# Patient Record
Sex: FEMALE | Race: WHITE | NOT HISPANIC OR LATINO | Employment: UNEMPLOYED | URBAN - NONMETROPOLITAN AREA
[De-identification: names, ages, dates, MRNs, and addresses within clinical notes are randomized per-mention and may not be internally consistent; named-entity substitution may affect disease eponyms.]

---

## 2017-01-01 ENCOUNTER — HOSPITAL ENCOUNTER (OUTPATIENT)
Dept: PHYSICAL THERAPY | Facility: HOSPITAL | Age: 37
Setting detail: THERAPIES SERIES
Discharge: HOME | End: 2017-01-20
Attending: NURSE PRACTITIONER | Admitting: NURSE PRACTITIONER

## 2017-01-04 ENCOUNTER — TRANSCRIBE ORDERS (OUTPATIENT)
Dept: ADMINISTRATIVE | Facility: HOSPITAL | Age: 37
End: 2017-01-04

## 2017-01-04 DIAGNOSIS — M54.17 RADICULOPATHY OF LUMBOSACRAL REGION: ICD-10-CM

## 2017-01-04 DIAGNOSIS — M54.50 LOW BACK PAIN POTENTIALLY ASSOCIATED WITH RADICULOPATHY: Primary | ICD-10-CM

## 2017-01-05 ENCOUNTER — HOSPITAL ENCOUNTER (OUTPATIENT)
Dept: MRI IMAGING | Facility: HOSPITAL | Age: 37
Discharge: HOME | End: 2017-01-05
Admitting: NURSE PRACTITIONER

## 2017-01-05 DIAGNOSIS — M54.50 LOW BACK PAIN POTENTIALLY ASSOCIATED WITH RADICULOPATHY: ICD-10-CM

## 2017-01-05 DIAGNOSIS — M54.17 RADICULOPATHY OF LUMBOSACRAL REGION: ICD-10-CM

## 2017-01-05 PROCEDURE — 72148 MRI LUMBAR SPINE W/O DYE: CPT

## 2017-01-18 ENCOUNTER — OFFICE VISIT (OUTPATIENT)
Dept: NEUROSURGERY | Facility: CLINIC | Age: 37
End: 2017-01-18

## 2017-01-18 VITALS — BODY MASS INDEX: 41.99 KG/M2 | HEIGHT: 65 IN | WEIGHT: 252 LBS

## 2017-01-18 DIAGNOSIS — M54.17 LUMBOSACRAL NEURITIS: ICD-10-CM

## 2017-01-18 DIAGNOSIS — E66.01 MORBID OBESITY DUE TO EXCESS CALORIES (HCC): ICD-10-CM

## 2017-01-18 DIAGNOSIS — M51.36 DEGENERATION OF LUMBAR INTERVERTEBRAL DISC: Primary | ICD-10-CM

## 2017-01-18 DIAGNOSIS — F17.210 CIGARETTE SMOKER: ICD-10-CM

## 2017-01-18 PROCEDURE — 99204 OFFICE O/P NEW MOD 45 MIN: CPT | Performed by: NURSE PRACTITIONER

## 2017-01-18 RX ORDER — TRAMADOL HYDROCHLORIDE 50 MG/1
50 TABLET ORAL 2 TIMES DAILY
COMMUNITY
End: 2017-11-06

## 2017-01-18 RX ORDER — OXYCODONE AND ACETAMINOPHEN 10; 325 MG/1; MG/1
1 TABLET ORAL 3 TIMES DAILY
Qty: 90 TABLET | Refills: 0 | Status: SHIPPED | OUTPATIENT
Start: 2017-01-18 | End: 2017-02-17

## 2017-01-18 RX ORDER — PREGABALIN 100 MG/1
100 CAPSULE ORAL 2 TIMES DAILY
COMMUNITY

## 2017-01-18 RX ORDER — TIZANIDINE 4 MG/1
4 TABLET ORAL 2 TIMES DAILY
COMMUNITY
End: 2017-11-06

## 2017-01-18 NOTE — PROGRESS NOTES
Neurosurgery Initial Patient Visit    Patient: Merari Bradshaw  : 1980    Primary Care Provider: LETY Shelby    Requesting Provider:  LETY Shelby      History    Chief Complaint:   Chief Complaint   Patient presents with   • Back Pain     Noticed having back pain and leg numbness while pregnant, to the point of even having falls while pregnant. After delivery, she would get better. Within the past 8 months she has noticed a severe increase in pain, to the point she is not able to stand for longer than 5-10 minutes. Legs alternate with numbness and weakness. Has recently tried PT with no improvement.       History of Present Illness: She is a 37-year-old  female who presents with chief complaint of pain in hips and lower back, pain in legs.  She is referred by LETY Mckeon, and we have been asked to see the patient in consultation for further evaluation.    She has had problems with her low back since around , during pregnancy.  She states that over the years this is gradually become worse.  She does not give history of any specific injury otherwise.  During her pregnancy she would often have episodes where her right leg would become completely numb, resulting in giving way with her and falling.  She has low back pain and pain that she feels in both of the hips.  She then has pain that radiates down the legs more mesial Rudy and posteriorly, reaching down into the foot on the left.  She has intermittent numbness and tingling more with activity, and the symptoms are all worse in the left leg.  As far as weakness she feels that both legs are about the same.    She has had 2 different sessions of physical therapy recently, probably a couple of months in total.  This even included pool therapy to ensure that she was not having an exacerbation of fibromyalgia.  She unfortunately did not get any lasting relief.  She chronically takes medication for her fibromyalgia and more  "recently has been prescribed tramadol for her back and leg pain.  She states that she is at the point that she can barely go and that she cannot even do more simple activities such as housekeeping.  She has been employed as a  and has not been able to work now in a while.  She has had a lumbar MRI here at [facility] for review.    Allergies:   Adhesive tape  She does not tolerate Neurontin, which makes her \"loopy\", and has had GI upset from Lortab in the past.    Past Medical History:    Past Medical History   Diagnosis Date   • Acid reflux    • Basal cell carcinoma of breast    • Fibrocystic breast    • Fibromyalgia        Past Surgical History:   Past Surgical History   Procedure Laterality Date   • Hysterectomy     •  section     • Cholecystectomy     • Hx ovarian cystectomy     • Exploratory laparotomy         Medications:    Current Outpatient Prescriptions on File Prior to Visit   Medication Sig Dispense Refill   • omeprazole (priLOSEC) 20 MG capsule Take 20 mg by mouth Daily.     • VITAMIN D, ERGOCALCIFEROL, PO Take 1 tablet by mouth Daily.     • [DISCONTINUED] pregabalin (LYRICA) 75 MG capsule Take 75 mg by mouth 3 (Three) Times a Day.       No current facility-administered medications on file prior to visit.         Social History:   reports that she has been smoking Cigarettes.  She has been smoking about 0.50 packs per day. She has never used smokeless tobacco. She reports that she does not drink alcohol or use illicit drugs.    Family History:    Family History   Problem Relation Age of Onset   • Coronary artery disease Father    • Hypertension Father    • Diabetes Mother    • Hypertension Mother    • Deep vein thrombosis Paternal Grandmother    • Pulmonary embolism Paternal Grandmother    • Throat cancer Maternal Grandfather        Review of Systems:  Review of Systems   Constitutional: Negative for chills, fever and unexpected weight change.   HENT: Negative for congestion, hearing loss, " rhinorrhea, sore throat and tinnitus.    Eyes: Negative for photophobia and visual disturbance.        Wears glasses   Respiratory: Negative for cough, shortness of breath and wheezing.    Cardiovascular: Negative for chest pain and palpitations.   Gastrointestinal: Negative for constipation, diarrhea, nausea and vomiting.   Endocrine: Positive for cold intolerance and heat intolerance.   Genitourinary: Negative for difficulty urinating.        She describes having some dribbling ever since her hysterectomy.   Musculoskeletal: Positive for back pain. Negative for arthralgias, gait problem and neck pain.        She has fibromyalgia.   Skin: Negative for rash.        She has polycystic breast disease and has had removal of basal cell carcinoma from the left breast.   Allergic/Immunologic: Negative for environmental allergies.   Neurological: Positive for headaches. Negative for seizures and numbness.   Hematological: Does not bruise/bleed easily.   Psychiatric/Behavioral: Negative for confusion. The patient is not nervous/anxious.    All other systems reviewed and are negative.          Neurological Physical Examination    Physical Exam   Constitutional: She is oriented to person, place, and time. She appears well-developed. No distress.   She is over nourished.  She appears to be in quite a bit of pain and discomfort.  She moves about very slowly with antalgic gait.   HENT:   Head: Normocephalic and atraumatic.   Eyes: No scleral icterus.   Neck: Neck supple. No tracheal deviation and normal range of motion present.   Cardiovascular: Normal rate, regular rhythm and normal heart sounds.    No murmur heard.  Pulmonary/Chest: Effort normal and breath sounds normal. No respiratory distress. She has no wheezes.   Musculoskeletal:   She seems very comfortable and moves about and changes positions very slowly.  Her gait is very antalgic.  Lumbar mobility is quite limited.  Straight leg raising is positive bilaterally, more  so on the left and at about 30-45°.  She has generalized lower extremity weakness, more on the left.  She is unable to attempt testing of all muscle groups.  Deep tendon reflexes a good 2+ patellar, 1-2+ at both ankles.   Neurological: She is alert and oriented to person, place, and time. She displays normal reflexes. No cranial nerve deficit.   Optic disks not visualized.   Skin: Skin is warm and dry. No rash noted.   Psychiatric: She has a normal mood and affect. Her speech is normal and behavior is normal. Cognition and memory are normal.   Vitals reviewed.         Medical Decision Making    Management Options:  In the office, I have initially reviewed her MRI done here at [facility].  The primary finding is disc degeneration and bulging at L4 5.  This results in bilateral foraminal narrowing, which is more to the left.  It is not clear that this results in a definite impingement that clearly correlates with her presenting symptoms, complaints, and findings on exam.    I have reviewed this with her and have also asked for Dr. Mondragon to personally review the imaging.  It is not felt that there is a clear surgical finding and at this time surgery is not recommended.  We would proceed with EMG nerve conduction testing of the left leg.  Also, after some discussion the patient is agreeable to referral in to pain management, and requests Dr. Cowart.  In review of her medications, I think it is reasonable to try increasing Lyrica.  She states that she has some 75 mg tablets at home, and she will try adding one of those twice a day to see if that dose can be tolerated.  If so, she can probably be increased to 200 mg twice a day.  She feels that she needs something stronger for pain but is concerned if she would be able to tolerate Norco.  We will give her a prescription for Percocet to try to help get her present pain level under control.  We will see her back in 2-3 months to review the nerve conduction testing and hopefully  at that point she will be establish with pain management.  She understands all this and is agreeable.    Merari was seen today for back pain.    Diagnoses and all orders for this visit:    Degeneration of lumbar intervertebral disc    Lumbosacral neuritis    Morbid obesity due to excess calories    Cigarette smoker        Thank you, LETY Shelby, for this Consultation and the opportunity to participate in the care of this pleasant patient.

## 2017-01-18 NOTE — MR AVS SNAPSHOT
Merari Bradshaw   1/18/2017 2:15 PM   Office Visit    Dept Phone:  [phone]   Encounter #:  [id number]    Provider:  LETY Stewart   Department:  [facility] NEUROSURGERY                Your Full Care Plan              Today's Medication Changes          These changes are accurate as of: 1/18/17  3:17 PM.  If you have any questions, ask your nurse or doctor.               New Medication(s)Ordered:     oxyCODONE-acetaminophen  MG per tablet   Commonly known as:  PERCOCET   Take 1 tablet by mouth 3 (Three) Times a Day for 30 days.   Started by:  LETY Stewart         Medication(s)that have changed:     pregabalin 100 MG capsule   Commonly known as:  LYRICA   Take 100 mg by mouth 2 (Two) Times a Day.   What changed:  Another medication with the same name was removed. Continue taking this medication, and follow the directions you see here.   Changed by:  LETY Stewart            Where to Get Your Medications      You can get these medications from any pharmacy     Bring a paper prescription for each of these medications     oxyCODONE-acetaminophen  MG per tablet                  Your Updated Medication List          This list is accurate as of: 1/18/17  3:17 PM.  Always use your most recent med list.                omeprazole 20 MG capsule   Commonly known as:  priLOSEC       oxyCODONE-acetaminophen  MG per tablet   Commonly known as:  PERCOCET   Take 1 tablet by mouth 3 (Three) Times a Day for 30 days.       pregabalin 100 MG capsule   Commonly known as:  LYRICA       tiZANidine 4 MG tablet   Commonly known as:  ZANAFLEX       traMADol 50 MG tablet   Commonly known as:  ULTRAM       VITAMIN D (ERGOCALCIFEROL) PO               We Performed the Following     Ambulatory Referral to Pain Management       You Were Diagnosed With        Codes Comments    Degeneration of lumbar intervertebral disc    -  Primary ICD-10-CM: M51.36  ICD-9-CM:  722.52     Lumbosacral neuritis     ICD-10-CM: M54.17  ICD-9-CM: 724.4     Morbid obesity due to excess calories     ICD-10-CM: E66.01  ICD-9-CM: 278.01     Cigarette smoker     ICD-10-CM: F17.210  ICD-9-CM: 305.1       Instructions     None    Patient Instructions History      Upcoming Appointments     Visit Type Date Time Department    NEW PATIENT 2017  2:15 PM [facility]OS[facility]    FOLLOW UP 2017 10:30 AM [facility]      MyChart Signup     [facility] [employer] allows you to send messages to your doctor, view your test results, renew your prescriptions, schedule appointments, and more. To sign up, go to [facility] and click on the Sign Up Now link in the New User? box. Enter your [employer] Activation Code exactly as it appears below along with the last four digits of your Social Security Number and your Date of Birth () to complete the sign-up process. If you do not sign up before the expiration date, you must request a new code.    [employer] Activation Code: [email or web address]  Expires: 2017  3:15 PM    If you have questions, you can email [email or web address] or call [phone] to talk to our [employer] staff. Remember, [employer] is NOT to be used for urgent needs. For medical emergencies, dial 911.               Other Info from Your Visit           Your Appointments     2017 10:30 AM CDT   Follow Up with Vicente Mondragon MD   [facility] (--)    [address] [place] [address] [address]  [facility] [address] [phone]           Arrive 15 minutes prior to appointment.              Allergies     Adhesive Tape  Rash      Reason for Visit     Back Pain Noticed having back pain and leg numbness while pregnant, to the point of even having falls while pregnant. After delivery, she would get better. Within the past 8 months she has noticed a severe increase in pain, to the point she is not able to stand for longer than 5-10 minutes.  "Legs alternate with numbness and weakness. Has recently tried PT with no improvement.      Vital Signs     Height Weight Body Mass Index Smoking Status          65\" (165.1 cm) 252 lb (114 kg) 41.93 kg/m2 Current Every Day Smoker        Problems and Diagnoses Noted     Cigarette smoker    Degeneration of lumbar or lumbosacral intervertebral disc    Lumbosacral neuritis    Severe obesity        "

## 2017-01-27 ENCOUNTER — HOSPITAL ENCOUNTER (OUTPATIENT)
Dept: NEUROLOGY | Age: 37
Discharge: HOME | End: 2017-01-27
Payer: MEDICAID

## 2017-01-27 PROCEDURE — 95886 MUSC TEST DONE W/N TEST COMP: CPT

## 2017-01-27 PROCEDURE — 95908 NRV CNDJ TST 3-4 STUDIES: CPT

## 2017-01-27 PROCEDURE — 95886 MUSC TEST DONE W/N TEST COMP: CPT | Performed by: PSYCHIATRY & NEUROLOGY

## 2017-01-27 PROCEDURE — 95908 NRV CNDJ TST 3-4 STUDIES: CPT | Performed by: PSYCHIATRY & NEUROLOGY

## 2017-05-16 ENCOUNTER — OFFICE VISIT (OUTPATIENT)
Dept: PRIMARY CARE CLINIC | Age: 37
End: 2017-05-16
Payer: MEDICAID

## 2017-05-16 VITALS
OXYGEN SATURATION: 98 % | DIASTOLIC BLOOD PRESSURE: 86 MMHG | HEART RATE: 74 BPM | BODY MASS INDEX: 40.02 KG/M2 | SYSTOLIC BLOOD PRESSURE: 128 MMHG | TEMPERATURE: 98.2 F | WEIGHT: 249 LBS | HEIGHT: 66 IN

## 2017-05-16 DIAGNOSIS — Z00.00 ROUTINE PHYSICAL EXAMINATION: Primary | ICD-10-CM

## 2017-05-16 DIAGNOSIS — Z72.0 TOBACCO USE: ICD-10-CM

## 2017-05-16 DIAGNOSIS — F41.1 GAD (GENERALIZED ANXIETY DISORDER): ICD-10-CM

## 2017-05-16 DIAGNOSIS — M79.7 FIBROMYALGIA: ICD-10-CM

## 2017-05-16 DIAGNOSIS — E55.9 VITAMIN D DEFICIENCY: ICD-10-CM

## 2017-05-16 DIAGNOSIS — E66.01 MORBID OBESITY DUE TO EXCESS CALORIES (HCC): ICD-10-CM

## 2017-05-16 PROCEDURE — 99395 PREV VISIT EST AGE 18-39: CPT | Performed by: NURSE PRACTITIONER

## 2017-05-16 RX ORDER — ERGOCALCIFEROL 1.25 MG/1
1 CAPSULE ORAL
COMMUNITY
Start: 2017-02-21 | End: 2018-07-19

## 2017-05-16 RX ORDER — VARENICLINE TARTRATE 25 MG
KIT ORAL
Qty: 1 EACH | Refills: 0 | Status: SHIPPED | OUTPATIENT
Start: 2017-05-16 | End: 2017-05-31 | Stop reason: SDUPTHER

## 2017-05-16 RX ORDER — BUSPIRONE HYDROCHLORIDE 7.5 MG/1
7.5 TABLET ORAL 2 TIMES DAILY
Qty: 60 TABLET | Refills: 5 | Status: SHIPPED | OUTPATIENT
Start: 2017-05-16 | End: 2017-07-26 | Stop reason: SDUPTHER

## 2017-05-16 RX ORDER — TRAMADOL HYDROCHLORIDE 50 MG/1
50 TABLET ORAL EVERY 6 HOURS PRN
COMMUNITY
End: 2017-06-21

## 2017-05-16 RX ORDER — OMEPRAZOLE 20 MG/1
1 CAPSULE, DELAYED RELEASE ORAL DAILY
COMMUNITY
Start: 2017-04-26 | End: 2017-08-29 | Stop reason: SDUPTHER

## 2017-05-16 RX ORDER — VARENICLINE TARTRATE 1 MG/1
1 TABLET, FILM COATED ORAL 2 TIMES DAILY
Qty: 60 TABLET | Refills: 3 | Status: SHIPPED | OUTPATIENT
Start: 2017-05-16 | End: 2017-05-31 | Stop reason: SDUPTHER

## 2017-05-16 RX ORDER — VITAMIN E 268 MG
400 CAPSULE ORAL 2 TIMES DAILY
Status: ON HOLD | COMMUNITY
End: 2017-10-01 | Stop reason: ALTCHOICE

## 2017-05-16 ASSESSMENT — ENCOUNTER SYMPTOMS
TROUBLE SWALLOWING: 0
COUGH: 0
VOMITING: 0
ABDOMINAL PAIN: 0
DIARRHEA: 0
SORE THROAT: 0
CONSTIPATION: 0
RHINORRHEA: 0
NAUSEA: 0
SHORTNESS OF BREATH: 0
SINUS PRESSURE: 0

## 2017-05-17 ENCOUNTER — TELEPHONE (OUTPATIENT)
Dept: PRIMARY CARE CLINIC | Age: 37
End: 2017-05-17

## 2017-05-19 DIAGNOSIS — Z00.00 ROUTINE PHYSICAL EXAMINATION: ICD-10-CM

## 2017-05-19 LAB
ALBUMIN SERPL-MCNC: 4.1 G/DL (ref 3.5–5.2)
ALP BLD-CCNC: 104 U/L (ref 35–104)
ALT SERPL-CCNC: 23 U/L (ref 5–33)
ANION GAP SERPL CALCULATED.3IONS-SCNC: 13 MMOL/L (ref 7–19)
AST SERPL-CCNC: 35 U/L (ref 5–32)
BASOPHILS ABSOLUTE: 0.1 K/UL (ref 0–0.2)
BASOPHILS RELATIVE PERCENT: 0.8 % (ref 0–1)
BILIRUB SERPL-MCNC: 0.3 MG/DL (ref 0.2–1.2)
BUN BLDV-MCNC: 10 MG/DL (ref 6–20)
CALCIUM SERPL-MCNC: 9.4 MG/DL (ref 8.6–10)
CHLORIDE BLD-SCNC: 104 MMOL/L (ref 98–111)
CHOLESTEROL, TOTAL: 200 MG/DL (ref 160–199)
CO2: 23 MMOL/L (ref 22–29)
CREAT SERPL-MCNC: 0.8 MG/DL (ref 0.5–0.9)
EOSINOPHILS ABSOLUTE: 0.1 K/UL (ref 0–0.6)
EOSINOPHILS RELATIVE PERCENT: 1 % (ref 0–5)
GFR NON-AFRICAN AMERICAN: >60
GLUCOSE BLD-MCNC: 90 MG/DL (ref 74–109)
HCT VFR BLD CALC: 46.4 % (ref 37–47)
HDLC SERPL-MCNC: 42 MG/DL (ref 65–121)
HEMOGLOBIN: 14.9 G/DL (ref 12–16)
LDL CHOLESTEROL CALCULATED: 133 MG/DL
LYMPHOCYTES ABSOLUTE: 3.8 K/UL (ref 1.1–4.5)
LYMPHOCYTES RELATIVE PERCENT: 38.9 % (ref 20–40)
MCH RBC QN AUTO: 29.9 PG (ref 27–31)
MCHC RBC AUTO-ENTMCNC: 32.1 G/DL (ref 33–37)
MCV RBC AUTO: 93.2 FL (ref 81–99)
MONOCYTES ABSOLUTE: 0.5 K/UL (ref 0–0.9)
MONOCYTES RELATIVE PERCENT: 5.2 % (ref 0–10)
NEUTROPHILS ABSOLUTE: 5.3 K/UL (ref 1.5–7.5)
NEUTROPHILS RELATIVE PERCENT: 53.9 % (ref 50–65)
PDW BLD-RTO: 13.3 % (ref 11.5–14.5)
PLATELET # BLD: 277 K/UL (ref 130–400)
PMV BLD AUTO: 11.9 FL (ref 7.4–10.4)
POTASSIUM SERPL-SCNC: 4.5 MMOL/L (ref 3.5–5)
RBC # BLD: 4.98 M/UL (ref 4.2–5.4)
SODIUM BLD-SCNC: 140 MMOL/L (ref 136–145)
T4 FREE: 1 NG/ML (ref 0.9–1.7)
TOTAL PROTEIN: 7.4 G/DL (ref 6.6–8.7)
TRIGL SERPL-MCNC: 126 MG/DL (ref 150–199)
TSH SERPL DL<=0.05 MIU/L-ACNC: 2.04 UIU/ML (ref 0.27–4.2)
WBC # BLD: 9.9 K/UL (ref 4.8–10.8)

## 2017-05-23 ENCOUNTER — TELEPHONE (OUTPATIENT)
Dept: PRIMARY CARE CLINIC | Age: 37
End: 2017-05-23

## 2017-05-31 ENCOUNTER — TELEPHONE (OUTPATIENT)
Dept: PRIMARY CARE CLINIC | Age: 37
End: 2017-05-31

## 2017-05-31 DIAGNOSIS — Z72.0 TOBACCO USE: ICD-10-CM

## 2017-05-31 RX ORDER — VARENICLINE TARTRATE 25 MG
KIT ORAL
Qty: 1 EACH | Refills: 0 | Status: SHIPPED | OUTPATIENT
Start: 2017-05-31 | End: 2017-06-21

## 2017-05-31 RX ORDER — VARENICLINE TARTRATE 1 MG/1
1 TABLET, FILM COATED ORAL 2 TIMES DAILY
Qty: 60 TABLET | Refills: 3 | Status: SHIPPED | OUTPATIENT
Start: 2017-05-31 | End: 2017-07-26

## 2017-06-12 ENCOUNTER — OFFICE VISIT (OUTPATIENT)
Dept: NEUROSURGERY | Facility: CLINIC | Age: 37
End: 2017-06-12

## 2017-06-12 VITALS
SYSTOLIC BLOOD PRESSURE: 120 MMHG | HEIGHT: 65 IN | WEIGHT: 256 LBS | DIASTOLIC BLOOD PRESSURE: 78 MMHG | BODY MASS INDEX: 42.65 KG/M2

## 2017-06-12 DIAGNOSIS — E66.01 MORBID OBESITY DUE TO EXCESS CALORIES (HCC): ICD-10-CM

## 2017-06-12 DIAGNOSIS — M51.36 DEGENERATION OF LUMBAR INTERVERTEBRAL DISC: Primary | ICD-10-CM

## 2017-06-12 DIAGNOSIS — F17.210 CIGARETTE SMOKER: ICD-10-CM

## 2017-06-12 DIAGNOSIS — M54.17 LUMBOSACRAL NEURITIS: ICD-10-CM

## 2017-06-12 PROCEDURE — 99213 OFFICE O/P EST LOW 20 MIN: CPT | Performed by: NEUROLOGICAL SURGERY

## 2017-06-12 RX ORDER — VARENICLINE TARTRATE 1 MG/1
TABLET, FILM COATED ORAL
COMMUNITY
Start: 2017-05-31 | End: 2017-10-09

## 2017-06-12 RX ORDER — VITAMIN E 268 MG
CAPSULE ORAL
COMMUNITY
End: 2017-10-09

## 2017-06-12 RX ORDER — BUSPIRONE HYDROCHLORIDE 7.5 MG/1
7.5 TABLET ORAL 2 TIMES DAILY
COMMUNITY
Start: 2017-05-16 | End: 2018-04-30

## 2017-06-12 RX ORDER — PREGABALIN 100 MG/1
CAPSULE ORAL
COMMUNITY
Start: 2017-04-26 | End: 2017-10-09

## 2017-06-12 NOTE — PROGRESS NOTES
Patient ID: Merari Bradshaw is a 37 y.o. female here today for []follow-up for chronic low back pain    HPI:  []She has had chronic pain.  Currently following a pregnancy.  She is in the past had some radicular pain on the left.  She has pain across her lower back and buttocks.  No recent event or injury.  Some numbness towards the left foot.  She has been to pain clinic with Dr. LILY Quarles.  The patient is happy with her.  She has had some injections that have helped.  She no longer describes a radicular pain.  Add an EMG nerve conduction carried out is normal.  I have reviewed previous MRI of lumbar spine.  There is no surgical lesion.  Minimal degenerative disc disease.    The following portions of the patient's history were reviewed and updated as appropriate: allergies, current medications, past family history, past medical history, past social history, past surgical history and problem list.    Review of Systems   Constitutional: Negative.    Eyes: Negative.    Respiratory: Negative.    Cardiovascular: Negative.    Gastrointestinal: Negative.    Endocrine: Negative.    Genitourinary: Negative.    Musculoskeletal: Positive for back pain.   Skin: Negative.    Allergic/Immunologic: Negative.    Neurological: Positive for headaches.   Hematological: Negative.    Psychiatric/Behavioral: Negative.    All other systems reviewed and are negative.      Past Medical History:   Diagnosis Date   • Acid reflux    • Basal cell carcinoma of breast    • Fibrocystic breast    • Fibromyalgia        Family History   Problem Relation Age of Onset   • Coronary artery disease Father    • Hypertension Father    • Diabetes Mother    • Hypertension Mother    • Deep vein thrombosis Paternal Grandmother    • Pulmonary embolism Paternal Grandmother    • Throat cancer Maternal Grandfather        Social History   Substance Use Topics   • Smoking status: Current Every Day Smoker     Packs/day: 0.50     Types: Cigarettes   •  Smokeless tobacco: Never Used   • Alcohol use No       Past Surgical History:   Procedure Laterality Date   •  SECTION     • CHOLECYSTECTOMY     • EXPLORATORY LAPAROTOMY     • HX OVARIAN CYSTECTOMY     • HYSTERECTOMY         Adhesive tape      Physical Exam:  []Ache alert oriented properly.  Gait is normal.  Reasonable forward flexion.  Straight leg raising 90° on either side.  Chest clear heart regular.  Abdomen slightly obese soft nontender.  She has cranial nerves intact.  Good equal strength.  Reflexes 0-1+ equal.  Complains of some numbness left foot.  Cerebellar intact.    Review of Radiographic Studies:  []MRI lumbar spine reviewed    Medical Decision Making:  []She has chronic pain there is no surgical lesion.  She should continue treatment with Dr. Quarles with whom she is very pleased    1. Degeneration of lumbar intervertebral disc    2. Lumbosacral neuritis    3. Cigarette smoker    4. Morbid obesity due to excess calories         Merari was seen today for back pain and leg pain.    Diagnoses and all orders for this visit:    Degeneration of lumbar intervertebral disc    Lumbosacral neuritis    Cigarette smoker    Morbid obesity due to excess calories        Return if symptoms worsen or fail to improve.

## 2017-06-21 ENCOUNTER — OFFICE VISIT (OUTPATIENT)
Dept: PRIMARY CARE CLINIC | Age: 37
End: 2017-06-21
Payer: MEDICAID

## 2017-06-21 VITALS
TEMPERATURE: 97.3 F | HEIGHT: 66 IN | HEART RATE: 71 BPM | DIASTOLIC BLOOD PRESSURE: 78 MMHG | SYSTOLIC BLOOD PRESSURE: 118 MMHG | BODY MASS INDEX: 40.18 KG/M2 | WEIGHT: 250 LBS | OXYGEN SATURATION: 96 %

## 2017-06-21 DIAGNOSIS — R60.9 PERIPHERAL EDEMA: ICD-10-CM

## 2017-06-21 DIAGNOSIS — R13.10 DYSPHAGIA, UNSPECIFIED TYPE: ICD-10-CM

## 2017-06-21 DIAGNOSIS — K21.9 GASTROESOPHAGEAL REFLUX DISEASE, ESOPHAGITIS PRESENCE NOT SPECIFIED: ICD-10-CM

## 2017-06-21 DIAGNOSIS — J01.90 ACUTE SINUSITIS, RECURRENCE NOT SPECIFIED, UNSPECIFIED LOCATION: Primary | ICD-10-CM

## 2017-06-21 PROCEDURE — 99213 OFFICE O/P EST LOW 20 MIN: CPT | Performed by: NURSE PRACTITIONER

## 2017-06-21 RX ORDER — AMOXICILLIN AND CLAVULANATE POTASSIUM 875; 125 MG/1; MG/1
1 TABLET, FILM COATED ORAL 2 TIMES DAILY
Qty: 20 TABLET | Refills: 0 | Status: SHIPPED | OUTPATIENT
Start: 2017-06-21 | End: 2017-07-01

## 2017-06-21 ASSESSMENT — ENCOUNTER SYMPTOMS
ABDOMINAL PAIN: 0
NAUSEA: 0
TROUBLE SWALLOWING: 1
RHINORRHEA: 0
COUGH: 0
CONSTIPATION: 0
SINUS PRESSURE: 0
DIARRHEA: 0
VOMITING: 0
SHORTNESS OF BREATH: 0
SORE THROAT: 0

## 2017-06-30 ENCOUNTER — OFFICE VISIT (OUTPATIENT)
Dept: GASTROENTEROLOGY | Age: 37
End: 2017-06-30
Payer: MEDICAID

## 2017-06-30 VITALS
SYSTOLIC BLOOD PRESSURE: 120 MMHG | DIASTOLIC BLOOD PRESSURE: 82 MMHG | HEIGHT: 66 IN | WEIGHT: 247.8 LBS | HEART RATE: 70 BPM | OXYGEN SATURATION: 98 % | BODY MASS INDEX: 39.82 KG/M2

## 2017-06-30 DIAGNOSIS — R13.10 DYSPHAGIA, UNSPECIFIED TYPE: ICD-10-CM

## 2017-06-30 DIAGNOSIS — Z80.0 FAMILY HISTORY OF ESOPHAGEAL CANCER: ICD-10-CM

## 2017-06-30 DIAGNOSIS — Z80.0 FAMILY HISTORY OF COLON CANCER: ICD-10-CM

## 2017-06-30 DIAGNOSIS — R12 CHRONIC HEARTBURN: Primary | ICD-10-CM

## 2017-06-30 PROCEDURE — 99214 OFFICE O/P EST MOD 30 MIN: CPT | Performed by: NURSE PRACTITIONER

## 2017-06-30 RX ORDER — RANITIDINE 150 MG/1
150 TABLET ORAL NIGHTLY
Qty: 30 TABLET | Refills: 11 | Status: SHIPPED | OUTPATIENT
Start: 2017-06-30 | End: 2017-11-17

## 2017-06-30 ASSESSMENT — ENCOUNTER SYMPTOMS
DIARRHEA: 1
ABDOMINAL PAIN: 0
CHOKING: 0
BACK PAIN: 1
VOMITING: 0
TROUBLE SWALLOWING: 1
ANAL BLEEDING: 0
RECTAL PAIN: 0
COUGH: 0
CONSTIPATION: 1
NAUSEA: 0
PHOTOPHOBIA: 0
WHEEZING: 0
BLOOD IN STOOL: 0
ABDOMINAL DISTENTION: 0
SORE THROAT: 0

## 2017-07-05 ENCOUNTER — HOSPITAL ENCOUNTER (OUTPATIENT)
Age: 37
Setting detail: OUTPATIENT SURGERY
Discharge: HOME | End: 2017-07-05
Attending: INTERNAL MEDICINE | Admitting: INTERNAL MEDICINE
Payer: MEDICAID

## 2017-07-05 ENCOUNTER — ANESTHESIA (OUTPATIENT)
Dept: OPERATING ROOM | Age: 37
End: 2017-07-05
Payer: MEDICAID

## 2017-07-05 ENCOUNTER — HOSPITAL ENCOUNTER (OUTPATIENT)
Age: 37
Setting detail: SPECIMEN
Discharge: HOME | End: 2017-07-05
Payer: MEDICAID

## 2017-07-05 ENCOUNTER — ANESTHESIA EVENT (OUTPATIENT)
Dept: OPERATING ROOM | Age: 37
End: 2017-07-05

## 2017-07-05 VITALS — OXYGEN SATURATION: 92 % | SYSTOLIC BLOOD PRESSURE: 112 MMHG | DIASTOLIC BLOOD PRESSURE: 73 MMHG

## 2017-07-05 VITALS
RESPIRATION RATE: 18 BRPM | HEIGHT: 66 IN | OXYGEN SATURATION: 95 % | SYSTOLIC BLOOD PRESSURE: 123 MMHG | HEART RATE: 70 BPM | TEMPERATURE: 97 F | BODY MASS INDEX: 39.7 KG/M2 | DIASTOLIC BLOOD PRESSURE: 77 MMHG | WEIGHT: 247 LBS

## 2017-07-05 PROBLEM — R13.19 ESOPHAGEAL DYSPHAGIA: Status: ACTIVE | Noted: 2017-06-30

## 2017-07-05 PROCEDURE — G8918 PT W/O PREOP ORDER IV AB PRO: HCPCS

## 2017-07-05 PROCEDURE — 00740 PR ANESTH,UGI ENDOSCOPY: CPT | Performed by: NURSE ANESTHETIST, CERTIFIED REGISTERED

## 2017-07-05 PROCEDURE — 43248 EGD GUIDE WIRE INSERTION: CPT | Performed by: INTERNAL MEDICINE

## 2017-07-05 PROCEDURE — G8907 PT DOC NO EVENTS ON DISCHARG: HCPCS

## 2017-07-05 PROCEDURE — 43239 EGD BIOPSY SINGLE/MULTIPLE: CPT

## 2017-07-05 PROCEDURE — 43248 EGD GUIDE WIRE INSERTION: CPT

## 2017-07-05 PROCEDURE — 87077 CULTURE AEROBIC IDENTIFY: CPT

## 2017-07-05 PROCEDURE — 43239 EGD BIOPSY SINGLE/MULTIPLE: CPT | Performed by: INTERNAL MEDICINE

## 2017-07-05 RX ORDER — PROPOFOL 10 MG/ML
INJECTION, EMULSION INTRAVENOUS PRN
Status: DISCONTINUED | OUTPATIENT
Start: 2017-07-05 | End: 2017-07-05 | Stop reason: SDUPTHER

## 2017-07-05 RX ORDER — SODIUM CHLORIDE, SODIUM LACTATE, POTASSIUM CHLORIDE, CALCIUM CHLORIDE 600; 310; 30; 20 MG/100ML; MG/100ML; MG/100ML; MG/100ML
INJECTION, SOLUTION INTRAVENOUS CONTINUOUS
Status: DISCONTINUED | OUTPATIENT
Start: 2017-07-05 | End: 2017-07-05 | Stop reason: HOSPADM

## 2017-07-05 RX ADMIN — SODIUM CHLORIDE, SODIUM LACTATE, POTASSIUM CHLORIDE, CALCIUM CHLORIDE: 600; 310; 30; 20 INJECTION, SOLUTION INTRAVENOUS at 08:08

## 2017-07-05 RX ADMIN — PROPOFOL 150 MG: 10 INJECTION, EMULSION INTRAVENOUS at 08:57

## 2017-07-06 LAB — CLOTEST: NEGATIVE

## 2017-07-26 ENCOUNTER — HOSPITAL ENCOUNTER (OUTPATIENT)
Dept: GENERAL RADIOLOGY | Age: 37
Discharge: HOME | End: 2017-07-26
Payer: MEDICAID

## 2017-07-26 ENCOUNTER — OFFICE VISIT (OUTPATIENT)
Dept: PRIMARY CARE CLINIC | Age: 37
End: 2017-07-26
Payer: MEDICAID

## 2017-07-26 ENCOUNTER — TELEPHONE (OUTPATIENT)
Dept: PRIMARY CARE CLINIC | Age: 37
End: 2017-07-26

## 2017-07-26 VITALS
TEMPERATURE: 98.4 F | DIASTOLIC BLOOD PRESSURE: 86 MMHG | SYSTOLIC BLOOD PRESSURE: 136 MMHG | HEART RATE: 71 BPM | WEIGHT: 245.5 LBS | HEIGHT: 66 IN | BODY MASS INDEX: 39.46 KG/M2 | OXYGEN SATURATION: 97 %

## 2017-07-26 DIAGNOSIS — S91.332A PUNCTURE WOUND OF LEFT FOOT, INITIAL ENCOUNTER: ICD-10-CM

## 2017-07-26 DIAGNOSIS — F41.1 GAD (GENERALIZED ANXIETY DISORDER): ICD-10-CM

## 2017-07-26 DIAGNOSIS — S91.332A PUNCTURE WOUND OF LEFT FOOT, INITIAL ENCOUNTER: Primary | ICD-10-CM

## 2017-07-26 PROCEDURE — 90715 TDAP VACCINE 7 YRS/> IM: CPT | Performed by: NURSE PRACTITIONER

## 2017-07-26 PROCEDURE — 90471 IMMUNIZATION ADMIN: CPT | Performed by: NURSE PRACTITIONER

## 2017-07-26 PROCEDURE — 73630 X-RAY EXAM OF FOOT: CPT

## 2017-07-26 PROCEDURE — 99213 OFFICE O/P EST LOW 20 MIN: CPT | Performed by: NURSE PRACTITIONER

## 2017-07-26 RX ORDER — BUSPIRONE HYDROCHLORIDE 7.5 MG/1
7.5 TABLET ORAL 3 TIMES DAILY
Qty: 90 TABLET | Refills: 5 | Status: SHIPPED | OUTPATIENT
Start: 2017-07-26 | End: 2018-07-19

## 2017-07-26 ASSESSMENT — ENCOUNTER SYMPTOMS
TROUBLE SWALLOWING: 0
CONSTIPATION: 0
NAUSEA: 0
SORE THROAT: 0
SHORTNESS OF BREATH: 0
RHINORRHEA: 0
DIARRHEA: 0
SINUS PRESSURE: 0
VOMITING: 0
ABDOMINAL PAIN: 0
COUGH: 0

## 2017-08-29 RX ORDER — OMEPRAZOLE 20 MG/1
20 CAPSULE, DELAYED RELEASE ORAL DAILY
Qty: 30 CAPSULE | Refills: 11 | Status: SHIPPED | OUTPATIENT
Start: 2017-08-29 | End: 2018-03-14 | Stop reason: SDUPTHER

## 2017-09-01 ENCOUNTER — OFFICE VISIT (OUTPATIENT)
Dept: PRIMARY CARE CLINIC | Age: 37
End: 2017-09-01
Payer: MEDICAID

## 2017-09-01 VITALS
DIASTOLIC BLOOD PRESSURE: 80 MMHG | OXYGEN SATURATION: 98 % | HEIGHT: 66 IN | WEIGHT: 249 LBS | HEART RATE: 65 BPM | TEMPERATURE: 98.4 F | SYSTOLIC BLOOD PRESSURE: 130 MMHG | BODY MASS INDEX: 40.02 KG/M2

## 2017-09-01 DIAGNOSIS — K21.9 GASTROESOPHAGEAL REFLUX DISEASE, ESOPHAGITIS PRESENCE NOT SPECIFIED: ICD-10-CM

## 2017-09-01 DIAGNOSIS — E78.2 MIXED HYPERLIPIDEMIA: ICD-10-CM

## 2017-09-01 DIAGNOSIS — G56.03 BILATERAL CARPAL TUNNEL SYNDROME: Primary | ICD-10-CM

## 2017-09-01 DIAGNOSIS — Z90.710 S/P TOTAL HYSTERECTOMY: ICD-10-CM

## 2017-09-01 DIAGNOSIS — N95.1 MENOPAUSAL SYMPTOMS: ICD-10-CM

## 2017-09-01 DIAGNOSIS — Z80.3 FAMILY HISTORY OF BREAST CANCER: ICD-10-CM

## 2017-09-01 DIAGNOSIS — R06.83 SNORING: ICD-10-CM

## 2017-09-01 DIAGNOSIS — G47.30 SLEEP APNEA, UNSPECIFIED TYPE: ICD-10-CM

## 2017-09-01 DIAGNOSIS — E66.3 OVERWEIGHT: ICD-10-CM

## 2017-09-01 PROCEDURE — 99214 OFFICE O/P EST MOD 30 MIN: CPT | Performed by: NURSE PRACTITIONER

## 2017-09-01 ASSESSMENT — ENCOUNTER SYMPTOMS
COUGH: 0
RHINORRHEA: 0
DIARRHEA: 0
EYE REDNESS: 0
SORE THROAT: 0
VOMITING: 0
CONSTIPATION: 0
SHORTNESS OF BREATH: 0

## 2017-09-19 ENCOUNTER — HOSPITAL ENCOUNTER (OUTPATIENT)
Dept: NEUROLOGY | Age: 37
Discharge: HOME | End: 2017-09-19
Payer: MEDICAID

## 2017-09-19 PROCEDURE — 95911 NRV CNDJ TEST 9-10 STUDIES: CPT

## 2017-09-19 PROCEDURE — 95886 MUSC TEST DONE W/N TEST COMP: CPT | Performed by: PSYCHIATRY & NEUROLOGY

## 2017-09-19 PROCEDURE — 95886 MUSC TEST DONE W/N TEST COMP: CPT

## 2017-09-19 PROCEDURE — 95911 NRV CNDJ TEST 9-10 STUDIES: CPT | Performed by: PSYCHIATRY & NEUROLOGY

## 2017-09-27 ENCOUNTER — APPOINTMENT (OUTPATIENT)
Dept: CT IMAGING | Age: 37
End: 2017-09-27
Payer: MEDICAID

## 2017-09-27 ENCOUNTER — TELEPHONE (OUTPATIENT)
Dept: PRIMARY CARE CLINIC | Age: 37
End: 2017-09-27

## 2017-09-27 ENCOUNTER — HOSPITAL ENCOUNTER (EMERGENCY)
Age: 37
Discharge: HOME | End: 2017-09-27
Payer: MEDICAID

## 2017-09-27 VITALS
SYSTOLIC BLOOD PRESSURE: 129 MMHG | OXYGEN SATURATION: 96 % | RESPIRATION RATE: 20 BRPM | DIASTOLIC BLOOD PRESSURE: 84 MMHG | WEIGHT: 250 LBS | HEIGHT: 66 IN | TEMPERATURE: 98 F | BODY MASS INDEX: 40.18 KG/M2 | HEART RATE: 74 BPM

## 2017-09-27 DIAGNOSIS — M54.2 CERVICAL SPINE PAIN: ICD-10-CM

## 2017-09-27 DIAGNOSIS — M54.12 CERVICAL RADICULOPATHY: ICD-10-CM

## 2017-09-27 DIAGNOSIS — R20.2 NUMBNESS AND TINGLING OF RIGHT ARM: ICD-10-CM

## 2017-09-27 DIAGNOSIS — R20.0 NUMBNESS AND TINGLING IN BOTH HANDS: ICD-10-CM

## 2017-09-27 DIAGNOSIS — M48.02 NEURAL FORAMINAL STENOSIS OF CERVICAL SPINE: Primary | ICD-10-CM

## 2017-09-27 DIAGNOSIS — R20.2 NUMBNESS AND TINGLING IN BOTH HANDS: ICD-10-CM

## 2017-09-27 DIAGNOSIS — R51.9 ACUTE NONINTRACTABLE HEADACHE, UNSPECIFIED HEADACHE TYPE: Primary | ICD-10-CM

## 2017-09-27 DIAGNOSIS — R20.0 NUMBNESS AND TINGLING OF RIGHT ARM: ICD-10-CM

## 2017-09-27 PROCEDURE — 99283 EMERGENCY DEPT VISIT LOW MDM: CPT | Performed by: NURSE PRACTITIONER

## 2017-09-27 PROCEDURE — 96372 THER/PROPH/DIAG INJ SC/IM: CPT

## 2017-09-27 PROCEDURE — 70450 CT HEAD/BRAIN W/O DYE: CPT

## 2017-09-27 PROCEDURE — 99283 EMERGENCY DEPT VISIT LOW MDM: CPT

## 2017-09-27 PROCEDURE — 72125 CT NECK SPINE W/O DYE: CPT

## 2017-09-27 PROCEDURE — 6360000002 HC RX W HCPCS: Performed by: NURSE PRACTITIONER

## 2017-09-27 RX ORDER — PROMETHAZINE HYDROCHLORIDE 25 MG/ML
25 INJECTION, SOLUTION INTRAMUSCULAR; INTRAVENOUS ONCE
Status: COMPLETED | OUTPATIENT
Start: 2017-09-27 | End: 2017-09-27

## 2017-09-27 RX ORDER — ONDANSETRON 4 MG/1
4 TABLET, ORALLY DISINTEGRATING ORAL ONCE
Status: COMPLETED | OUTPATIENT
Start: 2017-09-27 | End: 2017-09-27

## 2017-09-27 RX ORDER — METHYLPREDNISOLONE 4 MG/1
TABLET ORAL
Qty: 1 KIT | Refills: 0 | Status: ON HOLD | OUTPATIENT
Start: 2017-09-27 | End: 2017-10-01 | Stop reason: ALTCHOICE

## 2017-09-27 RX ADMIN — HYDROMORPHONE HYDROCHLORIDE 1 MG: 1 INJECTION, SOLUTION INTRAMUSCULAR; INTRAVENOUS; SUBCUTANEOUS at 10:07

## 2017-09-27 RX ADMIN — ONDANSETRON 4 MG: 4 TABLET, ORALLY DISINTEGRATING ORAL at 11:43

## 2017-09-27 RX ADMIN — PROMETHAZINE HYDROCHLORIDE 25 MG: 25 INJECTION INTRAMUSCULAR; INTRAVENOUS at 10:07

## 2017-09-27 ASSESSMENT — PAIN DESCRIPTION - PAIN TYPE: TYPE: ACUTE PAIN

## 2017-09-27 ASSESSMENT — PAIN SCALES - GENERAL
PAINLEVEL_OUTOF10: 7
PAINLEVEL_OUTOF10: 7

## 2017-09-27 ASSESSMENT — ENCOUNTER SYMPTOMS: RESPIRATORY NEGATIVE: 1

## 2017-10-01 ENCOUNTER — HOSPITAL ENCOUNTER (OUTPATIENT)
Age: 37
Setting detail: OBSERVATION
Discharge: HOME | End: 2017-10-02
Attending: EMERGENCY MEDICINE | Admitting: INTERNAL MEDICINE
Payer: MEDICAID

## 2017-10-01 ENCOUNTER — APPOINTMENT (OUTPATIENT)
Dept: GENERAL RADIOLOGY | Age: 37
End: 2017-10-01
Payer: MEDICAID

## 2017-10-01 DIAGNOSIS — H11.31 SUBCONJUNCTIVAL HEMORRHAGE, RIGHT: ICD-10-CM

## 2017-10-01 DIAGNOSIS — R07.89 CHEST DISCOMFORT: Primary | ICD-10-CM

## 2017-10-01 LAB
ALBUMIN SERPL-MCNC: 3.8 G/DL (ref 3.5–5.2)
ALP BLD-CCNC: 96 U/L (ref 35–104)
ALT SERPL-CCNC: 30 U/L (ref 5–33)
ANION GAP SERPL CALCULATED.3IONS-SCNC: 9 MMOL/L (ref 7–19)
AST SERPL-CCNC: 24 U/L (ref 5–32)
BASOPHILS ABSOLUTE: 0.1 K/UL (ref 0–0.2)
BASOPHILS RELATIVE PERCENT: 1.1 % (ref 0–1)
BILIRUB SERPL-MCNC: 0.3 MG/DL (ref 0.2–1.2)
BUN BLDV-MCNC: 11 MG/DL (ref 6–20)
CALCIUM SERPL-MCNC: 9.3 MG/DL (ref 8.6–10)
CHLORIDE BLD-SCNC: 106 MMOL/L (ref 98–111)
CO2: 27 MMOL/L (ref 22–29)
CREAT SERPL-MCNC: 0.7 MG/DL (ref 0.5–0.9)
EOSINOPHILS ABSOLUTE: 0.2 K/UL (ref 0–0.6)
EOSINOPHILS RELATIVE PERCENT: 1.9 % (ref 0–5)
GFR NON-AFRICAN AMERICAN: >60
GLUCOSE BLD-MCNC: 101 MG/DL (ref 74–109)
HCT VFR BLD CALC: 44.7 % (ref 37–47)
HEMOGLOBIN: 14.3 G/DL (ref 12–16)
LYMPHOCYTES ABSOLUTE: 3.4 K/UL (ref 1.1–4.5)
LYMPHOCYTES RELATIVE PERCENT: 34.7 % (ref 20–40)
MCH RBC QN AUTO: 28.9 PG (ref 27–31)
MCHC RBC AUTO-ENTMCNC: 32 G/DL (ref 33–37)
MCV RBC AUTO: 90.3 FL (ref 81–99)
MONOCYTES ABSOLUTE: 0.6 K/UL (ref 0–0.9)
MONOCYTES RELATIVE PERCENT: 6.4 % (ref 0–10)
NEUTROPHILS ABSOLUTE: 5.5 K/UL (ref 1.5–7.5)
NEUTROPHILS RELATIVE PERCENT: 55.7 % (ref 50–65)
PDW BLD-RTO: 12.9 % (ref 11.5–14.5)
PERFORMED ON: NORMAL
PLATELET # BLD: 293 K/UL (ref 130–400)
PMV BLD AUTO: 10.1 FL (ref 9.4–12.3)
POC TROPONIN I: 0 NG/ML (ref 0–0.08)
POTASSIUM SERPL-SCNC: 4.2 MMOL/L (ref 3.5–5)
RBC # BLD: 4.95 M/UL (ref 4.2–5.4)
SODIUM BLD-SCNC: 142 MMOL/L (ref 136–145)
TOTAL PROTEIN: 7.3 G/DL (ref 6.6–8.7)
TROPONIN: <0.01 NG/ML (ref 0–0.03)
TROPONIN: <0.01 NG/ML (ref 0–0.03)
WBC # BLD: 9.9 K/UL (ref 4.8–10.8)

## 2017-10-01 PROCEDURE — G0378 HOSPITAL OBSERVATION PER HR: HCPCS

## 2017-10-01 PROCEDURE — 99220 PR INITIAL OBSERVATION CARE/DAY 70 MINUTES: CPT | Performed by: INTERNAL MEDICINE

## 2017-10-01 PROCEDURE — 84484 ASSAY OF TROPONIN QUANT: CPT

## 2017-10-01 PROCEDURE — 36415 COLL VENOUS BLD VENIPUNCTURE: CPT

## 2017-10-01 PROCEDURE — 6370000000 HC RX 637 (ALT 250 FOR IP): Performed by: INTERNAL MEDICINE

## 2017-10-01 PROCEDURE — 96372 THER/PROPH/DIAG INJ SC/IM: CPT

## 2017-10-01 PROCEDURE — 99285 EMERGENCY DEPT VISIT HI MDM: CPT

## 2017-10-01 PROCEDURE — 99284 EMERGENCY DEPT VISIT MOD MDM: CPT | Performed by: EMERGENCY MEDICINE

## 2017-10-01 PROCEDURE — 80053 COMPREHEN METABOLIC PANEL: CPT

## 2017-10-01 PROCEDURE — 6370000000 HC RX 637 (ALT 250 FOR IP): Performed by: EMERGENCY MEDICINE

## 2017-10-01 PROCEDURE — 93005 ELECTROCARDIOGRAM TRACING: CPT

## 2017-10-01 PROCEDURE — 71010 XR CHEST PORTABLE: CPT

## 2017-10-01 PROCEDURE — 2580000003 HC RX 258: Performed by: INTERNAL MEDICINE

## 2017-10-01 PROCEDURE — 6360000002 HC RX W HCPCS: Performed by: INTERNAL MEDICINE

## 2017-10-01 PROCEDURE — 85025 COMPLETE CBC W/AUTO DIFF WBC: CPT

## 2017-10-01 RX ORDER — PANTOPRAZOLE SODIUM 40 MG/1
40 TABLET, DELAYED RELEASE ORAL
Status: DISCONTINUED | OUTPATIENT
Start: 2017-10-02 | End: 2017-10-02 | Stop reason: HOSPADM

## 2017-10-01 RX ORDER — FAMOTIDINE 20 MG/1
20 TABLET, FILM COATED ORAL DAILY
Status: CANCELLED | OUTPATIENT
Start: 2017-10-01

## 2017-10-01 RX ORDER — ASPIRIN 81 MG/1
324 TABLET, CHEWABLE ORAL ONCE
Status: COMPLETED | OUTPATIENT
Start: 2017-10-01 | End: 2017-10-01

## 2017-10-01 RX ORDER — FAMOTIDINE 20 MG/1
20 TABLET, FILM COATED ORAL DAILY
Status: DISCONTINUED | OUTPATIENT
Start: 2017-10-01 | End: 2017-10-02 | Stop reason: HOSPADM

## 2017-10-01 RX ORDER — BUSPIRONE HYDROCHLORIDE 15 MG/1
7.5 TABLET ORAL 3 TIMES DAILY
Status: DISCONTINUED | OUTPATIENT
Start: 2017-10-01 | End: 2017-10-02 | Stop reason: HOSPADM

## 2017-10-01 RX ORDER — ACETAMINOPHEN 325 MG/1
650 TABLET ORAL EVERY 4 HOURS PRN
Status: DISCONTINUED | OUTPATIENT
Start: 2017-10-01 | End: 2017-10-02 | Stop reason: HOSPADM

## 2017-10-01 RX ORDER — VITAMIN E 268 MG
400 CAPSULE ORAL 2 TIMES DAILY
Status: CANCELLED | OUTPATIENT
Start: 2017-10-01

## 2017-10-01 RX ORDER — ASPIRIN 81 MG/1
81 TABLET, CHEWABLE ORAL DAILY
Status: DISCONTINUED | OUTPATIENT
Start: 2017-10-01 | End: 2017-10-02 | Stop reason: HOSPADM

## 2017-10-01 RX ORDER — PREGABALIN 100 MG/1
200 CAPSULE ORAL NIGHTLY
COMMUNITY
End: 2017-12-06 | Stop reason: SDUPTHER

## 2017-10-01 RX ORDER — PREGABALIN 50 MG/1
200 CAPSULE ORAL NIGHTLY
Status: DISCONTINUED | OUTPATIENT
Start: 2017-10-01 | End: 2017-10-02 | Stop reason: HOSPADM

## 2017-10-01 RX ORDER — SODIUM CHLORIDE 0.9 % (FLUSH) 0.9 %
10 SYRINGE (ML) INJECTION PRN
Status: DISCONTINUED | OUTPATIENT
Start: 2017-10-01 | End: 2017-10-02 | Stop reason: HOSPADM

## 2017-10-01 RX ORDER — ONDANSETRON 2 MG/ML
4 INJECTION INTRAMUSCULAR; INTRAVENOUS EVERY 6 HOURS PRN
Status: DISCONTINUED | OUTPATIENT
Start: 2017-10-01 | End: 2017-10-02 | Stop reason: HOSPADM

## 2017-10-01 RX ORDER — PREGABALIN 50 MG/1
100 CAPSULE ORAL DAILY
Status: DISCONTINUED | OUTPATIENT
Start: 2017-10-02 | End: 2017-10-02 | Stop reason: HOSPADM

## 2017-10-01 RX ORDER — SODIUM CHLORIDE 0.9 % (FLUSH) 0.9 %
10 SYRINGE (ML) INJECTION EVERY 12 HOURS SCHEDULED
Status: DISCONTINUED | OUTPATIENT
Start: 2017-10-01 | End: 2017-10-02 | Stop reason: HOSPADM

## 2017-10-01 RX ADMIN — BUSPIRONE HYDROCHLORIDE 7.5 MG: 15 TABLET ORAL at 20:12

## 2017-10-01 RX ADMIN — FAMOTIDINE 20 MG: 20 TABLET ORAL at 20:12

## 2017-10-01 RX ADMIN — ENOXAPARIN SODIUM 40 MG: 40 INJECTION SUBCUTANEOUS at 16:28

## 2017-10-01 RX ADMIN — BUSPIRONE HYDROCHLORIDE 7.5 MG: 15 TABLET ORAL at 16:26

## 2017-10-01 RX ADMIN — Medication 10 ML: at 20:39

## 2017-10-01 RX ADMIN — ASPIRIN 81 MG CHEWABLE TABLET 324 MG: 81 TABLET CHEWABLE at 12:43

## 2017-10-01 RX ADMIN — PREGABALIN 200 MG: 50 CAPSULE ORAL at 20:12

## 2017-10-01 RX ADMIN — MAGNESIUM HYDROXIDE 30 ML: 400 SUSPENSION ORAL at 20:39

## 2017-10-01 ASSESSMENT — ENCOUNTER SYMPTOMS
VOMITING: 0
SHORTNESS OF BREATH: 1
NAUSEA: 1

## 2017-10-01 NOTE — PROGRESS NOTES
Patient states pain in L shoulder \"better now that I sat up. Still there, but not as bad. \" Will continue to monitor patient.   Electronically signed by Geoffrey Borges RN on 10/1/2017 at 5:48 PM

## 2017-10-01 NOTE — ED NOTES
Pt stating to MD that she had chest pains on Friday. Pt currently pain free. Pt did not mention this to triage RN or primary RN during assessment.       Roland Swan RN  10/01/17 2886

## 2017-10-01 NOTE — ED PROVIDER NOTES
Neg Hx     Stomach Cancer Neg Hx           SOCIAL HISTORY       Social History     Social History    Marital status:      Spouse name: N/A    Number of children: N/A    Years of education: N/A     Social History Main Topics    Smoking status: Current Some Day Smoker     Types: Cigarettes    Smokeless tobacco: Former User     Quit date: 6/10/2017      Comment: smokes 1-2 cigarretts per day    Alcohol use No    Drug use: No    Sexual activity: Yes     Partners: Male     Other Topics Concern    None     Social History Narrative       SCREENINGS    [place] Coma Scale  Eye Opening: Spontaneous  Best Verbal Response: Oriented  Best Motor Response: Obeys commands  [place] Coma Scale Score: 15        PHYSICAL EXAM    (up to 7 for level 4, 8 or more for level 5)   ED Triage Vitals   BP Temp Temp src Pulse Resp SpO2 Height Weight   10/01/17 1109 10/01/17 1109 -- 10/01/17 1109 10/01/17 1109 10/01/17 1109 10/01/17 1109 10/01/17 1109   150/98 98.3 °F (36.8 °C)  87 20 97 % 5' 6\" (1.676 m) 254 lb (115.2 kg)       Physical Exam   Constitutional: She is oriented to person, place, and time. She appears well-developed and well-nourished. No distress. HENT:   Mouth/Throat: Oropharynx is clear and moist.   Eyes:   Moderate-sized conjunctival hemorrhage   Neck: Normal range of motion. Neck supple. Cardiovascular: Normal rate, regular rhythm and normal heart sounds. Pulmonary/Chest: Effort normal and breath sounds normal. She exhibits no tenderness. Musculoskeletal: She exhibits no edema. Neurological: She is alert and oriented to person, place, and time. Skin: Skin is warm and dry. She is not diaphoretic. Psychiatric: She has a normal mood and affect. Nursing note and vitals reviewed.       DIAGNOSTIC RESULTS     EKG: All EKG's are interpreted by the Emergency Department Physician who either signs or Co-signs this chart in the absence of a cardiologist.    EKG: sinus, VR 61, short UT, short QT, LAD, no ST elevation or depression or Q-waves. RADIOLOGY:   Non-plain film images such as CT, Ultrasound and MRI are read by the radiologist. Plain radiographic images are visualized and preliminarily interpreted by the emergency physician with the below findings:        Interpretation per the Radiologist below, if available at the time of this note:    XR Chest Portable   Final Result   Impression:   Moderate thyromegaly, without convincing evidence of fluid overload. Signed by Dr Naye Ritchie on 10/1/2017 1:15 PM      NM Myocardial Spect Rest Multi    (Results Pending)         ED BEDSIDE ULTRASOUND:   Performed by ED Physician - none    LABS:  Labs Reviewed   CBC WITH AUTO DIFFERENTIAL - Abnormal; Notable for the following:        Result Value    MCHC 32.0 (*)     Basophils % 1.1 (*)     All other components within normal limits   COMPREHENSIVE METABOLIC PANEL   TROPONIN   TROPONIN   TROPONIN   POCT TROPONIN   POCT VENOUS       All other labs were within normal range or not returned as of this dictation. EMERGENCY DEPARTMENT COURSE and DIFFERENTIAL DIAGNOSIS/MDM:   Vitals:    Vitals:    10/01/17 1341 10/01/17 1401 10/01/17 1421 10/01/17 1457   BP: 130/85 125/85 136/85 120/72   Pulse: 77 61 62 62   Resp:    12   Temp:    97 °F (36.1 °C)   TempSrc:    Temporal   SpO2: 92% 94% 93% 96%   Weight:    248 lb 12.8 oz (112.9 kg)   Height:    5' 6\" (1.676 m)           MDM  Number of Diagnoses or Management Options  Diagnosis management comments: Dr. Kvng Noriega sees in ED - admits. CRITICAL CARE TIME   Total Critical Care time was 0 minutes, excluding separately reportable procedures. There was a high probability of clinically significant/life threatening deterioration in the patient's condition which required my urgent intervention. CONSULTS:  IP CONSULT TO CARDIOLOGY    PROCEDURES:  Unless otherwise noted below, none     Procedures    FINAL IMPRESSION      1. Chest discomfort    2.  Subconjunctival hemorrhage, right

## 2017-10-01 NOTE — IP AVS SNAPSHOT
Commonly known as:  ERGOCALCIFEROL   Take 1 capsule by mouth Twice a Week Fall/winter months only                  As directed                               Allergies as of 10/2/2017        Reactions    Adhesive Tape Rash      Immunizations as of 10/2/2017     Name Date Dose VIS Date Route    Tdap (Boostrix, Adacel) 2017 0.5 mL 2015 Intramuscular      Last Vitals          Most Recent Value    Temp  96.9 °F (36.1 °C)    Pulse  63    Resp  16    BP  124/82         After Visit Summary    This summary was created for you. Thank you for entrusting your care to us. The following information includes details about your hospital/visit stay along with steps you should take to help with your recovery once you leave the hospital.  In this packet, you will find information about the topics listed below:    · Instructions about your medications including a list of your home medications  · A summary of your hospital visit  · Follow-up appointments once you have left the hospital  · Your care plan at home      You may receive a survey regarding the care you received during your stay. Your input is valuable to us. We encourage you to complete and return your survey in the envelope provided. We hope you will choose us in the future for your healthcare needs. Patient Information     Patient Name MONSE Pham 1980      Care Provided at:     Name Address Phone       [address] [facility] [phone]            Your Visit    Here you will find information about your visit, including the reason for your visit. Please take this sheet with you when you visit your doctor or other health care provider in the future. It will help determine the best possible medical care for you at that time. If you have any questions once you leave the hospital, please call the department phone number listed below.         Why you were here Your primary diagnosis was:  Not on File    Your diagnoses also included:  Chest Pressure      Visit Information     Date & Time Provider Department Dept. Phone    10/1/2017 Bernadine Kelly MD [address] [phone]       Follow-up Appointments    Below is a list of your follow-up and future appointments. This may not be a complete list as you may have made appointments directly with providers that we are not aware of or your providers may have made some for you. Please call your providers to confirm appointments. It is important to keep your appointments. Please bring your current insurance card, photo ID, co-pay, and all medication bottles to your appointment. If self-pay, payment is expected at the time of service. Follow-up Information     Follow up with Andreas Boas, APRN. Schedule an appointment as soon as possible for a visit in 1 week. Specialties:  Family Nurse Practitioner, Family Medicine, Emergency Medicine    Contact information:    [address]  [phone]        Future Appointments     11/17/2017 9:00 AM     Appointment with MEHRAN Knowles at [address] [place] [address] ([phone])   Please arrive 15 minutes prior to scheduled appointment time to complete paper work, bring photo ID and insurance cards. Quintin Baker [address] [place] [address]       11/30/2017 9:00 PM     Appointment with [facility] 6 at [address] [place] [address] ([phone])   [address] [place] [address]  [phone]         Preventive Care        Date Due    HIV screening is recommended for all people regardless of risk factors  aged 15-65 years at least once (lifetime) who have never been HIV tested.  1/15/1994    Pneumococcal Vaccine - Pneumovax for adults aged 19-64 years with: chronic heart disease, chronic lung disease, diabetes mellitus, alcoholism, chronic liver disease, or cigarette smoking. (1 of 1 - PPSV23) 1/15/1998    Pap Smear 1/15/2000 For questions regarding your Connect[employer] account call [phone]. If you have a clinical question, please call your doctor's office. View your information online  ? Review your current list of  medications, immunization, and allergies. ? Review your future test results online . ? Review your discharge instructions provided by your caregivers at discharge    Certain functionality such as prescription refills, scheduling appointments or sending messages to your provider are not activated if your provider does not use [employer] in his/her office    For questions regarding your Connect[employer] account call [phone]. If you have a clinical question, please call your doctor's office. The information on all pages of the After Visit Summary has been reviewed with me, the patient and/or responsible adult, by my health care provider(s). I had the opportunity to ask questions regarding this information. I understand I should dispose of my armband safely at home to protect my health information. A complete copy of the After Visit Summary has been given to me, the patient and/or responsible adult. Patient Signature/Responsible Adult:____________________    Clinician Signature:_____________________    Date:_____________________    Time:_____________________        More Information           Chest Pain: Care Instructions  Your Care Instructions  There are many things that can cause chest pain. Some are not serious and will get better on their own in a few days. But some kinds of chest pain need more testing and treatment. Your doctor may have recommended a follow-up visit in the next 8 to 12 hours. If you are not getting better, you may need more tests or treatment. Even though your doctor has released you, you still need to watch for any problems. The doctor carefully checked you, but sometimes problems can develop later.  If you have new symptoms or if your symptoms do not get

## 2017-10-01 NOTE — H&P
vaccine  0.5 mL Intramuscular Once    pneumococcal 13-valent conjugate  0.5 mL Intramuscular Once       Allergies:  Adhesive tape     Social History:       Social History     Social History    Marital status:      Spouse name: N/A    Number of children: N/A    Years of education: N/A     Occupational History    Not on file. Social History Main Topics    Smoking status: Current Some Day Smoker     Types: Cigarettes    Smokeless tobacco: Former User     Quit date: 6/10/2017      Comment: smokes 1-2 cigarretts per day    Alcohol use No    Drug use: No    Sexual activity: Yes     Partners: Male     Other Topics Concern    Not on file     Social History Narrative       Family History:     Family History   Problem Relation Age of Onset    Heart Disease Mother     High Blood Pressure Mother     Diabetes Mother     Heart Disease Father     High Blood Pressure Father     Cancer Maternal Grandfather      esphogeal cancer    Cancer Paternal Grandfather      colon ca    Colon Cancer Paternal Grandfather     Colon Polyps Neg Hx     Liver Cancer Neg Hx     Liver Disease Neg Hx     Rectal Cancer Neg Hx     Stomach Cancer Neg Hx          REVIEW OF SYSTEMS:     Except as noted in the HPI, all other systems are negative        PHYSICAL EXAMINATION:     /72  Pulse 62  Temp 97 °F (36.1 °C) (Temporal)   Resp 12  Ht 5' 6\" (1.676 m)  Wt 248 lb 12.8 oz (112.9 kg)  LMP 01/01/2005  SpO2 96%  BMI 40.16 kg/m2    GENERAL - well developed and well nourished, in no amount of generalized distress  HEENT -  PERRLA, Hearing appears normal, conjunctiva and lids are normal, ears and nose appear normal  NECK - no thyromegaly, no JVD, trachea is in the midline  CARDIOVASCULAR - PMI is in the left mid line clavicular position, Normal S1 and S2 with a grade 1/6 systolic murmur. No S3 or S4    PULMONARY -  no respiratory distress. scattered wheezes and rales.    ABDOMEN  - soft, non tender, no rebound, no hepatomegaly or splenomegaly  MUSCULOSKELETAL  - Prone/Supine, digitals and nails are without clubbing or cyanosis  EXTREMITIES - trace edema  NEUROLOGIC - cranial nerves, II-XII, are normal  SKIN - turgor is normal, no rash  PSYCHIATRIC - normal mood and affect, alert and orientated x 3, judgement and insight appear appropriate      LABORATORY EVALUATION & TESTING:    I have personally reviewed and interpreted the results of the following diagnostic testing      EKG and or Telemetry:  which was personally reviewed me:  Sinus rhythm,   Pulse Readings from Last 1 Encounters:   10/01/17 62    bpm,  without Acute changes    Troponin:  negative myocardial necrosis (  0)    CBC:   Recent Labs      10/01/17   1240   WBC  9.9   HGB  14.3   HCT  44.7   MCV  90.3   PLT  293     BMP:   Recent Labs      10/01/17   1240   NA  142   K  4.2   CL  106   CO2  27   BUN  11   CREATININE  0.7     Cardiac Enzymes:   Recent Labs      10/01/17   1226   TROPONINI  0.00     PT/INR: No results for input(s): PROTIME, INR in the last 72 hours. APTT: No results for input(s): APTT in the last 72 hours.   Liver Profile:  Lab Results   Component Value Date    AST 24 10/01/2017    ALT 30 10/01/2017    BILITOT 0.3 10/01/2017    ALKPHOS 96 10/01/2017     Lab Results   Component Value Date    CHOL 200 05/19/2017    HDL 42 05/19/2017    TRIG 126 05/19/2017     TSH:  Lab Results   Component Value Date    TSH 2.04 05/19/2017     UA:   Lab Results   Component Value Date    COLORU YELLOW 02/27/2015    PHUR 6.0 02/27/2015    WBCUA 80 02/27/2015    RBCUA 2 02/27/2015    BACTERIA 4+ 02/27/2015    LEUKOCYTESUR MODERATE 02/27/2015    UROBILINOGEN 0.2 02/27/2015    BILIRUBINUR NEGATIVE 02/27/2015    GLUCOSEU NEGATIVE 02/27/2015             ALL THE CARDIOLOGY PROBLEMS ARE LISTED ABOVE; HOWEVER, THE FOLLOWING SPECIFIC CARDIAC PROBLEMS WERE ADDRESSED AND TREATED DURING THE HOSPITAL VISIT TODAY: MEDICAL DECISION MAKING             Cardiac Specific Problem / Diagnosis  Discussion and Data Reviewed Diagnostic Procedures Ordered Management Options Selected           1. Non exertional chest pressure  are worsening   Review and summation of old records:    none Yes Continue current medications:     Yes      lexiscan / cardiolyte           2. Cigarette use Initial presentation during this evaluation   Patient has a history of this risk factor, which is managed and is on current therapy. The patient has been advised of the potential contribution this risk factor makes to coronary atherosclerotic disease and multiple other systemic problems and the absolute need to discontinue    I advised the patient that there are multiple educational tools available to assist in the management of this risk factor. No Continue current medications:    Yes           3. GERD Initial presentation during this evaluation On zantac No Continue current medications:       Yes         PLAN:    1. Continue present medications except for changes as noted above  2. Continue to monitor rhythm  3. Further orders per clinical course. 4. lexiscan / cardiolyte, tomorrow            Discussed with patient and nursing.     I greatly appreciate the opportunity and your confidence in allowing me to participate in the care of Lamont Xiao    Electronically signed by Diane Hirsch MD on 10/1/17     [facility] [place]

## 2017-10-02 ENCOUNTER — APPOINTMENT (OUTPATIENT)
Dept: NUCLEAR MEDICINE | Age: 37
End: 2017-10-02
Payer: MEDICAID

## 2017-10-02 VITALS
TEMPERATURE: 96.9 F | WEIGHT: 248.8 LBS | BODY MASS INDEX: 39.98 KG/M2 | OXYGEN SATURATION: 96 % | RESPIRATION RATE: 16 BRPM | HEIGHT: 66 IN | SYSTOLIC BLOOD PRESSURE: 124 MMHG | HEART RATE: 63 BPM | DIASTOLIC BLOOD PRESSURE: 82 MMHG

## 2017-10-02 LAB
ALBUMIN SERPL-MCNC: 3.5 G/DL (ref 3.5–5.2)
ALP BLD-CCNC: 89 U/L (ref 35–104)
ALT SERPL-CCNC: 29 U/L (ref 5–33)
ANION GAP SERPL CALCULATED.3IONS-SCNC: 12 MMOL/L (ref 7–19)
AST SERPL-CCNC: 22 U/L (ref 5–32)
BASOPHILS ABSOLUTE: 0.1 K/UL (ref 0–0.2)
BASOPHILS RELATIVE PERCENT: 0.9 % (ref 0–1)
BILIRUB SERPL-MCNC: 0.3 MG/DL (ref 0.2–1.2)
BUN BLDV-MCNC: 12 MG/DL (ref 6–20)
CALCIUM SERPL-MCNC: 9.1 MG/DL (ref 8.6–10)
CHLORIDE BLD-SCNC: 103 MMOL/L (ref 98–111)
CHOLESTEROL, TOTAL: 179 MG/DL (ref 160–199)
CO2: 27 MMOL/L (ref 22–29)
CREAT SERPL-MCNC: 0.7 MG/DL (ref 0.5–0.9)
EKG P AXIS: 3 DEGREES
EKG P AXIS: 3 DEGREES
EKG P AXIS: 36 DEGREES
EKG P-R INTERVAL: 150 MS
EKG P-R INTERVAL: 156 MS
EKG P-R INTERVAL: 162 MS
EKG Q-T INTERVAL: 390 MS
EKG Q-T INTERVAL: 402 MS
EKG Q-T INTERVAL: 406 MS
EKG QRS DURATION: 76 MS
EKG QRS DURATION: 82 MS
EKG QRS DURATION: 88 MS
EKG QTC CALCULATION (BAZETT): 400 MS
EKG QTC CALCULATION (BAZETT): 406 MS
EKG QTC CALCULATION (BAZETT): 407 MS
EKG T AXIS: 33 DEGREES
EKG T AXIS: 34 DEGREES
EKG T AXIS: 47 DEGREES
EOSINOPHILS ABSOLUTE: 0.2 K/UL (ref 0–0.6)
EOSINOPHILS RELATIVE PERCENT: 2 % (ref 0–5)
GFR NON-AFRICAN AMERICAN: >60
GLUCOSE BLD-MCNC: 125 MG/DL (ref 74–109)
HCT VFR BLD CALC: 41 % (ref 37–47)
HDLC SERPL-MCNC: 43 MG/DL (ref 65–121)
HEMOGLOBIN: 13.1 G/DL (ref 12–16)
LDL CHOLESTEROL CALCULATED: 112 MG/DL
LV EF: 60 %
LVEF MODALITY: NORMAL
LYMPHOCYTES ABSOLUTE: 4.2 K/UL (ref 1.1–4.5)
LYMPHOCYTES RELATIVE PERCENT: 43.1 % (ref 20–40)
MCH RBC QN AUTO: 28.9 PG (ref 27–31)
MCHC RBC AUTO-ENTMCNC: 32 G/DL (ref 33–37)
MCV RBC AUTO: 90.3 FL (ref 81–99)
MONOCYTES ABSOLUTE: 0.5 K/UL (ref 0–0.9)
MONOCYTES RELATIVE PERCENT: 5.3 % (ref 0–10)
NEUTROPHILS ABSOLUTE: 4.7 K/UL (ref 1.5–7.5)
NEUTROPHILS RELATIVE PERCENT: 48.4 % (ref 50–65)
PDW BLD-RTO: 13 % (ref 11.5–14.5)
PLATELET # BLD: 291 K/UL (ref 130–400)
PMV BLD AUTO: 10.9 FL (ref 9.4–12.3)
POTASSIUM SERPL-SCNC: 3.8 MMOL/L (ref 3.5–5)
RBC # BLD: 4.54 M/UL (ref 4.2–5.4)
SODIUM BLD-SCNC: 142 MMOL/L (ref 136–145)
TOTAL PROTEIN: 6.9 G/DL (ref 6.6–8.7)
TRIGL SERPL-MCNC: 122 MG/DL (ref 150–199)
TROPONIN: <0.01 NG/ML (ref 0–0.03)
WBC # BLD: 9.8 K/UL (ref 4.8–10.8)

## 2017-10-02 PROCEDURE — G0009 ADMIN PNEUMOCOCCAL VACCINE: HCPCS | Performed by: INTERNAL MEDICINE

## 2017-10-02 PROCEDURE — 84484 ASSAY OF TROPONIN QUANT: CPT

## 2017-10-02 PROCEDURE — G0378 HOSPITAL OBSERVATION PER HR: HCPCS

## 2017-10-02 PROCEDURE — 6360000002 HC RX W HCPCS: Performed by: INTERNAL MEDICINE

## 2017-10-02 PROCEDURE — 85025 COMPLETE CBC W/AUTO DIFF WBC: CPT

## 2017-10-02 PROCEDURE — G0008 ADMIN INFLUENZA VIRUS VAC: HCPCS | Performed by: INTERNAL MEDICINE

## 2017-10-02 PROCEDURE — 78452 HT MUSCLE IMAGE SPECT MULT: CPT

## 2017-10-02 PROCEDURE — 3430000000 HC RX DIAGNOSTIC RADIOPHARMACEUTICAL: Performed by: INTERNAL MEDICINE

## 2017-10-02 PROCEDURE — 93005 ELECTROCARDIOGRAM TRACING: CPT

## 2017-10-02 PROCEDURE — A9500 TC99M SESTAMIBI: HCPCS | Performed by: INTERNAL MEDICINE

## 2017-10-02 PROCEDURE — 2580000003 HC RX 258: Performed by: INTERNAL MEDICINE

## 2017-10-02 PROCEDURE — 99217 PR OBSERVATION CARE DISCHARGE MANAGEMENT: CPT | Performed by: INTERNAL MEDICINE

## 2017-10-02 PROCEDURE — 80053 COMPREHEN METABOLIC PANEL: CPT

## 2017-10-02 PROCEDURE — 93017 CV STRESS TEST TRACING ONLY: CPT

## 2017-10-02 PROCEDURE — 80061 LIPID PANEL: CPT

## 2017-10-02 PROCEDURE — 6370000000 HC RX 637 (ALT 250 FOR IP): Performed by: INTERNAL MEDICINE

## 2017-10-02 PROCEDURE — 36415 COLL VENOUS BLD VENIPUNCTURE: CPT

## 2017-10-02 PROCEDURE — 90674 CCIIV4 VAC NO PRSV 0.5 ML IM: CPT | Performed by: INTERNAL MEDICINE

## 2017-10-02 PROCEDURE — 90670 PCV13 VACCINE IM: CPT | Performed by: INTERNAL MEDICINE

## 2017-10-02 RX ADMIN — PNEUMOCOCCAL 13-VALENT CONJUGATE VACCINE 0.5 ML: 2.2; 2.2; 2.2; 2.2; 2.2; 4.4; 2.2; 2.2; 2.2; 2.2; 2.2; 2.2; 2.2 INJECTION, SUSPENSION INTRAMUSCULAR at 18:21

## 2017-10-02 RX ADMIN — REGADENOSON 0.4 MG: 0.08 INJECTION, SOLUTION INTRAVENOUS at 07:00

## 2017-10-02 RX ADMIN — TETRAKIS(2-METHOXYISOBUTYLISOCYANIDE)COPPER(I) TETRAFLUOROBORATE 30 MILLICURIE: 1 INJECTION, POWDER, LYOPHILIZED, FOR SOLUTION INTRAVENOUS at 11:20

## 2017-10-02 RX ADMIN — PREGABALIN 100 MG: 50 CAPSULE ORAL at 10:59

## 2017-10-02 RX ADMIN — ASPIRIN 81 MG CHEWABLE TABLET 81 MG: 81 TABLET CHEWABLE at 10:56

## 2017-10-02 RX ADMIN — ACETAMINOPHEN 650 MG: 325 TABLET, FILM COATED ORAL at 11:03

## 2017-10-02 RX ADMIN — Medication 10 ML: at 10:54

## 2017-10-02 RX ADMIN — PANTOPRAZOLE SODIUM 40 MG: 40 TABLET, DELAYED RELEASE ORAL at 10:56

## 2017-10-02 RX ADMIN — A/SINGAPORE/GP1908/2015 IVR-180 (H1N1) (AN A/MICHIGAN/45/2015-LIKE VIRUS), A/SINGAPORE/GP2050/2015 (H3N2) (AN A/HONG KONG/4801/2014 - LIKE VIRUS), B/UTAH/9/2014 (A B/PHUKET/3073/2013-LIKE VIRUS), B/HONG KONG/259/2010 (A B/BRISBANE/60/08-LIKE VIRUS) 0.5 ML: 15; 15; 15; 15 INJECTION, SUSPENSION INTRAMUSCULAR at 18:24

## 2017-10-02 RX ADMIN — BUSPIRONE HYDROCHLORIDE 7.5 MG: 15 TABLET ORAL at 10:58

## 2017-10-02 RX ADMIN — BUSPIRONE HYDROCHLORIDE 7.5 MG: 15 TABLET ORAL at 14:38

## 2017-10-02 RX ADMIN — TETRAKIS(2-METHOXYISOBUTYLISOCYANIDE)COPPER(I) TETRAFLUOROBORATE 10 MILLICURIE: 1 INJECTION, POWDER, LYOPHILIZED, FOR SOLUTION INTRAVENOUS at 11:20

## 2017-10-02 ASSESSMENT — PAIN SCALES - GENERAL
PAINLEVEL_OUTOF10: 7
PAINLEVEL_OUTOF10: 7
PAINLEVEL_OUTOF10: 6

## 2017-10-02 NOTE — PROGRESS NOTES
[facility]  Progress Note                            Date:  10/2/2017  Patient: Lamont Xiao  Admission:  10/1/2017 11:31 AM  Admit DX: Chest pressure [R07.89]  Age:  44 y. o., 1980     LOS: 0 days     Reason for evaluation:   Chest discomfort      SUBJECTIVE:    The patient was seen and examined. Notes and labs reviewed. There were not complications over night. No new complaints. Radhika Valentino in progress. OBJECTIVE:    Telemetry: Sinus  /80  Pulse 65  Temp 96.6 °F (35.9 °C) (Temporal)   Resp 16  Ht 5' 6\" (1.676 m)  Wt 248 lb 12.8 oz (112.9 kg)  LMP 01/01/2005  SpO2 94%  BMI 40.16 kg/m2    Intake/Output Summary (Last 24 hours) at 10/02/17 1038  Last data filed at 10/02/17 0200   Gross per 24 hour   Intake             1370 ml   Output             1525 ml   Net             -155 ml           Labs:   CBC:   Recent Labs      10/01/17   1240  10/02/17   0121   WBC  9.9  9.8   HGB  14.3  13.1   HCT  44.7  41.0   PLT  293  291     BMP: Recent Labs      10/01/17   1240  10/02/17   0121   NA  142  142   K  4.2  3.8   CO2  27  27   BUN  11  12   CREATININE  0.7  0.7   LABGLOM  >60  >60   GLUCOSE  101  125*     BNP: No results for input(s): BNP in the last 72 hours. PT/INR: No results for input(s): PROTIME, INR in the last 72 hours. APTT:No results for input(s): APTT in the last 72 hours.   CARDIAC ENZYMES:  Recent Labs      10/01/17   1239  10/01/17   1955  10/02/17   0121   TROPONINI  <0.01  <0.01  <0.01     FASTING LIPID PANEL:  Lab Results   Component Value Date    HDL 43 10/02/2017    LDLCALC 112 10/02/2017    TRIG 122 10/02/2017     LIVER PROFILE:  Recent Labs      10/01/17   1240  10/02/17   0121   AST  24  22   ALT  30  29   LABALBU  3.8  3.5       NURSE:  Cristina Winston RN      See Dc summary

## 2017-10-03 NOTE — DISCHARGE SUMMARY
[facility] [address] [facility]    Discharge Summary        Patient ID: Elsa Bryant      Patient's PCP: ERIK Do    Admit Date: 10/1/2017     Discharge Date:  10/2/2017    Admitting Physician:  Dr. Ran Bryant     Discharge Physician: Dr. Ran Bryant    Discharge Diagnoses:    1. Non exertional chest pressure    2. Tobacco Abuse  3. GERD          Cardiac Specific Diagnoses:    Specialty Problems     None            The patient was seen and examined on day of discharge and this discharge summary is in conjunction with any daily progress note from day of discharge. History of Present Illness:     Elsa Bryant is a 44 y.o. female who presented to [facility] ED with symptoms / signs / problem or diagnosis of eye pain. Patient developed subconjunctival hemorrhage and presented to the emergency room. Patient admitted that she was having occasional chest discomfort that began three days prior while being evaluated. The chest discomfort was intermittent, varied in intensity, and would last for several hours. Patient stated that it began at rest and was not worse with activity. There were no aggravating or alleviating factors. Patient described it as mild pressure and tightness that was located in the center of her chest and radiated to her back, throat, and left shoulder. Patient denied nausea, vomiting, diaphoresis, dyspnea, presyncope, fever/chills, weakness, syncope, and edema. Hospital Course:     Patient was admitted to PCU and monitored on telemetry. Troponin remained negative. Patient underwent a nuclear Tomasa Sprout on 10/2/2017 with the following findings:  1. Analysis of the stress and rest images reveals no obvious areas of ischemia or infarction. 2. Analysis of the gated images reveals grossly normal left ventricular function with a calculated ejection fraction of 60 %.     Conclusions:  Grossly negative Cardiolyte for the presence of ischemia or infarction with normal wall motion and ejection fraction at rest.    The rest of the patient's hospitalization was uneventful. She was discharged home on medical therapy with outpatient follow up. Consults:     IP CONSULT TO CARDIOLOGY      Significant Diagnostic Studies:    1. NM lexiscan, EKG        Activity: activity as directed per discharge instructions. Diet:  Cardiac diet    Labs: For convenience and continuity at follow-up the following most recent labs are provided:    CBC:    Lab Results   Component Value Date    WBC 9.8 10/02/2017    HGB 13.1 10/02/2017    HCT 41.0 10/02/2017     10/02/2017       Renal:    Lab Results   Component Value Date     10/02/2017    K 3.8 10/02/2017     10/02/2017    CO2 27 10/02/2017    BUN 12 10/02/2017    CREATININE 0.7 10/02/2017    CALCIUM 9.1 10/02/2017         Discharge Medications:     Discharge Medication List as of 10/2/2017  6:01 PM           Details   ! ! pregabalin (LYRICA) 100 MG capsule Take 200 mg by mouth nightlyHistorical Med      omeprazole (PRILOSEC) 20 MG delayed release capsule Take 1 capsule by mouth daily, Disp-30 capsule, R-11Normal      !! LYRICA 100 MG capsule 1 qam 2 qhs, Disp-90 capsule, R-5, DAWPhone In      busPIRone (BUSPAR) 7.5 MG tablet Take 1 tablet by mouth 3 times daily, Disp-90 tablet, R-5Normal      ranitidine (ZANTAC) 150 MG tablet Take 1 tablet by mouth nightly, Disp-30 tablet, R-11Normal      vitamin D (ERGOCALCIFEROL) 74772 UNITS CAPS capsule Take 1 capsule by mouth Twice a Week Fall/winter months onlyHistorical Med       !! - Potential duplicate medications found. Please discuss with provider. Disposition:      1. Home  2. Follow up with cardiology as arranged  3. Follow up with primary care provider as arranged  4.  For patients who underwent percutaneous coronary intervention during this hospitalization, they will be sent home on:  aspirin, an antiplatelet,a beta - blocker, ACE-inhibitor or ARB, and statin if not allergic. Electronically signed by Pito Haider CNP on 10/3/17    ______________________________________________________________________  I have independently interviewed and examined Alisa Sutton. I have discussed key elements of the care plan with the APRN and I agree with the findings and care plan as stated above unless otherwise noted. Admission Complaint / Problem:      Chest discomfort    Physical Examination of the Day of Discharge:     GENERAL - well developed and well nourished    HEENT -  PERRLA, Hearing appears normal, conjunctiva and lids are normal, ears and nose appear normal  NECK - no thyromegaly, no JVD, trachea is in the midline  CARDIOVASCULAR - PMI is in the mid line clavicular position, Normal S1 and S2 with a grade 1/6 systolic murmur. No S3 or S4    PULMONARY - no respiratory distress. no wheezes or rales. Lungs are clear to ausculation   ABDOMEN  - soft, non tender, no rebound, no hepatomegaly or splenomegaly  MUSCULOSKELETAL  - gait and station are normal, digitals and nails are without clubbing or cyanosis  EXTREMITIES - no edema  NEUROLOGIC - cranial nerves, 2-12, are normal  SKIN - turgor is normal, no rash  PSYCHIATRIC - normal mood and affect, alert and orientated x 3, judgement and insight appear appropriate  I examined the patient for these problems:      Specialty Problems     None            Discharge Diagnoses:    1. Non exertional chest pressure     2. Tobacco Abuse  3. GERD      Plan:      2. May go  3. Follow up in the office PRN  4.  Follow up with PCP for non cardiac chest discomort      Electronically signed by Kenneth Preston MD on 10/3/17

## 2017-10-09 ENCOUNTER — OFFICE VISIT (OUTPATIENT)
Dept: NEUROSURGERY | Facility: CLINIC | Age: 37
End: 2017-10-09

## 2017-10-09 VITALS
DIASTOLIC BLOOD PRESSURE: 84 MMHG | HEIGHT: 65 IN | WEIGHT: 250 LBS | BODY MASS INDEX: 41.65 KG/M2 | SYSTOLIC BLOOD PRESSURE: 112 MMHG

## 2017-10-09 DIAGNOSIS — F17.210 CIGARETTE SMOKER: ICD-10-CM

## 2017-10-09 DIAGNOSIS — M50.30 DEGENERATION OF CERVICAL INTERVERTEBRAL DISC: Primary | ICD-10-CM

## 2017-10-09 DIAGNOSIS — M51.36 DEGENERATION OF LUMBAR INTERVERTEBRAL DISC: ICD-10-CM

## 2017-10-09 DIAGNOSIS — M79.7 FIBROMYALGIA: ICD-10-CM

## 2017-10-09 DIAGNOSIS — G44.89 OTHER HEADACHE SYNDROME: ICD-10-CM

## 2017-10-09 DIAGNOSIS — R20.0 FACIAL NUMBNESS: ICD-10-CM

## 2017-10-09 DIAGNOSIS — E66.01 MORBID OBESITY DUE TO EXCESS CALORIES (HCC): ICD-10-CM

## 2017-10-09 PROCEDURE — 99214 OFFICE O/P EST MOD 30 MIN: CPT | Performed by: NURSE PRACTITIONER

## 2017-10-09 RX ORDER — RANITIDINE 150 MG/1
150 TABLET ORAL NIGHTLY
Status: ON HOLD | COMMUNITY
Start: 2017-09-22 | End: 2017-12-14

## 2017-10-09 NOTE — PROGRESS NOTES
"Neurosurgery Follow Up Office Visit      Patient Name:  Merari Bradshaw  Age:  37 y.o.  YOB: 1980  MR#:  [id number]    /84 (BP Location: Right arm, Patient Position: Sitting)  Ht 65\" (165.1 cm)  Wt 250 lb (113 kg)  BMI 41.6 kg/m2    Social History   Substance Use Topics   • Smoking status: Current Every Day Smoker     Packs/day: 0.50     Types: Cigarettes   • Smokeless tobacco: Never Used   • Alcohol use No       Chief Complaint   Patient presents with   • Neck Pain     Chronic neck pain for multiple years, thought her pain was related to Fibromyalgia. Has bilateral arm pain and hand numbness, had EMG testing by Dr. Galvin about 1 month ago who ruled out CT. Right arm worse than left. She is having trouble holding objects. Has new neck imaging today.   • Back Pain     She is established with pain management, and recieving injections. These are not lasting long enough and her symptoms are returning quickly. She is now experiencing numbness in her feet and other lower body parts. No new lumbar imaging.   • Headache     Patient had an episode of a migraine a few weeks ago but with this she had facial and arm numbness. She was evaluated by [facility] where they performed a CT of the head.         History  Chief Complaint:  She returns to the office today with a new problem.  She has been having problems with increased neck pain and symptoms into both arms for about the last 6 months or so.  In particular she has noticed increasing weakness and has been dropping things with both hands.  Her pain seems to mostly radiating to the fourth and fifth fingers.  She has chronic numbness and tingling.  She describes that a few weeks ago she had an episode of severe headache and also numbness and tingling on the face.  She apparently had had an episode of chest pain about a week before that.  She had an extensive cardiac evaluation that was unremarkable and his CT of the head that was normal.  She " also had a CT of the cervical spine questioning abnormality at C6 7 to the right.  This was followed by EMG nerve conduction testing which ruled out carpal tunnel of both upper extremities.  As ultimately recommended that she see neurosurgery or orthospine, and she chose to return here for further evaluation.    She is a former patient last seen earlier in the summer, with primary focus on lower back issues.  She was not found to have any surgical lesion within the lumbar spine and we have never really evaluated her for cervical issues.  She was told years ago by her nurse practitioner that she had fibromyalgia.  She has never seen a specialist for this and I do not think she has ever formally been evaluated by neurology.  She continues area care of Dr. Lorenaz Quarles and Lyrica seems to help her symptoms some.  She also though states that she is having numbness and tingling all over her body including her legs and feet.  More recently she has started having vaginal and genital numbness which concerned her quite a bit.  It is something she has never experienced before.  She has had CT of the head as well as CT of the cervical spine and brings these in on disc for review.  And, she has also had EMG and nerve conduction testing though we do not yet have the formal report.      Physical Examination:  Upon exam, she looks pretty good.  She is awake and alert, pleasant and cooperative, speech is clear and appropriate.  Skin is warm and dry.  Cranial nerves II through XII appear intact without acute neurologic deficit.  I question some nystagmus with left lateral gaze.  There is also a small amount of remaining some conjunctival hemorrhage in the right eye.  She tells me this is much improved.  Rapid alternating movements and finger-nose done well.  Cervical range of motion is limited particularly with extension.  There is some generalized weakness of the upper extremities and largely this seems to be secondary to  pain.  Her weakness does in general seem to be greater on the right.  Deep tendon reflexes diminished bilaterally.  David's negative.  No Tinel's at either wrist or elbow.      Medical Decision Making  Treatment Options:   In the office we have discussed her care at length.  I reviewed her imaging on disc from [facility].  The CT of the head appears to be unremarkable.  There is some disc degeneration and disc space narrowing at C6 7 which may be a bit more rightward.  I cannot determine that there is a true compression or impingement on the CT.  We have agreed that we will request the formal EMG nerve conduction report.  In talking with her I am uncertain of the definite diagnosis of fibromyalgia which he has contributed a lot of her symptoms to 4 years.  I feel that her numbness and tingling needs to be further evaluated, particularly as it is throughout the body.  We will get an MRI of the brain with and without.  With regard to cervical issues she needs a trial of physical therapy and we will order this 3 days a week for 3-4 weeks.  I would also like to see an MRI of the cervical spine without contrast.  We have talked about referral to neurology and I feel that this would also be very helpful.  We will make the referral as I would be concerned about the possibility of multiple sclerosis being a differential diagnosis for her.  Cannot clearly correlate all of her presenting complaints to a cervical issue.  She is very agreeable with our plan of care and we will see her back for MRI review and follow-up from physical therapy.      Assessment and Plan  Merari was seen today for neck pain, back pain and headache.    Diagnoses and all orders for this visit:    Degeneration of cervical intervertebral disc  -     MRI Cervical Spine Without Contrast; Future  -     Ambulatory Referral to Physical Therapy Evaluate and treat (3 days a week x 3-4 weeks)    Degeneration of lumbar intervertebral disc  -     Ambulatory Referral  to Physical Therapy Evaluate and treat (3 days a week x 3-4 weeks)    Other headache syndrome  -     MRI Brain With & Without Contrast; Future  -     Ambulatory Referral to Neurology    Facial numbness  -     MRI Brain With & Without Contrast; Future  -     Ambulatory Referral to Neurology    Fibromyalgia  -     Ambulatory Referral to Neurology    Morbid obesity due to excess calories    Cigarette smoker      *Imaging disc kept in office for future review.*    Return in about 4 weeks (around 11/6/2017) for MRI and physical therapy follow up.      Yolis Morgan, APRN

## 2017-10-18 ENCOUNTER — HOSPITAL ENCOUNTER (OUTPATIENT)
Dept: PHYSICAL THERAPY | Facility: HOSPITAL | Age: 37
Setting detail: THERAPIES SERIES
Discharge: HOME | End: 2017-10-18

## 2017-10-18 DIAGNOSIS — M50.30 DEGENERATION OF CERVICAL INTERVERTEBRAL DISC: Primary | ICD-10-CM

## 2017-10-18 DIAGNOSIS — M51.36 DEGENERATION OF LUMBAR INTERVERTEBRAL DISC: ICD-10-CM

## 2017-10-18 PROCEDURE — 97161 PT EVAL LOW COMPLEX 20 MIN: CPT

## 2017-10-18 NOTE — THERAPY EVALUATION
Outpatient Physical Therapy Ortho Initial Evaluation  [facility]     Patient Name: Merari Bradshaw  : 1980  MRN: [id number]  Today's Date: 10/18/2017      Visit Date: 10/18/2017    Patient Active Problem List   Diagnosis   • Degeneration of lumbar intervertebral disc   • Lumbosacral neuritis   • Morbid obesity due to excess calories   • Cigarette smoker   • Degeneration of cervical intervertebral disc   • Other headache syndrome   • Facial numbness   • Fibromyalgia        Past Medical History:   Diagnosis Date   • Acid reflux    • Basal cell carcinoma of breast    • Fibrocystic breast    • Fibromyalgia         Past Surgical History:   Procedure Laterality Date   •  SECTION     • CHOLECYSTECTOMY     • EXPLORATORY LAPAROTOMY     • HX OVARIAN CYSTECTOMY     • HYSTERECTOMY       Allergies   Allergen Reactions   • Adhesive Tape Rash       Current Outpatient Prescriptions:   •  busPIRone (BUSPAR) 7.5 MG tablet, Take  by mouth., Disp: , Rfl:   •  omeprazole (priLOSEC) 20 MG capsule, Take 20 mg by mouth Daily., Disp: , Rfl:   •  pregabalin (LYRICA) 100 MG capsule, Take 100 mg by mouth 2 (Two) Times a Day. 1 a.m. And 2 p.m., Disp: , Rfl:   •  raNITIdine (ZANTAC) 150 MG tablet, , Disp: , Rfl:   •  tiZANidine (ZANAFLEX) 4 MG tablet, Take 4 mg by mouth 2 (Two) Times a Day., Disp: , Rfl:   •  traMADol (ULTRAM) 50 MG tablet, Take 50 mg by mouth 2 (Two) Times a Day., Disp: , Rfl:   •  VITAMIN D, ERGOCALCIFEROL, PO, Take 1 tablet by mouth Daily., Disp: , Rfl:     Visit Dx:     ICD-10-CM ICD-9-CM   1. Degeneration of cervical intervertebral disc M50.30 722.4   2. Degeneration of lumbar intervertebral disc M51.36 722.52             Patient History       10/18/17 1000          History    Chief Complaint Pain  -NH      Type of Pain Back pain;Neck pain  -NH      Date Current Problem(s) Began --   Chronic, years.  -NH      Brief Description of Current Complaint Patient has been having LBP since  "being pregnant 19 years ago. Patient feels like her legs give out at times and has toes on B feet that are numb. Patient also reports that she is feeling numb in her \"lady parts\" which caused her concern and go to the doctor. MD is aware. Patient denies sensation deficit with wiping and denies bowel incontienance. States she has had problems with bladder incontinence since she was pregnant. Patient reports pain in neck and hands has progressively gotten worse over the last 6 months. States she woke up with R arm numbness and a migrane in which she went to the ED for. Patient reports getting a nerve conduction study that showed diminished output.   -NH      Patient/Caregiver Goals Return to work;Return to prior level of function  -NH      Hand Dominance right-handed  -NH      Occupation/sports/leisure activities Unable to work the last 2 years due to LBP; Patient's goal is to return to work.  -NH      What clinical tests have you had for this problem? Nerve Conduction Test  -NH      Results of Clinical Tests C6-C7 reduced nerve activity  -NH      Pain     Pain Location Back;Neck  -NH      Pain at Present 6  -NH      Pain at Best 4  -NH      Pain at Worst 8  -NH      Pain Description Pins and needles  -NH      What Performance Factors Make the Current Problem(s) WORSE? Standing  -NH      Tolerance Time- Standing 10 minutes with shifting  -NH      Tolerance Time- Sitting 30 minutes with shifting  -NH      Tolerance Time- Walking 10-15 minutes  -NH      Is your sleep disturbed? Yes  -NH      Is medication used to assist with sleep? No  -NH      Total hours of sleep per night 4-5 hours  -NH      What position do you sleep in? Right sidelying   moves a lot/toss and turns  -NH        User Key  (r) = Recorded By, (t) = Taken By, (c) = Cosigned By    Initials Name Provider Type    NH Sirena Rao, PT Physical Therapist                PT Ortho       10/18/17 1000    Subjective Comments    Subjective Comments Patient " reports she is so sensitive her family cannot give her a hub because the pressure causes her extreme pain.  -NH    Precautions and Contraindications    Precautions/Limitations no known precautions/limitations  -NH    Posture/Observations    Posture/Observations Comments Forward head, rounded shld posture.  -NH    ROM (Range of Motion)    General ROM head/neck/trunk range of motion deficits identified  -NH    General ROM Detail B shoulders grossly WFL but painful. Decreased R shoulder IR functional reach to PSIS  -NH    General Head/Neck/Trunk Assessment    ROM neck ROM deficit  -NH    ROM Detail LB Flexion 63 degrees, pain with R SB  -NH    Neck    Flexion AROM Deficit 38 w/ pain, no radiating  -NH    Extension AROM Deficit 19 w/ pins/needles R 5th digit  -NH    Lt Lat Flexion AROM Deficit 20 increase radicular symptoms  -NH    Rt Lateral Flexion AROM Deficit 20 increase radicular symptoms  -NH    Lt Rotation AROM Deficit 45 pain down shld/back  -NH    Rt Rotation AROM Deficit 35 painful  -NH    MMT (Manual Muscle Testing)    General MMT Assessment lower extremity strength deficits identified;upper extremity strength deficits identified  -NH    General MMT Assessment Detail L : 45# R : 32#  -NH    Left Shoulder    Flexion Gross Movement (3+/5) fair plus  -NH    Extension Gross Movement (4-/5) good minus  -NH    ABduction Gross Movement (3+/5) fair plus  -NH    Int Rotation Gross Movement (4/5) good  -NH    Ext Rotation Gross Movement (4/5) good  -NH    Right Shoulder    Flexion Gross Movement (3/5) fair  -NH    Extension Gross Movement (3+/5) fair plus  -NH    ABduction Gross Movement (3/5) fair  -NH    Int Rotation Gross Movement (4-/5) good minus  -NH    Ext Rotation Gross Movement (4-/5) good minus  -NH    Left Hip    Hip Flexion Gross Movement (3+/5) fair plus  -NH    Hip ABduction Gross Movement (3+/5) fair plus  -NH    Hip ADduction Gross Movement (4-/5) good minus  -NH    Right Hip    Hip Flexion  Gross Movement (3/5) fair  -NH    Hip ABduction Gross Movement (3/5) fair  -NH    Hip ADduction Gross Movement (3+/5) fair plus  -NH    Upper Extremity    Upper Ext Manual Muscle Testing left shoulder strength deficit;right shoulder strength deficit;right elbow/forearm strength deficit;left elbow/forearm strength deficit  -NH    Lower Extremity    Lower Ext Manual Muscle Testing left hip strength deficit;right hip strength deficit;left knee strength deficit;right knee strength deficit  -NH    Left Knee    Knee Extension Gross Movement (4+/5) good plus  -NH    Knee Flexion Gross Movement (4+/5) good plus  -NH    Right Knee    Knee Extension Gross Movement (4/5) good  -NH    Knee Flexion Gross Movement (4/5) good  -NH    Flexibility    Flexibility Tested? Lower Extremity  -NH    Lower Extremity Flexibility    Hamstrings Severely limited   L 48 deg proximal length, R 45 deg proximal length  -NH      User Key  (r) = Recorded By, (t) = Taken By, (c) = Cosigned By    Initials Name Provider Type    NH Sirena Rao, PT Physical Therapist                            Therapy Education       10/18/17 1100          Therapy Education    Education Details Review chin tucks, UT/LS stretch, Pec stretch, TA isometric  -NH      Given HEP;Symptoms/condition management;Pain management;Posture/body mechanics  -NH      Program New  -NH      How Provided Demonstration;Verbal  -NH      Provided to Patient  -NH      Level of Understanding Verbalized;Demonstrated  -NH        User Key  (r) = Recorded By, (t) = Taken By, (c) = Cosigned By    Initials Name Provider Type    NH Sirena Rao, PT Physical Therapist                PT OP Goals       10/18/17 1100       PT Short Term Goals    STG Date to Achieve 11/01/17  -NH     STG 1 IND and compliant with HEP  -NH     STG 2 Patient will reduce OSWESTRY score to 50%  -NH     STG 3 Patient will be able to tolerate 45 min treatment session with no greater than 3 point increase in pain on VAS.   -NH     STG 4 Patient will have R shoulder strength to 3+/5 grossly  -NH     STG 5 Patient will have R hip flexion/abd 3+/5  -NH     Long Term Goals    LTG Date to Achieve 11/15/17  -NH     LTG 1 Patient will reduce OSWESTRY score to 40%  -NH     LTG 2 Patient will have 10 degree increase in proximal HS length bilaterally.  -NH     LTG 3 Patient will have 4/5 R shoulder flexion  -NH     LTG 4 Patient will have 4/5 R hip flexion strength  -NH     LTG 5 Patient will have 4/5 R hip abd strength  -NH     LTG 6 Patient will increase R  strength by 10#  -NH     Time Calculation    PT Goal Re-Cert Due Date 11/08/17  -NH       User Key  (r) = Recorded By, (t) = Taken By, (c) = Cosigned By    Initials Name Provider Type    NH Sirena Rao, PT Physical Therapist                PT Assessment/Plan       10/18/17 1100       PT Assessment    Functional Limitations Limitation in home management;Limitations in community activities;Limitations in functional capacity and performance;Performance in leisure activities;Performance in work activities  -NH     Impairments Impaired flexibility;Muscle strength;Pain;Posture;Range of motion;Sensation;Poor body mechanics  -NH     Assessment Comments Patient is a 37 year old female presenting to the clinic with cervical and lumbar pain with radicular symptoms in all extremities. Patient demonstrates reduced strength, joint moiblity and soft tissue tightness. Patient severely tender over cervical region making it difficult to assess joint mobility at time of evaluation. Patient's progress in therapy may be impacted by co-morbidities including overweight and fibromyalgia. Patient would benefit from skilled physical therapy to address deficits outlined in PTE.  -NH     Please refer to paper survey for additional self-reported information Yes  -NH     Rehab Potential Good  -NH     Patient/caregiver participated in establishment of treatment plan and goals Yes  -NH     Patient would benefit  from skilled therapy intervention Yes  -NH     PT Plan    PT Frequency 2x/week;3x/week  -NH     Predicted Duration of Therapy Intervention (days/wks) 3-4 weeks  -NH     Planned CPT's? PT EVAL LOW COMPLEXITY: 52548;PT RE-EVAL: 20440;PT THER PROC EA 15 MIN: 48244;PT THER ACT EA 15 MIN: 91829;PT MANUAL THERAPY EA 15 MIN: 16186;PT NEUROMUSC RE-EDUCATION EA 15 MIN: 56645;PT ELECTRICAL STIM UNATTEND: ;PT ULTRASOUND EA 15 MIN: 57478;PT TRACTION CERVICAL: 43397;PT TRACTION LUMBAR: 70087;PT THER SUPP EA 15 MIN  -NH     Physical Therapy Interventions (Optional Details) balance training;bed mobility training;gait training;home exercise program;manual therapy techniques;modalities;patient/family education;ROM (Range of Motion);strengthening;stretching;stair training  -NH     PT Plan Comments Postural strengthening, core. Restore ROM of neck.  -NH       User Key  (r) = Recorded By, (t) = Taken By, (c) = Cosigned By    Initials Name Provider Type    NH Sirena Rao, PT Physical Therapist                  Exercises       10/18/17 1200 10/18/17 1000       Subjective Comments    Subjective Comments  Patient reports she is so sensitive her family cannot give her a hub because the pressure causes her extreme pain.  -NH     Aquatics    Aquatics performed? No  -NH        User Key  (r) = Recorded By, (t) = Taken By, (c) = Cosigned By    Initials Name Provider Type    NH iSrena Rao, PT Physical Therapist           Manual Rx (last 36 hours)      Manual Treatments       10/18/17 1100          Manual Rx 1    Manual Rx 1 Location Cervical  -NH      Manual Rx 1 Type distraction, light stm  -NH      Manual Rx 1 Duration 5'  -NH        User Key  (r) = Recorded By, (t) = Taken By, (c) = Cosigned By    Initials Name Provider Type    NH Sirena Rao, PT Physical Therapist                            Outcome Measures       10/18/17 1100          Modified Oswestry    Modified Oswestry Score/Comments 62%  -NH      Functional Assessment     Outcome Measure Options Modyasmanyd Mimi  -NH        User Key  (r) = Recorded By, (t) = Taken By, (c) = Cosigned By    Initials Name Provider Type    NH Sirena Rao, PT Physical Therapist            Time Calculation:   Start Time: 1007  Stop Time: 1055  Time Calculation (min): 48 min  Total Timed Code Minutes- PT: 0 minute(s) (Eval only)     Therapy Charges for Today     Code Description Service Date Service Provider Modifiers Qty    59973978111 HC PT EVAL LOW COMPLEXITY 3 10/18/2017 Sirena Rao, PT GP 1          PT G-Codes  Outcome Measure Options: Modifed Owpati Rao, PT  10/18/2017

## 2017-10-23 ENCOUNTER — HOSPITAL ENCOUNTER (OUTPATIENT)
Dept: MRI IMAGING | Facility: HOSPITAL | Age: 37
Discharge: HOME | End: 2017-10-23

## 2017-10-23 ENCOUNTER — HOSPITAL ENCOUNTER (OUTPATIENT)
Dept: MRI IMAGING | Facility: HOSPITAL | Age: 37
Discharge: HOME | End: 2017-10-23
Admitting: NURSE PRACTITIONER

## 2017-10-23 DIAGNOSIS — G44.89 OTHER HEADACHE SYNDROME: ICD-10-CM

## 2017-10-23 DIAGNOSIS — M50.30 DEGENERATION OF CERVICAL INTERVERTEBRAL DISC: ICD-10-CM

## 2017-10-23 DIAGNOSIS — R20.0 FACIAL NUMBNESS: ICD-10-CM

## 2017-10-23 LAB — CREAT BLDA-MCNC: 0.8 MG/DL (ref 0.6–1.3)

## 2017-10-23 PROCEDURE — 0 GADOBENATE DIMEGLUMINE 529 MG/ML SOLUTION: Performed by: NURSE PRACTITIONER

## 2017-10-23 PROCEDURE — 70553 MRI BRAIN STEM W/O & W/DYE: CPT

## 2017-10-23 PROCEDURE — 82565 ASSAY OF CREATININE: CPT

## 2017-10-23 PROCEDURE — A9577 INJ MULTIHANCE: HCPCS | Performed by: NURSE PRACTITIONER

## 2017-10-23 PROCEDURE — 72141 MRI NECK SPINE W/O DYE: CPT

## 2017-10-23 RX ADMIN — GADOBENATE DIMEGLUMINE 20 ML: 529 INJECTION, SOLUTION INTRAVENOUS at 11:15

## 2017-10-25 ENCOUNTER — HOSPITAL ENCOUNTER (OUTPATIENT)
Dept: PHYSICAL THERAPY | Facility: HOSPITAL | Age: 37
Setting detail: THERAPIES SERIES
Discharge: HOME | End: 2017-10-25

## 2017-10-25 DIAGNOSIS — M50.30 DEGENERATION OF CERVICAL INTERVERTEBRAL DISC: Primary | ICD-10-CM

## 2017-10-25 DIAGNOSIS — M51.36 DEGENERATION OF LUMBAR INTERVERTEBRAL DISC: ICD-10-CM

## 2017-10-25 PROCEDURE — 97110 THERAPEUTIC EXERCISES: CPT | Performed by: PHYSICAL THERAPY ASSISTANT

## 2017-10-25 NOTE — THERAPY TREATMENT NOTE
Outpatient Physical Therapy Ortho Treatment Note  [facility]     Patient Name: Merari Bradshaw  : 1980  MRN: [id number]  Today's Date: 10/25/2017      Visit Date: 10/25/2017    Visits /2   Recert Date 17   MD appointment    Pt reported % of improvement  NA     Insurance available:8 visits approved    Visit Dx:    ICD-10-CM ICD-9-CM   1. Degeneration of cervical intervertebral disc M50.30 722.4   2. Degeneration of lumbar intervertebral disc M51.36 722.52       Patient Active Problem List   Diagnosis   • Degeneration of lumbar intervertebral disc   • Lumbosacral neuritis   • Morbid obesity due to excess calories   • Cigarette smoker   • Degeneration of cervical intervertebral disc   • Other headache syndrome   • Facial numbness   • Fibromyalgia        Past Medical History:   Diagnosis Date   • Acid reflux    • Basal cell carcinoma of breast    • Fibrocystic breast    • Fibromyalgia         Past Surgical History:   Procedure Laterality Date   •  SECTION     • CHOLECYSTECTOMY     • EXPLORATORY LAPAROTOMY     • HX OVARIAN CYSTECTOMY     • HYSTERECTOMY               PT Ortho       10/25/17 1100    Precautions and Contraindications    Precautions/Limitations no known precautions/limitations  -    Subjective Pain    Post-Treatment Pain Level 6  -      User Key  (r) = Recorded By, (t) = Taken By, (c) = Cosigned By    Initials Name Provider Type    RENNY Abernathy PTA Physical Therapy Assistant                            PT Assessment/Plan       10/25/17 1100       PT Assessment    Assessment Comments Pt had no necrease in pain, Educated Pt on HEP compliance.  -     PT Plan    PT Frequency 2x/week;3x/week  -     PT Plan Comments manual cervical   -       User Key  (r) = Recorded By, (t) = Taken By, (c) = Cosigned By    Initials Name Provider Type    RENNY Abernathy PTA Physical Therapy Assistant                Modalities       10/25/17 1100          Ice    Patient  denies application of Ice Yes  -        User Key  (r) = Recorded By, (t) = Taken By, (c) = Cosigned By    Initials Name Provider Type    RENNY Abernathy PTA Physical Therapy Assistant                Exercises       10/25/17 1100          Subjective Comments    Subjective Comments Pt reports she slept wrong and is very tender in cervical area.  -      Subjective Pain    Able to rate subjective pain? yes  -      Pre-Treatment Pain Level 6   in neck  -JH      Post-Treatment Pain Level 6  -JH      Exercise 1    Exercise Name 1 PRO ll UE LE  -JH      Time (Minutes) 1 10  -JH      Additional Comments L 1.0  -JH      Exercise 2    Exercise Name 2 UT stretch  -JH      Reps 2 3  -JH      Time (Seconds) 2 30  -JH      Exercise 3    Exercise Name 3 Levator stretch  -JH      Reps 3 3  -JH      Time (Seconds) 3 30  -JH      Exercise 4    Exercise Name 4 LTR  -JH      Reps 4 10  -JH      Time (Seconds) 4 10  -JH      Exercise 5    Exercise Name 5 supine piriformis stretch  -JH      Reps 5 3  -JH      Time (Seconds) 5 30  -JH      Exercise 6    Exercise Name 6 KAE  -JH      Time (Minutes) 6 4  -JH      Exercise 7    Exercise Name 7 scap squeeze  -JH      Reps 7 20  -JH        User Key  (r) = Recorded By, (t) = Taken By, (c) = Cosigned By    Initials Name Provider Type    RENNY Abernathy PTA Physical Therapy Assistant                               PT OP Goals       10/25/17 1100       PT Short Term Goals    STG Date to Achieve 11/01/17  -     STG 1 IND and compliant with HEP  -     STG 2 Patient will reduce OSWESTRY score to 50%  -     STG 3 Patient will be able to tolerate 45 min treatment session with no greater than 3 point increase in pain on VAS.  -     STG 4 Patient will have R shoulder strength to 3+/5 grossly  -     STG 5 Patient will have R hip flexion/abd 3+/5  -JH     Long Term Goals    LTG Date to Achieve 11/15/17  -     LTG 1 Patient will reduce OSWESTRY score to 40%  -     LTG 2 Patient will  have 10 degree increase in proximal HS length bilaterally.  -     LT 3 Patient will have 4/5 R shoulder flexion  -     LT 4 Patient will have 4/5 R hip flexion strength  -     LTG 5 Patient will have 4/5 R hip abd strength  [facility] Patient will increase R  strength by 10#  -     Time Calculation    PT Goal Re-Cert Due Date 11/08/17  -       User Key  (r) = Recorded By, (t) = Taken By, (c) = Cosigned By    Initials Name Provider Type     Naun Abernathy PTA Physical Therapy Assistant                    Time Calculation:   Start Time: 1100  Stop Time: 1141  Time Calculation (min): 41 min    Therapy Charges for Today     Code Description Service Date Service Provider Modifiers Qty    08902543875 HC PT THER PROC EA 15 MIN 10/25/2017 Naun Abernathy PTA GP 3                    Naun Abernathy PTA  10/25/2017

## 2017-10-27 ENCOUNTER — HOSPITAL ENCOUNTER (OUTPATIENT)
Dept: PHYSICAL THERAPY | Facility: HOSPITAL | Age: 37
Setting detail: THERAPIES SERIES
Discharge: HOME | End: 2017-10-27

## 2017-10-27 DIAGNOSIS — M50.30 DEGENERATION OF CERVICAL INTERVERTEBRAL DISC: Primary | ICD-10-CM

## 2017-10-27 DIAGNOSIS — M51.36 DEGENERATION OF LUMBAR INTERVERTEBRAL DISC: ICD-10-CM

## 2017-10-27 PROCEDURE — 97110 THERAPEUTIC EXERCISES: CPT

## 2017-10-27 PROCEDURE — 97140 MANUAL THERAPY 1/> REGIONS: CPT

## 2017-10-27 NOTE — THERAPY WOUND CARE TREATMENT
Outpatient Physical Therapy Ortho Treatment Note  [facility]     Patient Name: Merari Bradshaw  : 1980  MRN: [id number]  Today's Date: 10/27/2017      Visit Date: 10/27/2017     Subjective Improvement:  0%     Attendance: 3/3   Approved: Romaine menard MD follow up: PRN           date: 17           Visit Dx:    ICD-10-CM ICD-9-CM   1. Degeneration of cervical intervertebral disc M50.30 722.4   2. Degeneration of lumbar intervertebral disc M51.36 722.52       Patient Active Problem List   Diagnosis   • Degeneration of lumbar intervertebral disc   • Lumbosacral neuritis   • Morbid obesity due to excess calories   • Cigarette smoker   • Degeneration of cervical intervertebral disc   • Other headache syndrome   • Facial numbness   • Fibromyalgia        Past Medical History:   Diagnosis Date   • Acid reflux    • Basal cell carcinoma of breast    • Fibrocystic breast    • Fibromyalgia         Past Surgical History:   Procedure Laterality Date   •  SECTION     • CHOLECYSTECTOMY     • EXPLORATORY LAPAROTOMY     • HX OVARIAN CYSTECTOMY     • HYSTERECTOMY               PT Ortho       10/27/17 0800    Subjective Comments    Subjective Comments Pt states that she is really hurting today.  -MB    Precautions and Contraindications    Precautions/Limitations no known precautions/limitations  -MB    Subjective Pain    Able to rate subjective pain? yes  -MB    Pre-Treatment Pain Level 7  -MB    Post-Treatment Pain Level 6  -MB      10/25/17 1100    Precautions and Contraindications    Precautions/Limitations no known precautions/limitations  -JH    Subjective Pain    Post-Treatment Pain Level 6  -JH      User Key  (r) = Recorded By, (t) = Taken By, (c) = Cosigned By    Initials Name Provider Type    MECHE Adorno PTA Physical Therapy Assistant    RENNY Abernathy PTA Physical Therapy Assistant                            PT Assessment/Plan       10/27/17 0800       PT  Assessment    Functional Limitations Limitation in home management;Limitations in community activities;Limitations in functional capacity and performance;Performance in leisure activities;Performance in work activities  -MB     Impairments Impaired flexibility;Muscle strength;Pain;Posture;Range of motion;Sensation;Poor body mechanics  -MB     Assessment Comments Pt has slight decrease in pain with treatment today. Pt very TTP B UT with manual. Pt needs cues for trans ab iso.   -MB     Rehab Potential Good  -MB     Patient/caregiver participated in establishment of treatment plan and goals Yes  -MB     Patient would benefit from skilled therapy intervention Yes  -MB     PT Plan    PT Frequency 2x/week;3x/week  -MB     Predicted Duration of Therapy Intervention (days/wks) 3-4 weeks  -MB     PT Plan Comments Seated core next  -MB       User Key  (r) = Recorded By, (t) = Taken By, (c) = Cosigned By    Initials Name Provider Type    MECHE Adorno PTA Physical Therapy Assistant                Modalities       10/27/17 0800          Moist Heat    MH Applied Yes  -MB      Location Cervical/Lumbar  -MB      Rx Minutes 15 mins  -MB      MH S/P Rx Yes  -MB        User Key  (r) = Recorded By, (t) = Taken By, (c) = Cosigned By    Initials Name Provider Type    MECHE Adorno PTA Physical Therapy Assistant                Exercises       10/27/17 0800          Subjective Comments    Subjective Comments Pt states that she is really hurting today.  -MB      Subjective Pain    Able to rate subjective pain? yes  -MB      Pre-Treatment Pain Level 7  -MB      Post-Treatment Pain Level 6  -MB      Aquatics    Aquatics performed? No  -MB      Exercise 1    Exercise Name 1 Doorway S  -MB      Reps 1 3  -MB      Time (Seconds) 1 30  -MB      Exercise 2    Exercise Name 2 UT stretch  -MB      Reps 2 2  -MB      Time (Seconds) 2 30  -MB      Exercise 3    Exercise Name 3 Levator stretch  -MB      Reps 3 2  -MB      Time (Seconds)  3 30  -MB      Exercise 4    Exercise Name 4 Elephant S  -MB      Reps 4 3  -MB      Time (Seconds) 4 30  -MB      Exercise 5    Exercise Name 5 Hamstring S  -MB      Reps 5 2  -MB      Time (Seconds) 5 30  -MB      Exercise 6    Exercise Name 6 Piriformis S seated  -MB      Sets 6 2  -MB      Time (Seconds) 6 30  -MB      Exercise 7    Exercise Name 7 TBand Rows/ext  -MB      Sets 7 2  -MB      Reps 7 10  -MB      Additional Comments Red  -MB      Exercise 8    Exercise Name 8 LTR  -MB      Sets 8 1  -MB      Reps 8 10  -MB      Time (Seconds) 8 5  -MB      Exercise 9    Exercise Name 9 Hooklying clamshell  -MB      Sets 9 2  -MB      Reps 9 10  -MB      Additional Comments Green  -MB      Exercise 10    Exercise Name 10 Bridges + hip add squeeze  -MB      Sets 10 2  -MB      Reps 10 10  -MB        User Key  (r) = Recorded By, (t) = Taken By, (c) = Cosigned By    Initials Name Provider Type    MECHE Adorno PTA Physical Therapy Assistant                        Manual Rx (last 36 hours)      Manual Treatments       10/27/17 0700          Manual Rx 1    Manual Rx 1 Location Cervical  -MB      Manual Rx 1 Type Sub occipital release, distraction, STM  -MB      Manual Rx 1 Duration 8'  -MB        User Key  (r) = Recorded By, (t) = Taken By, (c) = Cosigned By    Initials Name Provider Type    MECHE Adorno PTA Physical Therapy Assistant                PT OP Goals       10/27/17 0800       PT Short Term Goals    STG Date to Achieve 11/01/17  -MB     STG 1 IND and compliant with HEP  -MB     STG 2 Patient will reduce OSWESTRY score to 50%  -MB     STG 3 Patient will be able to tolerate 45 min treatment session with no greater than 3 point increase in pain on VAS.  -MB     STG 4 Patient will have R shoulder strength to 3+/5 grossly  -MB     STG 5 Patient will have R hip flexion/abd 3+/5  -MB     Long Term Goals    LTG Date to Achieve 11/15/17  -MB     LTG 1 Patient will reduce OSWESTRY score to 40%  -MB      LTG 2 Patient will have 10 degree increase in proximal HS length bilaterally.  -MB     LTG 3 Patient will have 4/5 R shoulder flexion  -MB     LTG 4 Patient will have 4/5 R hip flexion strength  -MB     LTG 5 Patient will have 4/5 R hip abd strength  -MB     LTG 6 Patient will increase R  strength by 10#  -MB     Time Calculation    PT Goal Re-Cert Due Date 11/08/17  -MB       User Key  (r) = Recorded By, (t) = Taken By, (c) = Cosigned By    Initials Name Provider Type    MECHE Adorno PTA Physical Therapy Assistant                Therapy Education       10/27/17 0800          Therapy Education    Given HEP;Symptoms/condition management;Pain management;Posture/body mechanics  -MB      Program Reinforced  -MB      How Provided Demonstration;Verbal  -MB      Provided to Patient  -MB      Level of Understanding Verbalized;Demonstrated  -MB        User Key  (r) = Recorded By, (t) = Taken By, (c) = Cosigned By    Initials Name Provider Type    MECHE Adorno PTA Physical Therapy Assistant                Time Calculation:   Start Time: 0758  Stop Time: 0900  Time Calculation (min): 62 min  Total Timed Code Minutes- PT: 47 minute(s)    Therapy Charges for Today     Code Description Service Date Service Provider Modifiers Qty    01209072337 HC PT THER PROC EA 15 MIN 10/27/2017 Declan Adorno PTA GP 2    53293017438 HC PT THER SUPP EA 15 MIN 10/27/2017 Declan Adorno PTA GP 1    20830706892 HC PT MANUAL THERAPY EA 15 MIN 10/27/2017 Declan Adorno PTA GP 1                    Declan Adorno PTA  10/27/2017

## 2017-10-30 ENCOUNTER — APPOINTMENT (OUTPATIENT)
Dept: PHYSICAL THERAPY | Facility: HOSPITAL | Age: 37
End: 2017-10-30

## 2017-10-30 ENCOUNTER — TELEPHONE (OUTPATIENT)
Dept: NEUROSURGERY | Facility: CLINIC | Age: 37
End: 2017-10-30

## 2017-10-30 NOTE — TELEPHONE ENCOUNTER
Patient calls, stating that therapy is making her worse. I have advised her to D/C all future sessions and we will discuss this and her MRI results at her appointment next week. She is agreeable.

## 2017-11-01 ENCOUNTER — APPOINTMENT (OUTPATIENT)
Dept: PHYSICAL THERAPY | Facility: HOSPITAL | Age: 37
End: 2017-11-01

## 2017-11-06 ENCOUNTER — TELEPHONE (OUTPATIENT)
Dept: NEUROSURGERY | Facility: CLINIC | Age: 37
End: 2017-11-06

## 2017-11-06 ENCOUNTER — OFFICE VISIT (OUTPATIENT)
Dept: NEUROSURGERY | Facility: CLINIC | Age: 37
End: 2017-11-06

## 2017-11-06 VITALS
HEIGHT: 65 IN | SYSTOLIC BLOOD PRESSURE: 124 MMHG | WEIGHT: 250 LBS | BODY MASS INDEX: 41.65 KG/M2 | DIASTOLIC BLOOD PRESSURE: 78 MMHG

## 2017-11-06 DIAGNOSIS — F17.210 CIGARETTE SMOKER: ICD-10-CM

## 2017-11-06 DIAGNOSIS — M54.17 LUMBOSACRAL NEURITIS: Primary | ICD-10-CM

## 2017-11-06 DIAGNOSIS — M50.20 DISPLACEMENT OF CERVICAL INTERVERTEBRAL DISC WITHOUT MYELOPATHY: ICD-10-CM

## 2017-11-06 DIAGNOSIS — M54.12 CERVICAL RADICULOPATHY: ICD-10-CM

## 2017-11-06 DIAGNOSIS — E66.01 MORBID OBESITY DUE TO EXCESS CALORIES (HCC): ICD-10-CM

## 2017-11-06 PROCEDURE — 99213 OFFICE O/P EST LOW 20 MIN: CPT | Performed by: NURSE PRACTITIONER

## 2017-11-06 RX ORDER — OXYCODONE HYDROCHLORIDE AND ACETAMINOPHEN 5; 325 MG/1; MG/1
1 TABLET ORAL EVERY 8 HOURS PRN
Qty: 90 TABLET | Refills: 0 | Status: SHIPPED | OUTPATIENT
Start: 2017-11-06 | End: 2017-12-15 | Stop reason: HOSPADM

## 2017-11-06 RX ORDER — ONDANSETRON 4 MG/1
4 TABLET, ORALLY DISINTEGRATING ORAL EVERY 6 HOURS PRN
Status: SHIPPED | OUTPATIENT
Start: 2017-11-06

## 2017-11-06 NOTE — TELEPHONE ENCOUNTER
Yolis Morgan asked that I call Dr Dupree's office to see if they had an EMG and Nerve Conduction test results on this patient.  I left message for them to call back if they had any questions.

## 2017-11-06 NOTE — PROGRESS NOTES
"Neurosurgery Follow Up Office Visit      Patient Name:  Merari Bradshaw  Age:  37 y.o.  YOB: 1980  MR#:  [id number]    /78  Ht 65\" (165.1 cm)  Wt 250 lb (113 kg)  BMI 41.6 kg/m2    Social History   Substance Use Topics   • Smoking status: Current Every Day Smoker     Packs/day: 0.50     Types: Cigarettes   • Smokeless tobacco: Never Used   • Alcohol use No       Chief Complaint   Patient presents with   • Neck Pain     Patient states she has been having sharp pains shooting through her right neck down into right arm.  She is having some numbness in spots of her left arm.   • Back Pain     She was doing Physical therapy but it started making the pain worse so she has stopped PT till furture instructions.   • Headache     Still having chronic headaches every day.         History  Chief Complaint:  She and her  return to the office today for follow-up and MRI results.  She continues complaining of neck pain that radiates across the shoulders and down the shoulder blades.  Her pain is worse in the left arm where it radiates all the way to the hand and fingers.  She also has a lot of numbness and tingling and feels weak.  She has more mild symptoms happening in the right arm.  She has had EMG and nerve conduction testing that we still do not have a formal report of testing for the upper extremities.  She has been referred to neurology as there is question apparently of an actual fibromyalgia and diagnosis.  She is also described other symptoms to me in the past that would make me question that multiple sclerosis could be a differential diagnosis for her, in the setting of chronic pain issues.  She still sees Dr. Lorenza Quarles but this is for low back issues only.  She has not been found in the recent past to have any type of surgical lesion in the lumbar.  She and her family have already been talking and if surgery is an option then she is ready to consider it.  She has been in " physical therapy but this actually made her worse and it was agreed for her remaining sessions to be placed on hold.      Physical Examination:  On exam, she appears a bit uncomfortable.  She is awake and alert, pleasant and cooperative, speech is clear and appropriate.  Skin is warm and dry.  Cervical range of motion is quite limited with all movement.  It elicits a sensation of crepitus.  There is generalized weakness of the upper extremities.  Particularly pronation is weak bilaterally, and this is greater on the left.  It also elicits pain.  There is weakness of left  and finger extensors appear bit weak bilaterally.  Deep tendon reflexes diminished bilaterally.  David's negative.  I question a positive Tinel's at the left wrist and left elbow.      Medical Decision Making  Treatment Options:   In the office I reviewed her cervical MRI.  She has disc degeneration at C5 6 which is mostly central and also resulting in the left foraminal stenosis.  At C6 7, there is disc bulging with foraminal stenosis bilaterally.  We still need to obtain a formal EMG nerve conduction report of the upper extremities.  We have talked about her medications she has difficulty tolerating Percocet due to nausea and cannot tolerate Norco at all.  She has tried breaking of Percocet and half which will be 5 mg and did relatively well.  We will try that and she is requested a prescription for Zofran.  We will find a time for her to return on Dr. Botello schedule for him to personally evaluate her and reviewed imaging.  Again, if surgery is recommended that she is very ready to consider it.      Assessment and Plan  Merari was seen today for neck pain, back pain and headache.    Diagnoses and all orders for this visit:    Lumbosacral neuritis  -     ondansetron ODT (ZOFRAN-ODT) disintegrating tablet 4 mg; Take 1 tablet by mouth Every 6 (Six) Hours As Needed for Nausea or Vomiting.    Displacement of cervical intervertebral disc without  myelopathy    Cervical radiculopathy    Morbid obesity due to excess calories    Cigarette smoker    Other orders  -     SCANNED - IMAGING  -     oxyCODONE-acetaminophen (PERCOCET) 5-325 MG per tablet; Take 1 tablet by mouth Every 8 (Eight) Hours As Needed for Moderate Pain .        No Follow-up on file.      Yolis Morgan, APRN

## 2017-11-17 ENCOUNTER — OFFICE VISIT (OUTPATIENT)
Dept: NEUROLOGY | Age: 37
End: 2017-11-17
Payer: MEDICAID

## 2017-11-17 VITALS
HEIGHT: 66 IN | OXYGEN SATURATION: 99 % | DIASTOLIC BLOOD PRESSURE: 80 MMHG | BODY MASS INDEX: 40.47 KG/M2 | SYSTOLIC BLOOD PRESSURE: 120 MMHG | WEIGHT: 251.8 LBS | HEART RATE: 79 BPM

## 2017-11-17 DIAGNOSIS — R40.0 SOMNOLENCE, DAYTIME: ICD-10-CM

## 2017-11-17 DIAGNOSIS — G47.09 OTHER INSOMNIA: ICD-10-CM

## 2017-11-17 DIAGNOSIS — G25.81 RESTLESS LEG SYNDROME: ICD-10-CM

## 2017-11-17 DIAGNOSIS — R06.81 WITNESSED APNEIC SPELLS: ICD-10-CM

## 2017-11-17 DIAGNOSIS — R06.83 SNORING: ICD-10-CM

## 2017-11-17 DIAGNOSIS — G47.33 OBSTRUCTIVE SLEEP APNEA: Primary | ICD-10-CM

## 2017-11-17 PROCEDURE — 99203 OFFICE O/P NEW LOW 30 MIN: CPT | Performed by: PHYSICIAN ASSISTANT

## 2017-11-17 RX ORDER — ONDANSETRON 4 MG/1
4 TABLET, ORALLY DISINTEGRATING ORAL DAILY
COMMUNITY
Start: 2017-11-06 | End: 2018-07-19

## 2017-11-17 RX ORDER — OXYCODONE HYDROCHLORIDE AND ACETAMINOPHEN 5; 325 MG/1; MG/1
1 TABLET ORAL 4 TIMES DAILY
COMMUNITY
Start: 2017-11-06 | End: 2018-07-19

## 2017-11-17 NOTE — PROGRESS NOTES
[facility] SLEEP    Patient: Kt Anaya  :  1980  Age:  44 y.o. MRN:  [id number]  Today:    17    Provider:  Rachael Turpin PA-C    Chief Complaint:  Chief Complaint   Patient presents with    Sleep Apnea     New patient having sleep study on ./    Chronic Pain     Patient had a referal from outside source needing a proper diagnois        History Source: History obtained from chart review and the patient. PCP: ERKI Mohr     Referring Provider: ERIK Appiah    HISTORY OF PRESENT ILLNESS:   Kt Anaya is a 44y.o. year old female with a history of chronic back pain, fibromyalgia, and GERD who was referred for a pre-diagnostic PSG evaluation. She was referred for a PSG due to her complaints of snoring, witnessed apneas, and excessive daytime somnolence. She is scheduled for a PSG, 2017    Location or symptom:  Snoring  Onset:  Several years  Timing:  q hs  Severity:  The snoring can be heard from another room  Associated: Witnessed apneas and excessive daytime somnolence  Alleviated:  Nothing    Patient complains of snoring, complains of excessive daytime somnolence, complains of restless legs, complains of sleep disturbance, complains of witnessed apneas, complains of gasping, complains of insomnia. She does have difficulty initiating sleep. She does have difficulty maintaining sleep. She sleeps 3-4 hours per night. The sleep is not restorative. She does not use sleep aides. She does fall asleep when sedentary. She does awaken with a dry mouth. She does awaken with a morning headache. She  does not have nocturia. PAST MEDICAL HITORY:    Medical History:  Past Medical History:   Diagnosis Date    Chronic back pain     gets injections with [facility].      Fibromyalgia     GERD (gastroesophageal reflux disease)     Skin cancer     left breast        Surgical History:  Past Surgical History:   Procedure Laterality Date    ABDOMINAL EXPLORATION SURGERY       SECTION      x1    CHOLECYSTECTOMY      HYSTERECTOMY      OVARIAN CYST REMOVAL      PA EGD TRANSORAL BIOPSY SINGLE/MULTIPLE N/A 2017    Dr Meier-sushma/dilation over wire, 46 Polish-distal esophageal narrowing-Codie (-)    UPPER GASTROINTESTINAL ENDOSCOPY  2017    Dr Meier-sushma/dilation over wire, 46 Polish-distal esophageal narrowing-Codie (-)       Current Medications:  Current Outpatient Prescriptions   Medication Sig Dispense Refill    oxyCODONE-acetaminophen (PERCOCET) 5-325 MG per tablet Take 1 tablet by mouth 4 times daily .  ondansetron (ZOFRAN-ODT) 4 MG disintegrating tablet Take 4 mg by mouth daily      pregabalin (LYRICA) 100 MG capsule Take 200 mg by mouth nightly      omeprazole (PRILOSEC) 20 MG delayed release capsule Take 1 capsule by mouth daily 30 capsule 11    LYRICA 100 MG capsule 1 qam 2 qhs (Patient taking differently: Take 100 mg by mouth daily 1 qam 2 qhs) 90 capsule 5    busPIRone (BUSPAR) 7.5 MG tablet Take 1 tablet by mouth 3 times daily 90 tablet 5    vitamin D (ERGOCALCIFEROL) 18022 UNITS CAPS capsule Take 1 capsule by mouth Twice a Week Fall/winter months only       No current facility-administered medications for this visit.         Allergies:  Hydrocodone-acetaminophen and Adhesive tape    SOCIAL HISTORY:   History   Alcohol Use No     History   Drug Use No     History   Smoking Status    Current Some Day Smoker    Packs/day: 1.00    Types: Cigarettes   Smokeless Tobacco    Former User    Quit date: 6/10/2017     Comment: smokes 1-2 cigarretts per day/ patient has tried but nothing has worked       FAMILY HISTORY:   Family History   Problem Relation Age of Onset    Heart Disease Mother     High Blood Pressure Mother     Diabetes Mother     Heart Disease Father     High Blood Pressure Father     Cancer Maternal Grandfather      esphogeal cancer    Cancer Paternal Grandfather      colon ca    Colon Cancer Paternal Grandfather  Colon Polyps Neg Hx     Liver Cancer Neg Hx     Liver Disease Neg Hx     Rectal Cancer Neg Hx     Stomach Cancer Neg Hx        REVIEW OF SYSTEMS     Constitutional: []Fever []Sweats []Chills [] Recent Injury   [x] Denies all unless marked  HENT:[]Headache  [] Head Injury  [] Sore Throat  [] Ear Pain  [] Dizziness [] Hearing Loss   [x] Denies all unless marked  Spine:  [] Neck pain  [] Back pain  [] Sciaticia  [x] Denies all unless marked  Cardiovascular:[]Chest Pain []Palpitations [] Heart Disease  [x] Denies all unless marked  Pulmonary: []Shortness of Breath []Cough   [x] Denies all unless marked  Gastrointestinal:  []Abdominal Pain  []Blood in Stool  []Diarrhea []Constipation []Nausea  []Vomiting  [x] Denies all unless marked  Genitourinary:  [] Dysuria [] Frequency  [] Incontinence [] Urgency   [x] Denies all unless marked  Musculoskeletal: [] Arthralgia  [] Myalgias [] Muscle cramps  [] Muscle twitches   [x] Denies all unless marked   Extremities:   [] Pain   [] Swelling   [x] Denies all unless marked  Skin:[] Rash  [] Color Change  [x] Denies all unless marked  Neurological:[] Visual Disturbance [] Double Vision [] Slurred Speech [] Trouble swallowing  [] Vertigo [] Tingling [] Numbness [] Weakness [] Loss of Balance   [] Loss of Consciousness [] Memory Loss  [x] Denies all unless marked  Psychiatric/Behavioral:[] Depression [] Anxiety  [x] Denies all unless marked  Sleep: [x]  Insomnia [x] Sleep Disturbance [x] Snoring [x] Restless Legs [x] Daytime Sleepiness [] Sleep Apnea  [] Denies all unless marked      PHYSICAL EXAMINATION:  Vitals: /80   Pulse 79   Ht 5' 6\" (1.676 m)   Wt 251 lb 12.8 oz (114.2 kg)   LMP 01/01/2005   SpO2 99%   BMI 40.64 kg/m²     Neck circumference:  16 inches  Mallampati score: Type 2  General appearance: alert, appears stated age and cooperative  Skin: Skin color, texture, turgor normal. No rashes or lesions  HEENT: Head: Normal, normocephalic, atraumatic.   Neck: visit summary for your review:  RERE/PAP guidelines/sleep studies/CPAP-discussed with pt.  5.  Proceed with pre-scheduled PS2017  6. Follow up per protocol      Note:  A total of >50% (>16 minutes) of 30 minutes was spent discussing the pathophysiology and treatment and/or coordination of care of the above diagnoses.

## 2017-11-17 NOTE — PATIENT INSTRUCTIONS
more likely than others to have high blood pressure, heart attacks, and other serious heart problems. In people with severe sleep apnea, getting treated (for example, with a CPAP machine) can help prevent some of these problems. Important information:  Medicare/private insurance CPAP/BiPAP/APAP requirements:  Medicare/private insurance has specific requirements for PAP compliance that must be met during the first 90 days of use to continue coverage for CPAP/BiPAP/APAP  from day 91 and beyond. The policy requires that patients use a PAP device 4 hours per 24 hour period, at least 70% of the time over a 30 day period. This data must be downloaded as a report direct from the PAP devices. This is called a compliance download. Your PAP supplier will assist you in this matter. Reminder:  Please bring a copy of the compliance download to your next office visit or have your supplier fax it to our office prior to your office visit. Note:  Where applicable, we will utilize PAP device efficiency reports, additional testing, and face-to-face  clinical evaluation subsequent to any treatment, changes in treatment, and continued treatment. Caution:  Please abstain from driving or engaging in other activities which may be hazardous in the presence of diminished alertness or daytime drowsiness. And avoid the use of sedatives or alcohol, which can worsen sleep apnea and daytime drowsiness. Mask suggestions:  - Resmed Airfit N20 (Nasal) or F20 (Full face mask). They conform to your face, thus decreasing the potential for mask leakage. You might like the [employer] (full face mask). It has a \"memory foam\" like cushion. You might also like to try a nasal mask called a Dreamwear nasal mask or the Dreamwear nasal pillow. One other suggestion is the [facility], it is a minimal contact full face mask.   The Gela stacy under the nose design makes it the only full face mask that won't cause red marks on the bridge of your nose when compared to other Full Face Masks        Organizations  American Sleep Apnea Association  Provides information about sleep apnea to the public, publishes a newsletter, and serves as an advocate for people with the disorder. Nickjocelyn, [address]   Penny@Stellaris. org   AdminParking.. org   Tel: [phone]   Fax: [facility] that works to [employer] and safety by promoting public understanding of sleep and sleep disorders. Supports sleep-related education, research, and advocacy; produces and distributes educational materials to the public and healthcare professionals; and offers postdoctoral fellowships and grants for sleep researchers. [address]   [place]@Tethis. org   SurferLiWhatser.Goalbook. org   Tel: [phone]   Fax: [phone]       Sleep Studies: About This Test  What is it? Sleep studies are tests that watch what happens to your body during sleep. These studies usually are done in a sleep lab. Sleep labs are often located in hospitals. But sleep studies also can be done with portable equipment that you use at home. Why is this test done? Sleep studies are done to find sleep problems, including:  · Sleep apnea or excessive snoring. · Narcolepsy. · Nocturnal seizures. · REM behavior disorder (RBD). · Repeated muscle twitching of the feet, arms, or legs while you sleep. How can you prepare for the test?  · You may be asked to keep a sleep diary for 1 to 2 weeks before your sleep study. · Don't take any naps for 2 to 3 days before your test.  · You may be asked to avoid food or drinks with caffeine for a day or two before your test.  · Take a shower or bath before your test, but don't use sprays, oils, or gels on your hair. Don't wear makeup, fingernail polish, or fake nails.   · Pack and take along a small overnight bag with personal items, such as a toothbrush, a comb, favorite pillows or blankets, and a book. You can wear your own nightclothes. What happens during the test?  · In the sleep lab, you will be in a private room, much like a hotel room. · Small pads or patches called electrodes will be placed on your head and body with a small amount of glue and tape. These will record things like brain activity, eye movement, oxygen levels, and snoring. · Soft elastic belts will be placed around your chest and belly to measure your breathing. · Your blood oxygen levels will be checked by a small clip (oximeter) placed either on the tip of your index finger or on your earlobe. · If you have sleep apnea, you may wear a mask that is connected to a continuous positive airway pressure (CPAP) machine. · Depending on the type of test, you will be allowed to sleep through the night or you will be awakened periodically and asked to stay awake for a while. What else should you know about the test?  · It may feel odd to be hooked to the sleep study equipment. The sleep lab technician understands that your sleep may not be the same as it is at home because of the equipment. Try to relax and make yourself as comfortable as possible. · After your sleep problem has been identified, you may need a second study if your doctor orders treatment such as CPAP. · Portable sleep study equipment is available for a person to do sleep studies at home. This may be a choice for people who have problems sleeping in a sleep lab. But home sleep studies may not give the same results as a sleep lab. How long does the test take? · You will stay in the sleep lab overnight. For some tests, you will also stay part of the next day. What happens after the test?  · You will be able to go home right away. · You can go back to your usual activities right away. Follow-up care is a key part of your treatment and safety.  Be sure to make and go to all appointments, and call your doctor if you are having problems. It's also a good idea to keep a list of the medicines you take. Ask your doctor when you can expect to have your test results. Where can you learn more? Go to https://chpechantal.Relayr. org and sign in to your [employer] account. Enter L175 in the Fashion & You box to learn more about \"Sleep Studies: About This Test.\"     If you do not have an account, please click on the \"Sign Up Now\" link. Current as of: March 25, 2017  Content Version: 11.3  © 3907-3093 [employer]. Care instructions adapted under license by [facility] ([facility]). If you have questions about a medical condition or this instruction, always ask your healthcare professional. [address] any warranty or liability for your use of this information. Learning About CPAP for Sleep Apnea  What is CPAP? CPAP is a small machine that you use at home every night while you sleep. It increases air pressure in your throat to keep your airway open. When you have sleep apnea, this can help you sleep better so you feel much better. CPAP stands for \"continuous positive airway pressure. \"  The CPAP machine will have one of the following:  · A mask that covers your nose and mouth  · Prongs that fit into your nose  · A mask that covers your nose only, the most common type. This type is called NCPAP. The N stands for \"nasal.\"  Why is it done? CPAP is usually the best treatment for obstructive sleep apnea. It is the first treatment choice and the most widely used. Your doctor may suggest CPAP if you have:  · Moderate to severe sleep apnea. · Sleep apnea and coronary artery disease (CAD) or heart failure. How does it help? · CPAP can help you have more normal sleep, so you feel less sleepy and more alert during the daytime. · CPAP may help keep heart failure or other heart problems from getting worse.   · CPAP may help lower your blood pressure. · If you use CPAP, your bed partner may also sleep better because you are not snoring or restless. What are the side effects? Some people who use CPAP have:  · A dry or stuffy nose and a sore throat. · Irritated skin on the face. · Sore eyes. · Bloating. If you have any of these problems, work with your doctor to fix them. Here are some things you can try:  · Be sure the mask or nasal prongs fit well. · See if your doctor can adjust the pressure of your CPAP. · If your nose is dry, try a humidifier. · If your nose is runny or stuffy, try decongestant medicine or a steroid nasal spray. Be safe with medicines. Read and follow all instructions on the label. Do not use the medicine longer than the label says. If these things do not help, you might try a different type of machine. Some machines have air pressure that adjusts on its own. Others have air pressures that are different when you breathe in than when you breathe out. This may reduce discomfort caused by too much pressure in your nose. Where can you learn more? Go to https://Digital Oceanpepiceweb."[employer]. org and sign in to your BrightWhistle account. Enter S873 in the LaFourchette box to learn more about \"Learning About CPAP for Sleep Apnea. \"     If you do not have an account, please click on the \"Sign Up Now\" link. Current as of: March 25, 2017  Content Version: 11.3  © 0075-7079 [employer]. Care instructions adapted under license by [facility] [employer] [facility] ([facility]). If you have questions about a medical condition or this instruction, always ask your healthcare professional. [place] [address] any warranty or liability for your use of this information.

## 2017-11-20 ENCOUNTER — OFFICE VISIT (OUTPATIENT)
Dept: NEUROSURGERY | Facility: CLINIC | Age: 37
End: 2017-11-20

## 2017-11-20 VITALS
BODY MASS INDEX: 41.65 KG/M2 | SYSTOLIC BLOOD PRESSURE: 128 MMHG | WEIGHT: 250 LBS | HEIGHT: 65 IN | DIASTOLIC BLOOD PRESSURE: 80 MMHG

## 2017-11-20 DIAGNOSIS — M50.30 DEGENERATION OF CERVICAL INTERVERTEBRAL DISC: Primary | ICD-10-CM

## 2017-11-20 DIAGNOSIS — E66.01 MORBID OBESITY DUE TO EXCESS CALORIES (HCC): ICD-10-CM

## 2017-11-20 DIAGNOSIS — M47.12 CERVICAL SPONDYLOSIS WITH MYELOPATHY: ICD-10-CM

## 2017-11-20 DIAGNOSIS — F17.210 CIGARETTE SMOKER: ICD-10-CM

## 2017-11-20 PROCEDURE — 99213 OFFICE O/P EST LOW 20 MIN: CPT | Performed by: NEUROLOGICAL SURGERY

## 2017-11-20 NOTE — PROGRESS NOTES
"    Chief complaint   Chief Complaint   Patient presents with   • Neck Pain     fu to discuss surgery        Subjective     HPI:     As patient is a 37-year-old female with a two-year history of neck pain and hand weakness.  She also has history history of low back pain she currently is seeing pain management.  Physical therapy did not help her neck pain and hand weakness.  She is also been prescribed nonsteroidal anti-inflammatories and narcotics which has not helped.  She states that she has been out of work for 2 years secondary to her weakness.  Addition she describes numbness and tingling that radiates from her neck to her hands.    Review of Systems     A 12 point review of systems is obtained and is negative except for as described in HPI    Past Medical History:   Diagnosis Date   • Acid reflux    • Basal cell carcinoma of breast    • Fibrocystic breast    • Fibromyalgia      Past Surgical History:   Procedure Laterality Date   •  SECTION     • CHOLECYSTECTOMY     • EXPLORATORY LAPAROTOMY     • HX OVARIAN CYSTECTOMY     • HYSTERECTOMY       Family History   Problem Relation Age of Onset   • Coronary artery disease Father    • Hypertension Father    • Diabetes Mother    • Hypertension Mother    • Deep vein thrombosis Paternal Grandmother    • Pulmonary embolism Paternal Grandmother    • Throat cancer Maternal Grandfather      Social History   Substance Use Topics   • Smoking status: Current Every Day Smoker     Packs/day: 0.50     Types: Cigarettes   • Smokeless tobacco: Never Used   • Alcohol use No       (Not in a hospital admission)  Allergies:  Norco [hydrocodone-acetaminophen] and Adhesive tape    Objective      Vital Signs  /80  Ht 65\" (165.1 cm)  Wt 250 lb (113 kg)  BMI 41.6 kg/m2    Physical Exam    No acute distress  Awake alert oriented ×3  HEENT atraumatic normocephalic  Neck supple  Abdomen soft, nontender  Extremities no clubbing, edema, cyanosis  Neurologic exam  Cranial nerves " II through XII grossly intact  Patient moves all extremities 5 out of 5 strength except for  is 4 out of 5 strength  Sensation is intact light touch in upper and lower externus  2+ biceps reflex  2+ patellar reflex  Gait is normal  Absent David sign  No clonus    Results Review: Mri Brain With & Without Contrast    Result Date: 10/23/2017  Narrative: HISTORY: Chronic facial numbness and headache  MRI brain: Multiplanar imaging of the brain is performed pre- and post-IV contrast.  Comparison: None  Findings: The ventricles are normal in size and configuration. There is no abnormal diffusion restriction. The cerebellar tonsils. She approximately 5 to 6 mm below the foramen magnum. Corpus callosum appears intact. There is no sellar mass. There is no intracranial hemorrhage. There are prominent perivascular spaces below the basal ganglia. There is no intracranial mass. There is no abnormal extra-axial fluid collection. There is no demyelinating process.  No abnormal intracranial enhancement is identified. There is homogeneous enhancement of the sagittal and transverse sinuses. Basilar artery is small in caliber and demonstrates no appreciable focal stenosis.  Limited assessment of the orbits and base of skull is unremarkable. No air-fluid levels are seen within the paranasal sinuses. The mastoid air cells appear well-aerated.      Impression: 1. Cerebellar tonsils project approximately 5 to 6 mm below the foramen magnum favorable for cerebellar ectopia. 2. No acute signs of ischemia, hemorrhage, or mass. No abnormal intracranial enhancement. This report was finalized on 10/23/2017 13:22 by Dr. Maria Luz Rosenberg MD.    Mri Cervical Spine Without Contrast    Result Date: 10/23/2017  Narrative: HISTORY: Cervical disc degeneration. Numbness in right arm.  MRI cervical spine: Sagittal and axial images of cervical spine are obtained without contrast.  The alignment of the cervical spine is appropriate. There is mild  endplate spurring. There is no compression deformity or suspicious focal bony mass. There is no abnormal signal seen within the cervical spinal cord. The cervical cranial junction is unremarkable.  At C5/6, there is a moderate left paracentral disc bulge resulting in mild to moderate spinal canal stenosis and mild impression on the left anterior thecal sac. There is moderate left and mild right neural foramen narrowing at this level due to posterior lateral endplate spurring/uncinate process hypertrophy.  At C6/7, there is moderate posterior disc bulging with a slight right paracentral prominence and mild endplate spurring. There is moderate central canal stenosis with anterior compression of the thecal sac right of midline at this level. There is moderate to severe right and moderate left neural foramen narrowing at this level due to uncinate process hypertrophy and posterior lateral endplate spurring/disc bulging.      Impression: 1. Degenerative changes at C5/6 and C6/7 result in overall moderate spinal canal stenosis with impression of the anterior thecal sac at each level, however there is no abnormal signal seen within the cervical spinal cord. 2. Moderate to severe right C6/7 with moderate left C6/7 and left C5/6 neural foramen narrowing related to the degenerative changes. This report was finalized on 10/23/2017 11:39 by Dr. Maria Luz Rosenberg MD.              Assessment/Plan:     37-year-old female with a two-year history of cervical radiculopathy and progressive hand weakness that his not improve with physical therapy and pain management.  Her MRI cervical spine shows C5, 6 and C6, 7 disc herniation causing cord compression that is moderate to severe.  I directly reviewed imaging findings with the patient have answered all questions.  I discuss risks, benefits, alternatives of a C5, 6 and a C6, 7 anterior cervical discectomy and fusion with the patient.  I discuss risks include but are not limited to vascular  injury, esophageal injury, spinal fluid leak, spinal cord injury, nerve injury, weakness not improving, weakness worsening, pain not improving, pain worsening, adjacent level disease, need for posterior cervical fusion, paralysis, death.  Patient voices understanding wishes to proceed with surgery.    I discussed the patients findings and my recommendations with patient    Ezio Botello MD  11/20/17  10:32 AM

## 2017-11-27 ENCOUNTER — HOSPITAL ENCOUNTER (OUTPATIENT)
Dept: SLEEP CENTER | Age: 37
Discharge: HOME | End: 2017-11-27
Payer: MEDICAID

## 2017-11-27 PROCEDURE — 95811 POLYSOM 6/>YRS CPAP 4/> PARM: CPT | Performed by: PSYCHIATRY & NEUROLOGY

## 2017-11-27 PROCEDURE — 95811 POLYSOM 6/>YRS CPAP 4/> PARM: CPT

## 2017-11-29 ENCOUNTER — APPOINTMENT (OUTPATIENT)
Dept: PREADMISSION TESTING | Facility: HOSPITAL | Age: 37
End: 2017-11-29

## 2017-11-29 ENCOUNTER — HOSPITAL ENCOUNTER (OUTPATIENT)
Dept: GENERAL RADIOLOGY | Facility: HOSPITAL | Age: 37
Discharge: HOME | End: 2017-11-29
Admitting: NEUROLOGICAL SURGERY

## 2017-11-29 VITALS
BODY MASS INDEX: 42.28 KG/M2 | HEIGHT: 65 IN | DIASTOLIC BLOOD PRESSURE: 87 MMHG | WEIGHT: 253.8 LBS | HEART RATE: 72 BPM | OXYGEN SATURATION: 98 % | RESPIRATION RATE: 18 BRPM | SYSTOLIC BLOOD PRESSURE: 140 MMHG

## 2017-11-29 DIAGNOSIS — M50.30 DEGENERATION OF CERVICAL INTERVERTEBRAL DISC: ICD-10-CM

## 2017-11-29 DIAGNOSIS — G47.33 OBSTRUCTIVE SLEEP APNEA: Primary | ICD-10-CM

## 2017-11-29 LAB
ANION GAP SERPL CALCULATED.3IONS-SCNC: 9 MMOL/L (ref 4–13)
BACTERIA UR QL AUTO: ABNORMAL /HPF
BILIRUB UR QL STRIP: NEGATIVE
BUN BLD-MCNC: 12 MG/DL (ref 5–21)
BUN/CREAT SERPL: 16 (ref 7–25)
CALCIUM SPEC-SCNC: 9.6 MG/DL (ref 8.4–10.4)
CHLORIDE SERPL-SCNC: 107 MMOL/L (ref 98–110)
CLARITY UR: CLEAR
CO2 SERPL-SCNC: 27 MMOL/L (ref 24–31)
COLOR UR: YELLOW
CREAT BLD-MCNC: 0.75 MG/DL (ref 0.5–1.4)
DEPRECATED RDW RBC AUTO: 42.1 FL (ref 40–54)
ERYTHROCYTE [DISTWIDTH] IN BLOOD BY AUTOMATED COUNT: 13.4 % (ref 12–15)
GFR SERPL CREATININE-BSD FRML MDRD: 86 ML/MIN/1.73
GLUCOSE BLD-MCNC: 93 MG/DL (ref 70–100)
GLUCOSE UR STRIP-MCNC: NEGATIVE MG/DL
HCT VFR BLD AUTO: 43.7 % (ref 37–47)
HGB BLD-MCNC: 14.2 G/DL (ref 12–16)
HGB UR QL STRIP.AUTO: NEGATIVE
HYALINE CASTS UR QL AUTO: ABNORMAL /LPF
INR PPP: 0.9 (ref 0.91–1.09)
KETONES UR QL STRIP: NEGATIVE
LEUKOCYTE ESTERASE UR QL STRIP.AUTO: ABNORMAL
MCH RBC QN AUTO: 27.7 PG (ref 28–32)
MCHC RBC AUTO-ENTMCNC: 32.5 G/DL (ref 33–36)
MCV RBC AUTO: 85.2 FL (ref 82–98)
NITRITE UR QL STRIP: NEGATIVE
PH UR STRIP.AUTO: 7 [PH] (ref 5–8)
PLATELET # BLD AUTO: 244 10*3/MM3 (ref 130–400)
PMV BLD AUTO: 11.2 FL (ref 6–12)
POTASSIUM BLD-SCNC: 4.4 MMOL/L (ref 3.5–5.3)
PROT UR QL STRIP: NEGATIVE
PROTHROMBIN TIME: 12.4 SECONDS (ref 11.9–14.6)
RBC # BLD AUTO: 5.13 10*6/MM3 (ref 4.2–5.4)
RBC # UR: ABNORMAL /HPF
REF LAB TEST METHOD: ABNORMAL
SODIUM BLD-SCNC: 143 MMOL/L (ref 135–145)
SP GR UR STRIP: 1.01 (ref 1–1.03)
SQUAMOUS #/AREA URNS HPF: ABNORMAL /HPF
UROBILINOGEN UR QL STRIP: ABNORMAL
WBC NRBC COR # BLD: 8.73 10*3/MM3 (ref 4.8–10.8)
WBC UR QL AUTO: ABNORMAL /HPF

## 2017-11-29 PROCEDURE — 81001 URINALYSIS AUTO W/SCOPE: CPT | Performed by: NEUROLOGICAL SURGERY

## 2017-11-29 PROCEDURE — 87086 URINE CULTURE/COLONY COUNT: CPT | Performed by: NEUROLOGICAL SURGERY

## 2017-11-29 PROCEDURE — 93005 ELECTROCARDIOGRAM TRACING: CPT

## 2017-11-29 PROCEDURE — 85027 COMPLETE CBC AUTOMATED: CPT | Performed by: NEUROLOGICAL SURGERY

## 2017-11-29 PROCEDURE — 87088 URINE BACTERIA CULTURE: CPT | Performed by: NEUROLOGICAL SURGERY

## 2017-11-29 PROCEDURE — 71010 HC CHEST PA OR AP: CPT

## 2017-11-29 PROCEDURE — 80048 BASIC METABOLIC PNL TOTAL CA: CPT | Performed by: NEUROLOGICAL SURGERY

## 2017-11-29 PROCEDURE — 36415 COLL VENOUS BLD VENIPUNCTURE: CPT

## 2017-11-29 PROCEDURE — 85610 PROTHROMBIN TIME: CPT | Performed by: NEUROLOGICAL SURGERY

## 2017-11-29 PROCEDURE — 93010 ELECTROCARDIOGRAM REPORT: CPT | Performed by: INTERNAL MEDICINE

## 2017-11-29 PROCEDURE — 87186 SC STD MICRODIL/AGAR DIL: CPT | Performed by: NEUROLOGICAL SURGERY

## 2017-11-29 NOTE — PROGRESS NOTES
[facility]  [facility] [address] [place] [address]  Phone [phone]  Fax [phone]     Patient Name: Odalis Jolly 2017  : 1980  Age: 44 y.o.   Patient Address: [place] [address]       Patient Phone: [phone] (home)     REFERRAL  Referred to: DME provider of patient's choice  Odalis Jolly is referred for the following:    DME Equipment HPCPS Code Setting   CPAP device with flex or comparable pressure relief per comfort  11cm   Heated Humidifier  Patient Choice       Replinishible PAP Supplies, 1 year supply  Item HPCPS Code Frequency   Mask of choice  or  1 per 3 months   Nasal Mask cushion/pillows  or  2 per 30 days   Full Face Mask Interface  1 per 30 days   Headgear  1 per 6 months   Tubing, length of choice  or  1 per 3 months   Water Chamber  1 per 6 months   Chinstrap  1 per 6 months   Disposable Filters  2 per 30 days   Reusable Filters  1 per 6 months     Diagnoses:  Obstructive sleep apnea (G47.33)  Length of Need: Lifetime, 99    [facility] Plate: [id number]         Signature:       Date: 2017      Electronically Signed by Maria Still M.D.

## 2017-11-30 ENCOUNTER — TELEPHONE (OUTPATIENT)
Dept: PRIMARY CARE CLINIC | Age: 37
End: 2017-11-30

## 2017-12-01 LAB
BACTERIA SPEC AEROBE CULT: ABNORMAL
BACTERIA SPEC AEROBE CULT: ABNORMAL

## 2017-12-01 RX ORDER — CIPROFLOXACIN 500 MG/1
500 TABLET, FILM COATED ORAL 2 TIMES DAILY
Qty: 14 TABLET | Refills: 0 | Status: SHIPPED | OUTPATIENT
Start: 2017-12-01 | End: 2017-12-08

## 2017-12-06 ENCOUNTER — OFFICE VISIT (OUTPATIENT)
Dept: PRIMARY CARE CLINIC | Age: 37
End: 2017-12-06
Payer: MEDICAID

## 2017-12-06 VITALS
HEART RATE: 79 BPM | HEIGHT: 66 IN | DIASTOLIC BLOOD PRESSURE: 80 MMHG | TEMPERATURE: 97.6 F | WEIGHT: 253 LBS | OXYGEN SATURATION: 98 % | SYSTOLIC BLOOD PRESSURE: 130 MMHG | BODY MASS INDEX: 40.66 KG/M2

## 2017-12-06 DIAGNOSIS — R20.0 NUMBNESS IN BOTH HANDS: ICD-10-CM

## 2017-12-06 DIAGNOSIS — M50.90 CERVICAL NECK PAIN WITH EVIDENCE OF DISC DISEASE: Primary | ICD-10-CM

## 2017-12-06 DIAGNOSIS — G47.33 OBSTRUCTIVE SLEEP APNEA: ICD-10-CM

## 2017-12-06 DIAGNOSIS — Z87.440 HISTORY OF UTI: ICD-10-CM

## 2017-12-06 LAB
BILIRUBIN, POC: NORMAL
BLOOD URINE, POC: NORMAL
CLARITY, POC: CLEAR
COLOR, POC: YELLOW
GLUCOSE URINE, POC: NORMAL
KETONES, POC: NORMAL
LEUKOCYTE EST, POC: NORMAL
NITRITE, POC: NORMAL
PH, POC: 6.5
PROTEIN, POC: NORMAL
SPECIFIC GRAVITY, POC: 1.01
UROBILINOGEN, POC: 0.2

## 2017-12-06 PROCEDURE — 81002 URINALYSIS NONAUTO W/O SCOPE: CPT | Performed by: NURSE PRACTITIONER

## 2017-12-06 PROCEDURE — 99213 OFFICE O/P EST LOW 20 MIN: CPT | Performed by: NURSE PRACTITIONER

## 2017-12-06 ASSESSMENT — ENCOUNTER SYMPTOMS
VOMITING: 0
COUGH: 0
SHORTNESS OF BREATH: 0
SORE THROAT: 0
EYE REDNESS: 0
CONSTIPATION: 0
DIARRHEA: 0
RHINORRHEA: 0

## 2017-12-06 NOTE — PROGRESS NOTES
[address]  [place], [address]  Phone [phone]   Fax [phone]      OFFICE VISIT: 2017    Lamont Dameon- : 1980    Chief Complaint:  Karsten Rinne is a 44 y.o. female who is here for Pre-op Exam     HPI  The patient presents today for surgical clearance. The patient is having surgery per Dr. Duran Lucas. She is having a C5-C7 disc fusion in 2017. She will be in a neck brace for 6-8 weeks. She had lab work, EKG and chest x-ray at [facility]. CBC: WNL  BMP: WNL  Chest X-Ray: Lungs are clear  EKG: Normal sinus rhythm with arrhythmia    Repeat UA clear today in the office. height is 5' 6\" (1.676 m) and weight is 253 lb (114.8 kg). Her temporal temperature is 97.6 °F (36.4 °C). Her blood pressure is 130/80 and her pulse is 79. Her oxygen saturation is 98%. Body mass index is 40.84 kg/m². Results for orders placed or performed in visit on 17   POCT Urinalysis no Micro   Result Value Ref Range    Color, UA yellow     Clarity, UA clear     Glucose, UA POC neg     Bilirubin, UA neg     Ketones, UA neg     Spec Grav, UA 1.010     Blood, UA POC neg     pH, UA 6.5     Protein, UA POC neg     Urobilinogen, UA 0.2     Leukocytes, UA neg     Nitrite, UA neg        I have reviewed the following with the Ms. Tian   Lab Review   Admission on 10/01/2017, Discharged on 10/02/2017   Component Date Value    P-R Interval 10/02/2017 162     QRS Duration 10/02/2017 82     Q-T Interval 10/02/2017 402     QTc Calculation (Bazett) 10/02/2017 407     P Axis 10/02/2017 36     T Axis 10/02/2017 47     WBC 10/01/2017 9.9     RBC 10/01/2017 4.95     Hemoglobin 10/01/2017 14.3     Hematocrit 10/01/2017 44.7     MCV 10/01/2017 90.3     MCH 10/01/2017 28.9     MCHC 10/01/2017 32.0*    RDW 10/01/2017 12.9     Platelets  293     MPV 10/01/2017 10.1     Neutrophils % 10/01/2017 55.7     Lymphocytes % 10/01/2017 34.7     Monocytes % 10/01/2017 6.4     Eosinophils % 10/01/2017 1.9     Basophils % 10/01/2017 1.1*    Neutrophils # 10/01/2017 5.5     Lymphocytes # 10/01/2017 3.4     Monocytes # 10/01/2017 0.60     Eosinophils # 10/01/2017 0.20     Basophils # 10/01/2017 0.10     Sodium 10/01/2017 142     Potassium 10/01/2017 4.2     Chloride 10/01/2017 106     CO2 10/01/2017 27     Anion Gap 10/01/2017 9     Glucose 10/01/2017 101     BUN 10/01/2017 11     CREATININE 10/01/2017 0.7     GFR Non- 10/01/2017 >60     Calcium 10/01/2017 9.3     Total Protein 10/01/2017 7.3     Alb 10/01/2017 3.8     Total Bilirubin 10/01/2017 0.3     Alkaline Phosphatase 10/01/2017 96     ALT 10/01/2017 30     AST 10/01/2017 24     POC Troponin I 10/01/2017 0.00     Performed on 10/01/2017 i-Stat     Troponin 10/01/2017 <0.01     Troponin 10/01/2017 <0.01     WBC 10/02/2017 9.8     RBC 10/02/2017 4.54     Hemoglobin 10/02/2017 13.1     Hematocrit 10/02/2017 41.0     MCV 10/02/2017 90.3     MCH 10/02/2017 28.9     MCHC 10/02/2017 32.0*    RDW 10/02/2017 13.0     Platelets 67/78/2244 291     MPV 10/02/2017 10.9     Neutrophils % 10/02/2017 48.4*    Lymphocytes % 10/02/2017 43.1*    Monocytes % 10/02/2017 5.3     Eosinophils % 10/02/2017 2.0     Basophils % 10/02/2017 0.9     Neutrophils # 10/02/2017 4.7     Lymphocytes # 10/02/2017 4.2     Monocytes # 10/02/2017 0.50     Eosinophils # 10/02/2017 0.20     Basophils # 10/02/2017 0.10     Sodium 10/02/2017 142     Potassium 10/02/2017 3.8     Chloride 10/02/2017 103     CO2 10/02/2017 27     Anion Gap 10/02/2017 12     Glucose 10/02/2017 125*    BUN 10/02/2017 12     CREATININE 10/02/2017 0.7     GFR Non- 10/02/2017 >60     Calcium 10/02/2017 9.1     Total Protein 10/02/2017 6.9     Alb 10/02/2017 3.5     Total Bilirubin 10/02/2017 0.3     Alkaline Phosphatase 10/02/2017 89     ALT 10/02/2017 29     AST 10/02/2017 22     Cholesterol, Total 10/02/2017 179     Triglycerides 10/02/2017  CHOLECYSTECTOMY      HYSTERECTOMY      OVARIAN CYST REMOVAL      WY EGD TRANSORAL BIOPSY SINGLE/MULTIPLE N/A 7/5/2017    Dr Meier-w/dilation over wire, 46 [place]-distal esophageal narrowing-Codie (-)    UPPER GASTROINTESTINAL ENDOSCOPY  7/5/2017    Dr Meier-sushma/dilation over wire, 46 [place]uv[place]-distal esophageal narrowing-Codie (-)       Social History   Substance Use Topics    Smoking status: Current Some Day Smoker     Packs/day: 1.00     Types: Cigarettes    Smokeless tobacco: Former User     Quit date: 6/10/2017      Comment: smokes 1-2 cigarretts per day/ patient has tried but nothing has worked    Alcohol use No       Review of Systems   Constitutional: Positive for fatigue. Negative for chills and fever. HENT: Negative for congestion, ear pain, rhinorrhea and sore throat. Snoring: patient being set up with CPAP   Eyes: Negative for redness. Respiratory: Negative for cough and shortness of breath. Cardiovascular: Negative for chest pain. Gastrointestinal: Negative for constipation, diarrhea and vomiting. Musculoskeletal: Positive for arthralgias (bilateral hand and wrist pain) and neck pain. Skin: Negative for rash. Neurological: Positive for numbness (bilateral hands) and headaches (early morning headache). Negative for dizziness. Physical Exam   Constitutional: She appears well-developed. Overweight   HENT:   Head: Normocephalic. Right Ear: Tympanic membrane and external ear normal.   Left Ear: Tympanic membrane and external ear normal.   Nose: Nose normal.   Mouth/Throat: Oropharynx is clear and moist.   Neck: Normal range of motion. Carotid bruit is not present. Cardiovascular: Normal rate and regular rhythm. Pulmonary/Chest: Effort normal and breath sounds normal. No respiratory distress. She has no wheezes. She has no rales. Abdominal: Soft. Bowel sounds are normal.   Musculoskeletal:        Right wrist: She exhibits tenderness.         Left wrist: She exhibits tenderness. Cervical back: She exhibits tenderness and pain. Right hand: Decreased sensation noted. Decreased sensation is present in the ulnar distribution and is present in the medial distribution. Decreased strength noted. Left hand: Decreased sensation noted. Decreased sensation is present in the ulnar distribution and is present in the medial distribution. Decreased strength noted. Neurological: She is alert. Skin: Skin is dry. Vitals reviewed. ASSESSMENT      ICD-10-CM ICD-9-CM    1. Cervical neck pain with evidence of disc disease M50.90 722.91 Surgical clearance letter to DR. Sandee Hernandes, neurosurgeon   2. Numbness in both hands R20.0 782.0    3. Obstructive sleep apnea G47.33 327.23 Recommend CPAP as prescribed   4. History of UTI Z87.440 V13.02 POCT Urinalysis no Micro         PLAN    Orders Placed This Encounter   Procedures    POCT Urinalysis no Micro        Return if symptoms worsen or fail to improve. Patient Instructions       Patient Education        Cervical Spinal Fusion: Before Your Surgery  What is cervical spinal fusion? Cervical spinal fusion is surgery that joins two or more of the vertebrae in your neck. When these bones are joined together, it's called fusion. After the joints are fused, they can no longer move. During the surgery, the doctor uses bone to make a \"bridge\" between your vertebrae. This bridge is strengthened with metal plates and screws. In most cases, the doctor uses bone from another part of your body or bone that has been donated to a bone bank. But sometimes artificial bone is used. To do the surgery, the doctor makes a cut in either the front or the back of your neck. The cut is called an incision. It leaves a scar that fades with time. After surgery, you will stay in the hospital for a few days. Your neck will feel stiff or sore. You will get medicine to help with pain. Most people can go back to work after 4 to 6 weeks.  But it may take condition or this instruction, always ask your healthcare professional. [place] [id number] any warranty or liability for your use of this information. Controlled Substances Monitoring:  n/a            Additional Instructions: As always, patient is advised to bring in medication bottles in order to correctly reconcile with our current list.    Bindu Keller received counseling on the following healthy behaviors: n/a    Patient given educational materials on dx    I have instructed Bindu Keller to complete a self tracking handout on n/a and instructed them to bring it with them to her next appointment. Discussed use, benefit, and side effects of prescribed medications. Barriers to medication compliance addressed. All patient questions answered. Pt voiced understanding.      ERIK Dial

## 2017-12-14 ENCOUNTER — APPOINTMENT (OUTPATIENT)
Dept: GENERAL RADIOLOGY | Facility: HOSPITAL | Age: 37
End: 2017-12-14

## 2017-12-14 ENCOUNTER — ANESTHESIA (OUTPATIENT)
Dept: PERIOP | Facility: HOSPITAL | Age: 37
End: 2017-12-14

## 2017-12-14 ENCOUNTER — HOSPITAL ENCOUNTER (INPATIENT)
Facility: HOSPITAL | Age: 37
LOS: 1 days | Discharge: HOME | End: 2017-12-15
Attending: NEUROLOGICAL SURGERY | Admitting: NEUROLOGICAL SURGERY

## 2017-12-14 ENCOUNTER — APPOINTMENT (OUTPATIENT)
Dept: CT IMAGING | Facility: HOSPITAL | Age: 37
End: 2017-12-14

## 2017-12-14 ENCOUNTER — ANESTHESIA EVENT (OUTPATIENT)
Dept: PERIOP | Facility: HOSPITAL | Age: 37
End: 2017-12-14

## 2017-12-14 DIAGNOSIS — Z74.09 IMPAIRED MOBILITY AND ADLS: ICD-10-CM

## 2017-12-14 DIAGNOSIS — Z78.9 IMPAIRED MOBILITY AND ADLS: ICD-10-CM

## 2017-12-14 PROCEDURE — 72125 CT NECK SPINE W/O DYE: CPT

## 2017-12-14 PROCEDURE — 22845 INSERT SPINE FIXATION DEVICE: CPT | Performed by: NEUROLOGICAL SURGERY

## 2017-12-14 PROCEDURE — 25010000002 PROPOFOL 10 MG/ML EMULSION: Performed by: NURSE ANESTHETIST, CERTIFIED REGISTERED

## 2017-12-14 PROCEDURE — 25010000002 MIDAZOLAM PER 1 MG: Performed by: NURSE ANESTHETIST, CERTIFIED REGISTERED

## 2017-12-14 PROCEDURE — C1713 ANCHOR/SCREW BN/BN,TIS/BN: HCPCS | Performed by: NEUROLOGICAL SURGERY

## 2017-12-14 PROCEDURE — 25010000002 FENTANYL CITRATE (PF) 250 MCG/5ML SOLUTION: Performed by: NURSE ANESTHETIST, CERTIFIED REGISTERED

## 2017-12-14 PROCEDURE — 25010000002 DEXAMETHASONE PER 1 MG: Performed by: NURSE ANESTHETIST, CERTIFIED REGISTERED

## 2017-12-14 PROCEDURE — 72040 X-RAY EXAM NECK SPINE 2-3 VW: CPT

## 2017-12-14 PROCEDURE — 25010000002 MIDAZOLAM PER 1 MG: Performed by: ANESTHESIOLOGY

## 2017-12-14 PROCEDURE — 76000 FLUOROSCOPY <1 HR PHYS/QHP: CPT

## 2017-12-14 PROCEDURE — 25010000003 CEFAZOLIN PER 500 MG: Performed by: NEUROLOGICAL SURGERY

## 2017-12-14 PROCEDURE — 0RB30ZZ EXCISION OF CERVICAL VERTEBRAL DISC, OPEN APPROACH: ICD-10-PCS | Performed by: NEUROLOGICAL SURGERY

## 2017-12-14 PROCEDURE — 22552 ARTHRD ANT NTRBD CERVICAL EA: CPT | Performed by: NEUROLOGICAL SURGERY

## 2017-12-14 PROCEDURE — 25010000002 DEXAMETHASONE PER 1 MG: Performed by: ANESTHESIOLOGY

## 2017-12-14 PROCEDURE — 4A11X4G MONITORING OF PERIPHERAL NERVOUS ELECTRICAL ACTIVITY, INTRAOPERATIVE, EXTERNAL APPROACH: ICD-10-PCS | Performed by: NEUROLOGICAL SURGERY

## 2017-12-14 PROCEDURE — 25010000002 ONDANSETRON PER 1 MG: Performed by: NEUROLOGICAL SURGERY

## 2017-12-14 PROCEDURE — 25010000002 PROPOFOL 1000 MG/ML EMULSION: Performed by: NURSE ANESTHETIST, CERTIFIED REGISTERED

## 2017-12-14 PROCEDURE — 0RG20A0 FUSION OF 2 OR MORE CERVICAL VERTEBRAL JOINTS WITH INTERBODY FUSION DEVICE, ANTERIOR APPROACH, ANTERIOR COLUMN, OPEN APPROACH: ICD-10-PCS | Performed by: NEUROLOGICAL SURGERY

## 2017-12-14 PROCEDURE — 25010000002 VANCOMYCIN PER 500 MG: Performed by: NEUROLOGICAL SURGERY

## 2017-12-14 PROCEDURE — 94799 UNLISTED PULMONARY SVC/PX: CPT

## 2017-12-14 PROCEDURE — 25010000002 ONDANSETRON PER 1 MG: Performed by: NURSE ANESTHETIST, CERTIFIED REGISTERED

## 2017-12-14 PROCEDURE — 25010000002 HYDROMORPHONE PER 4 MG: Performed by: ANESTHESIOLOGY

## 2017-12-14 PROCEDURE — 22853 INSJ BIOMECHANICAL DEVICE: CPT | Performed by: NEUROLOGICAL SURGERY

## 2017-12-14 PROCEDURE — 22551 ARTHRD ANT NTRBDY CERVICAL: CPT | Performed by: NEUROLOGICAL SURGERY

## 2017-12-14 PROCEDURE — 25010000002 SUCCINYLCHOLINE PER 20 MG: Performed by: NURSE ANESTHETIST, CERTIFIED REGISTERED

## 2017-12-14 DEVICE — DBM T43103 2.5CC GRAFTON PUTTY
Type: IMPLANTABLE DEVICE | Site: SPINE CERVICAL | Status: FUNCTIONAL
Brand: GRAFTON®AND GRAFTON PLUS®DEMINERALIZED BONE MATRIX (DBM)

## 2017-12-14 DEVICE — SCREW 7713513 ZEVO VAR SD 3.5MM X 13MM
Type: IMPLANTABLE DEVICE | Site: SPINE CERVICAL | Status: FUNCTIONAL
Brand: ZEVO™ ANTERIOR CERVICAL PLATE SYSTEM

## 2017-12-14 DEVICE — CAGE 5030641 ANATOMIC PTC 14X11X6MM
Type: IMPLANTABLE DEVICE | Site: SPINE CERVICAL | Status: FUNCTIONAL
Brand: ANATOMIC PEEK PTC CERVICAL FUSION SYSTEM

## 2017-12-14 RX ORDER — NALOXONE HCL 0.4 MG/ML
0.4 VIAL (ML) INJECTION
Status: DISCONTINUED | OUTPATIENT
Start: 2017-12-14 | End: 2017-12-15 | Stop reason: HOSPADM

## 2017-12-14 RX ORDER — SODIUM CHLORIDE 0.9 % (FLUSH) 0.9 %
1-10 SYRINGE (ML) INJECTION AS NEEDED
Status: DISCONTINUED | OUTPATIENT
Start: 2017-12-14 | End: 2017-12-15 | Stop reason: HOSPADM

## 2017-12-14 RX ORDER — SUCCINYLCHOLINE CHLORIDE 20 MG/ML
INJECTION INTRAMUSCULAR; INTRAVENOUS AS NEEDED
Status: DISCONTINUED | OUTPATIENT
Start: 2017-12-14 | End: 2017-12-14 | Stop reason: SURG

## 2017-12-14 RX ORDER — IPRATROPIUM BROMIDE AND ALBUTEROL SULFATE 2.5; .5 MG/3ML; MG/3ML
3 SOLUTION RESPIRATORY (INHALATION) ONCE AS NEEDED
Status: DISCONTINUED | OUTPATIENT
Start: 2017-12-14 | End: 2017-12-14 | Stop reason: HOSPADM

## 2017-12-14 RX ORDER — MIDAZOLAM HYDROCHLORIDE 1 MG/ML
2 INJECTION INTRAMUSCULAR; INTRAVENOUS
Status: DISCONTINUED | OUTPATIENT
Start: 2017-12-14 | End: 2017-12-14 | Stop reason: HOSPADM

## 2017-12-14 RX ORDER — PROPOFOL 10 MG/ML
VIAL (ML) INTRAVENOUS AS NEEDED
Status: DISCONTINUED | OUTPATIENT
Start: 2017-12-14 | End: 2017-12-14 | Stop reason: SURG

## 2017-12-14 RX ORDER — DIPHENHYDRAMINE HYDROCHLORIDE 50 MG/ML
12.5 INJECTION INTRAMUSCULAR; INTRAVENOUS
Status: DISCONTINUED | OUTPATIENT
Start: 2017-12-14 | End: 2017-12-14 | Stop reason: HOSPADM

## 2017-12-14 RX ORDER — NALOXONE HCL 0.4 MG/ML
0.4 VIAL (ML) INJECTION AS NEEDED
Status: DISCONTINUED | OUTPATIENT
Start: 2017-12-14 | End: 2017-12-14 | Stop reason: HOSPADM

## 2017-12-14 RX ORDER — MORPHINE SULFATE 2 MG/ML
1 INJECTION, SOLUTION INTRAMUSCULAR; INTRAVENOUS EVERY 4 HOURS PRN
Status: DISCONTINUED | OUTPATIENT
Start: 2017-12-14 | End: 2017-12-15 | Stop reason: HOSPADM

## 2017-12-14 RX ORDER — SODIUM CHLORIDE 0.9 % (FLUSH) 0.9 %
1-10 SYRINGE (ML) INJECTION AS NEEDED
Status: DISCONTINUED | OUTPATIENT
Start: 2017-12-14 | End: 2017-12-14 | Stop reason: HOSPADM

## 2017-12-14 RX ORDER — DEXAMETHASONE SODIUM PHOSPHATE 4 MG/ML
INJECTION, SOLUTION INTRA-ARTICULAR; INTRALESIONAL; INTRAMUSCULAR; INTRAVENOUS; SOFT TISSUE AS NEEDED
Status: DISCONTINUED | OUTPATIENT
Start: 2017-12-14 | End: 2017-12-14 | Stop reason: SURG

## 2017-12-14 RX ORDER — SODIUM CHLORIDE, SODIUM LACTATE, POTASSIUM CHLORIDE, CALCIUM CHLORIDE 600; 310; 30; 20 MG/100ML; MG/100ML; MG/100ML; MG/100ML
9 INJECTION, SOLUTION INTRAVENOUS CONTINUOUS
Status: DISCONTINUED | OUTPATIENT
Start: 2017-12-14 | End: 2017-12-15 | Stop reason: HOSPADM

## 2017-12-14 RX ORDER — FENTANYL CITRATE 50 UG/ML
25 INJECTION, SOLUTION INTRAMUSCULAR; INTRAVENOUS
Status: DISCONTINUED | OUTPATIENT
Start: 2017-12-14 | End: 2017-12-14 | Stop reason: HOSPADM

## 2017-12-14 RX ORDER — DEXAMETHASONE SODIUM PHOSPHATE 4 MG/ML
4 INJECTION, SOLUTION INTRA-ARTICULAR; INTRALESIONAL; INTRAMUSCULAR; INTRAVENOUS; SOFT TISSUE ONCE AS NEEDED
Status: COMPLETED | OUTPATIENT
Start: 2017-12-14 | End: 2017-12-14

## 2017-12-14 RX ORDER — CEFAZOLIN SODIUM 2 G/100ML
2 INJECTION, SOLUTION INTRAVENOUS EVERY 8 HOURS
Status: DISCONTINUED | OUTPATIENT
Start: 2017-12-14 | End: 2017-12-14 | Stop reason: ALTCHOICE

## 2017-12-14 RX ORDER — ONDANSETRON 2 MG/ML
INJECTION INTRAMUSCULAR; INTRAVENOUS AS NEEDED
Status: DISCONTINUED | OUTPATIENT
Start: 2017-12-14 | End: 2017-12-14 | Stop reason: SURG

## 2017-12-14 RX ORDER — LIDOCAINE HYDROCHLORIDE 40 MG/ML
SOLUTION TOPICAL AS NEEDED
Status: DISCONTINUED | OUTPATIENT
Start: 2017-12-14 | End: 2017-12-14 | Stop reason: SURG

## 2017-12-14 RX ORDER — SODIUM CHLORIDE 9 MG/ML
INJECTION, SOLUTION INTRAVENOUS AS NEEDED
Status: DISCONTINUED | OUTPATIENT
Start: 2017-12-14 | End: 2017-12-14 | Stop reason: HOSPADM

## 2017-12-14 RX ORDER — MEPERIDINE HYDROCHLORIDE 25 MG/ML
12.5 INJECTION INTRAMUSCULAR; INTRAVENOUS; SUBCUTANEOUS
Status: DISCONTINUED | OUTPATIENT
Start: 2017-12-14 | End: 2017-12-14 | Stop reason: HOSPADM

## 2017-12-14 RX ORDER — LABETALOL HYDROCHLORIDE 5 MG/ML
5 INJECTION, SOLUTION INTRAVENOUS
Status: DISCONTINUED | OUTPATIENT
Start: 2017-12-14 | End: 2017-12-14 | Stop reason: HOSPADM

## 2017-12-14 RX ORDER — SODIUM CHLORIDE 0.9 % (FLUSH) 0.9 %
3 SYRINGE (ML) INJECTION AS NEEDED
Status: DISCONTINUED | OUTPATIENT
Start: 2017-12-14 | End: 2017-12-14 | Stop reason: HOSPADM

## 2017-12-14 RX ORDER — OXYCODONE HYDROCHLORIDE AND ACETAMINOPHEN 5; 325 MG/1; MG/1
1 TABLET ORAL EVERY 4 HOURS PRN
Status: DISCONTINUED | OUTPATIENT
Start: 2017-12-14 | End: 2017-12-15 | Stop reason: HOSPADM

## 2017-12-14 RX ORDER — SODIUM CHLORIDE, SODIUM LACTATE, POTASSIUM CHLORIDE, CALCIUM CHLORIDE 600; 310; 30; 20 MG/100ML; MG/100ML; MG/100ML; MG/100ML
1000 INJECTION, SOLUTION INTRAVENOUS CONTINUOUS
Status: DISCONTINUED | OUTPATIENT
Start: 2017-12-14 | End: 2017-12-15 | Stop reason: HOSPADM

## 2017-12-14 RX ORDER — FENTANYL CITRATE 50 UG/ML
INJECTION, SOLUTION INTRAMUSCULAR; INTRAVENOUS AS NEEDED
Status: DISCONTINUED | OUTPATIENT
Start: 2017-12-14 | End: 2017-12-14 | Stop reason: SURG

## 2017-12-14 RX ORDER — MORPHINE SULFATE 2 MG/ML
2 INJECTION, SOLUTION INTRAMUSCULAR; INTRAVENOUS
Status: DISCONTINUED | OUTPATIENT
Start: 2017-12-14 | End: 2017-12-14 | Stop reason: HOSPADM

## 2017-12-14 RX ORDER — MIDAZOLAM HYDROCHLORIDE 1 MG/ML
INJECTION INTRAMUSCULAR; INTRAVENOUS AS NEEDED
Status: DISCONTINUED | OUTPATIENT
Start: 2017-12-14 | End: 2017-12-14 | Stop reason: SURG

## 2017-12-14 RX ORDER — BUPIVACAINE HYDROCHLORIDE AND EPINEPHRINE 5; 5 MG/ML; UG/ML
INJECTION, SOLUTION PERINEURAL AS NEEDED
Status: DISCONTINUED | OUTPATIENT
Start: 2017-12-14 | End: 2017-12-14 | Stop reason: HOSPADM

## 2017-12-14 RX ORDER — CYCLOBENZAPRINE HCL 10 MG
10 TABLET ORAL 3 TIMES DAILY PRN
Status: DISCONTINUED | OUTPATIENT
Start: 2017-12-14 | End: 2017-12-15 | Stop reason: HOSPADM

## 2017-12-14 RX ORDER — LIDOCAINE HYDROCHLORIDE 20 MG/ML
INJECTION, SOLUTION INFILTRATION; PERINEURAL AS NEEDED
Status: DISCONTINUED | OUTPATIENT
Start: 2017-12-14 | End: 2017-12-14 | Stop reason: SURG

## 2017-12-14 RX ORDER — FAMOTIDINE 10 MG/ML
20 INJECTION, SOLUTION INTRAVENOUS
Status: DISCONTINUED | OUTPATIENT
Start: 2017-12-14 | End: 2017-12-14 | Stop reason: HOSPADM

## 2017-12-14 RX ORDER — SUFENTANIL CITRATE 50 UG/ML
INJECTION EPIDURAL; INTRAVENOUS AS NEEDED
Status: DISCONTINUED | OUTPATIENT
Start: 2017-12-14 | End: 2017-12-14 | Stop reason: SURG

## 2017-12-14 RX ORDER — HYDRALAZINE HYDROCHLORIDE 20 MG/ML
5 INJECTION INTRAMUSCULAR; INTRAVENOUS
Status: DISCONTINUED | OUTPATIENT
Start: 2017-12-14 | End: 2017-12-14 | Stop reason: HOSPADM

## 2017-12-14 RX ORDER — ONDANSETRON 2 MG/ML
4 INJECTION INTRAMUSCULAR; INTRAVENOUS EVERY 6 HOURS PRN
Status: DISCONTINUED | OUTPATIENT
Start: 2017-12-14 | End: 2017-12-15 | Stop reason: HOSPADM

## 2017-12-14 RX ORDER — ONDANSETRON 2 MG/ML
4 INJECTION INTRAMUSCULAR; INTRAVENOUS ONCE AS NEEDED
Status: DISCONTINUED | OUTPATIENT
Start: 2017-12-14 | End: 2017-12-14 | Stop reason: HOSPADM

## 2017-12-14 RX ORDER — MIDAZOLAM HYDROCHLORIDE 1 MG/ML
1 INJECTION INTRAMUSCULAR; INTRAVENOUS
Status: DISCONTINUED | OUTPATIENT
Start: 2017-12-14 | End: 2017-12-14 | Stop reason: HOSPADM

## 2017-12-14 RX ORDER — DEXTROSE MONOHYDRATE 25 G/50ML
12.5 INJECTION, SOLUTION INTRAVENOUS AS NEEDED
Status: DISCONTINUED | OUTPATIENT
Start: 2017-12-14 | End: 2017-12-14 | Stop reason: HOSPADM

## 2017-12-14 RX ORDER — MAGNESIUM HYDROXIDE 1200 MG/15ML
LIQUID ORAL AS NEEDED
Status: DISCONTINUED | OUTPATIENT
Start: 2017-12-14 | End: 2017-12-14 | Stop reason: HOSPADM

## 2017-12-14 RX ADMIN — SODIUM CHLORIDE, POTASSIUM CHLORIDE, SODIUM LACTATE AND CALCIUM CHLORIDE: 600; 310; 30; 20 INJECTION, SOLUTION INTRAVENOUS at 10:35

## 2017-12-14 RX ADMIN — PROPOFOL 100 MCG/KG/MIN: 10 INJECTION, EMULSION INTRAVENOUS at 10:15

## 2017-12-14 RX ADMIN — CYCLOBENZAPRINE HYDROCHLORIDE 10 MG: 10 TABLET, FILM COATED ORAL at 19:45

## 2017-12-14 RX ADMIN — DEXAMETHASONE SODIUM PHOSPHATE 4 MG: 4 INJECTION, SOLUTION INTRAMUSCULAR; INTRAVENOUS at 08:20

## 2017-12-14 RX ADMIN — CEFAZOLIN SODIUM 2 G: 2 SOLUTION INTRAVENOUS at 09:05

## 2017-12-14 RX ADMIN — CEFAZOLIN 2 G: 1 INJECTION, POWDER, FOR SOLUTION INTRAVENOUS at 17:09

## 2017-12-14 RX ADMIN — ONDANSETRON 4 MG: 2 INJECTION, SOLUTION INTRAMUSCULAR; INTRAVENOUS at 21:43

## 2017-12-14 RX ADMIN — OXYCODONE HYDROCHLORIDE AND ACETAMINOPHEN 1 TABLET: 5; 325 TABLET ORAL at 15:32

## 2017-12-14 RX ADMIN — ONDANSETRON HYDROCHLORIDE 4 MG: 2 SOLUTION INTRAMUSCULAR; INTRAVENOUS at 11:05

## 2017-12-14 RX ADMIN — HYDROMORPHONE HYDROCHLORIDE 0.5 MG: 1 INJECTION, SOLUTION INTRAMUSCULAR; INTRAVENOUS; SUBCUTANEOUS at 12:35

## 2017-12-14 RX ADMIN — MIDAZOLAM HYDROCHLORIDE 2 MG: 1 INJECTION, SOLUTION INTRAMUSCULAR; INTRAVENOUS at 08:24

## 2017-12-14 RX ADMIN — PROPOFOL 100 MG: 10 INJECTION, EMULSION INTRAVENOUS at 09:06

## 2017-12-14 RX ADMIN — SUFENTANIL CITRATE 20 MCG: 50 INJECTION, SOLUTION EPIDURAL; INTRAVENOUS at 09:40

## 2017-12-14 RX ADMIN — DEXAMETHASONE SODIUM PHOSPHATE 4 MG: 4 INJECTION, SOLUTION INTRAMUSCULAR; INTRAVENOUS at 11:05

## 2017-12-14 RX ADMIN — HYDROMORPHONE HYDROCHLORIDE 0.5 MG: 1 INJECTION, SOLUTION INTRAMUSCULAR; INTRAVENOUS; SUBCUTANEOUS at 12:30

## 2017-12-14 RX ADMIN — FAMOTIDINE 20 MG: 10 INJECTION, SOLUTION INTRAVENOUS at 08:20

## 2017-12-14 RX ADMIN — SUFENTANIL CITRATE 10 MCG: 50 INJECTION, SOLUTION EPIDURAL; INTRAVENOUS at 09:06

## 2017-12-14 RX ADMIN — FENTANYL CITRATE 250 MCG: 50 INJECTION INTRAMUSCULAR; INTRAVENOUS at 09:50

## 2017-12-14 RX ADMIN — LIDOCAINE HYDROCHLORIDE 1 EACH: 40 SOLUTION TOPICAL at 09:08

## 2017-12-14 RX ADMIN — OXYCODONE HYDROCHLORIDE AND ACETAMINOPHEN 1 TABLET: 5; 325 TABLET ORAL at 21:43

## 2017-12-14 RX ADMIN — SODIUM CHLORIDE, POTASSIUM CHLORIDE, SODIUM LACTATE AND CALCIUM CHLORIDE 1000 ML: 600; 310; 30; 20 INJECTION, SOLUTION INTRAVENOUS at 07:50

## 2017-12-14 RX ADMIN — HYDROMORPHONE HYDROCHLORIDE 0.5 MG: 1 INJECTION, SOLUTION INTRAMUSCULAR; INTRAVENOUS; SUBCUTANEOUS at 12:20

## 2017-12-14 RX ADMIN — PROPOFOL 100 MCG/KG/MIN: 10 INJECTION, EMULSION INTRAVENOUS at 09:06

## 2017-12-14 RX ADMIN — SODIUM CHLORIDE, POTASSIUM CHLORIDE, SODIUM LACTATE AND CALCIUM CHLORIDE 1000 ML: 600; 310; 30; 20 INJECTION, SOLUTION INTRAVENOUS at 13:27

## 2017-12-14 RX ADMIN — MIDAZOLAM HYDROCHLORIDE 5 MG: 1 INJECTION, SOLUTION INTRAMUSCULAR; INTRAVENOUS at 09:06

## 2017-12-14 RX ADMIN — SUCCINYLCHOLINE CHLORIDE 100 MG: 20 INJECTION, SOLUTION INTRAMUSCULAR; INTRAVENOUS at 09:06

## 2017-12-14 RX ADMIN — LIDOCAINE HYDROCHLORIDE 100 MG: 20 INJECTION, SOLUTION INFILTRATION; PERINEURAL at 09:05

## 2017-12-14 RX ADMIN — HYDROMORPHONE HYDROCHLORIDE 0.5 MG: 1 INJECTION, SOLUTION INTRAMUSCULAR; INTRAVENOUS; SUBCUTANEOUS at 12:25

## 2017-12-14 RX ADMIN — SUFENTANIL CITRATE 20 MCG: 50 INJECTION, SOLUTION EPIDURAL; INTRAVENOUS at 09:20

## 2017-12-14 RX ADMIN — SODIUM CHLORIDE, POTASSIUM CHLORIDE, SODIUM LACTATE AND CALCIUM CHLORIDE: 600; 310; 30; 20 INJECTION, SOLUTION INTRAVENOUS at 09:05

## 2017-12-14 RX ADMIN — LIDOCAINE HYDROCHLORIDE 0.5 ML: 10 INJECTION, SOLUTION EPIDURAL; INFILTRATION; INTRACAUDAL; PERINEURAL at 07:50

## 2017-12-14 RX ADMIN — ONDANSETRON 4 MG: 2 INJECTION, SOLUTION INTRAMUSCULAR; INTRAVENOUS at 15:32

## 2017-12-14 NOTE — ANESTHESIA POSTPROCEDURE EVALUATION
Patient: Merari Bradshaw    Procedure Summary     Date Anesthesia Start Anesthesia Stop Room / Location    12/14/17 0901 1207  [facility] OR 07 / [facility] OR       Procedure Diagnosis Surgeon Provider    C5-6,C6-7 CERVICAL DISCECTOMY ANTERIOR WITH FUSION (N/A Spine Cervical) Degeneration of cervical intervertebral disc  (Degeneration of cervical intervertebral disc [M50.30]) MD Willis Del Angel CRNA          Anesthesia Type: general  Last vitals  BP   129/85 (12/14/17 1254)   Temp   98.2 °F (36.8 °C) (12/14/17 1301)   Pulse   98 (12/14/17 1301)   Resp   16 (12/14/17 1254)     SpO2   96 % (12/14/17 1301)     Post Anesthesia Care and Evaluation    Patient location during evaluation: PACU  Patient participation: complete - patient participated  Level of consciousness: awake and alert  Pain management: adequate  Airway patency: patent  Anesthetic complications: No anesthetic complications  PONV Status: controlled  Cardiovascular status: acceptable and hemodynamically stable  Respiratory status: acceptable  Hydration status: acceptable    Comments: Patient discharged from PACU prior to anesthesia evaluation based on Ailyn Score.  For details, see RN note.     /85  Pulse 98  Temp 98.2 °F (36.8 °C) (Temporal Artery )   Resp 16  SpO2 96%  Breastfeeding? No

## 2017-12-14 NOTE — ANESTHESIA PROCEDURE NOTES
Airway  Urgency: elective    Date/Time: 12/14/2017 9:08 AM  Airway not difficult    General Information and Staff    Patient location during procedure: OR  CRNA: JOANIE VILLALOBOS    Indications and Patient Condition  Indications for airway management: airway protection    Preoxygenated: yes  MILS maintained throughout  Mask difficulty assessment: 1 - vent by mask    Final Airway Details  Final airway type: endotracheal airway      Successful airway: ETT  Cuffed: yes   Successful intubation technique: direct laryngoscopy  Facilitating devices/methods: intubating stylet  Endotracheal tube insertion site: oral  Blade: Amaro  Blade size: #2  ETT size: 7.0 mm  Placement verified by: chest auscultation and capnometry   Cuff volume (mL): 8  Measured from: teeth  ETT to teeth (cm): 20  Number of attempts at approach: 1

## 2017-12-14 NOTE — PAYOR COMM NOTE
"ADMIT INPT 12-14-17  [id number]  UR PHONE     Merari Bradshaw (37 y.o. Female)     Date of Birth Social Security Number Address Home Phone MRN    [id number]  [id number]  [facility] [phone] [id number]    Orthodox Marital Status          Religious        Admission Date Admission Type Admitting Provider Attending Provider Department, Room/Bed    12/14/17 Elective Ezio Botello MD Hall, Graham Charles, MD [facility] 3A, 343/1    Discharge Date Discharge Disposition Discharge Destination                      Attending Provider: Ezio Botello MD     Allergies:  Norco [Hydrocodone-acetaminophen], Adhesive Tape    Isolation:  None   Infection:  None   Code Status:  FULL    Ht:  165.1 cm (65\")   Wt:  115 kg (253 lb 12.8 oz)    Admission Cmt:  None   Principal Problem:  None                Active Insurance as of 12/14/2017     Primary Coverage     Payor Plan Insurance Group Employer/Plan Group    [facility] [employer] [facility]      Payor Plan Address Payor Plan Phone Number Effective From Effective To    [address] [address]  6/1/2015     [place] [address]       Subscriber Name Subscriber Birth Date Member ID       MERARI BRADSHAW 1980 [id number]                 Emergency Contacts      (Rel.) Home Phone Work Phone Mobile Phone    Abhay Bradshaw (Spouse) -- -- [phone]    Nena Mejia (Mother) [phone] -- --               History & Physical      Ezio Botello MD at 11/20/2017 10:00 AM              Chief complaint   Chief Complaint   Patient presents with   • Neck Pain     fu to discuss surgery        Subjective     HPI:     As patient is a 37-year-old female with a two-year history of neck pain and hand weakness.  She also has history history of low back pain she currently is seeing pain management.  Physical therapy did not help her neck pain and hand weakness.  She is also been " "prescribed nonsteroidal anti-inflammatories and narcotics which has not helped.  She states that she has been out of work for 2 years secondary to her weakness.  Addition she describes numbness and tingling that radiates from her neck to her hands.    Review of Systems     A 12 point review of systems is obtained and is negative except for as described in HPI    Past Medical History:   Diagnosis Date   • Acid reflux    • Basal cell carcinoma of breast    • Fibrocystic breast    • Fibromyalgia      Past Surgical History:   Procedure Laterality Date   •  SECTION     • CHOLECYSTECTOMY     • EXPLORATORY LAPAROTOMY     • HX OVARIAN CYSTECTOMY     • HYSTERECTOMY       Family History   Problem Relation Age of Onset   • Coronary artery disease Father    • Hypertension Father    • Diabetes Mother    • Hypertension Mother    • Deep vein thrombosis Paternal Grandmother    • Pulmonary embolism Paternal Grandmother    • Throat cancer Maternal Grandfather      Social History   Substance Use Topics   • Smoking status: Current Every Day Smoker     Packs/day: 0.50     Types: Cigarettes   • Smokeless tobacco: Never Used   • Alcohol use No       (Not in a hospital admission)  Allergies:  Norco [hydrocodone-acetaminophen] and Adhesive tape    Objective      Vital Signs  /80  Ht 65\" (165.1 cm)  Wt 250 lb (113 kg)  BMI 41.6 kg/m2    Physical Exam    No acute distress  Awake alert oriented ×3  HEENT atraumatic normocephalic  Neck supple  Abdomen soft, nontender  Extremities no clubbing, edema, cyanosis  Neurologic exam  Cranial nerves II through XII grossly intact  Patient moves all extremities 5 out of 5 strength except for  is 4 out of 5 strength  Sensation is intact light touch in upper and lower externus  2+ biceps reflex  2+ patellar reflex  Gait is normal  Absent David sign  No clonus    Results Review: Mri Brain With & Without Contrast    Result Date: 10/23/2017  Narrative: HISTORY: Chronic facial numbness " and headache  MRI brain: Multiplanar imaging of the brain is performed pre- and post-IV contrast.  Comparison: None  Findings: The ventricles are normal in size and configuration. There is no abnormal diffusion restriction. The cerebellar tonsils. She approximately 5 to 6 mm below the foramen magnum. Corpus callosum appears intact. There is no sellar mass. There is no intracranial hemorrhage. There are prominent perivascular spaces below the basal ganglia. There is no intracranial mass. There is no abnormal extra-axial fluid collection. There is no demyelinating process.  No abnormal intracranial enhancement is identified. There is homogeneous enhancement of the sagittal and transverse sinuses. Basilar artery is small in caliber and demonstrates no appreciable focal stenosis.  Limited assessment of the orbits and base of skull is unremarkable. No air-fluid levels are seen within the paranasal sinuses. The mastoid air cells appear well-aerated.      Impression: 1. Cerebellar tonsils project approximately 5 to 6 mm below the foramen magnum favorable for cerebellar ectopia. 2. No acute signs of ischemia, hemorrhage, or mass. No abnormal intracranial enhancement. This report was finalized on 10/23/2017 13:22 by Dr. Maria Luz Rosenberg MD.    Mri Cervical Spine Without Contrast    Result Date: 10/23/2017  Narrative: HISTORY: Cervical disc degeneration. Numbness in right arm.  MRI cervical spine: Sagittal and axial images of cervical spine are obtained without contrast.  The alignment of the cervical spine is appropriate. There is mild endplate spurring. There is no compression deformity or suspicious focal bony mass. There is no abnormal signal seen within the cervical spinal cord. The cervical cranial junction is unremarkable.  At C5/6, there is a moderate left paracentral disc bulge resulting in mild to moderate spinal canal stenosis and mild impression on the left anterior thecal sac. There is moderate left and mild right  neural foramen narrowing at this level due to posterior lateral endplate spurring/uncinate process hypertrophy.  At C6/7, there is moderate posterior disc bulging with a slight right paracentral prominence and mild endplate spurring. There is moderate central canal stenosis with anterior compression of the thecal sac right of midline at this level. There is moderate to severe right and moderate left neural foramen narrowing at this level due to uncinate process hypertrophy and posterior lateral endplate spurring/disc bulging.      Impression: 1. Degenerative changes at C5/6 and C6/7 result in overall moderate spinal canal stenosis with impression of the anterior thecal sac at each level, however there is no abnormal signal seen within the cervical spinal cord. 2. Moderate to severe right C6/7 with moderate left C6/7 and left C5/6 neural foramen narrowing related to the degenerative changes. This report was finalized on 10/23/2017 11:39 by Dr. Maria Luz Rosenberg MD.              Assessment/Plan:     37-year-old female with a two-year history of cervical radiculopathy and progressive hand weakness that his not improve with physical therapy and pain management.  Her MRI cervical spine shows C5, 6 and C6, 7 disc herniation causing cord compression that is moderate to severe.  I directly reviewed imaging findings with the patient have answered all questions.  I discuss risks, benefits, alternatives of a C5, 6 and a C6, 7 anterior cervical discectomy and fusion with the patient.  I discuss risks include but are not limited to vascular injury, esophageal injury, spinal fluid leak, spinal cord injury, nerve injury, weakness not improving, weakness worsening, pain not improving, pain worsening, adjacent level disease, need for posterior cervical fusion, paralysis, death.  Patient voices understanding wishes to proceed with surgery.    I discussed the patients findings and my recommendations with patient    Ezioshanna Patel Ayan,  MD  17  10:32 AM           Electronically signed by Ezio Botello MD at 2017 12:34 PM      Ezio Botello MD at 2017  8:28 AM            H&P reviewed. The patient was examined and there are no changes to the H&P.         Electronically signed by Ezio Botello MD at 2017  8:28 AM    Source Note                 Chief complaint   Chief Complaint   Patient presents with   • Neck Pain     fu to discuss surgery        Subjective     HPI:     As patient is a 37-year-old female with a two-year history of neck pain and hand weakness.  She also has history history of low back pain she currently is seeing pain management.  Physical therapy did not help her neck pain and hand weakness.  She is also been prescribed nonsteroidal anti-inflammatories and narcotics which has not helped.  She states that she has been out of work for 2 years secondary to her weakness.  Addition she describes numbness and tingling that radiates from her neck to her hands.    Review of Systems     A 12 point review of systems is obtained and is negative except for as described in HPI    Past Medical History:   Diagnosis Date   • Acid reflux    • Basal cell carcinoma of breast    • Fibrocystic breast    • Fibromyalgia      Past Surgical History:   Procedure Laterality Date   •  SECTION     • CHOLECYSTECTOMY     • EXPLORATORY LAPAROTOMY     • HX OVARIAN CYSTECTOMY     • HYSTERECTOMY       Family History   Problem Relation Age of Onset   • Coronary artery disease Father    • Hypertension Father    • Diabetes Mother    • Hypertension Mother    • Deep vein thrombosis Paternal Grandmother    • Pulmonary embolism Paternal Grandmother    • Throat cancer Maternal Grandfather      Social History   Substance Use Topics   • Smoking status: Current Every Day Smoker     Packs/day: 0.50     Types: Cigarettes   • Smokeless tobacco: Never Used   • Alcohol use No       (Not in a hospital admission)  Allergies:  Norco  "[hydrocodone-acetaminophen] and Adhesive tape    Objective      Vital Signs  /80  Ht 65\" (165.1 cm)  Wt 250 lb (113 kg)  BMI 41.6 kg/m2    Physical Exam    No acute distress  Awake alert oriented ×3  HEENT atraumatic normocephalic  Neck supple  Abdomen soft, nontender  Extremities no clubbing, edema, cyanosis  Neurologic exam  Cranial nerves II through XII grossly intact  Patient moves all extremities 5 out of 5 strength except for  is 4 out of 5 strength  Sensation is intact light touch in upper and lower externus  2+ biceps reflex  2+ patellar reflex  Gait is normal  Absent David sign  No clonus    Results Review: Mri Brain With & Without Contrast    Result Date: 10/23/2017  Narrative: HISTORY: Chronic facial numbness and headache  MRI brain: Multiplanar imaging of the brain is performed pre- and post-IV contrast.  Comparison: None  Findings: The ventricles are normal in size and configuration. There is no abnormal diffusion restriction. The cerebellar tonsils. She approximately 5 to 6 mm below the foramen magnum. Corpus callosum appears intact. There is no sellar mass. There is no intracranial hemorrhage. There are prominent perivascular spaces below the basal ganglia. There is no intracranial mass. There is no abnormal extra-axial fluid collection. There is no demyelinating process.  No abnormal intracranial enhancement is identified. There is homogeneous enhancement of the sagittal and transverse sinuses. Basilar artery is small in caliber and demonstrates no appreciable focal stenosis.  Limited assessment of the orbits and base of skull is unremarkable. No air-fluid levels are seen within the paranasal sinuses. The mastoid air cells appear well-aerated.      Impression: 1. Cerebellar tonsils project approximately 5 to 6 mm below the foramen magnum favorable for cerebellar ectopia. 2. No acute signs of ischemia, hemorrhage, or mass. No abnormal intracranial enhancement. This report was finalized " on 10/23/2017 13:22 by Dr. Maria Luz Rosenberg MD.    Mri Cervical Spine Without Contrast    Result Date: 10/23/2017  Narrative: HISTORY: Cervical disc degeneration. Numbness in right arm.  MRI cervical spine: Sagittal and axial images of cervical spine are obtained without contrast.  The alignment of the cervical spine is appropriate. There is mild endplate spurring. There is no compression deformity or suspicious focal bony mass. There is no abnormal signal seen within the cervical spinal cord. The cervical cranial junction is unremarkable.  At C5/6, there is a moderate left paracentral disc bulge resulting in mild to moderate spinal canal stenosis and mild impression on the left anterior thecal sac. There is moderate left and mild right neural foramen narrowing at this level due to posterior lateral endplate spurring/uncinate process hypertrophy.  At C6/7, there is moderate posterior disc bulging with a slight right paracentral prominence and mild endplate spurring. There is moderate central canal stenosis with anterior compression of the thecal sac right of midline at this level. There is moderate to severe right and moderate left neural foramen narrowing at this level due to uncinate process hypertrophy and posterior lateral endplate spurring/disc bulging.      Impression: 1. Degenerative changes at C5/6 and C6/7 result in overall moderate spinal canal stenosis with impression of the anterior thecal sac at each level, however there is no abnormal signal seen within the cervical spinal cord. 2. Moderate to severe right C6/7 with moderate left C6/7 and left C5/6 neural foramen narrowing related to the degenerative changes. This report was finalized on 10/23/2017 11:39 by Dr. Maria Luz Rosenberg MD.              Assessment/Plan:     37-year-old female with a two-year history of cervical radiculopathy and progressive hand weakness that his not improve with physical therapy and pain management.  Her MRI cervical spine shows  C5, 6 and C6, 7 disc herniation causing cord compression that is moderate to severe.  I directly reviewed imaging findings with the patient have answered all questions.  I discuss risks, benefits, alternatives of a C5, 6 and a C6, 7 anterior cervical discectomy and fusion with the patient.  I discuss risks include but are not limited to vascular injury, esophageal injury, spinal fluid leak, spinal cord injury, nerve injury, weakness not improving, weakness worsening, pain not improving, pain worsening, adjacent level disease, need for posterior cervical fusion, paralysis, death.  Patient voices understanding wishes to proceed with surgery.    I discussed the patients findings and my recommendations with patient    Ezio Botello MD  11/20/17  10:32 AM            Electronically signed by Ezio Botello MD at 11/20/2017 12:34 PM              Vital Signs (last 24 hours)       12/13 0700  -  12/14 0659 12/14 0700  -  12/14 1427   Most Recent    Temp (°F)   97.8 -  98.2     98.2 (36.8)    Heart Rate   64 -  98     94    Resp   12 -  20     16    BP   129/85 -  149/92     135/83    SpO2 (%)   92 -  98     96          Intake & Output (last day)       12/13 0701 - 12/14 0700 12/14 0701 - 12/15 0700    I.V. (mL/kg)  1000 (8.7)    Total Intake(mL/kg)  1000 (8.7)    Urine (mL/kg/hr)  800    Blood  25    Total Output   825    Net   +175              Lines, Drains & Airways    Active LDAs     Name:   Placement date:   Placement time:   Site:   Days:    Peripheral IV Line - Single Lumen 12/14/17 0747 metacarpal vein (top of hand), right 18 gauge  12/14/17 PER TRANG RIVERA  0747      less than 1    Peripheral IV Line - Single Lumen 12/14/17 0815 median vein (underside of arm), left 18 gauge  12/14/17 per Trang Basilio  0815      less than 1    Urethral Catheter 12/14/17 0920 100% silicone 16 10 10  12/14/17    0920      less than 1         Inactive LDAs     Name:   Placement date:   Placement time:   Removal date:   Removal  time:   Site:   Days:    [REMOVED] Airway 12/14/17 0908  12/14/17    0908 created via procedure documentation  12/14/17    1158      less than 1    [REMOVED] Airway 12/14/17 0917 nasopharyngeal  12/14/17    0917    12/14/17    1158      less than 1                Hospital Medications (all)       Dose Frequency Start End    buffered lidocaine syringe 0.5 mL 0.5 mL Once As Needed 12/14/2017 12/14/2017    Sig - Route: Inject 0.5 mL into the skin 1 (One) Time As Needed (Prior to IV Insertion). - Intradermal    ceFAZolin (ANCEF) 2 g in dextrose (D5W) 5 % 100 mL IVPB 2 g Every 8 Hours 12/14/2017 12/15/2017    Sig - Route: Infuse 2 g into a venous catheter Every 8 (Eight) Hours. - Intravenous    ceFAZolin (ANCEF) IVPB (duplex) 2 g 2 g Once 12/14/2017 12/14/2017    Sig - Route: Infuse 2,000 mg into a venous catheter 1 (One) Time. - Intravenous    cyclobenzaprine (FLEXERIL) tablet 10 mg 10 mg 3 Times Daily PRN 12/14/2017     Sig - Route: Take 1 tablet by mouth 3 (Three) Times a Day As Needed for Muscle Spasms. - Oral    dexamethasone (DECADRON) injection 4 mg 4 mg Once As Needed 12/14/2017 12/14/2017    Sig - Route: Infuse 1 mL into a venous catheter 1 (One) Time As Needed for Nausea or Vomiting. - Intravenous    HYDROmorphone (DILAUDID) injection 0.5 mg 0.5 mg Every 5 Minutes PRN 12/14/2017 12/14/2017    Sig - Route: Infuse 0.5 mL into a venous catheter Every 5 (Five) Minutes As Needed for Severe Pain . - Intravenous    lactated ringers infusion 1,000 mL 1,000 mL Continuous 12/14/2017 12/15/2017    Sig - Route: Infuse 1,000 mL into a venous catheter Continuous. - Intravenous    lactated ringers infusion 9 mL/hr Continuous 12/14/2017     Sig - Route: Infuse 9 mL/hr into a venous catheter Continuous. - Intravenous    Morphine sulfate (PF) injection 1 mg 1 mg Every 4 Hours PRN 12/14/2017 12/24/2017    Sig - Route: Infuse 0.5 mL into a venous catheter Every 4 (Four) Hours As Needed for Moderate Pain . - Intravenous    Linked  "Group 1:  \"And\" Linked Group Details        naloxone (NARCAN) injection 0.4 mg 0.4 mg Every 5 Minutes PRN 12/14/2017     Sig - Route: Infuse 1 mL into a venous catheter Every 5 (Five) Minutes As Needed for Respiratory Depression. - Intravenous    Linked Group 1:  \"And\" Linked Group Details        oxyCODONE-acetaminophen (PERCOCET) 5-325 MG per tablet 1 tablet 1 tablet Every 4 Hours PRN 12/14/2017 12/24/2017    Sig - Route: Take 1 tablet by mouth Every 4 (Four) Hours As Needed for Moderate Pain . - Oral    sodium chloride 0.9 % flush 1-10 mL 1-10 mL As Needed 12/14/2017     Sig - Route: Infuse 1-10 mL into a venous catheter As Needed for Line Care. - Intravenous    atropine sulfate injection 0.5 mg (Discontinued) 0.5 mg Once As Needed 12/14/2017 12/14/2017    Sig - Route: Infuse 5 mL into a venous catheter 1 (One) Time As Needed for Bradycardia (symptomatic bradycardia (hypotension, dizziness, confusion). Notify attending anesthesiologist.). - Intravenous    Reason for Discontinue: Patient Transfer    buffered lidocaine syringe 0.5 mL (Discontinued) 0.5 mL Once As Needed 12/14/2017 12/14/2017    Sig - Route: Inject 0.5 mL as directed 1 (One) Time As Needed (IV Start). - Injection    Reason for Discontinue: Patient Transfer    bupivacaine-EPINEPHrine (MARCAINE w/EPI) injection (Discontinued)  As Needed 12/14/2017 12/14/2017    Sig: As Needed.    Reason for Discontinue: Patient Discharge    ceFAZolin in dextrose (ANCEF) IVPB solution 2 g (Discontinued) 2 g Every 8 Hours 12/14/2017 12/14/2017    Sig - Route: Infuse 100 mL into a venous catheter Every 8 (Eight) Hours. - Intravenous    Reason for Discontinue: Alternate therapy    dextrose (D50W) solution 12.5 g (Discontinued) 12.5 g As Needed 12/14/2017 12/14/2017    Sig - Route: Infuse 25 mL into a venous catheter As Needed for Low Blood Sugar (for blood sugar less than 60). - Intravenous    Reason for Discontinue: Patient Transfer    diphenhydrAMINE (BENADRYL) injection " 12.5 mg (Discontinued) 12.5 mg Every 15 Minutes PRN 12/14/2017 12/14/2017    Sig - Route: Infuse 0.25 mL into a venous catheter Every 15 (Fifteen) Minutes As Needed for Itching (May repeat x 1). - Intravenous    Reason for Discontinue: Patient Transfer    famotidine (PEPCID) injection 20 mg (Discontinued) 20 mg 60 Minutes Pre-Op 12/14/2017 12/14/2017    Sig - Route: Infuse 2 mL into a venous catheter 60 Minutes Prior to Surgery. - Intravenous    Reason for Discontinue: Patient Transfer    fentaNYL citrate (PF) (SUBLIMAZE) injection 25 mcg (Discontinued) 25 mcg Every 5 Minutes PRN 12/14/2017 12/14/2017    Sig - Route: Infuse 0.5 mL into a venous catheter Every 5 (Five) Minutes As Needed for Severe Pain . - Intravenous    Reason for Discontinue: Patient Transfer    gelatin absorbable (GELFOAM) powder (Discontinued)  As Needed 12/14/2017 12/14/2017    Sig: As Needed.    Reason for Discontinue: Patient Discharge    hydrALAZINE (APRESOLINE) injection 5 mg (Discontinued) 5 mg Every 10 Minutes PRN 12/14/2017 12/14/2017    Sig - Route: Infuse 0.25 mL into a venous catheter Every 10 (Ten) Minutes As Needed for High Blood Pressure (for systolic blood pressure greater than 180 mmHg or diastolic blood pressure greater than 105 mmHg and heart rate less than 60). - Intravenous    Reason for Discontinue: Patient Transfer    ipratropium-albuterol (DUO-NEB) nebulizer solution 3 mL (Discontinued) 3 mL Once As Needed 12/14/2017 12/14/2017    Sig - Route: Take 3 mL by nebulization 1 (One) Time As Needed for Wheezing or Shortness of Air (bronchospasm). - Nebulization    Reason for Discontinue: Patient Transfer    labetalol (NORMODYNE,TRANDATE) injection 5 mg (Discontinued) 5 mg Every 5 Minutes PRN 12/14/2017 12/14/2017    Sig - Route: Infuse 1 mL into a venous catheter Every 5 (Five) Minutes As Needed for High Blood Pressure (for systolic blood pressure greater than 180 mmHg or diastolic blood pressure greater than 105 mmHg). -  "Intravenous    Reason for Discontinue: Patient Transfer    meperidine (DEMEROL) injection 12.5 mg (Discontinued) 12.5 mg Every 5 Minutes PRN 12/14/2017 12/14/2017    Sig - Route: Infuse 0.5 mL into a venous catheter Every 5 (Five) Minutes As Needed for Shivering (May repeat x 1). - Intravenous    Reason for Discontinue: Patient Transfer    midazolam (VERSED) injection 1 mg (Discontinued) 1 mg Every 5 Minutes PRN 12/14/2017 12/14/2017    Sig - Route: Infuse 1 mL into a venous catheter Every 5 (Five) Minutes As Needed for Anxiety (Max 4mg midazolam TOTAL). - Intravenous    Reason for Discontinue: Patient Transfer    Linked Group 2:  \"Or\" Linked Group Details        midazolam (VERSED) injection 2 mg (Discontinued) 2 mg Every 5 Minutes PRN 12/14/2017 12/14/2017    Sig - Route: Infuse 2 mL into a venous catheter Every 5 (Five) Minutes As Needed for Anxiety (Max 4mg midazolam TOTAL). - Intravenous    Reason for Discontinue: Patient Transfer    Linked Group 2:  \"Or\" Linked Group Details        Morphine sulfate (PF) injection 2 mg (Discontinued) 2 mg Every 5 Minutes PRN 12/14/2017 12/14/2017    Sig - Route: Infuse 1 mL into a venous catheter Every 5 (Five) Minutes As Needed for Moderate Pain . - Intravenous    Reason for Discontinue: Patient Transfer    naloxone (NARCAN) injection 0.4 mg (Discontinued) 0.4 mg As Needed 12/14/2017 12/14/2017    Sig - Route: Infuse 1 mL into a venous catheter As Needed for Opioid Reversal (unresponsiveness, decrease oxygen saturation). - Intravenous    Reason for Discontinue: Patient Transfer    ondansetron (ZOFRAN) injection 4 mg (Discontinued) 4 mg Once As Needed 12/14/2017 12/14/2017    Sig - Route: Infuse 2 mL into a venous catheter 1 (One) Time As Needed for Nausea or Vomiting. - Intravenous    Reason for Discontinue: Patient Transfer    sodium chloride (NS) irrigation solution (Discontinued)  As Needed 12/14/2017 12/14/2017    Sig: As Needed.    Reason for Discontinue: Patient Discharge "    sodium chloride 0.9 % flush 1-10 mL (Discontinued) 1-10 mL As Needed 12/14/2017 12/14/2017    Sig - Route: Infuse 1-10 mL into a venous catheter As Needed for Line Care. - Intravenous    Reason for Discontinue: Patient Transfer    sodium chloride 0.9 % flush 3 mL (Discontinued) 3 mL As Needed 12/14/2017 12/14/2017    Sig - Route: Infuse 3 mL into a venous catheter As Needed for Line Care. - Intravenous    Reason for Discontinue: Patient Transfer    sodium chloride 0.9 % solution (Discontinued)  As Needed 12/14/2017 12/14/2017    Sig: As Needed.    Reason for Discontinue: Patient Discharge    sodium chloride 1,000 mL with vancomycin 1,000 mg irrigation (Discontinued)  As Needed 12/14/2017 12/14/2017    Sig: As Needed.    Reason for Discontinue: Patient Discharge    thrombin spray (Discontinued)  As Needed 12/14/2017 12/14/2017    Sig: As Needed.    Reason for Discontinue: Patient Discharge            Lab Results (last 24 hours)     ** No results found for the last 24 hours. **        Imaging Results (last 24 hours)     ** No results found for the last 24 hours. **        Orders (last 24 hrs)     Start     Ordered    12/14/17 1700  ceFAZolin in dextrose (ANCEF) IVPB solution 2 g  Every 8 Hours,   Status:  Discontinued      12/14/17 1317    12/14/17 1700  ceFAZolin (ANCEF) 2 g in dextrose (D5W) 5 % 100 mL IVPB  Every 8 Hours      12/14/17 1323    12/14/17 1600  Neurovascular Checks  Every 4 Hours      12/14/17 1317    12/14/17 1500  CT Cervical Spine Without Contrast  1 Time Imaging      12/14/17 1225    12/14/17 1400  Incentive Spirometry  Every 2 Hours While Awake      12/14/17 1317    12/14/17 1318  Oxygen Therapy- Blow by - Humidified; Titrate for SPO2: equal to or greater than, 96%, per policy  Continuous      12/14/17 1317    12/14/17 1318  Pulse Oximetry, Continuous  Continuous      12/14/17 1317    12/14/17 1318  Oxygen Therapy- Nasal Cannula; Titrate for SPO2: equal to or greater than, 92%, per policy   Continuous      12/14/17 1317    12/14/17 1318  Continuous Pulse Oximetry  Continuous,   Status:  Canceled      12/14/17 1317    12/14/17 1318  Advance Diet as Tolerated  Until Discontinued      12/14/17 1317    12/14/17 1318  Insert Peripheral IV  Once      12/14/17 1317    12/14/17 1318  Saline Lock & Maintain IV Access  Continuous      12/14/17 1317    12/14/17 1318  Full Code  Continuous      12/14/17 1317    12/14/17 1318  VTE Risk Assessment - Low Risk  Once      12/14/17 1317    12/14/17 1318  Pharmacologic VTE Prophylaxis Not Indicated: Low Risk for VTE  Once      12/14/17 1317    12/14/17 1318  Place Compression Stockings (TEDs)  Once      12/14/17 1317    12/14/17 1318  Remove & Replace Compression Stockings (TEDS) Daily  Daily      12/14/17 1317    12/14/17 1318  Place Sequential Compression Device  Once      12/14/17 1317    12/14/17 1318  Maintain Sequential Compression Device  Continuous      12/14/17 1317    12/14/17 1318  Ambulate Patient  Every Shift      12/14/17 1317    12/14/17 1318  Discontinue Indwelling Urinary Catheter  Once      12/14/17 1317    12/14/17 1318  Notify Provider if Bladder Distention Continues  Until Discontinued      12/14/17 1317    12/14/17 1318  Consult Pharmacist For Review of Medications That May Cause Urinary Retention - RN To Place Order for Consult it Needed  Continuous      12/14/17 1317    12/14/17 1318  Notify Provider of Changes in Neuro Status  Until Discontinued      12/14/17 1317    12/14/17 1318  Notify Provider - Access Site  Until Discontinued      12/14/17 1317    12/14/17 1318  Diet Regular  Diet Effective Now      12/14/17 1317    12/14/17 1317  Morphine sulfate (PF) injection 1 mg  Every 4 Hours PRN      12/14/17 1317    12/14/17 1317  naloxone (NARCAN) injection 0.4 mg  Every 5 Minutes PRN      12/14/17 1317    12/14/17 1317  sodium chloride 0.9 % flush 1-10 mL  As Needed      12/14/17 1317    12/14/17 1317  oxyCODONE-acetaminophen (PERCOCET) 5-325 MG  per tablet 1 tablet  Every 4 Hours PRN      12/14/17 1317    12/14/17 1317  cyclobenzaprine (FLEXERIL) tablet 10 mg  3 Times Daily PRN      12/14/17 1317    12/14/17 1216  OT Consult: Eval & Treat s/p acdf  Once      12/14/17 1216    12/14/17 1215  PT Consult: Eval & Treat  Once      12/14/17 1216    12/14/17 1208  Vital signs every 5 minutes for 15 minutes, every 15 minutes thereafter.  Once,   Status:  Canceled      12/14/17 1207    12/14/17 1208  Notify Anesthesia of Any Acute Changes in Patient Condition  Until Discontinued,   Status:  Canceled      12/14/17 1207    12/14/17 1208  Notify Anesthesia for Unrelieved Pain  Until Discontinued,   Status:  Canceled      12/14/17 1207    12/14/17 1208  Once DC criteria to floor met, follow surgeon's orders.  Continuous,   Status:  Canceled      12/14/17 1207    12/14/17 1208  Discharge patient from PACU when discharge criteria is met.  Continuous,   Status:  Canceled      12/14/17 1207    12/14/17 1207  Morphine sulfate (PF) injection 2 mg  Every 5 Minutes PRN,   Status:  Discontinued      12/14/17 1207    12/14/17 1207  HYDROmorphone (DILAUDID) injection 0.5 mg  Every 5 Minutes PRN      12/14/17 1207    12/14/17 1207  labetalol (NORMODYNE,TRANDATE) injection 5 mg  Every 5 Minutes PRN,   Status:  Discontinued      12/14/17 1207    12/14/17 1207  hydrALAZINE (APRESOLINE) injection 5 mg  Every 10 Minutes PRN,   Status:  Discontinued      12/14/17 1207    12/14/17 1207  atropine sulfate injection 0.5 mg  Once As Needed,   Status:  Discontinued      12/14/17 1207    12/14/17 1207  ipratropium-albuterol (DUO-NEB) nebulizer solution 3 mL  Once As Needed,   Status:  Discontinued      12/14/17 1207    12/14/17 1207  naloxone (NARCAN) injection 0.4 mg  As Needed,   Status:  Discontinued      12/14/17 1207    12/14/17 1207  ondansetron (ZOFRAN) injection 4 mg  Once As Needed,   Status:  Discontinued      12/14/17 1207    12/14/17 1207  diphenhydrAMINE (BENADRYL) injection 12.5 mg   Every 15 Minutes PRN,   Status:  Discontinued      12/14/17 1207    12/14/17 1207  meperidine (DEMEROL) injection 12.5 mg  Every 5 Minutes PRN,   Status:  Discontinued      12/14/17 1207    12/14/17 1033  Intra-Op Urinary Catheter Insertion - Monitor Urine Output  Once,   Status:  Canceled      12/14/17 1033    12/14/17 0949  sodium chloride 0.9 % solution  As Needed,   Status:  Discontinued      12/14/17 0949    12/14/17 0949  bupivacaine-EPINEPHrine (MARCAINE w/EPI) injection  As Needed,   Status:  Discontinued      12/14/17 0949    12/14/17 0948  thrombin spray  As Needed,   Status:  Discontinued      12/14/17 0948    12/14/17 0947  sodium chloride 1,000 mL with vancomycin 1,000 mg irrigation  As Needed,   Status:  Discontinued      12/14/17 0947    12/14/17 0947  gelatin absorbable (GELFOAM) powder  As Needed,   Status:  Discontinued      12/14/17 0947    12/14/17 0947  sodium chloride (NS) irrigation solution  As Needed,   Status:  Discontinued      12/14/17 0948    12/14/17 0900  lactated ringers infusion  Continuous      12/14/17 0815    12/14/17 0900  ceFAZolin (ANCEF) IVPB (duplex) 2 g  Once      12/14/17 0829    12/14/17 0830  Place Sequential Compression Device on Patient in Pre-Op  Once      12/14/17 0829    12/14/17 0829  Follow Anesthesia Guidelines / Standing Orders  Continuous      12/14/17 0829    12/14/17 0829  Follow Anesthesia Guidelines / Standing Orders  Once,   Status:  Canceled      12/14/17 0829    12/14/17 0816  Oxygen Therapy- Nasal Cannula; Titrate for SPO2: equal to or greater than, 90%  Continuous,   Status:  Canceled      12/14/17 0815    12/14/17 0816  Pulse Oximetry, Continuous  Continuous,   Status:  Canceled      12/14/17 0815    12/14/17 0816  Insert Peripheral IV  Once,   Status:  Canceled      12/14/17 0815    12/14/17 0816  Saline Lock & Maintain IV Access  Continuous,   Status:  Canceled      12/14/17 0815    12/14/17 0815  famotidine (PEPCID) injection 20 mg  60 Minutes  Pre-Op,   Status:  Discontinued      12/14/17 0816    12/14/17 0815  dexamethasone (DECADRON) injection 4 mg  Once As Needed      12/14/17 0816    12/14/17 0815  Vital Signs - Per Anesthesia Protocol  As Needed,   Status:  Canceled      12/14/17 0815    12/14/17 0815  sodium chloride 0.9 % flush 1-10 mL  As Needed,   Status:  Discontinued      12/14/17 0815    12/14/17 0815  buffered lidocaine syringe 0.5 mL  Once As Needed,   Status:  Discontinued      12/14/17 0815    12/14/17 0815  fentaNYL citrate (PF) (SUBLIMAZE) injection 25 mcg  Every 5 Minutes PRN,   Status:  Discontinued      12/14/17 0815 12/14/17 0815  midazolam (VERSED) injection 1 mg  Every 5 Minutes PRN,   Status:  Discontinued      12/14/17 0815    12/14/17 0815  midazolam (VERSED) injection 2 mg  Every 5 Minutes PRN,   Status:  Discontinued      12/14/17 0815    12/14/17 0815  dextrose (D50W) solution 12.5 g  As Needed,   Status:  Discontinued      12/14/17 0815    12/14/17 0815  lactated ringers infusion 1,000 mL  Continuous      12/14/17 0731    12/14/17 0805  FL C Arm During Surgery  1 Time Imaging      12/14/17 0804    12/14/17 0804  XR Spine Cervical 2 View  1 Time Imaging      12/14/17 0804    12/14/17 0731  Inpatient Admission  Once      12/14/17 0730    12/14/17 0731  Follow Anesthesia Guidelines / Standing Orders  Once,   Status:  Canceled      12/14/17 0730    12/14/17 0731  Insert Peripheral IV  Once,   Status:  Canceled      12/14/17 0731    12/14/17 0731  Maintain IV Access  Continuous,   Status:  Canceled      12/14/17 0731    12/14/17 0730  buffered lidocaine syringe 0.5 mL  Once As Needed      12/14/17 0731    12/14/17 0730  sodium chloride 0.9 % flush 3 mL  As Needed,   Status:  Discontinued      12/14/17 0731    Unscheduled  Bladder Scan if Patient Unable to Void 4-6 Hours After Catheter Removal  As Needed      12/14/17 1317    Unscheduled  If Bladder Scan Volume is Less Than 350-500mL & Patient is Without Symptoms of Bladder  Discomfort / Distention Monitor Every 1-2 Hours for Spontaneous Void  As Needed      12/14/17 1317    Unscheduled  Straight Cath Every 4-6 Hours As Needed If Patient is Unable to Void After 4-6 Hours, Bladder Scan Volume is Greater Than 350-500mL & Patient Has Symptoms of Bladder Discomfort / Distention  As Needed      12/14/17 1317    Unscheduled  Schedule / Prompt Voiding For Patients With Urinary Incontinence  As Needed      12/14/17 1317             Operative/Procedure Notes (all)      Ezio Botello MD at 12/14/2017 12:03 PM  Version 1 of 1         CERVICAL DISCECTOMY ANTERIOR WITH FUSION  Procedure Note    Merari Dan Augustine  12/14/2017    Pre-op Diagnosis:   Degeneration of cervical intervertebral disc [M50.30]    Post-op Diagnosis:     Post-Op Diagnosis Codes:     * Degeneration of cervical intervertebral disc [M50.30]    Procedure/CPT® Codes:      Procedure(s):    Anterior C5, 6 discectomy with decompression of spinal cord and preparation of endplates for arthrodesis  Anterior C6, C7 discectomy with decompression spinal cord and preparation of endplates for arthrodesis  Insertion of interbody device C5, C6 arthrodesis  Insertion of interbody device C6, 7 for arthrodesis  Use of allograft  Anterior segmental instrumentation C5, 6, 7  Use of operative microscope  Use of neuro monitoring    Surgeon(s):  Ezio Botello MD    Anesthesia: General    Staff:   Circulator: Amie Okeefe RN; Alina Trujillo RN  Scrub Person: Kourtney Balderrama; Rain Rosas  Assistant: Carin Cagle    Estimated Blood Loss: 25 mL          Drains:   Urethral Catheter 12/14/17 0920 100% silicone 16 10 10 (Active)           Indication for procedure:    This patient is a 37-year-old female who underwent conservative management for a C5, 6 and C6, 7 disc herniation causing cord compression without improvement of her symptoms.  She elected to proceed with the above procedure.    Prescription of  procedure:    Informed consent was obtained.  General endotracheal anesthesia was administered.  Timeout was performed.  Preoperative prophylactic antibiotics were given.  Patient was placed in the supine position and prepped and draped in normal sterile fashion.  Local anesthetic was injected.  #10 blade was used to make skin incision.  Metzenbaum scissors were used to dissect through the avascular plane to the prevertebral space.  Longus coli muscles were mobilized.  The microscope was prepped and draped in the sterile field.  Under microscopic dissection, a #15 blade was used to make an annulotomy at C5, 6 and C6, 7.  Upgoing curette was used to remove the disc material C5, 6 and C6, 7.  Under microscopic dissection the endplates were prepared for arthrodesis by removing the cartilaginous caps.  Kerrison punch was used to remove the posterior longitudinal ligament with decompression of spinal cord and visualization thecal sac at C5, 6 and C6, 7.  Interbody devices were sized to 6 mm, packed with Sameer allograft and inserted into C5, 6 and C6, 7.  A plate was placed across C5, C6, C7 vertebral bodies and secured with two 15 millimeter screws inserted into C5, C6, C7.  Locking mechanism was turned.  Wound was copiously irrigated.  Bipolar cautery was used to achieve hemostasis.  3-0 Vicryls were used to close the platysma and subcutaneous skin.  Skin affix was used to close the skin.  All counts were correct ×2 in the procedure.  Patient was extubated in operative room and brought to recovery.  Neuro monitoring showed no change from baseline at end of procedure.    Ezio Botello MD     Date: 12/14/2017  Time: 12:05 PM     Electronically signed by Ezio Botello MD at 12/14/2017 12:13 PM

## 2017-12-14 NOTE — PLAN OF CARE
Problem: Patient Care Overview (Adult)  Goal: Plan of Care Review  Outcome: Ongoing (interventions implemented as appropriate)    12/14/17 0932   Coping/Psychosocial Response Interventions   Plan Of Care Reviewed With patient   Patient Care Overview   Progress no change   Outcome Evaluation   Outcome Summary/Follow up Plan no changes in the preop holding phase

## 2017-12-14 NOTE — PLAN OF CARE
Problem: Perioperative Period (Adult)  Goal: Signs and Symptoms of Listed Potential Problems Will be Absent or Manageable (Perioperative Period)  Outcome: Ongoing (interventions implemented as appropriate)    12/14/17 1986   Perioperative Period   Problems Assessed (Perioperative Period) pain;hypothermia;hypoxia/hypoxemia;perioperative injury;situational response   Problems Present (Perioperative Period) situational response;pain

## 2017-12-14 NOTE — OP NOTE
CERVICAL DISCECTOMY ANTERIOR WITH FUSION  Procedure Note    Merari Bradshaw  12/14/2017    Pre-op Diagnosis:   Degeneration of cervical intervertebral disc [M50.30]    Post-op Diagnosis:     Post-Op Diagnosis Codes:     * Degeneration of cervical intervertebral disc [M50.30]    Procedure/CPT® Codes:      Procedure(s):    Anterior C5, 6 discectomy with decompression of spinal cord and preparation of endplates for arthrodesis  Anterior C6, C7 discectomy with decompression spinal cord and preparation of endplates for arthrodesis  Insertion of interbody device C5, C6 arthrodesis  Insertion of interbody device C6, 7 for arthrodesis  Use of allograft  Anterior segmental instrumentation C5, 6, 7  Use of operative microscope  Use of neuro monitoring    Surgeon(s):  Ezio Botello MD    Anesthesia: General    Staff:   Circulator: Amie Okeefe RN; Alina Trujillo RN  Scrub Person: Kourtney Balderrama; Rain Zamudio; Tania Rosas  Assistant: Carin Cagle    Estimated Blood Loss: 25 mL          Drains:   Urethral Catheter 12/14/17 0920 100% silicone 16 10 10 (Active)           Indication for procedure:    This patient is a 37-year-old female who underwent conservative management for a C5, 6 and C6, 7 disc herniation causing cord compression without improvement of her symptoms.  She elected to proceed with the above procedure.    Prescription of procedure:    Informed consent was obtained.  General endotracheal anesthesia was administered.  Timeout was performed.  Preoperative prophylactic antibiotics were given.  Patient was placed in the supine position and prepped and draped in normal sterile fashion.  Local anesthetic was injected.  #10 blade was used to make skin incision.  Metzenbaum scissors were used to dissect through the avascular plane to the prevertebral space.  Longus coli muscles were mobilized.  The microscope was prepped and draped in the sterile field.  Under microscopic dissection, a #15 blade was  used to make an annulotomy at C5, 6 and C6, 7.  Upgoing curette was used to remove the disc material C5, 6 and C6, 7.  Under microscopic dissection the endplates were prepared for arthrodesis by removing the cartilaginous caps.  Kerrison punch was used to remove the posterior longitudinal ligament with decompression of spinal cord and visualization thecal sac at C5, 6 and C6, 7.  Interbody devices were sized to 6 mm, packed with Sameer allograft and inserted into C5, 6 and C6, 7.  A plate was placed across C5, C6, C7 vertebral bodies and secured with two 15 millimeter screws inserted into C5, C6, C7.  Locking mechanism was turned.  Wound was copiously irrigated.  Bipolar cautery was used to achieve hemostasis.  3-0 Vicryls were used to close the platysma and subcutaneous skin.  Skin affix was used to close the skin.  All counts were correct ×2 in the procedure.  Patient was extubated in operative room and brought to recovery.  Neuro monitoring showed no change from baseline at end of procedure.    Ezio Botello MD     Date: 12/14/2017  Time: 12:05 PM

## 2017-12-14 NOTE — PLAN OF CARE
Problem: Patient Care Overview (Adult)  Goal: Plan of Care Review  Outcome: Ongoing (interventions implemented as appropriate)    12/14/17 7305   Coping/Psychosocial Response Interventions   Plan Of Care Reviewed With patient;family   Outcome Evaluation   Outcome Summary/Follow up Plan received from PACU...alert and oriented x4..KISER's...n/v WNL...no c/o pain...peralta in place...fall protocol initiated ...aspen collar in place...incision c/d/i...well approximated...AP...continue to monitor       Goal: Adult Individualization and Mutuality  Outcome: Ongoing (interventions implemented as appropriate)  Goal: Discharge Needs Assessment  Outcome: Ongoing (interventions implemented as appropriate)    Problem: Perioperative Period (Adult)  Goal: Signs and Symptoms of Listed Potential Problems Will be Absent or Manageable (Perioperative Period)  Outcome: Ongoing (interventions implemented as appropriate)    Problem: Fall Risk (Adult)  Goal: Identify Related Risk Factors and Signs and Symptoms  Outcome: Ongoing (interventions implemented as appropriate)  Goal: Absence of Falls  Outcome: Ongoing (interventions implemented as appropriate)

## 2017-12-14 NOTE — PLAN OF CARE
Problem: Patient Care Overview (Adult)  Goal: Plan of Care Review  Outcome: Ongoing (interventions implemented as appropriate)    12/14/17 1251   Coping/Psychosocial Response Interventions   Plan Of Care Reviewed With patient   Patient Care Overview   Progress improving   Outcome Evaluation   Outcome Summary/Follow up Plan d/c criteria met         Problem: Perioperative Period (Adult)  Goal: Signs and Symptoms of Listed Potential Problems Will be Absent or Manageable (Perioperative Period)  Outcome: Ongoing (interventions implemented as appropriate)

## 2017-12-14 NOTE — ANESTHESIA PREPROCEDURE EVALUATION
Anesthesia Evaluation     Patient summary reviewed   history of anesthetic complications: PONV  NPO Solid Status: > 8 hours       Airway   Mallampati: II  TM distance: >3 FB  Neck ROM: full  Dental      Pulmonary    (+) a smoker, sleep apnea on CPAP,   Cardiovascular - negative cardio ROS  Exercise tolerance: good (4-7 METS)    ECG reviewed        Neuro/Psych  (-) seizures, TIA, CVA  GI/Hepatic/Renal/Endo    (+) morbid obesity, GERD,   (-) liver disease, no renal disease, diabetes    Musculoskeletal     Abdominal    Substance History      OB/GYN          Other                                      Anesthesia Plan    ASA 3     general     intravenous induction   Anesthetic plan and risks discussed with patient.

## 2017-12-15 VITALS
SYSTOLIC BLOOD PRESSURE: 128 MMHG | BODY MASS INDEX: 42.28 KG/M2 | HEART RATE: 65 BPM | RESPIRATION RATE: 20 BRPM | WEIGHT: 253.8 LBS | TEMPERATURE: 97.6 F | OXYGEN SATURATION: 97 % | DIASTOLIC BLOOD PRESSURE: 62 MMHG | HEIGHT: 65 IN

## 2017-12-15 PROCEDURE — 25010000003 CEFAZOLIN PER 500 MG: Performed by: NEUROLOGICAL SURGERY

## 2017-12-15 PROCEDURE — 25010000002 ONDANSETRON PER 1 MG: Performed by: NEUROLOGICAL SURGERY

## 2017-12-15 PROCEDURE — 99024 POSTOP FOLLOW-UP VISIT: CPT | Performed by: NEUROLOGICAL SURGERY

## 2017-12-15 PROCEDURE — 97165 OT EVAL LOW COMPLEX 30 MIN: CPT

## 2017-12-15 PROCEDURE — G8988 SELF CARE GOAL STATUS: HCPCS

## 2017-12-15 PROCEDURE — G8987 SELF CARE CURRENT STATUS: HCPCS

## 2017-12-15 PROCEDURE — G8989 SELF CARE D/C STATUS: HCPCS

## 2017-12-15 RX ORDER — OXYCODONE HYDROCHLORIDE AND ACETAMINOPHEN 5; 325 MG/1; MG/1
1 TABLET ORAL EVERY 4 HOURS PRN
Qty: 90 TABLET | Refills: 0 | Status: SHIPPED | OUTPATIENT
Start: 2017-12-15 | End: 2017-12-24

## 2017-12-15 RX ORDER — CYCLOBENZAPRINE HCL 10 MG
10 TABLET ORAL 3 TIMES DAILY PRN
Qty: 90 TABLET | Refills: 0 | Status: SHIPPED | OUTPATIENT
Start: 2017-12-15

## 2017-12-15 RX ADMIN — OXYCODONE HYDROCHLORIDE AND ACETAMINOPHEN 1 TABLET: 5; 325 TABLET ORAL at 09:58

## 2017-12-15 RX ADMIN — ONDANSETRON 4 MG: 2 INJECTION, SOLUTION INTRAMUSCULAR; INTRAVENOUS at 03:15

## 2017-12-15 RX ADMIN — OXYCODONE HYDROCHLORIDE AND ACETAMINOPHEN 1 TABLET: 5; 325 TABLET ORAL at 03:15

## 2017-12-15 RX ADMIN — CEFAZOLIN 2 G: 1 INJECTION, POWDER, FOR SOLUTION INTRAVENOUS at 00:50

## 2017-12-15 RX ADMIN — CYCLOBENZAPRINE HYDROCHLORIDE 10 MG: 10 TABLET, FILM COATED ORAL at 09:48

## 2017-12-15 NOTE — PAYOR COMM NOTE
"PR HOME 12-15-17    [id number]  Merari Bradshaw (37 y.o. Female)     Date of Birth Social Security Number Address Home Phone MRN    1980  [address]  [facility] [address] [phone] [id number]    Gnosticism Marital Status          Restorationism        Admission Date Admission Type Admitting Provider Attending Provider Department, Room/Bed    12/14/17 Elective Ezio Botello MD  [facility] 3A, 343/1    Discharge Date Discharge Disposition Discharge Destination        12/15/2017 Home or Self Care             Attending Provider: (none)    Allergies:  Norco [Hydrocodone-acetaminophen], Adhesive Tape    Isolation:  None   Infection:  None   Code Status:  FULL    Ht:  165.1 cm (65\")   Wt:  115 kg (253 lb 12.8 oz)    Admission Cmt:  None   Principal Problem:  None                Active Insurance as of 12/14/2017     Primary Coverage     Payor Plan Insurance Group Employer/Plan Group    [employer] [facility]      Payor Plan Address Payor Plan Phone Number Effective From Effective To    [address] [address]  6/1/2015     [place] [address]       Subscriber Name Subscriber Birth Date Member ID       MERARI BRADSHAW 1980 [id number]                 Emergency Contacts      (Rel.) Home Phone Work Phone Mobile Phone    Abhay Bradshaw (Spouse) -- -- [phone]    Nena Mejia (Mother) [phone] -- --               Discharge Summary      Ezio Botello MD at 12/15/2017  9:48 AM            Date of Discharge:  12/15/2017    Discharge Diagnosis:   Degeneration of cervical intervertebral disc [M50.30]  Degeneration of cervical intervertebral disc [M50.30]     Presenting Problem/History of Present Illness  Degeneration of cervical intervertebral disc [M50.30]  Degeneration of cervical intervertebral disc [M50.30]       Hospital Course  Patient is a 37 y.o. female presented with cervical spondylosis and spinal cord compression, " underwent anterior cervical discectomy and fusion, was ready for discharge on postoperative day #1.      Procedures Performed  Procedure(s):  C5-6,C6-7 CERVICAL DISCECTOMY ANTERIOR WITH FUSION       Consults:   Consults     No orders found for last 30 day(s).          \  Vital Signs  Temp:  [97.5 °F (36.4 °C)-98.3 °F (36.8 °C)] 97.6 °F (36.4 °C)  Heart Rate:  [61-98] 65  Resp:  [12-20] 20  BP: (122-150)/(62-92) 128/62    Physical Exam:   Physical Exam     Neurologic Exam       Discharge Disposition  Home or Self Care    Discharge Medications   BradshawMerari Ni   Home Medication Instructions DELORIS:[id number]    Printed on:12/15/17 2839   Medication Information                      busPIRone (BUSPAR) 7.5 MG tablet  Take 7.5 mg by mouth 2 (Two) Times a Day.             cyclobenzaprine (FLEXERIL) 10 MG tablet  Take 1 tablet by mouth 3 (Three) Times a Day As Needed for Muscle Spasms.             omeprazole (priLOSEC) 20 MG capsule  Take 20 mg by mouth Daily.             oxyCODONE-acetaminophen (PERCOCET) 5-325 MG per tablet  Take 1 tablet by mouth Every 8 (Eight) Hours As Needed for Moderate Pain .             oxyCODONE-acetaminophen (PERCOCET) 5-325 MG per tablet  Take 1 tablet by mouth Every 4 (Four) Hours As Needed for Moderate Pain  for up to 9 days.             pregabalin (LYRICA) 100 MG capsule  Take 100 mg by mouth 2 (Two) Times a Day. 1 a.m. And 2 p.m.             VITAMIN D, ERGOCALCIFEROL, PO  Take 1 tablet by mouth 2 (Two) Times a Week. On Saturdays and Wednesdays                 Discharge Diet:     Activity at Discharge:     Follow-up Appointments  No future appointments.  Additional Instructions for the Follow-ups that You Need to Schedule     Discharge Follow-up with Specialty: Neurosurgery; 3 Weeks    As directed    Specialty:  Neurosurgery    Follow Up:  3 Weeks    Follow Up Details:  wound check                     Test Results Pending at Discharge       Ezio Botello MD  12/15/17  9:48 AM      Electronically signed by Ezio Botello MD at 12/15/2017  9:48 AM

## 2017-12-15 NOTE — PROGRESS NOTES
Merari Bradshaw  37 y.o.      Subjective  No events    Temp:  [97.5 °F (36.4 °C)-98.3 °F (36.8 °C)] 97.6 °F (36.4 °C)  Heart Rate:  [61-98] 65  Resp:  [12-20] 20  BP: (122-150)/(62-92) 128/62      Objective    Neurologic Exam    NAD  AAox3  MAEW  Inc c/d/i    Active Problems:    Degeneration of cervical intervertebral disc      Lab Results (last 24 hours)     ** No results found for the last 24 hours. **              Plan:   A/P - s/p ACDF; exam stable, PT/OT, mobilize    Ezio Botello MD

## 2017-12-15 NOTE — PLAN OF CARE
Prior Authorization Approval    Authorization Effective Date: 7/16/2017  Authorization Expiration Date: 10/14/2018  Medication: fluocinonide (LIDEX) 0.05 % ointment - approved   Approved Dose/Quantity: 60g  Reference #:   n/a  Insurance Company: [employer] - Phone [phone] Fax [phone]  Expected CoPay: $82.26     CoPay Card Available:      Foundation Assistance Needed:    Which Pharmacy is filling the prescription (Not needed for infusion/clinic administered): [id number] - [place], [place] - [address]  Pharmacy Notified: Yes  Patient Notified: Yes- left voicemail         Problem: Patient Care Overview (Adult)  Goal: Plan of Care Review  Outcome: Ongoing (interventions implemented as appropriate)    12/15/17 0874   Coping/Psychosocial Response Interventions   Plan Of Care Reviewed With patient;spouse   Patient Care Overview   Progress no change   Outcome Evaluation   Outcome Summary/Follow up Plan OT gómez completed. Pt mod I with bed mobility, independent with transfers and functional mobility in hallway and back to room. Able to don pants with set up, required Alden for donning shoes however this is pt's baseline. AROM and  strength WFL. Numbness in 4th and 5th digits of L hand however pt reports this is improved since surgery. Pt instructed on brace wear/care and management. Pt educated on home safety and surgical precautions. She is safe for discharge home with assist.

## 2017-12-15 NOTE — DISCHARGE SUMMARY
Date of Discharge:  12/15/2017    Discharge Diagnosis:   Degeneration of cervical intervertebral disc [M50.30]  Degeneration of cervical intervertebral disc [M50.30]     Presenting Problem/History of Present Illness  Degeneration of cervical intervertebral disc [M50.30]  Degeneration of cervical intervertebral disc [M50.30]       Hospital Course  Patient is a 37 y.o. female presented with cervical spondylosis and spinal cord compression, underwent anterior cervical discectomy and fusion, was ready for discharge on postoperative day #1.      Procedures Performed  Procedure(s):  C5-6,C6-7 CERVICAL DISCECTOMY ANTERIOR WITH FUSION       Consults:   Consults     No orders found for last 30 day(s).          \  Vital Signs  Temp:  [97.5 °F (36.4 °C)-98.3 °F (36.8 °C)] 97.6 °F (36.4 °C)  Heart Rate:  [61-98] 65  Resp:  [12-20] 20  BP: (122-150)/(62-92) 128/62    Physical Exam:   Physical Exam     Neurologic Exam       Discharge Disposition  Home or Self Care    Discharge Medications   Merari Bradshaw   Home Medication Instructions DELORIS:[id number]    Printed on:12/15/17 0948   Medication Information                      busPIRone (BUSPAR) 7.5 MG tablet  Take 7.5 mg by mouth 2 (Two) Times a Day.             cyclobenzaprine (FLEXERIL) 10 MG tablet  Take 1 tablet by mouth 3 (Three) Times a Day As Needed for Muscle Spasms.             omeprazole (priLOSEC) 20 MG capsule  Take 20 mg by mouth Daily.             oxyCODONE-acetaminophen (PERCOCET) 5-325 MG per tablet  Take 1 tablet by mouth Every 8 (Eight) Hours As Needed for Moderate Pain .             oxyCODONE-acetaminophen (PERCOCET) 5-325 MG per tablet  Take 1 tablet by mouth Every 4 (Four) Hours As Needed for Moderate Pain  for up to 9 days.             pregabalin (LYRICA) 100 MG capsule  Take 100 mg by mouth 2 (Two) Times a Day. 1 a.m. And 2 p.m.             VITAMIN D, ERGOCALCIFEROL, PO  Take 1 tablet by mouth 2 (Two) Times a Week. On Saturdays and Wednesdays                  Discharge Diet:     Activity at Discharge:     Follow-up Appointments  No future appointments.  Additional Instructions for the Follow-ups that You Need to Schedule     Discharge Follow-up with Specialty: Neurosurgery; 3 Weeks    As directed    Specialty:  Neurosurgery    Follow Up:  3 Weeks    Follow Up Details:  wound check                     Test Results Pending at Discharge       Ezio Botello MD  12/15/17  9:48 AM

## 2017-12-15 NOTE — THERAPY DISCHARGE NOTE
Acute Care - Occupational Therapy Initial Eval/Discharge  [facility]     Patient Name: Merari Bradshaw  : 1980  MRN: [id number]  Today's Date: 12/15/2017  Onset of Illness/Injury or Date of Surgery Date: 17  Date of Referral to OT: 17  Referring Physician: Dr. Botello      Admit Date: 2017       ICD-10-CM ICD-9-CM   1. Impaired mobility and ADLs Z74.09 799.89     Patient Active Problem List   Diagnosis   • Degeneration of lumbar intervertebral disc   • Lumbosacral neuritis   • Morbid obesity due to excess calories   • Cigarette smoker   • Degeneration of cervical intervertebral disc   • Other headache syndrome   • Facial numbness   • Fibromyalgia   • Cervical spondylosis with myelopathy     Past Medical History:   Diagnosis Date   • Acid reflux    • Anxiety    • Basal cell carcinoma of breast     skin cancer    • CPAP (continuous positive airway pressure) dependence    • Decreased ROM of neck    • Fibrocystic breast    • Fibromyalgia    • Panic attacks    • PONV (postoperative nausea and vomiting)    • Sleep apnea    • Spinal headache    • Strep throat    • Wears glasses      Past Surgical History:   Procedure Laterality Date   • ANTERIOR CERVICAL DISCECTOMY W/ FUSION N/A 2017    Procedure: C5-6,C6-7 CERVICAL DISCECTOMY ANTERIOR WITH FUSION;  Surgeon: Ezio Botello MD;  Location: [facility];  Service:    •  SECTION     • CHOLECYSTECTOMY     • EXPLORATORY LAPAROTOMY     • HX OVARIAN CYSTECTOMY     • HYSTERECTOMY            OT ASSESSMENT FLOWSHEET (last 72 hours)      OT Evaluation       12/15/17 0819 12/15/17 0730 17 1329          Rehab Evaluation    Document Type  evaluation   see MAR  -AC       Subjective Information  agree to therapy;complains of;pain;weakness;numbness;fatigue   numbness in L 4th and 5th digits  -       Evaluation Not Performed, Comment PT screened pt and no needs for PT at this time. The patient is independent with all functional mobility  and OT reviewed bracing   -MS        General Information    Patient Profile Review  yes  -AC       Onset of Illness/Injury or Date of Surgery Date  12/14/17  -       Referring Physician  Dr. Botello  -       General Observations  lying in fowlers in bed, aspen collar donned, spouse present  -AC       Pertinent History Of Current Problem  neck pain and hand weakness, LBP; Dx: cervical degeneration of intervertebral disc s/p ACDF C5-7 on 12/14/17  -AC       Precautions/Limitations  brace on when up;fall precautions;spinal precautions  -AC       Prior Level of Function  independent:;all household mobility;community mobility;gait;transfer;ADL's;min assist:;cooking;dressing;dependent:;driving  -AC       Equipment Currently Used at Home  none  -AC none  -BS      Plans/Goals Discussed With  patient and family;agreed upon  -AC       Risks Reviewed  patient and family:;LOB;nausea/vomiting;dizziness;increased discomfort;change in vital signs;increased drainage;lines disloged  -AC       Benefits Reviewed  patient and family:;improve function;increase independence;increase strength;increase balance;decrease pain;decrease risk of DVT;improve skin integrity;increase knowledge  -AC       Barriers to Rehab  none identified  -AC       Living Environment    Lives With  spouse;child(grazyna), dependent  -AC child(grazyna), adult;spouse  -BS      Living Arrangements  house  -AC house  -BS      Home Accessibility  bed and bath on same level;stairs to enter home;tub/shower is not walk in  -AC no concerns  -BS      Number of Stairs to Enter Home  4  -AC       Stair Railings at Home   outside, present on left side  -BS      Type of Financial/Environmental Concern   none  -BS      Transportation Available   none  -BS      Living Environment Comment  spouse and children available PRN  -AC       Clinical Impression    Date of Referral to OT  12/14/17  -AC       OT Diagnosis  decreased ADL  -AC       Prognosis  good  -AC       Criteria for Skilled  Therapeutic Interventions Met  no problems identified which require skilled intervention  -       Therapy Frequency  evaluation only  -AC       Anticipated Discharge Disposition  home with assist  -AC       Functional Level Prior    Ambulation   0-->independent  -BS      Transferring   0-->independent  -BS      Toileting   0-->independent  -BS      Bathing   0-->independent  -BS      Dressing   0-->independent  -BS      Eating   0-->independent  -BS      Communication   0-->understands/communicates without difficulty  -BS      Swallowing   0-->swallows foods/liquids without difficulty  -BS      Prior Functional Level Comment   none  -      Pain Assessment    Pain Assessment  0-10  -       Pain Score  3  -       Pain Type  Acute pain;Surgical pain  -       Pain Location  Shoulder  -       Pain Orientation  Right;Left  -AC       Pain Descriptors  Tightness  -       Pain Intervention(s)  Medication (See MAR);Repositioned;Ambulation/increased activity  -       Response to Interventions  tolerated  -       Vision Assessment/Intervention    Visual Impairment  WFL with corrective lenses  -       Cognitive Assessment/Intervention    Current Cognitive/Communication Assessment  functional  -AC       Orientation Status  oriented x 4  -AC       Follows Commands/Answers Questions  100% of the time;able to follow multi-step instructions  -       Personal Safety  WNL/WFL  -AC       Personal Safety Interventions  fall prevention program maintained;nonskid shoes/slippers when out of bed;supervised activity  -AC       ROM (Range of Motion)    General ROM Detail  WFL AROM BUE  -AC       MMT (Manual Muscle Testing)    General MMT Assessment Detail  4+/5  strength, other MMT deferred due to spinal precautions  -       Bed Mobility, Assessment/Treatment    Bed Mobility, Assistive Device  bed rails;head of bed elevated  -       Bed Mob, Supine to Sit, [place]  conditional independence;verbal cues  required  -AC       Transfer Assessment/Treatment    Transfers, Sit-Stand [place]  independent  -       Transfers, Stand-Sit [place]  independent  -AC       Functional Mobility    Functional Mobility- Ind. Level  independent  -AC       Functional Mobility- Comment  in hallway, to room  -       Lower Body Dressing Assessment/Training    LB Dressing Assess/Train, Clothing Type  donning:;pants  -AC       LB Dressing Assess/Train, Position  edge of bed;standing  -AC       LB Dressing Assess/Train, [place]  set up required  -AC       Motor Skills/Interventions    Additional Documentation  Balance Skills Training (Group);Fine Motor Coordination Training (Group)  -AC       Balance Skills Training    Sitting-Level of Assistance  Independent  -AC       Sitting-Balance Support  Feet supported  -AC       Standing-Level of Assistance  Independent  -       Static Standing Balance Support  No upper extremity supported  -AC       Gait Balance-Level of Assistance  Independent  -AC       Gait Balance Support  No upper extremity supported  -AC       Fine Motor Coordination Training    Opposition  Right:;Left:;intact  -       Orthotics Prosthetics    Additional Documentation  Orthosis Location (Group);Orthosis Management/Training (Group)  -       Orthosis Location    Orthosis Location/Type  neck/back  -       Orthosis, Neck/Back  cervical collar  -       Orthosis Management/Training    Orthosis Fabrication Detail  aspen collar issued post op and already donned on pt  -AC       Orthosis Indications  immobilize, protect/position healing structures  -       Orthosis Skills Training  activity limitations;clothing management related to orthosis;doffing orthosis;donning orthosis;orthosis cleaning;orthosis maintenance;purpose/goals of orthosis;restrictions/precautions;sleeping with orthosis  -       Orthosis Wear Schedule  wear full time  -       Sensory Assessment/Intervention    Sensory Impairment   numbness   some numbness in 4th and 5th digits in L hand but improved  -AC       Positioning and Restraints    Pre-Treatment Position  in bed  -AC       Post Treatment Position  other  -AC       Other Position  --   standing in room safely with spouse  -AC         User Key  (r) = Recorded By, (t) = Taken By, (c) = Cosigned By    Initials Name Effective Dates    AC Sridhar DESI Orantes, OTR/L 06/22/15 -     MS Michelle Carpenter, PT DPT 08/02/16 -     BS Deysi Teixeira, RN 08/02/16 -           Occupational Therapy Education     Title: PT OT SLP Therapies (Done)     Topic: Occupational Therapy (Done)     Point: ADL training (Done)    Description: Instruct learner(s) on proper safety adaptation and remediation techniques during self care or transfers.   Instruct in proper use of assistive devices.    Learning Progress Summary    Learner Readiness Method Response Comment Documented by Status   Patient Acceptance E,D VU,DU surgical precautions, brace don/doff, log roll, positioning, ADL/IADL functional restrictions, home safety, bathing AC 12/15/17 0841 Done   Significant Other Acceptance E,D VU,DU surgical precautions, brace don/doff, log roll, positioning, ADL/IADL functional restrictions, home safety, bathing AC 12/15/17 0841 Done               Point: Precautions (Done)    Description: Instruct learner(s) on prescribed precautions during self-care and functional transfers.    Learning Progress Summary    Learner Readiness Method Response Comment Documented by Status   Patient Acceptance E,D VU,DU surgical precautions, brace don/doff, log roll, positioning, ADL/IADL functional restrictions, home safety, bathing AC 12/15/17 0841 Done   Significant Other Acceptance E,D VU,DU surgical precautions, brace don/doff, log roll, positioning, ADL/IADL functional restrictions, home safety, bathing AC 12/15/17 0841 Done               Point: Body mechanics (Done)    Description: Instruct learner(s) on proper positioning and spine  alignment during self-care, functional mobility activities and/or exercises.    Learning Progress Summary    Learner Readiness Method Response Comment Documented by Status   Patient Acceptance E,D ANATOLY,RENETTA surgical precautions, brace don/doff, log roll, positioning, ADL/IADL functional restrictions, home safety, bathing  12/15/17 0841 Done   Significant Other Acceptance E,D ANATOLY,RENETTA surgical precautions, brace don/doff, log roll, positioning, ADL/IADL functional restrictions, home safety, bathing  12/15/17 0841 Done                      User Key     Initials Effective Dates Name Provider Type Discipline     06/22/15 -  Sridhar Orantes, OTR/L Occupational Therapist OT                OT Recommendation and Plan  Anticipated Discharge Disposition: home with assist  Therapy Frequency: evaluation only  Plan of Care Review  Plan Of Care Reviewed With: patient, spouse  Progress: no change  Outcome Summary/Follow up Plan: OT gómez completed.  Pt mod I with bed mobility, independent with transfers and functional mobility in hallway and back to room.  Able to don pants with set up, required Ald[place] for donning shoes however this is pt's baseline.  AROM and  strength WFL.  Numbness in 4th and 5th digits of L hand however pt reports this is improved since surgery.  Pt instructed on brace wear/care and management.  Pt educated on home safety and surgical precautions.  She is safe for discharge home with assist.                Outcome Measures       12/15/17 0840          How much help from another is currently needed...    Putting on and taking off regular lower body clothing? 3  -AC      Bathing (including washing, rinsing, and drying) 4  -AC      Toileting (which includes using toilet bed pan or urinal) 4  -AC      Putting on and taking off regular upper body clothing 4  -AC      Taking care of personal grooming (such as brushing teeth) 4  -AC      Eating meals 4  -AC      Score 23  -AC      Functional Assessment    Outcome  Measure Options AM-PAC 6 Clicks Daily Activity (OT)  -        User Key  (r) = Recorded By, (t) = Taken By, (c) = Cosigned By    Initials Name Provider Type     Sridhar Orantes OTR/L Occupational Therapist          Time Calculation:         Time Calculation- OT       12/15/17 0845          Time Calculation- OT    OT Start Time 0730  -AC      OT Stop Time 0830  -AC      OT Time Calculation (min) 60 min  -AC      OT Received On 12/15/17  -        User Key  (r) = Recorded By, (t) = Taken By, (c) = Cosigned By    Initials Name Provider Type     Sridhar Orantes OTR/L Occupational Therapist          Therapy Charges for Today     Code Description Service Date Service Provider Modifiers Qty    26186484479 HC OT SELFCARE CURRENT 12/15/2017 Sridhar Orantes OTR/L GO, CI 1    95259281597 HC OT SELFCARE PROJECTED 12/15/2017 Sridhar Orantes OTR/L GO, CI 1    62066910951 HC OT SELFCARE DISCHARGE 12/15/2017 Sridhar Orantes OTR/L GO, CI 1    91964514007 HC OT EVAL LOW COMPLEXITY 4 12/15/2017 Sridhar Orantes, OTR/L GO, KX 1          OT G-codes  OT Professional Judgement Used?: Yes  OT Functional Scales Options: AM-PAC 6 Clicks Daily Activity (OT)  Score: 23  Functional Limitation: Self care  Self Care Current Status (): At least 1 percent but less than 20 percent impaired, limited or restricted  Self Care Goal Status (): At least 1 percent but less than 20 percent impaired, limited or restricted  Self Care Discharge Status (): At least 1 percent but less than 20 percent impaired, limited or restricted    OT Discharge Summary  Anticipated Discharge Disposition: home with assist  Reason for Discharge: Discharge from facility  Outcomes Achieved: Other (1 time eval)  Discharge Destination: Home with assist    ANDI Medina/DIVYA  12/15/2017

## 2017-12-15 NOTE — PLAN OF CARE
Problem: Patient Care Overview (Adult)  Goal: Plan of Care Review  Outcome: Ongoing (interventions implemented as appropriate)    12/15/17 0348   Coping/Psychosocial Response Interventions   Plan Of Care Reviewed With patient   Patient Care Overview   Progress no change   Outcome Evaluation   Outcome Summary/Follow up Plan vss; alert and oriented x 4; c/o neck/shoulder pain; prn pain med offered relief; iv abx; cervical collar in place; standby assist to br; continue to monitor        Goal: Adult Individualization and Mutuality  Outcome: Ongoing (interventions implemented as appropriate)  Goal: Discharge Needs Assessment  Outcome: Ongoing (interventions implemented as appropriate)    Problem: Perioperative Period (Adult)  Goal: Signs and Symptoms of Listed Potential Problems Will be Absent or Manageable (Perioperative Period)  Outcome: Ongoing (interventions implemented as appropriate)    Problem: Fall Risk (Adult)  Goal: Identify Related Risk Factors and Signs and Symptoms  Outcome: Ongoing (interventions implemented as appropriate)  Goal: Absence of Falls  Outcome: Ongoing (interventions implemented as appropriate)

## 2017-12-18 ENCOUNTER — TELEPHONE (OUTPATIENT)
Dept: PRIMARY CARE CLINIC | Age: 37
End: 2017-12-18

## 2017-12-22 ENCOUNTER — OFFICE VISIT (OUTPATIENT)
Dept: PRIMARY CARE CLINIC | Age: 37
End: 2017-12-22
Payer: MEDICAID

## 2017-12-22 VITALS
HEART RATE: 86 BPM | DIASTOLIC BLOOD PRESSURE: 88 MMHG | HEIGHT: 65 IN | SYSTOLIC BLOOD PRESSURE: 136 MMHG | OXYGEN SATURATION: 98 % | WEIGHT: 255 LBS | TEMPERATURE: 97.5 F | BODY MASS INDEX: 42.49 KG/M2

## 2017-12-22 DIAGNOSIS — M54.2 CERVICALGIA: ICD-10-CM

## 2017-12-22 DIAGNOSIS — Z09 HOSPITAL DISCHARGE FOLLOW-UP: Primary | ICD-10-CM

## 2017-12-22 PROCEDURE — 1111F DSCHRG MED/CURRENT MED MERGE: CPT | Performed by: NURSE PRACTITIONER

## 2017-12-22 PROCEDURE — 99213 OFFICE O/P EST LOW 20 MIN: CPT | Performed by: NURSE PRACTITIONER

## 2017-12-22 RX ORDER — CYCLOBENZAPRINE HCL 10 MG
10 TABLET ORAL 3 TIMES DAILY PRN
COMMUNITY
End: 2018-07-19

## 2017-12-22 ASSESSMENT — ENCOUNTER SYMPTOMS
EYE REDNESS: 0
WHEEZING: 0
RHINORRHEA: 0
ABDOMINAL PAIN: 0
BLOOD IN STOOL: 0
CONSTIPATION: 0
EYE DISCHARGE: 0
DIARRHEA: 0
BACK PAIN: 1
TROUBLE SWALLOWING: 0
SORE THROAT: 0
COUGH: 0

## 2017-12-22 NOTE — PROGRESS NOTES
[facility]  [place], [address] [place] [address] Rd  Phone [phone]   Fax [phone]      OFFICE VISIT: 2017    Bernardino Cortez is a 44 y.o. female who presents today for her medical conditions/complaints as noted below. Bernardino Cortez is c/o of Follow-up (General acute hospital follow - no concerns)        HPI:     HPI  Here for hospital follow up. She had surgery on her neck at [facility] by Dr. Viv Good. States that she is doing well denies fever ad chills. Pain is improving. Doing well no concerns    Past Medical History:   Diagnosis Date    Chronic back pain     gets injections with [facility].      Fibromyalgia     GERD (gastroesophageal reflux disease)     Skin cancer     left breast       Past Surgical History:   Procedure Laterality Date    ABDOMINAL EXPLORATION SURGERY       SECTION      x1    CHOLECYSTECTOMY      HYSTERECTOMY      OVARIAN CYST REMOVAL      [place] EGD TRANSORAL BIOPSY SINGLE/MULTIPLE N/A 2017    Dr Meier-w/dilation over wire, 45 [place]-distal esophageal narrowing-Codie (-)    UPPER GASTROINTESTINAL ENDOSCOPY  2017    Dr Meier-w/dilation over wire, 45 [place]-distal esophageal narrowing-Codie (-)       Family History   Problem Relation Age of Onset    Heart Disease Mother     High Blood Pressure Mother     Diabetes Mother     Heart Disease Father     High Blood Pressure Father     Cancer Maternal Grandfather      esphogeal cancer    Cancer Paternal Grandfather      colon ca    Colon Cancer Paternal Grandfather     Colon Polyps Neg Hx     Liver Cancer Neg Hx     Liver Disease Neg Hx     Rectal Cancer Neg Hx     Stomach Cancer Neg Hx        Social History   Substance Use Topics    Smoking status: Current Some Day Smoker     Packs/day: 1.00     Types: Cigarettes    Smokeless tobacco: Former User     Quit date: 6/10/2017      Comment: smokes 1-2 cigarretts per day/ patient has tried but nothing has worked    Alcohol use No      Current Outpatient Prescriptions   Medication Sig Dispense Refill    cyclobenzaprine (FLEXERIL) 10 MG tablet Take 10 mg by mouth 3 times daily as needed for Muscle spasms      oxyCODONE-acetaminophen (PERCOCET) 5-325 MG per tablet Take 1 tablet by mouth 4 times daily .  ondansetron (ZOFRAN-ODT) 4 MG disintegrating tablet Take 4 mg by mouth daily      omeprazole (PRILOSEC) 20 MG delayed release capsule Take 1 capsule by mouth daily 30 capsule 11    LYRICA 100 MG capsule 1 qam 2 qhs (Patient taking differently: Take 100 mg by mouth daily 1 qam 2 qhs) 90 capsule 5    busPIRone (BUSPAR) 7.5 MG tablet Take 1 tablet by mouth 3 times daily 90 tablet 5    vitamin D (ERGOCALCIFEROL) 04589 UNITS CAPS capsule Take 1 capsule by mouth Twice a Week Fall/winter months only       No current facility-administered medications for this visit. Allergies   Allergen Reactions    Hydrocodone-Acetaminophen Nausea Only    Adhesive Tape Rash       Health Maintenance   Topic Date Due    HIV screen  01/15/1994    Pneumococcal med risk (1 of 1 - PPSV23) 01/15/1998    Cervical cancer screen  01/15/2000    DTaP/Tdap/Td vaccine (2 - Td) 07/26/2027    Flu vaccine  Completed        Subjective:      Review of Systems   Constitutional: Negative for appetite change and unexpected weight change. HENT: Negative for congestion, rhinorrhea, sore throat and trouble swallowing. Eyes: Negative for discharge and redness. Respiratory: Negative for cough and wheezing. Cardiovascular: Negative for chest pain. Gastrointestinal: Negative for abdominal pain, blood in stool, constipation and diarrhea. Genitourinary: Negative for dysuria. Musculoskeletal: Positive for arthralgias, back pain and neck pain. Skin: Negative for rash. Neurological: Negative for weakness (improving). Hematological: Negative for adenopathy. Objective:     Physical Exam   Constitutional: She is oriented to person, place, and time. She appears well-developed and well-nourished.    HENT: Head: Normocephalic. Right Ear: Tympanic membrane normal.   Left Ear: Tympanic membrane normal.   Eyes: Conjunctivae are normal.   Neck: Normal range of motion. Neck supple. Cardiovascular: Normal rate, regular rhythm and normal heart sounds. No murmur heard. Pulmonary/Chest: Effort normal and breath sounds normal.   Abdominal: Soft. Bowel sounds are normal. There is no tenderness. Musculoskeletal: Normal range of motion. Cervical back: She exhibits tenderness (in c collar). Neurological: She is alert and oriented to person, place, and time. Skin: Skin is warm and dry. Psychiatric: Her behavior is normal.   Vitals reviewed. /88   Pulse 86   Temp 97.5 °F (36.4 °C) (Temporal)   Ht 5' 5\" (1.651 m)   Wt 255 lb (115.7 kg)   LMP 01/01/2005   SpO2 98%   BMI 42.43 kg/m²     Assessment:        ICD-10-CM ICD-9-CM    1. Hospital discharge follow-up Z09 V67.59 DE DISCHARGE MEDS RECONCILED W/ CURRENT OUTPATIENT MED LIST   2. Cervicalgia M54.2 723.1        Plan:    keep f/u with Dr. Torrey Hubbard. Return if symptoms worsen or fail to improve. Orders Placed This Encounter   Procedures    DE DISCHARGE MEDS RECONCILED W/ CURRENT OUTPATIENT MED LIST     No orders of the defined types were placed in this encounter. Discussed use, benefit, and side effects of prescribed medications. All patient questions answered. Pt voiced understanding. Reviewed health maintenance. .  Patient agreed with treatment plan. Follow up as directed. There are no Patient Instructions on file for this visit.       Electronically signed by ERIK Noel on 12/22/2017 at 1:59 PM

## 2017-12-29 ENCOUNTER — TELEPHONE (OUTPATIENT)
Dept: NEUROSURGERY | Age: 37
End: 2017-12-29

## 2018-01-10 ENCOUNTER — OFFICE VISIT (OUTPATIENT)
Dept: NEUROSURGERY | Facility: CLINIC | Age: 38
End: 2018-01-10

## 2018-01-10 VITALS
WEIGHT: 253 LBS | DIASTOLIC BLOOD PRESSURE: 80 MMHG | BODY MASS INDEX: 42.15 KG/M2 | HEIGHT: 65 IN | SYSTOLIC BLOOD PRESSURE: 120 MMHG

## 2018-01-10 DIAGNOSIS — F17.210 CIGARETTE SMOKER: ICD-10-CM

## 2018-01-10 DIAGNOSIS — M47.12 CERVICAL SPONDYLOSIS WITH MYELOPATHY: Primary | ICD-10-CM

## 2018-01-10 DIAGNOSIS — E66.3 OVERWEIGHT: ICD-10-CM

## 2018-01-10 PROCEDURE — 99024 POSTOP FOLLOW-UP VISIT: CPT | Performed by: NEUROLOGICAL SURGERY

## 2018-01-10 RX ORDER — CIPROFLOXACIN 500 MG/1
TABLET, FILM COATED ORAL
COMMUNITY
Start: 2017-12-01 | End: 2018-04-30

## 2018-01-10 RX ORDER — OXYCODONE HYDROCHLORIDE AND ACETAMINOPHEN 5; 325 MG/1; MG/1
TABLET ORAL
COMMUNITY
Start: 2018-01-02 | End: 2018-04-30

## 2018-01-10 RX ORDER — ERGOCALCIFEROL 1.25 MG/1
CAPSULE ORAL
COMMUNITY
Start: 2017-11-22

## 2018-01-11 NOTE — PROGRESS NOTES
"    Chief complaint   Chief Complaint   Patient presents with   • Neck Pain   • Post-op        Subjective     HPI:     This patient is a 37-year-old female who is now nearly 1 month out from an anterior cervical discectomy and fusion.  She is doing very well and discontinued the cervical collar on her own.  She reports significant relief and improvement of her symptoms at this time.    Review of Systems     Past Medical History:   Diagnosis Date   • Acid reflux    • Anxiety    • Basal cell carcinoma of breast     skin cancer    • CPAP (continuous positive airway pressure) dependence    • Decreased ROM of neck    • Fibrocystic breast    • Fibromyalgia    • Panic attacks    • PONV (postoperative nausea and vomiting)    • Sleep apnea    • Spinal headache    • Strep throat    • Wears glasses      Past Surgical History:   Procedure Laterality Date   • ANTERIOR CERVICAL DISCECTOMY W/ FUSION N/A 2017    Procedure: C5-6,C6-7 CERVICAL DISCECTOMY ANTERIOR WITH FUSION;  Surgeon: Ezio Botello MD;  Location: [facility];  Service:    •  SECTION     • CHOLECYSTECTOMY     • EXPLORATORY LAPAROTOMY     • HX OVARIAN CYSTECTOMY     • HYSTERECTOMY       Family History   Problem Relation Age of Onset   • Coronary artery disease Father    • Hypertension Father    • Diabetes Mother    • Hypertension Mother    • Deep vein thrombosis Paternal Grandmother    • Pulmonary embolism Paternal Grandmother    • Throat cancer Maternal Grandfather      Social History   Substance Use Topics   • Smoking status: Current Every Day Smoker     Packs/day: 0.50     Types: Cigarettes   • Smokeless tobacco: Never Used   • Alcohol use No       (Not in a hospital admission)  Allergies:  Norco [hydrocodone-acetaminophen] and Adhesive tape    Objective      Vital Signs  /80  Ht 165.1 cm (65\")  Wt 115 kg (253 lb)  BMI 42.1 kg/m2    Physical Exam    No acute distress  Awake alert oriented ×3  Incision clean dry intact, visible vicryl " suture being pushed out the medial aspect of the incision without purulent drainage or significant erythema.  This was attempted to be removed.  Moves all extremities 5 out of 5 strength  Sensation is intact light touch in upper and lower extremities    Results Review: Ct Cervical Spine Without Contrast    Result Date: 12/14/2017  Narrative: CT CERVICAL SPINE WO CONTRAST- 12/14/2017 2:27 PM CST  HISTORY: postop  COMPARISON: None  DOSE LENGTH PRODUCT: 266 mGy cm. Automated exposure control was also utilized to decrease patient radiation dose.  TECHNIQUE: Serial helical tomographic images of the cervical spine were obtained without the use of intravenous contrast. Additionally, sagittal and coronal reformatted images were also provided for review.  FINDINGS: Alignment: Normal. Surgical plate is present anteriorly with fixation screws at C5-C6 and C7.  Bones: There is no evidence of fracture. Vertebral body heights are maintained.  Disc spaces: Disc space device is present at the C5-6 and C6-7 levels.  Canal and neuroforamina: Patent.  Soft tissues: Postsurgical air is noted in the soft tissues of the right neck anteriorly.  Lung apices: Clear. No pneumothorax.      Impression: 1. Status post anterior cervical fusion C5-C6 and C7.   This report was finalized on 12/14/2017 15:41 by Dr. Long Houser MD.    Fl C Arm During Surgery    Result Date: 12/15/2017  Narrative: EXAMINATION:  XR SPINE CERVICAL 2 VW-  12/14/2017 10:15 AM EST  HISTORY: Anterior fusion.  COMPARISON: No comparison study.  TECHNIQUE: A single lateral spot images obtained in the operating room.  FINDINGS: The patient is intubated. There is an esophageal monitoring lead. The patient may also have a nasal airway. There is a surgical instrument attached to a screw overlying the C5 vertebral body. No other images are obtained in this sequence.  FLUOROSCOPY TIME: Was not recorded.      Impression: Operative image, as described. This report was finalized on  12/15/2017 07:32 by Dr. Yogesh Rowe MD.    Xr Spine Cervical 2 View    Result Date: 12/15/2017  Narrative: EXAMINATION:  XR SPINE CERVICAL 2 VW-  12/14/2017 10:15 AM EST  HISTORY: Anterior fusion.  COMPARISON: No comparison study.  TECHNIQUE: A single lateral spot images obtained in the operating room.  FINDINGS: The patient is intubated. There is an esophageal monitoring lead. The patient may also have a nasal airway. There is a surgical instrument attached to a screw overlying the C5 vertebral body. No other images are obtained in this sequence.  FLUOROSCOPY TIME: Was not recorded.      Impression: Operative image, as described. This report was finalized on 12/15/2017 07:32 by Dr. Yogesh Rowe MD.              Assessment/Plan:     37-year-old female status post anterior cervical discectomy and fusion with significant improvement of her preoperative symptoms at this time.  Will plan to follow-up with her in 6 weeks with AP lateral plain films of cervical spine or sooner with questions or concerns.  A Vicryl suture is being pushed out along the medial aspect of the incision.  Patient states that she will continue to monitor and notify us with any questions or concerns in the meantime.  Thank you for this consultation.    I discussed the patients findings and my recommendations with patient    Ezio Botello MD  01/11/18  9:20 AM

## 2018-02-09 ENCOUNTER — OFFICE VISIT (OUTPATIENT)
Dept: OBGYN | Age: 38
End: 2018-02-09
Payer: MEDICAID

## 2018-02-09 VITALS
SYSTOLIC BLOOD PRESSURE: 148 MMHG | DIASTOLIC BLOOD PRESSURE: 70 MMHG | HEIGHT: 65 IN | BODY MASS INDEX: 41.48 KG/M2 | WEIGHT: 249 LBS

## 2018-02-09 DIAGNOSIS — N89.8 VAGINAL DRYNESS: Primary | ICD-10-CM

## 2018-02-09 DIAGNOSIS — R63.5 WEIGHT GAIN: ICD-10-CM

## 2018-02-09 DIAGNOSIS — N94.10 DYSPAREUNIA, FEMALE: ICD-10-CM

## 2018-02-09 DIAGNOSIS — R23.2 HOT FLASHES: ICD-10-CM

## 2018-02-09 DIAGNOSIS — N39.46 MIXED STRESS AND URGE URINARY INCONTINENCE: ICD-10-CM

## 2018-02-09 PROCEDURE — 99203 OFFICE O/P NEW LOW 30 MIN: CPT | Performed by: OBSTETRICS & GYNECOLOGY

## 2018-02-09 RX ORDER — MAGNESIUM 30 MG
30 TABLET ORAL 2 TIMES DAILY
COMMUNITY
End: 2018-07-19

## 2018-02-09 ASSESSMENT — ENCOUNTER SYMPTOMS
BLOOD IN STOOL: 0
COLOR CHANGE: 0
DIARRHEA: 0
BACK PAIN: 0
CONSTIPATION: 0
TROUBLE SWALLOWING: 0
SHORTNESS OF BREATH: 0
COUGH: 0

## 2018-02-09 NOTE — PATIENT INSTRUCTIONS
These can increase your chances of quitting for good. Where can you learn more? Go to [email or web address] and sign in to your [employer] account. Enter E415 in the [employer] box to learn more about \"Kegel Exercises: Care Instructions. \"     If you do not have an account, please click on the \"Sign Up Now\" link. Current as of: May 12, 2017  Content Version: 11.5  © 1548-6164 Healthwise, Incorporated. Care instructions adapted under license by [facility] ([facility]). If you have questions about a medical condition or this instruction, always ask your healthcare professional. [address] any warranty or liability for your use of this information.

## 2018-02-09 NOTE — PROGRESS NOTES
AHI: 18.1 per [facility], 2017    Restless leg syndrome     Skin cancer     left breast     Vitamin D deficiency      Past Surgical History:   Procedure Laterality Date    ABDOMINAL EXPLORATION SURGERY       SECTION      x1    CHOLECYSTECTOMY      HYSTERECTOMY      NECK SURGERY      2 level neck fusion    OVARIAN CYST REMOVAL      CO EGD TRANSORAL BIOPSY SINGLE/MULTIPLE N/A 2017    Dr Meier-w/dilation over wire, 45 [place]-distal esophageal narrowing-Codie (-)    UPPER GASTROINTESTINAL ENDOSCOPY  2017    Dr Meier-w/dilation over wire, 45 [place]-distal esophageal narrowing-Codie (-)     Family History   Problem Relation Age of Onset    Heart Disease Mother     High Blood Pressure Mother     Diabetes Mother     Heart Disease Father     High Blood Pressure Father     Cancer Maternal Grandfather      esphogeal cancer    Cancer Paternal Grandfather      colon ca    Colon Cancer Paternal Grandfather     Colon Polyps Neg Hx     Liver Cancer Neg Hx     Liver Disease Neg Hx     Rectal Cancer Neg Hx     Stomach Cancer Neg Hx      Social History   Substance Use Topics    Smoking status: Current Some Day Smoker     Packs/day: 1.00     Years: 15.00     Types: Cigarettes    Smokeless tobacco: Never Used      Comment: smokes 1-2 cigarretts per day/ patient has tried but nothing has worked    Alcohol use No     BP (!) 148/70   Ht 5' 5\" (1.651 m)   Wt 249 lb (112.9 kg)   LMP 2005   BMI 41.44 kg/m²     Objective:   Physical Exam   Constitutional: She is oriented to person, place, and time. She appears well-developed and well-nourished. No distress. HENT:   Head: Normocephalic and atraumatic. Eyes: Conjunctivae and EOM are normal. Pupils are equal, round, and reactive to light. Neck: Normal range of motion. Pulmonary/Chest: Effort normal. No respiratory distress. Abdominal: Soft. She exhibits no distension. There is no tenderness.    Genitourinary: Rectum normal and

## 2018-02-14 ENCOUNTER — OFFICE VISIT (OUTPATIENT)
Dept: NEUROLOGY | Age: 38
End: 2018-02-14
Payer: MEDICAID

## 2018-02-14 VITALS
SYSTOLIC BLOOD PRESSURE: 125 MMHG | OXYGEN SATURATION: 98 % | WEIGHT: 251 LBS | DIASTOLIC BLOOD PRESSURE: 79 MMHG | BODY MASS INDEX: 40.34 KG/M2 | HEART RATE: 76 BPM | HEIGHT: 66 IN

## 2018-02-14 DIAGNOSIS — Z99.89 CPAP (CONTINUOUS POSITIVE AIRWAY PRESSURE) DEPENDENCE: ICD-10-CM

## 2018-02-14 DIAGNOSIS — G25.81 RESTLESS LEG SYNDROME: ICD-10-CM

## 2018-02-14 DIAGNOSIS — G47.33 OBSTRUCTIVE SLEEP APNEA: Primary | ICD-10-CM

## 2018-02-14 PROCEDURE — 99214 OFFICE O/P EST MOD 30 MIN: CPT | Performed by: PHYSICIAN ASSISTANT

## 2018-02-14 NOTE — PATIENT INSTRUCTIONS
Patient Education   Patient education: Sleep apnea in adults (Beyond the Basics)   Author:  Sharon Lay MD  Section :  Val Mitchell MD  [place] :  Aileen Bello MD  Contributor Disclosures  All topics are updated as new evidence becomes available and our peer review process is complete. Literature review current through: Dec 2017. This topic last updated: Nov 06, 2017. INTRODUCTION  Normally during sleep, air moves through the throat and in and out of the lungs at a regular rhythm. In a person with sleep apnea, air movement is periodically diminished or stopped. There are two types of sleep apnea: obstructive sleep apnea and central sleep apnea. In obstructive sleep apnea, breathing is abnormal because of narrowing or closure of the throat. In central sleep apnea, breathing is abnormal because of a change in the breathing control and rhythm. Sleep apnea is a serious condition that can affect a person's ability to safely perform normal daily activities and can affect long term health. Approximately 25 percent of adults are at risk for sleep apnea of some degree [1]. Men are more commonly affected than women. Other risk factors include middle and older age, being overweight or obese, and having a small mouth and throat. This topic review focuses on the most common type of sleep apnea in adults, obstructive sleep apnea (RERE). HOW SLEEP APNEA OCCURS  The throat is surrounded by muscles that control the airway for speaking, swallowing, and breathing. During sleep, these muscles are less active, and this causes the throat to narrow. In most people, this narrowing does not affect breathing. In others, it can cause snoring, sometimes with reduced or completely blocked airflow. A completely blocked airway without airflow is called an obstructive apnea. Partial obstruction with diminished airflow is called a hypopnea. A person may have apnea and hypopnea during sleep.   Insufficient small upper airway: difficulty seeing the throat because of a tongue that is large for the mouth  ? High blood pressure, especially if it is resistant to treatment  ? If a bed partner has observed the patient during episodes of stopped breathing (apnea), choking, or gasping during sleep, there is a strong possibility of sleep apnea  Testing is usually performed in a sleep laboratory. A full sleep study is called a polysomnogram. The polysomnogram measures the breathing effort and airflow, blood oxygen level, heart rate and rhythm, duration of the various stages of sleep, body position, and movement of the arms/legs. Home monitoring devices are available that can perform a sleep study. This is a reasonable alternative to conventional testing in a sleep laboratory if the clinician strongly suspects moderate or severe sleep apnea and the patient does not have other illnesses or sleep disorders that may interfere with the results. SLEEP APNEA TREATMENT  Sleep apnea is best treated by a knowledgeable sleep medicine specialist. The goal of treatment is to maintain an open airway during sleep. Effective treatment will eliminate the symptoms of sleep disturbance; long-term health consequences are also reduced. Most treatments require nightly use. The challenge for the clinician and the patient is to select an effective therapy that is appropriate for the patient's problem and that is acceptable for long term use. Continuous positive airway pressure (CPAP)  The most effective treatment for sleep apnea uses air pressure from a mechanical device to keep the upper airway open during sleep. A CPAP (continuous positive airway pressure)  device uses an air-tight attachment to the nose, typically a mask, connected to a tube and a blower which generates the pressure [3]. Devices that fit comfortably into the nasal opening, rather than over the nose, are also available.  CPAP should be used any time the person sleeps (day or night). The CPAP device is usually used for the first time in the sleep lab, where a technician can adjust the pressure and select the best equipment to keep the airway open. Alternatively, an auto device with a self-adjusting pressure feature, provided with proper education and training, can get treatment started without another sleep test. While the treatment may seem uncomfortable, noisy, or bulky at first, most people accept the treatment after experiencing better sleep. However, difficulty with mask comfort and nasal congestion prevent up to 50 percent of people from using the treatment on a regular basis. Continued follow up with a healthcare provider helps to ensure that the treatment is effective and comfortable. Information from the CPAP machine is often used by physicians, therapists, and insurers to track the success of treatment. CPAP can be delivered with different features to improve comfort and solve problems that may come up during treatment. Changes in treatment may be needed if symptoms do not improve or if the persons condition changes, such as a gain or loss of weight. Adjust sleep position  Adjusting sleep position (to stay off the back) may help improve sleep quality in people who have RERE when sleeping on the back. However, this is difficult to maintain throughout the night and is rarely an adequate solution. Weight loss  Weight loss may be helpful for obese or overweight patients. Weight loss may be accomplished with dietary changes, exercise, and/or surgical treatment. However, it can be difficult to maintain weight loss; the five-year success of non-surgical weight loss is only 5 percent, meaning that 95 percent of people regain lost weight. Avoid alcohol and other sedatives  Alcohol can worsen sleepiness, potentially increasing the risk of accidents or injury. People with RERE are often counseled to drink little to no alcohol, even during the daytime.  Similarly, people who take people and should be considered with caution. MMA may have a higher success rate, particularly in people with abnormal jaw (maxilla and mandible) anatomy, but it is the most complicated procedure. A newer surgical approach, nerve stimulation to protrude the tongue, has promising success rates in very selected people. Tracheostomy creates a permanent opening in the neck. It is reserved for people with severe disease in whom less drastic measures have failed or are inappropriate. Although it is always successful in eliminating obstructive sleep apnea, tracheostomy requires significant lifestyle changes and carries some serious risks (eg, infection, bleeding, blockage). All surgical treatments require discussions about the goals of treatment, the expected outcomes, and potential complications. WHERE TO GET MORE INFORMATION  Your healthcare provider is the best source of information for questions and concerns related to your medical problem. Organizations  American Sleep Apnea Association  Provides information about sleep apnea to the public, publishes a newsletter, and serves as an advocate for people with the disorder. Debra, [address]   Lorena@Intacct. org   AdminParking.. org   Tel: [phone]   Fax: [facility] organization that works to [employer] and safety by promoting public understanding of sleep and sleep disorders. Supports sleep-related education, research, and advocacy; produces and distributes educational materials to the public and healthcare professionals; and offers postdoctoral fellowships and grants for sleep researchers. Derek [address]   Anastasiia@Intacct. org   Bashir Matute Mater   Tel: [phone]   Fax: [phone]    Important information:  Medicare/private insurance CPAP/BiPAP/APAP requirements:  Medicare/private often occurs in the evening and at night and can lead to sleep problems and tiredness. Your doctor may suggest doing a study of your sleep patterns to figure out what is happening when you try to sleep. Many people get relief from symptoms when they get regular exercise, eat well, and avoid caffeine, alcohol, and tobacco.  Follow-up care is a key part of your treatment and safety. Be sure to make and go to all appointments, and call your doctor if you are having problems. It's also a good idea to know your test results and keep a list of the medicines you take. How can you care for yourself at home? · Take your medicines exactly as prescribed. Call your doctor if you think you are having a problem with your medicine. · Try bathing in hot or cold water. Applying a heating pad or ice bag to your legs may also help symptoms. · Stretch and massage your legs before bed or when discomfort begins. · Get some exercise for at least 30 minutes a day on most days of the week. Stop exercising at least 3 hours before bedtime. · Try to plan for situations where you will need to remain seated for long stretches. For example, if you are traveling by car, plan some stops so you can get out and walk around. · Tell your doctor about any medicines you are taking. This includes all over-the-counter, prescription, and herbal medicines. Some medicines, such as antidepressants, antihistamines, and cold and sinus medicines, can make your symptoms worse. · Avoid caffeine products, such as coffee, tea, cola, and chocolate. Caffeine can interrupt your sleep and stimulate you. · Do not smoke. Nicotine can make restless legs worse. If you need help quitting, talk to your doctor about stop-smoking programs and medicines. These can increase your chances of quitting for good. · Do not drink alcohol late in the evening.   Take steps to help you sleep better  · Get plenty of sunlight in the outdoors, particularly later in the afternoon. · Use the evening hours for settling down. Avoid activities that challenge you in the hours before bedtime. · Eat meals at regular times, and do not snack before bedtime. · Keep your bedroom quiet, dark, and cool. Try using a sleep mask and earplugs to help you sleep. · Limit how much you drink at night to reduce your need to get up to urinate. But do not go to bed thirsty. · Run a fan or other steady \"white noise\" during the night if noises wake you up. · [place] the bed for sleeping and sex. Do your reading or TV watching in another room. · Once you are in bed, relax from head to toe, and guide your mind to pleasant thoughts. · Do not stay in bed longer than 8 hours, and try to avoid naps. · If your symptoms usually improve around 4 a.m. to 6 a.m., try going to bed later than usual or allowing extra time for sleeping in to help you get the rest you need. When should you call for help? Watch closely for changes in your health, and be sure to contact your doctor if:  ? · You are still not getting enough sleep. ? · Your symptoms become more severe or happen more often. Where can you learn more? Go to https://madKastpeFlowityeb.Appirio. org and sign in to your Nekted account. Enter P019 in the [employer] box to learn more about \"Restless Legs Syndrome: Care Instructions. \"     If you do not have an account, please click on the \"Sign Up Now\" link. Current as of: October 14, 2016  Content Version: 11.5  © 5628-2991 [employer]. Care instructions adapted under license by [facility] [employer] [facility] ([facility]). If you have questions about a medical condition or this instruction, always ask your healthcare professional. [place] [address] any warranty or liability for your use of this information. Patient Education        Fatigue: Care Instructions  Your Care Instructions    Fatigue is a feeling of tiredness, exhaustion, or lack of energy.  You may feel fatigue because of or hopeless. Or you may have lost interest in things that you usually enjoy. ? You are not getting better as expected. Where can you learn more? Go to https://chpepiceweb.Me!Box [email or web address] org and sign in to your Sorbisense account. Enter P123 in the Meal Mantra box to learn more about \"Fatigue: Care Instructions. \"     If you do not have an account, please click on the \"Sign Up Now\" link. Current as of: March 20, 2017  Content Version: 11.5  © 0886-2482 [employer]. Care instructions adapted under license by [facility] ([facility]). If you have questions about a medical condition or this instruction, always ask your healthcare professional. [address] any warranty or liability for your use of this information. Patient Education        Eating Healthy Foods: Care Instructions  Your Care Instructions    Eating healthy foods can help lower your risk for disease. Healthy food gives you energy and keeps your heart strong, your brain active, your muscles working, and your bones strong. A healthy diet includes a variety of foods from the basic food groups: grains, vegetables, fruits, milk and milk products, and meat and beans. Some people may eat more of their favorite foods from only one food group and, as a result, miss getting the nutrients they need. So, it is important to pay attention not only to what you eat but also to what you are missing from your diet. You can eat a healthy, balanced diet by making a few small changes. Follow-up care is a key part of your treatment and safety. Be sure to make and go to all appointments, and call your doctor if you are having problems. It's also a good idea to know your test results and keep a list of the medicines you take. How can you care for yourself at home? Look at what you eat  · Keep a food diary for a week or two and record everything you eat or drink. Track the number of servings you eat from each food group.   · For a balanced diet every day, eat a variety of:  ¨ 6 or more ounce-equivalents of grains, such as cereals, breads, crackers, rice, or pasta, every day. An ounce-equivalent is 1 slice of bread, 1 cup of ready-to-eat cereal, or ½ cup of cooked rice, cooked pasta, or cooked cereal.  ¨ 2½ cups of vegetables, especially:  § Dark-green vegetables such as broccoli and spinach. § Orange vegetables such as carrots and sweet potatoes. § Dry beans (such as stewart and kidney beans) and peas (such as lentils). ¨ 2 cups of fresh, frozen, or canned fruit. A small apple or 1 banana or orange equals 1 cup. ¨ 3 cups of nonfat or low-fat milk, yogurt, or other milk products. ¨ 5½ ounces of meat and beans, such as chicken, fish, lean meat, beans, nuts, and seeds. One egg, 1 tablespoon of peanut butter, ½ ounce nuts or seeds, or ¼ cup of cooked beans equals 1 ounce of meat. · Learn how to read food labels for serving sizes and ingredients. Fast-food and convenience-food meals often contain few or no fruits or vegetables. Make sure you eat some fruits and vegetables to make the meal more nutritious. · Look at your food diary. For each food group, add up what you have eaten and then divide the total by the number of days. This will give you an idea of how much you are eating from each food group. See if you can find some ways to change your diet to make it more healthy. Start small  · Do not try to make dramatic changes to your diet all at once. You might feel that you are missing out on your favorite foods and then be more likely to fail. · Start slowly, and gradually change your habits. Try some of the following:  ¨ Use whole wheat bread instead of white bread. ¨ Use nonfat or low-fat milk instead of whole milk. ¨ Eat brown rice instead of white rice, and eat whole wheat pasta instead of white-flour pasta. ¨ Try low-fat cheeses and low-fat yogurt. ¨ Add more fruits and vegetables to meals and have them for snacks.   ¨ Add lettuce, tomato, cucumber, and onion to sandwiches. ¨ Add fruit to yogurt and cereal.  Enjoy food  · You can still eat your favorite foods. You just may need to eat less of them. If your favorite foods are high in fat, salt, and sugar, limit how often you eat them, but do not cut them out entirely. · Eat a wide variety of foods. Make healthy choices when eating out  · The type of restaurant you choose can help you make healthy choices. Even fast-food chains are now offering more low-fat or healthier choices on the menu. · Choose smaller portions, or take half of your meal home. · When eating out, try:  ¨ A veggie pizza with a whole wheat crust or grilled chicken (instead of sausage or pepperoni). ¨ Pasta with roasted vegetables, grilled chicken, or marinara sauce instead of cream sauce. ¨ A vegetable wrap or grilled chicken wrap. ¨ Broiled or poached food instead of fried or breaded items. Make healthy choices easy  · Buy packaged, prewashed, ready-to-eat fresh vegetables and fruits, such as baby carrots, salad mixes, and chopped or shredded broccoli and cauliflower. · Buy packaged, presliced fruits, such as melon or pineapple. · Choose 100% fruit or vegetable juice instead of soda. Limit juice intake to 4 to 6 oz (½ to ¾ cup) a day. · Blend low-fat yogurt, fruit juice, and canned or frozen fruit to make a smoothie for breakfast or a snack. Where can you learn more? Go to https://Emergent Labsal.healthBrightQube. org and sign in to your Physcient account. Enter I967 in the [facility] box to learn more about \"Eating Healthy Foods: Care Instructions. \"     If you do not have an account, please click on the \"Sign Up Now\" link. Current as of: May 12, 2017  Content Version: 11.5  © 5377-7886 [employer]. Care instructions adapted under license by [facility] ([facility]).  If you have questions about a medical condition or this instruction, always ask your healthcare professional. Edinson Thornton

## 2018-02-14 NOTE — PROGRESS NOTES
[facility] Sleep Follow Up Encounter      Information:   Patient Name: Lexus Cronin  :   1980  Age:   43 y.o. MRN:   [id number]  Account #:  [de-identified]  Today:                18    Provider:  Saumya Martinez PA-C    Chief Complaint   Patient presents with    Sleep Apnea     Patient reports things going okay with CPAP but doesn't feel rested still.  Daytime Sleepiness     Patient states she is still sleepy during the day. Subjective:   Lexus Cronin is a 43 y.o. woman  with a history of recently diagnosed RERE and RLS who comes in for a sleep clinic follow up. At her last office visit, 2017 she was to proceed with a PSG. She was referred for a PSG due to her c/o snoring witnessed apneas, and excessive daytime somnolence. The PSG,2017 revealed an AHI of 18.1 with O2 desaturations as low as 87%. She is prescribed CPAP therapy at 11cm of pressure. The compliance report indicates that she  is averaging  8 hours of CPAP use per day. She  averages 6-8 hours of sleep per night. She reports that consistent CPAP use has alleviated the previous RERE symptoms but she does c/o daytime somnolence but it is not as bad as it was pre-CPAP. She had a cervical fusion per Dr. Savana Walden at [address] [place] [address], 18. She has a history of fibromyalgia. She has been evaluated by Dr. Rich Knight, she did not agree with his plan of care. She takes Lyrica 100 mg 1 q am 2 q hs per ERIK Burkett. She is being treated for Vitamin D deficiency. She has hyperlipidemia and is supposed to be on a diet and exercise. She has not done well with that. Location or symptom:  RREE  Onset:  PS2017  Timing:  q hs  Severity:  Moderate  Associated:  Snoring, witnessed apneas, and excessive daytime somnolence  Alleviated:  CPAP      Objective:     Past Medical History:   Diagnosis Date    Chronic back pain     gets injections with [facility].      CPAP (continuous positive airway pressure) dependence     11cm    Fibromyalgia  GERD (gastroesophageal reflux disease)     Hyperlipidemia     Obstructive sleep apnea     AHI: 18.1 per PSG, 2017    Restless leg syndrome     Skin cancer     left breast     Vitamin D deficiency        Past Surgical History:   Procedure Laterality Date    ABDOMINAL EXPLORATION SURGERY       SECTION      x1    CHOLECYSTECTOMY      HYSTERECTOMY      NECK SURGERY      2 level neck fusion    OVARIAN CYST REMOVAL      [facility] EGD TRANSORAL BIOPSY SINGLE/MULTIPLE N/A 2017    Dr Meier-w/dilation over wire, 46 French-distal esophageal narrowing-Codie (-)    UPPER GASTROINTESTINAL ENDOSCOPY  2017    Dr Meier-sushma/dilation over wire, 46 French-distal esophageal narrowing-Codie (-)       Recent Hospitalizations  ·     Significant Injuries  ·     Family History   Problem Relation Age of Onset    Heart Disease Mother     High Blood Pressure Mother     Diabetes Mother     Heart Disease Father     High Blood Pressure Father     Cancer Maternal Grandfather      esphogeal cancer    Cancer Paternal Grandfather      colon ca    Colon Cancer Paternal Grandfather     Colon Polyps Neg Hx     Liver Cancer Neg Hx     Liver Disease Neg Hx     Rectal Cancer Neg Hx     Stomach Cancer Neg Hx        Social History  History   Smoking Status    Current Some Day Smoker    Packs/day: 1.00    Years: 15.00    Types: Cigarettes   Smokeless Tobacco    Never Used     Comment: smokes 1-2 cigarretts per day/ patient has tried but nothing has worked     History   Alcohol Use No     History   Drug Use No         Current Outpatient Prescriptions   Medication Sig Dispense Refill    estrogens, conjugated, (PREMARIN) 0.3 MG tablet Take 1 tablet by mouth daily 30 tablet 11    magnesium 30 MG tablet Take 30 mg by mouth 2 times daily      cyclobenzaprine (FLEXERIL) 10 MG tablet Take 10 mg by mouth 3 times daily as needed for Muscle spasms      oxyCODONE-acetaminophen (PERCOCET) 5-325 MG per tablet Take 1 tablet by Insomnia [] Sleep Disturbance [x] Snoring [x] Restless Legs [x] Daytime Sleepiness [x] Sleep Apnea  [x] Denies all unless marked    Examination:  Vitals:  /79   Pulse 76   Ht 5' 6\" (1.676 m)   Wt 251 lb (113.9 kg)   LMP 01/01/2005   SpO2 98%   BMI 40.51 kg/m²   General appearance:  alert and cooperative with exam  HEENT:  PERRLA, EOMI and Neck supple with midline trachea  Heart[de-identified]  regular rate and rhythm, S1, S2 normal, no murmur, click, rub or gallop  Lungs:  clear to auscultation bilaterally  Extremities:  extremities normal, atraumatic, no cyanosis or edema  Neurologic:  Extraocular movements are intact without nystagmus. Visual fields are full to confrontation. Facial movements are symmetrical and normal.  Speech is precise. Extremity strength is normal in both uppers and lowers. Deep tendon reflexes are intact and symmetrical.  Rapid alternating movements are unimpaired. Finger-to-nose testing is performed well, without dysmetria. Gait is normal.    I reviewed the following studies:      [x]  :  Clinical laboratory test results    []  :  Radiology reports    []  :  Review and summarization of medical records and/or obtain medical records     [x]  :  Previous/recent polysomnogram report(s)    []  :  [place] Sleepiness Scale           [x]  :  Compliance download:  She has a CPAP set at a pressure of 11cm. Compliance download shows that she uses device:  100% of the time;  percentage of days with usage >=4 hours:  97%. AHI:  0.2    Assessment:       ICD-10-CM ICD-9-CM    1. Obstructive sleep apnea G47.33 327.23    2. Restless leg syndrome G25.81 333.94    3.  CPAP (continuous positive airway pressure) dependence Z99.89 V46.8           []  :  Stable     []  :  Improved                       [x]  :  Well controlled/compliant and AHI wnl but with persistent daytime somnolence         []  :  Resolving     []  :  Resolved     []  :  Inadequately controlled     []  :  Worsening     [x]  :  Additional Consider starting Provigil    9. Follow up in 3 months        Note:  A total of >50% (>13 minutes) of 25 minutes was spent discussing the pathophysiology and treatment and/or coordination of care of the above diagnoses.

## 2018-02-14 NOTE — LETTER
[address], [address]  [place] [address]  Phone [phone]               Re:  Eun Hyman    17  :  1980  Address: [place] [address]      Diagnoses:  Obstructive sleep apnea (G47.33)      To Whom It May Concern: Anahi Flores, RT      Current therapy:  CPAP 11 cm H2O    Change to:  CPAP 13 cm H2O        Ordering Provider:  Ruddy Lopez PA-C  NPI:  [id number]          Signature:       Date: 2018      Electronically Signed by Ruddy Lopez PA-C  on 2018 at 11:21 AM

## 2018-02-21 ENCOUNTER — HOSPITAL ENCOUNTER (OUTPATIENT)
Dept: GENERAL RADIOLOGY | Facility: HOSPITAL | Age: 38
Discharge: HOME | End: 2018-02-21
Attending: NEUROLOGICAL SURGERY | Admitting: NEUROLOGICAL SURGERY

## 2018-02-21 ENCOUNTER — OFFICE VISIT (OUTPATIENT)
Dept: NEUROSURGERY | Facility: CLINIC | Age: 38
End: 2018-02-21

## 2018-02-21 VITALS
SYSTOLIC BLOOD PRESSURE: 110 MMHG | DIASTOLIC BLOOD PRESSURE: 80 MMHG | WEIGHT: 253 LBS | HEIGHT: 65 IN | BODY MASS INDEX: 42.15 KG/M2

## 2018-02-21 DIAGNOSIS — F17.210 CIGARETTE SMOKER: ICD-10-CM

## 2018-02-21 DIAGNOSIS — E66.01 MORBID OBESITY DUE TO EXCESS CALORIES (HCC): ICD-10-CM

## 2018-02-21 DIAGNOSIS — M47.12 CERVICAL SPONDYLOSIS WITH MYELOPATHY: ICD-10-CM

## 2018-02-21 DIAGNOSIS — M47.12 CERVICAL SPONDYLOSIS WITH MYELOPATHY: Primary | ICD-10-CM

## 2018-02-21 PROCEDURE — 72040 X-RAY EXAM NECK SPINE 2-3 VW: CPT

## 2018-02-21 PROCEDURE — 99024 POSTOP FOLLOW-UP VISIT: CPT | Performed by: NEUROLOGICAL SURGERY

## 2018-02-21 NOTE — PATIENT INSTRUCTIONS
BMI for Adults  Body mass index (BMI) is a number that is calculated from a person's weight and height. In most adults, the number is used to find how much of an adult's weight is made up of fat. BMI is not as accurate as a direct measure of body fat.  How is BMI calculated?  BMI is calculated by dividing weight in kilograms by height in meters squared. It can also be calculated by dividing weight in pounds by height in inches squared, then multiplying the resulting number by 703. Charts are available to help you find your BMI quickly and easily without doing this calculation.  How is BMI interpreted?  Health care professionals use BMI charts to identify whether an adult is underweight, at a normal weight, or overweight based on the following guidelines:  · Underweight: BMI less than 18.5.  · Normal weight: BMI between 18.5 and 24.9.  · Overweight: BMI between 25 and 29.9.  · Obese: BMI of 30 and above.  BMI is usually interpreted the same for males and females.  Weight includes both fat and muscle, so someone with a muscular build, such as an athlete, may have a BMI that is higher than 24.9. In cases like these, BMI may not accurately depict body fat. To determine if excess body fat is the cause of a BMI of 25 or higher, further assessments may need to be done by a health care provider.  Why is BMI a useful tool?  BMI is used to identify a possible weight problem that may be related to a medical problem or may increase the risk for medical problems. BMI can also be used to promote changes to reach a healthy weight.  This information is not intended to replace advice given to you by your health care provider. Make sure you discuss any questions you have with your health care provider.  Document Released: 08/29/2005 Document Revised: 04/27/2017 Document Reviewed: 05/15/2015  [employer] Interactive Patient Education © 2017 [employer]    Health Risks of Smoking  Smoking cigarettes is very bad for your health. Tobacco  smoke has over 200 known poisons in it. It contains the poisonous gases nitrogen oxide and carbon monoxide. There are over 60 chemicals in tobacco smoke that cause cancer.  Smoking is difficult to quit because a chemical in tobacco, called nicotine, causes addiction or dependence. When you smoke and inhale, nicotine is absorbed rapidly into the bloodstream through your lungs. Both inhaled and non-inhaled nicotine may be addictive.  What are the risks of cigarette smoke?  Cigarette smokers have an increased risk of many serious medical problems, including:  · Lung cancer.  · Lung disease, such as pneumonia, bronchitis, and emphysema.  · Chest pain (angina) and heart attack because the heart is not getting enough oxygen.  · Heart disease and peripheral blood vessel disease.  · High blood pressure (hypertension).  · Stroke.  · Oral cancer, including cancer of the lip, mouth, or voice box.  · Bladder cancer.  · Pancreatic cancer.  · Cervical cancer.  · Pregnancy complications, including premature birth.  · Stillbirths and smaller  babies, birth defects, and genetic damage to sperm.  · Early menopause.  · Lower estrogen level for women.  · Infertility.  · Facial wrinkles.  · Blindness.  · Increased risk of broken bones (fractures).  · Senile dementia.  · Stomach ulcers and internal bleeding.  · Delayed wound healing and increased risk of complications during surgery.  · Even smoking lightly shortens your life expectancy by several years.  Because of secondhand smoke exposure, children of smokers have an increased risk of the following:  · Sudden infant death syndrome (SIDS).  · Respiratory infections.  · Lung cancer.  · Heart disease.  · Ear infections.  What are the benefits of quitting?  There are many health benefits of quitting smoking. Here are some of them:  · Within days of quitting smoking, your risk of having a heart attack decreases, your blood flow improves, and your lung capacity improves. Blood  pressure, pulse rate, and breathing patterns start returning to normal soon after quitting.  · Within months, your lungs may clear up completely.  · Quitting for 10 years reduces your risk of developing lung cancer and heart disease to almost that of a nonsmoker.  · People who quit may see an improvement in their overall quality of life.  How do I quit smoking?  Smoking is an addiction with both physical and psychological effects, and longtime habits can be hard to change. Your health care provider can recommend:  · Programs and community resources, which may include group support, education, or talk therapy.  · Prescription medicines to help reduce cravings.  · Nicotine replacement products, such as patches, gum, and nasal sprays. Use these products only as directed. Do not replace cigarette smoking with electronic cigarettes, which are commonly called e-cigarettes. The safety of e-cigarettes is not known, and some may contain harmful chemicals.  · A combination of two or more of these methods.  Where to find more information:  · American Lung Association: www.lung.org  · American Cancer Society: www.cancer.org  Summary  · Smoking cigarettes is very bad for your health. Cigarette smokers have an increased risk of many serious medical problems, including several cancers, heart disease, and stroke.  · Smoking is an addiction with both physical and psychological effects, and longtime habits can be hard to change.  · By stopping right away, you can greatly reduce the risk of medical problems for you and your family.  · To help you quit smoking, your health care provider can recommend programs, community resources, prescription medicines, and nicotine replacement products such as patches, gum, and nasal sprays.  This information is not intended to replace advice given to you by your health care provider. Make sure you discuss any questions you have with your health care provider.  Document Released: 01/25/2006 Document  Revised: 12/22/2017 Document Reviewed: 12/22/2017  [employer] Interactive Patient Education © 2017 [employer].

## 2018-02-21 NOTE — PROGRESS NOTES
"    Chief complaint   Chief Complaint   Patient presents with   • Neck Pain        Subjective     HPI:     This patient is a 38-year-old female who is now 2 months out from an anterior cervical discectomy and fusion.  She is doing well and she has no complaints today.    Review of Systems     Past Medical History:   Diagnosis Date   • Acid reflux    • Anxiety    • Basal cell carcinoma of breast     skin cancer    • CPAP (continuous positive airway pressure) dependence    • Decreased ROM of neck    • Fibrocystic breast    • Fibromyalgia    • Panic attacks    • PONV (postoperative nausea and vomiting)    • Sleep apnea    • Spinal headache    • Strep throat    • Wears glasses      Past Surgical History:   Procedure Laterality Date   • ANTERIOR CERVICAL DISCECTOMY W/ FUSION N/A 2017    Procedure: C5-6,C6-7 CERVICAL DISCECTOMY ANTERIOR WITH FUSION;  Surgeon: Ezio Botello MD;  Location: [facility];  Service:    •  SECTION     • CHOLECYSTECTOMY     • EXPLORATORY LAPAROTOMY     • HX OVARIAN CYSTECTOMY     • HYSTERECTOMY       Family History   Problem Relation Age of Onset   • Coronary artery disease Father    • Hypertension Father    • Diabetes Mother    • Hypertension Mother    • Deep vein thrombosis Paternal Grandmother    • Pulmonary embolism Paternal Grandmother    • Throat cancer Maternal Grandfather      Social History   Substance Use Topics   • Smoking status: Current Every Day Smoker     Packs/day: 0.50     Types: Cigarettes   • Smokeless tobacco: Never Used   • Alcohol use No       (Not in a hospital admission)  Allergies:  Norco [hydrocodone-acetaminophen] and Adhesive tape    Objective      Vital Signs  /80  Ht 165.1 cm (65\")  Wt 115 kg (253 lb)  BMI 42.1 kg/m2    Physical Exam    No acute distress  Awake alert oriented ×3  Incision healed  Moves all extremities well    Results Review: Xr Spine Cervical 2 Vw    Result Date: 2018  Narrative: EXAMINATION:  XR SPINE CERVICAL 2 VW-  " 2/21/2018 8:32 AM CST  HISTORY: M47.12-Other spondylosis with myelopathy, cervical region. Neck pain. Status post cervical fusion.  COMPARISON: No comparison study.  TECHNIQUE: 3 views were obtained.  FINDINGS: There has been interbody fusion with anterior plate fixation extending from C5 through C7. There is good alignment. No operative complication is seen. There is no prevertebral soft tissue swelling.      Impression: Status post cervical fusion. No complication seen. This report was finalized on 02/21/2018 08:51 by Dr. Yogesh Rowe MD.              Assessment/Plan:     38-year-old female status post anterior cervical discectomy and fusion.  Overall she is doing well with improvement of her preoperative symptoms.  Imaging today show stable hardware placement.  We will follow-up with her in 2 months with repeat imaging or sooner with questions or concerns.    I discussed the patients findings and my recommendations with patient    Ezio Botello MD  02/21/18  4:14 PM

## 2018-03-14 RX ORDER — OMEPRAZOLE 20 MG/1
20 CAPSULE, DELAYED RELEASE ORAL DAILY
Qty: 30 CAPSULE | Refills: 11 | Status: SHIPPED | OUTPATIENT
Start: 2018-03-14 | End: 2018-07-19

## 2018-03-19 ENCOUNTER — HOSPITAL ENCOUNTER (EMERGENCY)
Age: 38
Discharge: HOME | End: 2018-03-19
Payer: MEDICAID

## 2018-03-19 VITALS
HEIGHT: 65 IN | BODY MASS INDEX: 41.82 KG/M2 | WEIGHT: 251 LBS | HEART RATE: 80 BPM | RESPIRATION RATE: 18 BRPM | SYSTOLIC BLOOD PRESSURE: 140 MMHG | TEMPERATURE: 98.5 F | DIASTOLIC BLOOD PRESSURE: 87 MMHG | OXYGEN SATURATION: 98 %

## 2018-03-19 DIAGNOSIS — S61.209A AVULSION OF SKIN OF FINGER, INITIAL ENCOUNTER: Primary | ICD-10-CM

## 2018-03-19 PROCEDURE — 90471 IMMUNIZATION ADMIN: CPT | Performed by: NURSE PRACTITIONER

## 2018-03-19 PROCEDURE — 90715 TDAP VACCINE 7 YRS/> IM: CPT | Performed by: NURSE PRACTITIONER

## 2018-03-19 PROCEDURE — 99283 EMERGENCY DEPT VISIT LOW MDM: CPT | Performed by: NURSE PRACTITIONER

## 2018-03-19 PROCEDURE — 99282 EMERGENCY DEPT VISIT SF MDM: CPT

## 2018-03-19 PROCEDURE — 6360000002 HC RX W HCPCS: Performed by: NURSE PRACTITIONER

## 2018-03-19 RX ORDER — LIDOCAINE HYDROCHLORIDE 10 MG/ML
20 INJECTION, SOLUTION EPIDURAL; INFILTRATION; INTRACAUDAL; PERINEURAL ONCE
Status: DISCONTINUED | OUTPATIENT
Start: 2018-03-19 | End: 2018-03-19

## 2018-03-19 RX ORDER — CEPHALEXIN 500 MG/1
500 CAPSULE ORAL 2 TIMES DAILY
Qty: 20 CAPSULE | Refills: 0 | Status: SHIPPED | OUTPATIENT
Start: 2018-03-19 | End: 2018-03-29

## 2018-03-19 RX ADMIN — TETANUS TOXOID, REDUCED DIPHTHERIA TOXOID AND ACELLULAR PERTUSSIS VACCINE, ADSORBED 0.5 ML: 5; 2.5; 8; 8; 2.5 SUSPENSION INTRAMUSCULAR at 19:34

## 2018-03-19 ASSESSMENT — ENCOUNTER SYMPTOMS
RESPIRATORY NEGATIVE: 1
GASTROINTESTINAL NEGATIVE: 1

## 2018-03-19 ASSESSMENT — PAIN DESCRIPTION - ORIENTATION: ORIENTATION: RIGHT

## 2018-03-19 ASSESSMENT — PAIN SCALES - GENERAL: PAINLEVEL_OUTOF10: 10

## 2018-04-30 ENCOUNTER — OFFICE VISIT (OUTPATIENT)
Dept: NEUROSURGERY | Facility: CLINIC | Age: 38
End: 2018-04-30

## 2018-04-30 ENCOUNTER — HOSPITAL ENCOUNTER (OUTPATIENT)
Dept: GENERAL RADIOLOGY | Facility: HOSPITAL | Age: 38
Discharge: HOME | End: 2018-04-30
Attending: NEUROLOGICAL SURGERY | Admitting: NEUROLOGICAL SURGERY

## 2018-04-30 VITALS — BODY MASS INDEX: 40.82 KG/M2 | HEIGHT: 66 IN | WEIGHT: 254 LBS

## 2018-04-30 DIAGNOSIS — M47.12 CERVICAL SPONDYLOSIS WITH MYELOPATHY: ICD-10-CM

## 2018-04-30 DIAGNOSIS — R20.0 NUMBNESS AND TINGLING IN BOTH HANDS: ICD-10-CM

## 2018-04-30 DIAGNOSIS — R20.2 NUMBNESS AND TINGLING IN BOTH HANDS: ICD-10-CM

## 2018-04-30 DIAGNOSIS — M25.531 PAIN IN BOTH WRISTS: ICD-10-CM

## 2018-04-30 DIAGNOSIS — F17.210 CIGARETTE SMOKER: ICD-10-CM

## 2018-04-30 DIAGNOSIS — M25.532 PAIN IN BOTH WRISTS: ICD-10-CM

## 2018-04-30 DIAGNOSIS — M50.30 DEGENERATION OF CERVICAL INTERVERTEBRAL DISC: Primary | ICD-10-CM

## 2018-04-30 PROCEDURE — 72040 X-RAY EXAM NECK SPINE 2-3 VW: CPT

## 2018-04-30 PROCEDURE — 99213 OFFICE O/P EST LOW 20 MIN: CPT | Performed by: NURSE PRACTITIONER

## 2018-04-30 RX ORDER — VARENICLINE TARTRATE 1 MG/1
TABLET, FILM COATED ORAL
COMMUNITY
Start: 2018-04-15

## 2018-04-30 RX ORDER — ONDANSETRON 4 MG/1
TABLET, FILM COATED ORAL
COMMUNITY
Start: 2018-03-16

## 2018-04-30 NOTE — PROGRESS NOTES
"Neurosurgery Follow Up Office Visit      Patient Name:  Merari Bradshaw  Age:  38 y.o.  YOB: 1980  MR#:  [id number]    Ht 167.6 cm (66\")   Wt 115 kg (254 lb)   BMI 41.00 kg/m²     Social History   Substance Use Topics   • Smoking status: Current Every Day Smoker     Packs/day: 0.50     Types: Cigarettes   • Smokeless tobacco: Never Used   • Alcohol use No       Chief Complaint   Patient presents with   • Follow-up     2 month f/u with xray.  She is still having some numbness in thumb and first digit in both hands and her bilateral wrists are weak         History  Chief Complaint:  She returns today for follow-up of cervical ACF at C5 6 and C6 7 from December of this past year.  She has continued to make good recovery.  Prior to surgery, she had had issues in both arms, but worse on the left.  She feels that gradually she has continued to improve and get better.  She shows me that she is still bothered by some numbness and tingling in the first and second fingers on both hands and also that her wrists feel weak and painful.  She has gone back to wearing wrist splints that have been prescribed in the past and they help.  She has not yet been released to return to work, as her job requires a large amount of heavy pushing and pulling.  He has been talking with her manager but is not certain that any type of light duty could be guaranteed.      Physical Examination:  Upon exam, she looks really good.  She is awake and alert, pleasant and cooperative, speech is clear and appropriate.  Skin is warm and dry.  Her cervical incision healed well.  She has fairly good symmetric strength in the upper extremities.  Her pronation is a bit weaker bilaterally, but this seems secondary to pain.  David's negative bilaterally.      Medical Decision Making  Treatment Options:   In the office we have discussed her care.  We had never received a formal EMG and nerve conduction report of the upper extremities, but " the patient does not recall there was an actual diagnosis of carpal tunnel.  It may be that we are only dealing with resolving neuropathic and radicular pain that will hopefully continue to improve.  She feels the splints are adequate for right now.  She has not taken medication for pain and quite some time.  We have talked about physical therapy specifically for work conditioning and she is very agreeable.  We will order this for the next 3-4 weeks and then see her back to reevaluate, and hopefully be able to release her to work in some capacity.  I have reviewed a cervical x-ray for today that he continues to show good stable postoperative appearance.  She is making efforts at smoking cessation and has restarted Chantix.  I have encouraged her on that and again emphasized the importance to help with proper healing of her fusion.  She is agreeable.    Information regarding BMI as well as smoking cessation has been given in an effort to help support overall health and wellness.      Assessment and Plan  Merari was seen today for follow-up.    Diagnoses and all orders for this visit:    Degeneration of cervical intervertebral disc  -     Ambulatory Referral to Physical Therapy Evaluate and treat (3x a week for 4 weeks), Neuro, Ortho    Cervical spondylosis with myelopathy  -     Ambulatory Referral to Physical Therapy Evaluate and treat (3x a week for 4 weeks), Neuro, Ortho    Numbness and tingling in both hands  -     Ambulatory Referral to Physical Therapy Evaluate and treat (3x a week for 4 weeks), Neuro, Ortho    Pain in both wrists  -     Ambulatory Referral to Physical Therapy Evaluate and treat (3x a week for 4 weeks), Neuro, Ortho    BMI 40.0-44.9, adult    Cigarette smoker        Return in about 4 weeks (around 5/28/2018).      Yolis Morgan, APRN

## 2018-05-09 ENCOUNTER — TELEPHONE (OUTPATIENT)
Dept: NEUROSURGERY | Facility: CLINIC | Age: 38
End: 2018-05-09

## 2018-05-09 NOTE — TELEPHONE ENCOUNTER
"Cheryl from [facility] in [place] called and stated that the patient was noted that she was going to have PT in [place], however, the PT in [place] stated that it said \"work conditioning\" and they didn't have this so they forwarded it to [place].  The therapist has called because she states that she normally doesn't see this requested unless it is WC.  She wanted to verify if this was WC and to be sure that is what we were wanting.  I think that I remember that this isn't WC, but that due to the procedure the patient had and her job, we just wanted to be sure that she would be able to do her job duties.  "

## 2018-05-16 ENCOUNTER — TELEPHONE (OUTPATIENT)
Dept: NEUROSURGERY | Facility: CLINIC | Age: 38
End: 2018-05-16

## 2018-05-16 DIAGNOSIS — R20.0 NUMBNESS AND TINGLING IN BOTH HANDS: ICD-10-CM

## 2018-05-16 DIAGNOSIS — M47.12 CERVICAL SPONDYLOSIS WITH MYELOPATHY: ICD-10-CM

## 2018-05-16 DIAGNOSIS — M50.30 DEGENERATION OF CERVICAL INTERVERTEBRAL DISC: Primary | ICD-10-CM

## 2018-05-16 DIAGNOSIS — M25.532 PAIN IN BOTH WRISTS: ICD-10-CM

## 2018-05-16 DIAGNOSIS — R20.2 NUMBNESS AND TINGLING IN BOTH HANDS: ICD-10-CM

## 2018-05-16 DIAGNOSIS — M25.531 PAIN IN BOTH WRISTS: ICD-10-CM

## 2018-05-16 NOTE — TELEPHONE ENCOUNTER
Ok.  We'll give that a try.  Maybe they can improvise a little and add some things that would be similar and help her get back to work.

## 2018-05-16 NOTE — TELEPHONE ENCOUNTER
"Due to insurance, a new referral was put in to leave out \"work conditioning\".  They were afraid that if they had to send anything to the insurance that showed that on it, they wouldn't cover any of the visits.  I sent at new order for Yolis to sign.  I will fax it after it is entered.  "

## 2018-05-16 NOTE — TELEPHONE ENCOUNTER
"Cheryl called and stated the patient can't do \"work conditioning\" due to her insurance.  She said that you could send another order for regular PT and she could do this.  "

## 2018-06-15 ENCOUNTER — DOCUMENTATION (OUTPATIENT)
Dept: PHYSICAL THERAPY | Facility: HOSPITAL | Age: 38
End: 2018-06-15

## 2018-06-15 DIAGNOSIS — M50.30 DEGENERATION OF CERVICAL INTERVERTEBRAL DISC: Primary | ICD-10-CM

## 2018-06-15 DIAGNOSIS — M51.36 DEGENERATION OF LUMBAR INTERVERTEBRAL DISC: ICD-10-CM

## 2018-06-15 NOTE — THERAPY DISCHARGE NOTE
Outpatient Physical Therapy Discharge Summary         Patient Name: Merari Bradshaw  : 1980  MRN: [id number]    Today's Date: 6/15/2018    Visit Dx:    ICD-10-CM ICD-9-CM   1. Degeneration of cervical intervertebral disc M50.30 722.4   2. Degeneration of lumbar intervertebral disc M51.36 722.52             PT OP Goals     Row Name 06/15/18 0800          PT Short Term Goals    STG Date to Achieve 17  -KG     STG 1 IND and compliant with HEP  -KG     STG 1 Progress Not Met  -KG     STG 2 Patient will reduce OSWESTRY score to 50%  -KG     STG 2 Progress Not Met  -KG     STG 3 Patient will be able to tolerate 45 min treatment session with no greater than 3 point increase in pain on VAS.  -KG     STG 3 Progress Not Met  -KG     STG 4 Patient will have R shoulder strength to 3+/5 grossly  -KG     STG 4 Progress Not Met  -KG     STG 5 Patient will have R hip flexion/abd 3+/5  -KG     STG 5 Progress Not Met  -KG        Long Term Goals    LTG Date to Achieve 11/15/17  -KG     LTG 1 Patient will reduce OSWESTRY score to 40%  -KG     LTG 1 Progress Not Met  -KG     LTG 2 Patient will have 10 degree increase in proximal HS length bilaterally.  -KG     LTG 2 Progress Not Met  -KG     LTG 3 Patient will have 4/5 R shoulder flexion  -KG     LTG 3 Progress Not Met  -KG     LTG 4 Patient will have 4/5 R hip flexion strength  -KG     LTG 4 Progress Not Met  -KG     LTG 5 Patient will have 4/5 R hip abd strength  -KG     LTG 5 Progress Not Met  -KG     LTG 6 Patient will increase R  strength by 10#  -KG     LTG 6 Progress Not Met  -KG       User Key  (r) = Recorded By, (t) = Taken By, (c) = Cosigned By    Initials Name Provider Type    ALINA Salvador PT Physical Therapist          OP PT Discharge Summary  Date of Discharge: 10/30/17  Reason for Discharge: Per MD order (Per call from pt, MD wants her to stop PT at this time.)  Outcomes Achieved: Unable to make functional progress toward goals at this  time, Refer to plan of care for updates on goals achieved  Discharge Destination: Home with home program      Time Calculation:        Therapy Suggested Charges     Code   Minutes Charges    None                       Megan Salvador, PT  6/15/2018

## 2018-07-19 ENCOUNTER — OFFICE VISIT (OUTPATIENT)
Dept: PRIMARY CARE CLINIC | Age: 38
End: 2018-07-19
Payer: MEDICAID

## 2018-07-19 VITALS
SYSTOLIC BLOOD PRESSURE: 124 MMHG | BODY MASS INDEX: 42.21 KG/M2 | OXYGEN SATURATION: 96 % | TEMPERATURE: 97.9 F | HEIGHT: 65 IN | DIASTOLIC BLOOD PRESSURE: 82 MMHG | WEIGHT: 253.38 LBS | HEART RATE: 86 BPM

## 2018-07-19 DIAGNOSIS — N30.00 ACUTE CYSTITIS WITHOUT HEMATURIA: Primary | ICD-10-CM

## 2018-07-19 DIAGNOSIS — R10.2 PELVIC PAIN IN FEMALE: ICD-10-CM

## 2018-07-19 DIAGNOSIS — M54.50 ACUTE BILATERAL LOW BACK PAIN WITHOUT SCIATICA: ICD-10-CM

## 2018-07-19 DIAGNOSIS — G43.009 MIGRAINE WITHOUT AURA AND WITHOUT STATUS MIGRAINOSUS, NOT INTRACTABLE: ICD-10-CM

## 2018-07-19 LAB
APPEARANCE FLUID: CLEAR
BILIRUBIN, POC: NORMAL
BLOOD URINE, POC: 5.5
CLARITY, POC: CLEAR
COLOR, POC: YELLOW
GLUCOSE URINE, POC: NORMAL
KETONES, POC: NORMAL
LEUKOCYTE EST, POC: NORMAL
NITRITE, POC: NORMAL
PH, POC: 5.5
PROTEIN, POC: NORMAL
SPECIFIC GRAVITY, POC: 1.03
UROBILINOGEN, POC: 0.2

## 2018-07-19 PROCEDURE — 99214 OFFICE O/P EST MOD 30 MIN: CPT | Performed by: NURSE PRACTITIONER

## 2018-07-19 PROCEDURE — 81002 URINALYSIS NONAUTO W/O SCOPE: CPT | Performed by: NURSE PRACTITIONER

## 2018-07-19 RX ORDER — CIPROFLOXACIN 500 MG/1
500 TABLET, FILM COATED ORAL 2 TIMES DAILY
Qty: 20 TABLET | Refills: 0 | Status: SHIPPED | OUTPATIENT
Start: 2018-07-19 | End: 2018-07-29

## 2018-07-19 RX ORDER — KETOROLAC TROMETHAMINE 30 MG/ML
60 INJECTION, SOLUTION INTRAMUSCULAR; INTRAVENOUS ONCE
Status: COMPLETED | OUTPATIENT
Start: 2018-07-19 | End: 2018-07-19

## 2018-07-19 RX ADMIN — KETOROLAC TROMETHAMINE 60 MG: 30 INJECTION, SOLUTION INTRAMUSCULAR; INTRAVENOUS at 15:52

## 2018-07-19 ASSESSMENT — ENCOUNTER SYMPTOMS
EYE REDNESS: 0
SHORTNESS OF BREATH: 0
COUGH: 0
RHINORRHEA: 0
BACK PAIN: 1
SORE THROAT: 0
NAUSEA: 1
VOMITING: 0
DIARRHEA: 0
CONSTIPATION: 0

## 2018-07-19 NOTE — PROGRESS NOTES
After obtaining consent, and per orders of ZENON VALENCIA, injection of 60mg TORADOL given in Right upper quad. gluteus by Nivia Ruelas. Patient tolerated well.

## 2018-07-19 NOTE — PROGRESS NOTES
2017 neg     Spec Grav, UA 2017 1.010     Blood, UA POC 2017 neg     pH, UA 2017 6.5     Protein, UA POC 2017 neg     Urobilinogen, UA 2017 0.2     Leukocytes, UA 2017 neg     Nitrite, UA 2017 neg      Copies of these are in the chart. Prior to Visit Medications    Medication Sig Taking? Authorizing Provider   ciprofloxacin (CIPRO) 500 MG tablet Take 1 tablet by mouth 2 times daily for 10 days Yes ERIK De Oliveira   LYRICA 100 MG capsule TAKE ONE CAPSULE BY MOUTH IN THE MORNING AND TWO AT BEDTIME Yes Luanna Rinne, APRN       Allergies: Hydrocodone-acetaminophen and Adhesive tape    Past Medical History:   Diagnosis Date    Chronic back pain     gets injections with [facility].  CPAP (continuous positive airway pressure) dependence     13cm    Fibromyalgia     GERD (gastroesophageal reflux disease)     Hyperlipidemia     Obstructive sleep apnea     AHI: 18.1 per [facility], 2017    Restless leg syndrome     Skin cancer     left breast     Vitamin D deficiency        Past Surgical History:   Procedure Laterality Date    ABDOMINAL EXPLORATION SURGERY       SECTION      x1    CHOLECYSTECTOMY      HYSTERECTOMY      NECK SURGERY      2 level neck fusion    OVARIAN CYST REMOVAL      [place] EGD TRANSORAL BIOPSY SINGLE/MULTIPLE N/A 2017    Dr Mahmood/dilation over wire, 46 [place]-distal esophageal narrowing-Codie (-)    UPPER GASTROINTESTINAL ENDOSCOPY  2017    Dr Mahmood/dilation over wire, 46 [place]-distal esophageal narrowing-Codie (-)       Social History   Substance Use Topics    Smoking status: Current Some Day Smoker     Packs/day: 1.00     Years: 15.00     Types: Cigarettes    Smokeless tobacco: Never Used      Comment: smokes 1-2 cigarretts per day/ patient has tried but nothing has worked    Alcohol use No       Review of Systems   Constitutional: Negative for chills, fatigue and fever.    HENT: Negative for congestion, ear pain, rhinorrhea and sore throat. Eyes: Negative for redness. Respiratory: Negative for cough and shortness of breath. Cardiovascular: Negative for chest pain. Gastrointestinal: Positive for nausea. Negative for constipation, diarrhea and vomiting. Genitourinary: Positive for dysuria, frequency and pelvic pain. Musculoskeletal: Positive for back pain (low back pain). Skin: Negative for rash. Neurological: Positive for headaches. Negative for dizziness. Psychiatric/Behavioral: The patient is nervous/anxious. Physical Exam   Constitutional: She appears well-developed. Overweight   HENT:   Head: Normocephalic. Right Ear: Tympanic membrane and external ear normal.   Left Ear: Tympanic membrane and external ear normal.   Nose: Nose normal.   Mouth/Throat: Oropharynx is clear and moist.   Neck: Normal range of motion. Cardiovascular: Normal rate and regular rhythm. Pulmonary/Chest: Effort normal and breath sounds normal. No respiratory distress. She has no wheezes. She has no rales. Abdominal: Soft. Bowel sounds are normal. There is tenderness in the right lower quadrant, suprapubic area and left lower quadrant. Musculoskeletal:        Lumbar back: She exhibits pain (bilateral lower back pain). Neurological: She is alert. Skin: Skin is dry. Vitals reviewed. ASSESSMENT      ICD-10-CM ICD-9-CM    1. Acute cystitis without hematuria N30.00 595.0 ciprofloxacin (CIPRO) 500 MG tablet  Urinalysis does not show evidence of leukocytes. However symptomatically, the patient appears to have a urinary tract infection. She reports a history of urinary tract infection and is persistent that this is what she has going on. 2. Acute bilateral low back pain without sciatica M54.5 724.2 POCT Urinalysis no Micro     338.19    3. Pelvic pain in female R10.2 625.9 POCT Urinalysis no Micro   4.  Migraine without aura and without status migrainosus, not intractable to.  · Urinate right after you have sex. · Change sanitary pads often. · Avoid douches, bubble baths, feminine hygiene sprays, and other feminine hygiene products that have deodorants. · After going to the bathroom, wipe from front to back. When should you call for help? Call your doctor now or seek immediate medical care if:    · Symptoms such as fever, chills, nausea, or vomiting get worse or appear for the first time.     · You have new pain in your back just below your rib cage. This is called flank pain.     · There is new blood or pus in your urine.     · You have any problems with your antibiotic medicine.    Watch closely for changes in your health, and be sure to contact your doctor if:    · You are not getting better after taking an antibiotic for 2 days.     · Your symptoms go away but then come back. Where can you learn more? Go to https://[employer] Photospeeco4cloud.BigTip. org and sign in to your [employer] account. Enter B611 in the Blippar box to learn more about \"Urinary Tract Infection in Women: Care Instructions. \"     If you do not have an account, please click on the \"Sign Up Now\" link. Current as of: May 12, 2017  Content Version: 11.6  © 6033-6490 [employer]. Care instructions adapted under license by [facility] ([facility]). If you have questions about a medical condition or this instruction, always ask your healthcare professional. [place] [address] any warranty or liability for your use of this information. n/a  Controlled Substances Monitoring:               Additional Instructions: As always, patient is advised to bring in medication bottles in order to correctly reconcile with our current list.    Brandon Cano received counseling on the following healthy behaviors: n/a    Patient given educational materials on dx    I have instructed Brandon Cano to complete a self tracking handout on n/a and instructed them to bring it with them to her next appointment. Discussed use, benefit, and side effects of prescribed medications. Barriers to medication compliance addressed. All patient questions answered. Pt voiced understanding.      ERIK Carbajal

## 2018-07-19 NOTE — PATIENT INSTRUCTIONS
Patient Education        Urinary Tract Infection in Women: Care Instructions  Your Care Instructions    A urinary tract infection, or UTI, is a general term for an infection anywhere between the kidneys and the urethra (where urine comes out). Most UTIs are bladder infections. They often cause pain or burning when you urinate. UTIs are caused by bacteria and can be cured with antibiotics. Be sure to complete your treatment so that the infection goes away. Follow-up care is a key part of your treatment and safety. Be sure to make and go to all appointments, and call your doctor if you are having problems. It's also a good idea to know your test results and keep a list of the medicines you take. How can you care for yourself at home? · Take your antibiotics as directed. Do not stop taking them just because you feel better. You need to take the full course of antibiotics. · Drink extra water and other fluids for the next day or two. This may help wash out the bacteria that are causing the infection. (If you have kidney, heart, or liver disease and have to limit fluids, talk with your doctor before you increase your fluid intake.)  · Avoid drinks that are carbonated or have caffeine. They can irritate the bladder. · Urinate often. Try to empty your bladder each time. · To relieve pain, take a hot bath or lay a heating pad set on low over your lower belly or genital area. Never go to sleep with a heating pad in place. To prevent UTIs  · Drink plenty of water each day. This helps you urinate often, which clears bacteria from your system. (If you have kidney, heart, or liver disease and have to limit fluids, talk with your doctor before you increase your fluid intake.)  · Urinate when you need to. · Urinate right after you have sex. · Change sanitary pads often. · Avoid douches, bubble baths, feminine hygiene sprays, and other feminine hygiene products that have deodorants.   · After going to the bathroom, wipe from front to back. When should you call for help? Call your doctor now or seek immediate medical care if:    · Symptoms such as fever, chills, nausea, or vomiting get worse or appear for the first time.     · You have new pain in your back just below your rib cage. This is called flank pain.     · There is new blood or pus in your urine.     · You have any problems with your antibiotic medicine.    Watch closely for changes in your health, and be sure to contact your doctor if:    · You are not getting better after taking an antibiotic for 2 days.     · Your symptoms go away but then come back. Where can you learn more? Go to https://Deal.com.sgpeHomeJabeb.Mozilla. org and sign in to your [employer] account. Enter Y076 in the [employer] box to learn more about \"Urinary Tract Infection in Women: Care Instructions. \"     If you do not have an account, please click on the \"Sign Up Now\" link. Current as of: May 12, 2017  Content Version: 11.6  © 5017-3307 [employer]. Care instructions adapted under license by [facility] ([facility]). If you have questions about a medical condition or this instruction, always ask your healthcare professional. [address] any warranty or liability for your use of this information.

## 2018-09-10 ENCOUNTER — OFFICE VISIT (OUTPATIENT)
Dept: PRIMARY CARE CLINIC | Age: 38
End: 2018-09-10
Payer: MEDICAID

## 2018-09-10 VITALS
HEIGHT: 66 IN | SYSTOLIC BLOOD PRESSURE: 151 MMHG | WEIGHT: 260 LBS | DIASTOLIC BLOOD PRESSURE: 86 MMHG | OXYGEN SATURATION: 97 % | HEART RATE: 74 BPM | BODY MASS INDEX: 41.78 KG/M2 | TEMPERATURE: 97.2 F

## 2018-09-10 DIAGNOSIS — M79.7 FIBROMYALGIA: ICD-10-CM

## 2018-09-10 DIAGNOSIS — R50.9 FEVER, UNSPECIFIED FEVER CAUSE: ICD-10-CM

## 2018-09-10 DIAGNOSIS — J00 ACUTE NASOPHARYNGITIS: Primary | ICD-10-CM

## 2018-09-10 LAB — S PYO AG THROAT QL: NORMAL

## 2018-09-10 PROCEDURE — 99214 OFFICE O/P EST MOD 30 MIN: CPT | Performed by: NURSE PRACTITIONER

## 2018-09-10 PROCEDURE — 87880 STREP A ASSAY W/OPTIC: CPT | Performed by: NURSE PRACTITIONER

## 2018-09-10 RX ORDER — DULOXETIN HYDROCHLORIDE 30 MG/1
CAPSULE, DELAYED RELEASE ORAL
Qty: 49 CAPSULE | Refills: 0 | Status: SHIPPED | OUTPATIENT
Start: 2018-09-10 | End: 2018-10-04

## 2018-09-10 RX ORDER — DULOXETIN HYDROCHLORIDE 60 MG/1
60 CAPSULE, DELAYED RELEASE ORAL DAILY
Qty: 30 CAPSULE | Refills: 3 | Status: SHIPPED | OUTPATIENT
Start: 2018-09-10 | End: 2019-02-23 | Stop reason: SDUPTHER

## 2018-09-10 RX ORDER — PREGABALIN 100 MG/1
CAPSULE ORAL
Qty: 90 CAPSULE | Refills: 2 | Status: SHIPPED | OUTPATIENT
Start: 2018-09-10 | End: 2019-03-01 | Stop reason: SDUPTHER

## 2018-09-10 ASSESSMENT — ENCOUNTER SYMPTOMS
SHORTNESS OF BREATH: 0
SORE THROAT: 1
DIARRHEA: 0
RHINORRHEA: 0
SINUS PRESSURE: 0
VOMITING: 0
TROUBLE SWALLOWING: 0
ABDOMINAL PAIN: 0
CONSTIPATION: 0
COUGH: 1
NAUSEA: 0

## 2018-09-10 NOTE — PROGRESS NOTES
[address]  [place], [address] [place] [address] Rd  Phone [phone]   Fax [phone]      OFFICE VISIT: 9/10/2018    Meena Hebert is a 43 y.o. female who presents today for her medical conditions/complaints as noted below. Meena Hebert is c/o of Pharyngitis (cough, congestion, feels like it is in chest and cannot get anything out. started 1 week ago. low grade fever at times. )        HPI:     HPI  Here for cough, congestion, sore throat  Onset 1 week. Felt like had low grade fever at times. Throat is scratchy. Been taking otc medicine without relief. (sinus medicine and flonase)    fibromyalgia  Have been on lyrica. Currently out, need refill. Have seen Dr Karla Mccabe for it in the past and he started the lyrica. Have not taken anything besides lyrica for symptoms. Symptoms Have not been controlled on lyrica      Past Medical History:   Diagnosis Date    Chronic back pain     gets injections with [facility].      CPAP (continuous positive airway pressure) dependence     13cm    Fibromyalgia     GERD (gastroesophageal reflux disease)     Hyperlipidemia     Obstructive sleep apnea     AHI: 18.1 per PSG, 2017    Restless leg syndrome     Skin cancer     left breast     Vitamin D deficiency       Past Surgical History:   Procedure Laterality Date    ABDOMINAL EXPLORATION SURGERY       SECTION      x1    CHOLECYSTECTOMY      HYSTERECTOMY      NECK SURGERY      2 level neck fusion    OVARIAN CYST REMOVAL      [place] EGD TRANSORAL BIOPSY SINGLE/MULTIPLE N/A 2017    Dr Meier-sushma/dilation over wire, 46 [place]-distal esophageal narrowing-Codie (-)    UPPER GASTROINTESTINAL ENDOSCOPY  2017    Dr Meier-sushma/dilation over wire, 46 [place]-distal esophageal narrowing-Codie (-)       Family History   Problem Relation Age of Onset    Heart Disease Mother     High Blood Pressure Mother     Diabetes Mother     Heart Disease Father     High Blood Pressure Father     Cancer Maternal infections are not serious. They usually get better with time and self-care. Antibiotics are not used to treat a viral infection. That's because antibiotics will not help cure a viral illness. In some cases, antiviral medicine can help your body fight a serious viral infection. Follow-up care is a key part of your treatment and safety. Be sure to make and go to all appointments, and call your doctor if you are having problems. It's also a good idea to know your test results and keep a list of the medicines you take. How can you care for yourself at home? · Rest as much as possible until you feel better. · Be safe with medicines. Take your medicine exactly as prescribed. Call your doctor if you think you are having a problem with your medicine. You will get more details on the specific medicine your doctor prescribes. · Take an over-the-counter pain medicine, such as acetaminophen (Tylenol), ibuprofen (Advil, Motrin), or naproxen (Aleve), as needed for pain and fever. Read and follow all instructions on the label. Do not give aspirin to anyone younger than 20. It has been linked to Reye syndrome, a serious illness. · Drink plenty of fluids, enough so that your urine is light yellow or clear like water. Hot fluids, such as tea or soup, may help relieve congestion in your nose and throat. If you have kidney, heart, or liver disease and have to limit fluids, talk with your doctor before you increase the amount of fluids you drink. · Try to clear mucus from your lungs by breathing deeply and coughing. · Gargle with warm salt water once an hour. This can help reduce swelling and throat pain. Use 1 teaspoon of salt mixed in 1 cup of warm water. · Do not smoke or allow others to smoke around you. If you need help quitting, talk to your doctor about stop-smoking programs and medicines. These can increase your chances of quitting for good. To avoid spreading the virus  · Cough or sneeze into a tissue.  Then throw the tissue away. · If you don't have a tissue, use your hand to cover your cough or sneeze. Then clean your hand. You can also cough into your sleeve. · Wash your hands often. Use soap and warm water. Wash for 15 to 20 seconds each time. · If you don't have soap and water near you, you can clean your hands with alcohol wipes or gel. When should you call for help? Call your doctor now or seek immediate medical care if:    · You have a new or higher fever.     · Your fever lasts more than 48 hours.     · You have trouble breathing.     · You have a fever with a stiff neck or a severe headache.     · You are sensitive to light.     · You feel very sleepy or confused.    Watch closely for changes in your health, and be sure to contact your doctor if:    · You do not get better as expected. Where can you learn more? Go to https://Amity.Waste2Tricity. org and sign in to your [facility] account. Enter Q426 in the [employer] box to learn more about \"Viral Respiratory Infection: Care Instructions. \"     If you do not have an account, please click on the \"Sign Up Now\" link. Current as of: December 6, 2017  Content Version: 11.7  © 3348-7696 [employer]. Care instructions adapted under license by [facility] ([facility]). If you have questions about a medical condition or this instruction, always ask your healthcare professional. [place] [address] any warranty or liability for your use of this information.              Electronically signed by ERIK Oliver on 9/10/2018 at 3:28 PM

## 2018-09-10 NOTE — PATIENT INSTRUCTIONS

## 2018-10-04 ENCOUNTER — OFFICE VISIT (OUTPATIENT)
Dept: PRIMARY CARE CLINIC | Age: 38
End: 2018-10-04
Payer: MEDICAID

## 2018-10-04 VITALS
BODY MASS INDEX: 42.59 KG/M2 | OXYGEN SATURATION: 97 % | HEART RATE: 75 BPM | TEMPERATURE: 97.5 F | DIASTOLIC BLOOD PRESSURE: 101 MMHG | HEIGHT: 66 IN | SYSTOLIC BLOOD PRESSURE: 153 MMHG | WEIGHT: 265 LBS

## 2018-10-04 DIAGNOSIS — I10 ESSENTIAL HYPERTENSION: ICD-10-CM

## 2018-10-04 DIAGNOSIS — M77.8 TENDONITIS OF BOTH WRISTS: Primary | ICD-10-CM

## 2018-10-04 PROCEDURE — 99213 OFFICE O/P EST LOW 20 MIN: CPT | Performed by: NURSE PRACTITIONER

## 2018-10-04 RX ORDER — HYDROCHLOROTHIAZIDE 25 MG/1
25 TABLET ORAL DAILY
Qty: 30 TABLET | Refills: 11 | Status: SHIPPED | OUTPATIENT
Start: 2018-10-04 | End: 2019-10-01 | Stop reason: SDUPTHER

## 2018-10-04 RX ORDER — PREDNISONE 10 MG/1
10 TABLET ORAL DAILY
Qty: 42 TABLET | Refills: 0 | Status: SHIPPED | OUTPATIENT
Start: 2018-10-04 | End: 2018-10-14

## 2018-10-04 ASSESSMENT — ENCOUNTER SYMPTOMS
SHORTNESS OF BREATH: 0
TROUBLE SWALLOWING: 0
CONSTIPATION: 0
RHINORRHEA: 0
ABDOMINAL PAIN: 0
SORE THROAT: 0
COUGH: 0
NAUSEA: 0
DIARRHEA: 0
VOMITING: 0
SINUS PRESSURE: 0

## 2018-10-04 ASSESSMENT — PATIENT HEALTH QUESTIONNAIRE - PHQ9
SUM OF ALL RESPONSES TO PHQ9 QUESTIONS 1 & 2: 0
2. FEELING DOWN, DEPRESSED OR HOPELESS: 0
1. LITTLE INTEREST OR PLEASURE IN DOING THINGS: 0
SUM OF ALL RESPONSES TO PHQ QUESTIONS 1-9: 0
SUM OF ALL RESPONSES TO PHQ QUESTIONS 1-9: 0

## 2018-10-04 NOTE — PROGRESS NOTES
[address]  [place], [address] [place] [address] Rd  Phone [phone]   Fax [phone]      OFFICE VISIT: 10/4/2018    Blaze Panda is a [address]89 [facility] y.o. female who presents today for her medical conditions/complaints as noted below. Blaze Panda is c/o of Carpal Tunnel (saw Glorious Geovany for this last year, ended up with neck fusion. bilat hand weakness and pain.)        HPI:     HPI  Here for possible carpal tunnel - last year had similar problem   Had NCS which was negative for carpal tunnel but showed abnormal needle exam reflecting right C6-C7 radiculopathy. Had neck surgery done by Dr Mike Luz at [facility] and the numbness and tingling went away but the pain never went away. Pain and weakness is getting worse  Works on farm, drive and fix tractors  Wearing wrist splints and it helps but within 2 hours the pain is back. Denies numbness. Both wrists are even tender to touch. Pt states she uses wrenches all the time. Past Medical History:   Diagnosis Date    Chronic back pain     gets injections with [facility].      CPAP (continuous positive airway pressure) dependence     13cm    Fibromyalgia     GERD (gastroesophageal reflux disease)     Hyperlipidemia     Obstructive sleep apnea     AHI: 18.1 per PSG, 2017    Restless leg syndrome     Skin cancer     left breast     Vitamin D deficiency       Past Surgical History:   Procedure Laterality Date    ABDOMINAL EXPLORATION SURGERY       SECTION      x1    CHOLECYSTECTOMY      HYSTERECTOMY      NECK SURGERY      2 level neck fusion    OVARIAN CYST REMOVAL      [place] EGD TRANSORAL BIOPSY SINGLE/MULTIPLE N/A 2017    Dr Meier-w/dilation over wire, 46 [place]anese-distal esophageal narrowing-Codie (-)    UPPER GASTROINTESTINAL ENDOSCOPY  2017    Dr Meier-sushma/dilation over wire, 46 [place]anese-distal esophageal narrowing-Codie (-)       Family History   Problem Relation Age of Onset    Heart Disease Mother     High Blood Pressure Mother     Diabetes Mother will protect your wrist until it is better. · Take pain medicines exactly as directed. ¨ If the doctor gave you a prescription medicine for pain, take it as prescribed. ¨ If you are not taking a prescription pain medicine, ask your doctor if you can take an over-the-counter medicine. · If your doctor prescribes antibiotics, take them as directed. Do not stop taking them just because you feel better. You need to take the full course of antibiotics. · Try not to use your injured wrist and hand. · After you are better, do exercises to make the muscles around your tendon stronger. This can prevent the problem from coming back. Follow instructions from your doctor. When should you call for help? Call your doctor now or seek immediate medical care if:    · Your hand or fingers are cool or pale or change colors.     · You have tingling, weakness, or numbness in your hand and fingers.     · Your pain gets worse.     · The tendon may be infected. Signs of infection include:  ¨ Increased pain and tenderness around the wrist or thumb. ¨ Swelling or redness of the wrist or thumb. ¨ A fever.    Watch closely for changes in your health, and be sure to contact your doctor if:    · You do not get better as expected. Where can you learn more? Go to https://Behind the Burner.Finderly. org and sign in to your [employer] account. Enter K554 in the [employer] box to learn more about \"Tenosynovitis of the Wrist: Care Instructions. \"     If you do not have an account, please click on the \"Sign Up Now\" link. Current as of: November 29, 2017  Content Version: 11.7  © 5340-8396 [employer] Incorporated. Care instructions adapted under license by [facility] If you have questions about a medical condition or this instruction, always ask your healthcare professional. [place] [address] any warranty or liability for your use of this information.              Electronically signed by Merlin Minks

## 2018-10-10 ENCOUNTER — TELEPHONE (OUTPATIENT)
Dept: PRIMARY CARE CLINIC | Age: 38
End: 2018-10-10

## 2018-10-10 NOTE — TELEPHONE ENCOUNTER
Pt is coming in Friday for labs.  Said she can handle the sweats, just wanted to make sure it was normal.

## 2018-10-12 DIAGNOSIS — I10 ESSENTIAL HYPERTENSION: ICD-10-CM

## 2018-10-12 LAB
ALBUMIN SERPL-MCNC: 4.1 G/DL (ref 3.5–5.2)
ALP BLD-CCNC: 100 U/L (ref 35–104)
ALT SERPL-CCNC: 28 U/L (ref 5–33)
ANION GAP SERPL CALCULATED.3IONS-SCNC: 15 MMOL/L (ref 7–19)
AST SERPL-CCNC: 15 U/L (ref 5–32)
BASOPHILS ABSOLUTE: 0.1 K/UL (ref 0–0.2)
BASOPHILS RELATIVE PERCENT: 0.5 % (ref 0–1)
BILIRUB SERPL-MCNC: <0.2 MG/DL (ref 0.2–1.2)
BUN BLDV-MCNC: 17 MG/DL (ref 6–20)
CALCIUM SERPL-MCNC: 10 MG/DL (ref 8.6–10)
CHLORIDE BLD-SCNC: 102 MMOL/L (ref 98–111)
CHOLESTEROL, TOTAL: 184 MG/DL (ref 160–199)
CO2: 27 MMOL/L (ref 22–29)
CREAT SERPL-MCNC: 0.7 MG/DL (ref 0.5–0.9)
EOSINOPHILS ABSOLUTE: 0 K/UL (ref 0–0.6)
EOSINOPHILS RELATIVE PERCENT: 0.1 % (ref 0–5)
GFR NON-AFRICAN AMERICAN: >60
GLUCOSE BLD-MCNC: 87 MG/DL (ref 74–109)
HCT VFR BLD CALC: 48.7 % (ref 37–47)
HDLC SERPL-MCNC: 56 MG/DL (ref 65–121)
HEMOGLOBIN: 15 G/DL (ref 12–16)
LDL CHOLESTEROL CALCULATED: 110 MG/DL
LYMPHOCYTES ABSOLUTE: 3.4 K/UL (ref 1.1–4.5)
LYMPHOCYTES RELATIVE PERCENT: 27.3 % (ref 20–40)
MCH RBC QN AUTO: 28.7 PG (ref 27–31)
MCHC RBC AUTO-ENTMCNC: 30.8 G/DL (ref 33–37)
MCV RBC AUTO: 93.1 FL (ref 81–99)
MONOCYTES ABSOLUTE: 0.5 K/UL (ref 0–0.9)
MONOCYTES RELATIVE PERCENT: 4.3 % (ref 0–10)
NEUTROPHILS ABSOLUTE: 8.5 K/UL (ref 1.5–7.5)
NEUTROPHILS RELATIVE PERCENT: 67.4 % (ref 50–65)
PDW BLD-RTO: 14 % (ref 11.5–14.5)
PLATELET # BLD: 335 K/UL (ref 130–400)
PMV BLD AUTO: 11.5 FL (ref 9.4–12.3)
POTASSIUM SERPL-SCNC: 4.5 MMOL/L (ref 3.5–5)
RBC # BLD: 5.23 M/UL (ref 4.2–5.4)
SODIUM BLD-SCNC: 144 MMOL/L (ref 136–145)
TOTAL PROTEIN: 7.4 G/DL (ref 6.6–8.7)
TRIGL SERPL-MCNC: 92 MG/DL (ref 0–149)
WBC # BLD: 12.6 K/UL (ref 4.8–10.8)

## 2018-10-15 ENCOUNTER — TELEPHONE (OUTPATIENT)
Dept: PRIMARY CARE CLINIC | Age: 38
End: 2018-10-15

## 2018-10-15 DIAGNOSIS — R79.89 ABNORMAL CBC: Primary | ICD-10-CM

## 2019-01-30 DIAGNOSIS — M79.7 FIBROMYALGIA: ICD-10-CM

## 2019-02-23 DIAGNOSIS — M79.7 FIBROMYALGIA: ICD-10-CM

## 2019-02-25 DIAGNOSIS — M79.7 FIBROMYALGIA: ICD-10-CM

## 2019-02-25 RX ORDER — DULOXETIN HYDROCHLORIDE 60 MG/1
60 CAPSULE, DELAYED RELEASE ORAL 2 TIMES DAILY
Qty: 90 CAPSULE | Refills: 1 | Status: SHIPPED | OUTPATIENT
Start: 2019-02-25 | End: 2019-02-26 | Stop reason: SDUPTHER

## 2019-02-26 RX ORDER — DULOXETIN HYDROCHLORIDE 60 MG/1
60 CAPSULE, DELAYED RELEASE ORAL DAILY
Qty: 90 CAPSULE | Refills: 1 | Status: SHIPPED | OUTPATIENT
Start: 2019-02-26 | End: 2019-02-28 | Stop reason: SDUPTHER

## 2019-02-28 ENCOUNTER — OFFICE VISIT (OUTPATIENT)
Dept: PRIMARY CARE CLINIC | Age: 39
End: 2019-02-28
Payer: MEDICAID

## 2019-02-28 VITALS
BODY MASS INDEX: 42.11 KG/M2 | TEMPERATURE: 98 F | HEIGHT: 66 IN | HEART RATE: 87 BPM | SYSTOLIC BLOOD PRESSURE: 152 MMHG | DIASTOLIC BLOOD PRESSURE: 96 MMHG | OXYGEN SATURATION: 97 % | WEIGHT: 262 LBS

## 2019-02-28 DIAGNOSIS — M79.7 FIBROMYALGIA: ICD-10-CM

## 2019-02-28 DIAGNOSIS — I10 ESSENTIAL HYPERTENSION: Primary | ICD-10-CM

## 2019-02-28 DIAGNOSIS — F41.1 GAD (GENERALIZED ANXIETY DISORDER): ICD-10-CM

## 2019-02-28 PROCEDURE — 99214 OFFICE O/P EST MOD 30 MIN: CPT | Performed by: NURSE PRACTITIONER

## 2019-02-28 RX ORDER — BUSPIRONE HYDROCHLORIDE 7.5 MG/1
7.5 TABLET ORAL 3 TIMES DAILY
Qty: 90 TABLET | Refills: 1 | Status: SHIPPED | OUTPATIENT
Start: 2019-02-28 | End: 2019-10-01

## 2019-02-28 RX ORDER — DULOXETIN HYDROCHLORIDE 60 MG/1
60 CAPSULE, DELAYED RELEASE ORAL DAILY
Qty: 90 CAPSULE | Refills: 1 | Status: SHIPPED | OUTPATIENT
Start: 2019-02-28 | End: 2019-12-26 | Stop reason: SDUPTHER

## 2019-02-28 RX ORDER — ONDANSETRON 4 MG/1
4 TABLET, ORALLY DISINTEGRATING ORAL EVERY 8 HOURS PRN
COMMUNITY
End: 2019-02-28 | Stop reason: SDUPTHER

## 2019-02-28 RX ORDER — TIZANIDINE 4 MG/1
4 TABLET ORAL EVERY 8 HOURS PRN
Qty: 90 TABLET | Refills: 1 | Status: SHIPPED | OUTPATIENT
Start: 2019-02-28 | End: 2019-11-11 | Stop reason: SDUPTHER

## 2019-02-28 RX ORDER — ONDANSETRON 4 MG/1
4 TABLET, ORALLY DISINTEGRATING ORAL EVERY 8 HOURS PRN
Qty: 30 TABLET | Refills: 0 | Status: SHIPPED | OUTPATIENT
Start: 2019-02-28 | End: 2020-11-03

## 2019-02-28 ASSESSMENT — PATIENT HEALTH QUESTIONNAIRE - PHQ9
SUM OF ALL RESPONSES TO PHQ QUESTIONS 1-9: 1
SUM OF ALL RESPONSES TO PHQ9 QUESTIONS 1 & 2: 1
1. LITTLE INTEREST OR PLEASURE IN DOING THINGS: 1
SUM OF ALL RESPONSES TO PHQ QUESTIONS 1-9: 1
2. FEELING DOWN, DEPRESSED OR HOPELESS: 0

## 2019-02-28 ASSESSMENT — ENCOUNTER SYMPTOMS
ABDOMINAL PAIN: 0
NAUSEA: 0
RHINORRHEA: 0
COUGH: 0
TROUBLE SWALLOWING: 0
VOMITING: 0
SHORTNESS OF BREATH: 0
CONSTIPATION: 0
SORE THROAT: 0
SINUS PRESSURE: 0
DIARRHEA: 0

## 2019-03-01 DIAGNOSIS — M79.7 FIBROMYALGIA: ICD-10-CM

## 2019-04-29 ENCOUNTER — HOSPITAL ENCOUNTER (OUTPATIENT)
Dept: GENERAL RADIOLOGY | Age: 39
Discharge: HOME | End: 2019-04-29
Payer: MEDICAID

## 2019-04-29 ENCOUNTER — OFFICE VISIT (OUTPATIENT)
Dept: PRIMARY CARE CLINIC | Age: 39
End: 2019-04-29
Payer: MEDICAID

## 2019-04-29 ENCOUNTER — TELEPHONE (OUTPATIENT)
Dept: PRIMARY CARE CLINIC | Age: 39
End: 2019-04-29

## 2019-04-29 VITALS
SYSTOLIC BLOOD PRESSURE: 131 MMHG | TEMPERATURE: 98.4 F | HEART RATE: 64 BPM | WEIGHT: 264 LBS | HEIGHT: 66 IN | BODY MASS INDEX: 42.43 KG/M2 | DIASTOLIC BLOOD PRESSURE: 82 MMHG | OXYGEN SATURATION: 97 %

## 2019-04-29 DIAGNOSIS — M65.312 TRIGGER FINGER OF LEFT THUMB: Primary | ICD-10-CM

## 2019-04-29 DIAGNOSIS — M65.312 TRIGGER FINGER OF LEFT THUMB: ICD-10-CM

## 2019-04-29 DIAGNOSIS — H00.012 HORDEOLUM EXTERNUM OF RIGHT LOWER EYELID: ICD-10-CM

## 2019-04-29 PROCEDURE — 99213 OFFICE O/P EST LOW 20 MIN: CPT | Performed by: NURSE PRACTITIONER

## 2019-04-29 PROCEDURE — 73140 X-RAY EXAM OF FINGER(S): CPT

## 2019-04-29 ASSESSMENT — ENCOUNTER SYMPTOMS
EYE PAIN: 1
RHINORRHEA: 0
ABDOMINAL PAIN: 0
TROUBLE SWALLOWING: 0
SINUS PRESSURE: 0
NAUSEA: 0
COUGH: 0
CONSTIPATION: 0
VOMITING: 0
DIARRHEA: 0
SHORTNESS OF BREATH: 0
SORE THROAT: 0

## 2019-04-29 NOTE — TELEPHONE ENCOUNTER
----- Message from ERIK Strong sent at 4/29/2019  9:41 AM CDT -----  Please call patient and let them know results.    Xray negative

## 2019-04-29 NOTE — PROGRESS NOTES
[address]  [place], [address] [place] [address] Rd  Phone [phone]   Fax [phone]      OFFICE VISIT: 2019    Leonetta Castleman is a 35 y.o. female whopresents today for her medical conditions/complaints as noted below. Leonetta Castleman isc/o of Finger Pain (lt thumb pain. started 3 weeks ago. limited ROM, has knot between thumb and index finger. hurts through palm of hand. ) and Stye (having issues with stye on right eye. was seen at [address] [place] [address] er for it a few weeks ago. thought had eye infection. )        HPI:      HPI  Here for left thumb pain. Onset 3 weeks. Hurts to move. Pt states has a knot b/t thumb and index finger. Denies any injury  Been taking tylenol/aleve for the pain without relief. It has a catch in the thumb  Been using brace that supports thumb. Cut hand on glass jug yesterday  Tetanus status UTD    Also have a stye to right eye  Seen at [facility] ER a few weeks ago. They treated with antibiotics    Past Medical History:   Diagnosis Date    [facility]     [facility]      CPAP (continuous positive airway pressure) dependence     13cm    Fibromyalgia     GERD (gastroesophageal reflux disease)     Hyperlipidemia     Obstructive sleep apnea     AHI: 18.1 per PSG, 2017    Restless leg syndrome     Skin cancer     left breast     Vitamin D deficiency       Past Surgical History:   Procedure Laterality Date    ABDOMINAL EXPLORATION SURGERY       SECTION      x1    CHOLECYSTECTOMY      HYSTERECTOMY, VAGINAL      NECK SURGERY      2 level neck fusion    OVARIAN CYST REMOVAL      [place] EGD TRANSORAL BIOPSY SINGLE/MULTIPLE N/A 2017    Dr Meier-sushma/dilation over wire, 46 [place]-distal esophageal narrowing-Codie (-)    UPPER GASTROINTESTINAL ENDOSCOPY  2017    Dr Meier-sushma/dilation over wire, 46 [place]-distal esophageal narrowing-Codie (-)       Family History   Problem Relation Age of Onset    Heart Disease Mother     High Blood Review of Systems   Constitutional: Negative for activity change, appetite change, fatigue, fever and unexpected weight change. HENT: Negative for congestion, hearing loss, rhinorrhea, sinus pressure, sore throat and trouble swallowing. Eyes: Positive for pain (right). Negative for visual disturbance. Respiratory: Negative for cough and shortness of breath. Cardiovascular: Negative for chest pain, palpitations and leg swelling. Gastrointestinal: Negative for abdominal pain, constipation, diarrhea, nausea and vomiting. Endocrine: Negative for cold intolerance and heat intolerance. Genitourinary: Negative for flank pain, menstrual problem, pelvic pain, urgency and vaginal discharge. Musculoskeletal: Negative for arthralgias. Left thumb pain   Skin: Positive for wound (left palm wound). Negative for rash. Neurological: Negative for headaches. Psychiatric/Behavioral: Negative for dysphoric mood and sleep disturbance. The patient is not nervous/anxious. Objective:      Physical Exam   Constitutional: She is oriented to person, place, and time. She appears well-developed and well-nourished. HENT:   Head: Normocephalic and atraumatic. Eyes: Pupils are equal, round, and reactive to light. Conjunctivae are normal.   Neck: Normal range of motion. Neck supple. Cardiovascular: Normal rate, regular rhythm, normal heart sounds and intact distal pulses. Pulmonary/Chest: Effort normal and breath sounds normal.   Musculoskeletal: Normal range of motion. Left hand: She exhibits normal range of motion, no tenderness and no bony tenderness. Left thumb locking with flexion. Neurological: She is alert and oriented to person, place, and time. Skin: Skin is warm. Psychiatric: She has a normal mood and affect.  Her behavior is normal. Judgment and thought content normal.     /82 (Site: Left Upper Arm, Position: Sitting, Cuff Size: Large Adult)   Pulse 64   Temp 98.4 °F (36.9 °C) (Temporal)   Ht 5' 6\" (1.676 m)   Wt 264 lb (119.7 kg)   LMP 01/01/2005   SpO2 97%   BMI 42.61 kg/m²     Assessment:           ICD-10-CM    1. Trigger finger of left thumb M65.312 XR FINGER LEFT (MIN 2 VIEWS)     External Referral To Orthopedic Surgery   2. Hordeolum externum of right lower eyelid H00.012        Plan:      Return if symptoms worsen or fail to improve. Orders Placed This Encounter   Procedures    XR FINGER LEFT (MIN 2 VIEWS)     Need copies of xray on disk     Standing Status:   Future     Standing Expiration Date:   4/29/2020     Order Specific Question:   Reason for exam:     Answer:   trigger thumb    External Referral To Orthopedic Surgery     Referral Priority:   Routine     Referral Type:   Eval and Treat     Referral Reason:   Specialty Services Required     Requested Specialty:   Orthopedic Surgery     Number of Visits Requested:   1     No orders of the defined types were placed in this encounter. Discussed use, benefit, and side effectsof prescribed medications. All patient questions answered. Pt voiced understanding. Reviewed health maintenance. .  Patient agreed with treatment plan. Follow up asdirected. Patient Instructions       Patient Education   avoid wearing make up       Styes and Chalazia: Care Instructions  Your Care Instructions    Styes and chalazia (say \"osz-USF-wfp-\") are both conditions that can cause swelling of the eyelid. A stye is an infection in the root of an eyelash. The infection causes a tender red lump on the edge of the eyelid. The infection can spread until the whole eyelid becomes red and inflamed. Styes usually break open, and a tiny amount of pus drains. They usually clear up on their own in about a week, but they sometimes need treatment with antibiotics. A chalazion is a lump or cyst in the eyelid (chalazion is singular; chalazia is plural).  It is caused by swelling and inflammation of deep oil glands inside the eyelid. Chalazia are usually not infected. They can take a few months to heal.  If a chalazion becomes more swollen and painful or does not go away, you may need to have it drained by your doctor. Follow-up care is a key part of your treatment and safety. Be sure to make and go to all appointments, and call your doctor if you are having problems. It's also a good idea to know your test results and keep a list of the medicines you take. How can you care for yourself at home? · Do not rub your eyes. Do not squeeze or try to open a stye or chalazion. · To help a stye or chalazion heal faster:  ? Put a warm, moist compress on your eye for 5 to 10 minutes, 3 to 6 times a day. Heat often brings a stye to a point where it drains on its own. Keep in mind that warm compresses will often increase swelling a little at first.  ? Do not use hot water or heat a wet cloth in a microwave oven. The compress may get too hot and can burn the eyelid. · Always wash your hands before and after you use a compress or touch your eyes. · If the doctor gave you antibiotic drops or ointment, use the medicine exactly as directed. Use the medicine for as long as instructed, even if your eye starts to feel better. · To put in eyedrops or ointment:  ? Tilt your head back, and pull your lower eyelid down with one finger. ? Drop or squirt the medicine inside the lower lid. ? Close your eye for 30 to 60 seconds to let the drops or ointment move around. ? Do not touch the ointment or dropper tip to your eyelashes or any other surface. · Do not wear eye makeup or contact lenses until the stye or chalazion heals. · Do not share towels, pillows, or washcloths while you have a stye. When should you call for help?   Call your doctor now or seek immediate medical care if:    · You have pain in your eye.     · You have a change in vision or loss of vision.     · Redness and swelling get much worse.    Watch closely for changes in your health,

## 2019-04-29 NOTE — TELEPHONE ENCOUNTER
Patient has been receiving trigger point injections with dr Caridad Jeffries. Didn't help and would like so see if there is any other type of treatment such as accupuncture that they offer or could try.

## 2019-04-30 ENCOUNTER — TELEPHONE (OUTPATIENT)
Dept: PRIMARY CARE CLINIC | Age: 39
End: 2019-04-30

## 2019-10-01 ENCOUNTER — OFFICE VISIT (OUTPATIENT)
Dept: PRIMARY CARE CLINIC | Age: 39
End: 2019-10-01
Payer: MEDICAID

## 2019-10-01 VITALS
DIASTOLIC BLOOD PRESSURE: 88 MMHG | OXYGEN SATURATION: 96 % | HEIGHT: 65 IN | WEIGHT: 273.25 LBS | BODY MASS INDEX: 45.52 KG/M2 | HEART RATE: 62 BPM | SYSTOLIC BLOOD PRESSURE: 138 MMHG | TEMPERATURE: 97.1 F

## 2019-10-01 DIAGNOSIS — I10 ESSENTIAL HYPERTENSION: ICD-10-CM

## 2019-10-01 DIAGNOSIS — M79.7 FIBROMYALGIA: Primary | ICD-10-CM

## 2019-10-01 DIAGNOSIS — Z00.00 WELL ADULT EXAM: ICD-10-CM

## 2019-10-01 PROCEDURE — 99213 OFFICE O/P EST LOW 20 MIN: CPT | Performed by: NURSE PRACTITIONER

## 2019-10-01 RX ORDER — PREGABALIN 150 MG/1
CAPSULE ORAL
Qty: 90 CAPSULE | Refills: 5 | Status: SHIPPED | OUTPATIENT
Start: 2019-10-01 | End: 2019-12-26 | Stop reason: SDUPTHER

## 2019-10-01 RX ORDER — HYDROCHLOROTHIAZIDE 50 MG/1
50 TABLET ORAL DAILY
Qty: 30 TABLET | Refills: 11 | Status: SHIPPED | OUTPATIENT
Start: 2019-10-01 | End: 2020-04-09

## 2019-10-01 ASSESSMENT — ENCOUNTER SYMPTOMS
WHEEZING: 0
SORE THROAT: 0
COUGH: 0
TROUBLE SWALLOWING: 0
SHORTNESS OF BREATH: 0
ABDOMINAL PAIN: 0
EYES NEGATIVE: 1

## 2019-10-08 ENCOUNTER — TELEPHONE (OUTPATIENT)
Dept: PRIMARY CARE CLINIC | Age: 39
End: 2019-10-08

## 2019-10-08 DIAGNOSIS — Z00.00 WELL ADULT EXAM: ICD-10-CM

## 2019-10-08 LAB
ALBUMIN SERPL-MCNC: 4.1 G/DL (ref 3.5–5.2)
ALP BLD-CCNC: 95 U/L (ref 35–104)
ALT SERPL-CCNC: 49 U/L (ref 5–33)
ANION GAP SERPL CALCULATED.3IONS-SCNC: 18 MMOL/L (ref 7–19)
AST SERPL-CCNC: 33 U/L (ref 5–32)
BASOPHILS ABSOLUTE: 0.1 K/UL (ref 0–0.2)
BASOPHILS RELATIVE PERCENT: 0.9 % (ref 0–1)
BILIRUB SERPL-MCNC: 0.3 MG/DL (ref 0.2–1.2)
BUN BLDV-MCNC: 11 MG/DL (ref 6–20)
CALCIUM SERPL-MCNC: 9.8 MG/DL (ref 8.6–10)
CHLORIDE BLD-SCNC: 103 MMOL/L (ref 98–111)
CHOLESTEROL, TOTAL: 187 MG/DL (ref 160–199)
CO2: 24 MMOL/L (ref 22–29)
CREAT SERPL-MCNC: 0.7 MG/DL (ref 0.5–0.9)
CREATININE URINE: 163.3 MG/DL (ref 4.2–622)
EOSINOPHILS ABSOLUTE: 0.2 K/UL (ref 0–0.6)
EOSINOPHILS RELATIVE PERCENT: 1.5 % (ref 0–5)
GFR NON-AFRICAN AMERICAN: >60
GLUCOSE BLD-MCNC: 149 MG/DL (ref 74–109)
HBA1C MFR BLD: 6.6 % (ref 4–6)
HCT VFR BLD CALC: 47.3 % (ref 37–47)
HDLC SERPL-MCNC: 40 MG/DL (ref 65–121)
HEMOGLOBIN: 15.1 G/DL (ref 12–16)
IMMATURE GRANULOCYTES #: 0 K/UL
LDL CHOLESTEROL CALCULATED: 119 MG/DL
LYMPHOCYTES ABSOLUTE: 3.2 K/UL (ref 1.1–4.5)
LYMPHOCYTES RELATIVE PERCENT: 30.4 % (ref 20–40)
MCH RBC QN AUTO: 30 PG (ref 27–31)
MCHC RBC AUTO-ENTMCNC: 31.9 G/DL (ref 33–37)
MCV RBC AUTO: 93.8 FL (ref 81–99)
MICROALBUMIN UR-MCNC: <1.2 MG/DL (ref 0–19)
MICROALBUMIN/CREAT UR-RTO: NORMAL MG/G
MONOCYTES ABSOLUTE: 0.7 K/UL (ref 0–0.9)
MONOCYTES RELATIVE PERCENT: 6.7 % (ref 0–10)
NEUTROPHILS ABSOLUTE: 6.4 K/UL (ref 1.5–7.5)
NEUTROPHILS RELATIVE PERCENT: 60.1 % (ref 50–65)
PDW BLD-RTO: 13.4 % (ref 11.5–14.5)
PLATELET # BLD: 284 K/UL (ref 130–400)
PMV BLD AUTO: 12.2 FL (ref 9.4–12.3)
POTASSIUM SERPL-SCNC: 3.9 MMOL/L (ref 3.5–5)
RBC # BLD: 5.04 M/UL (ref 4.2–5.4)
SODIUM BLD-SCNC: 145 MMOL/L (ref 136–145)
T4 FREE: 0.9 NG/DL (ref 0.9–1.7)
TOTAL PROTEIN: 7.8 G/DL (ref 6.6–8.7)
TRIGL SERPL-MCNC: 138 MG/DL (ref 0–149)
TSH SERPL DL<=0.05 MIU/L-ACNC: 3.67 UIU/ML (ref 0.27–4.2)
VITAMIN D 25-HYDROXY: 19.9 NG/ML
WBC # BLD: 10.6 K/UL (ref 4.8–10.8)

## 2019-10-08 RX ORDER — ERGOCALCIFEROL 1.25 MG/1
50000 CAPSULE ORAL WEEKLY
Qty: 4 CAPSULE | Refills: 2 | Status: SHIPPED | OUTPATIENT
Start: 2019-10-08 | End: 2019-11-11 | Stop reason: SDUPTHER

## 2019-10-10 ENCOUNTER — PATIENT MESSAGE (OUTPATIENT)
Dept: PRIMARY CARE CLINIC | Age: 39
End: 2019-10-10

## 2019-10-11 ENCOUNTER — OFFICE VISIT (OUTPATIENT)
Dept: PRIMARY CARE CLINIC | Age: 39
End: 2019-10-11
Payer: MEDICAID

## 2019-10-11 VITALS
WEIGHT: 268.5 LBS | HEIGHT: 65 IN | DIASTOLIC BLOOD PRESSURE: 72 MMHG | TEMPERATURE: 97.8 F | OXYGEN SATURATION: 97 % | SYSTOLIC BLOOD PRESSURE: 130 MMHG | BODY MASS INDEX: 44.73 KG/M2 | HEART RATE: 96 BPM

## 2019-10-11 DIAGNOSIS — E66.01 MORBID OBESITY WITH BMI OF 40.0-44.9, ADULT (HCC): ICD-10-CM

## 2019-10-11 DIAGNOSIS — E11.9 NEW ONSET TYPE 2 DIABETES MELLITUS (HCC): Primary | ICD-10-CM

## 2019-10-11 PROCEDURE — 99213 OFFICE O/P EST LOW 20 MIN: CPT | Performed by: NURSE PRACTITIONER

## 2019-10-11 ASSESSMENT — ENCOUNTER SYMPTOMS
WHEEZING: 0
EYES NEGATIVE: 1
SORE THROAT: 0
TROUBLE SWALLOWING: 0
ABDOMINAL PAIN: 0
SHORTNESS OF BREATH: 0
COUGH: 0

## 2019-11-07 DIAGNOSIS — E66.01 MORBID OBESITY WITH BMI OF 40.0-44.9, ADULT (HCC): ICD-10-CM

## 2019-11-07 DIAGNOSIS — E11.9 NEW ONSET TYPE 2 DIABETES MELLITUS (HCC): ICD-10-CM

## 2019-11-11 ENCOUNTER — OFFICE VISIT (OUTPATIENT)
Dept: PRIMARY CARE CLINIC | Age: 39
End: 2019-11-11
Payer: MEDICAID

## 2019-11-11 VITALS
BODY MASS INDEX: 45.11 KG/M2 | SYSTOLIC BLOOD PRESSURE: 130 MMHG | OXYGEN SATURATION: 98 % | HEIGHT: 65 IN | WEIGHT: 270.75 LBS | HEART RATE: 78 BPM | DIASTOLIC BLOOD PRESSURE: 80 MMHG | TEMPERATURE: 97.9 F

## 2019-11-11 DIAGNOSIS — E11.9 NEW ONSET TYPE 2 DIABETES MELLITUS (HCC): Primary | ICD-10-CM

## 2019-11-11 DIAGNOSIS — E55.9 VITAMIN D DEFICIENCY: ICD-10-CM

## 2019-11-11 DIAGNOSIS — M79.7 FIBROMYALGIA: ICD-10-CM

## 2019-11-11 DIAGNOSIS — E66.01 MORBID OBESITY WITH BMI OF 40.0-44.9, ADULT (HCC): ICD-10-CM

## 2019-11-11 LAB — HBA1C MFR BLD: 6.2 %

## 2019-11-11 PROCEDURE — 83036 HEMOGLOBIN GLYCOSYLATED A1C: CPT | Performed by: NURSE PRACTITIONER

## 2019-11-11 PROCEDURE — 99214 OFFICE O/P EST MOD 30 MIN: CPT | Performed by: NURSE PRACTITIONER

## 2019-11-11 RX ORDER — TIZANIDINE 4 MG/1
4 TABLET ORAL EVERY 8 HOURS PRN
Qty: 90 TABLET | Refills: 1 | Status: SHIPPED | OUTPATIENT
Start: 2019-11-11 | End: 2019-12-26 | Stop reason: SDUPTHER

## 2019-11-11 RX ORDER — ERGOCALCIFEROL 1.25 MG/1
50000 CAPSULE ORAL
Qty: 10 CAPSULE | Refills: 2 | Status: SHIPPED | OUTPATIENT
Start: 2019-11-11 | End: 2019-12-26

## 2019-11-11 ASSESSMENT — ENCOUNTER SYMPTOMS
TROUBLE SWALLOWING: 0
COUGH: 0
SORE THROAT: 0
WHEEZING: 0
EYES NEGATIVE: 1
SHORTNESS OF BREATH: 0
ABDOMINAL PAIN: 0

## 2019-12-26 ENCOUNTER — OFFICE VISIT (OUTPATIENT)
Dept: PRIMARY CARE CLINIC | Age: 39
End: 2019-12-26
Payer: MEDICAID

## 2019-12-26 ENCOUNTER — TELEPHONE (OUTPATIENT)
Dept: PRIMARY CARE CLINIC | Age: 39
End: 2019-12-26

## 2019-12-26 VITALS
HEIGHT: 65 IN | OXYGEN SATURATION: 98 % | DIASTOLIC BLOOD PRESSURE: 80 MMHG | WEIGHT: 260 LBS | BODY MASS INDEX: 43.32 KG/M2 | SYSTOLIC BLOOD PRESSURE: 122 MMHG | TEMPERATURE: 98.7 F | HEART RATE: 86 BPM

## 2019-12-26 DIAGNOSIS — E11.9 NEW ONSET TYPE 2 DIABETES MELLITUS (HCC): ICD-10-CM

## 2019-12-26 DIAGNOSIS — E11.9 NEW ONSET TYPE 2 DIABETES MELLITUS (HCC): Primary | ICD-10-CM

## 2019-12-26 DIAGNOSIS — M79.7 FIBROMYALGIA: ICD-10-CM

## 2019-12-26 DIAGNOSIS — E66.01 MORBID OBESITY WITH BMI OF 40.0-44.9, ADULT (HCC): ICD-10-CM

## 2019-12-26 DIAGNOSIS — E55.9 VITAMIN D DEFICIENCY: Primary | ICD-10-CM

## 2019-12-26 DIAGNOSIS — E55.9 VITAMIN D DEFICIENCY: ICD-10-CM

## 2019-12-26 DIAGNOSIS — L82.1 SK (SEBORRHEIC KERATOSIS): ICD-10-CM

## 2019-12-26 LAB
ALBUMIN SERPL-MCNC: 4.1 G/DL (ref 3.5–5.2)
ALP BLD-CCNC: 84 U/L (ref 35–104)
ALT SERPL-CCNC: 55 U/L (ref 5–33)
ANION GAP SERPL CALCULATED.3IONS-SCNC: 17 MMOL/L (ref 7–19)
AST SERPL-CCNC: 34 U/L (ref 5–32)
BASOPHILS ABSOLUTE: 0.1 K/UL (ref 0–0.2)
BASOPHILS RELATIVE PERCENT: 0.8 % (ref 0–1)
BILIRUB SERPL-MCNC: <0.2 MG/DL (ref 0.2–1.2)
BUN BLDV-MCNC: 12 MG/DL (ref 6–20)
CALCIUM SERPL-MCNC: 9.3 MG/DL (ref 8.6–10)
CHLORIDE BLD-SCNC: 105 MMOL/L (ref 98–111)
CO2: 22 MMOL/L (ref 22–29)
CREAT SERPL-MCNC: 0.7 MG/DL (ref 0.5–0.9)
CREATININE URINE: 31.7 MG/DL (ref 4.2–622)
EOSINOPHILS ABSOLUTE: 0.1 K/UL (ref 0–0.6)
EOSINOPHILS RELATIVE PERCENT: 1.2 % (ref 0–5)
GFR NON-AFRICAN AMERICAN: >60
GLUCOSE BLD-MCNC: 88 MG/DL (ref 74–109)
HBA1C MFR BLD: 6.1 % (ref 4–6)
HCT VFR BLD CALC: 47 % (ref 37–47)
HEMOGLOBIN: 15 G/DL (ref 12–16)
IMMATURE GRANULOCYTES #: 0 K/UL
LYMPHOCYTES ABSOLUTE: 3.1 K/UL (ref 1.1–4.5)
LYMPHOCYTES RELATIVE PERCENT: 31.8 % (ref 20–40)
MCH RBC QN AUTO: 29.6 PG (ref 27–31)
MCHC RBC AUTO-ENTMCNC: 31.9 G/DL (ref 33–37)
MCV RBC AUTO: 92.7 FL (ref 81–99)
MICROALBUMIN UR-MCNC: <1.2 MG/DL (ref 0–19)
MICROALBUMIN/CREAT UR-RTO: NORMAL MG/G
MONOCYTES ABSOLUTE: 0.6 K/UL (ref 0–0.9)
MONOCYTES RELATIVE PERCENT: 6.4 % (ref 0–10)
NEUTROPHILS ABSOLUTE: 5.8 K/UL (ref 1.5–7.5)
NEUTROPHILS RELATIVE PERCENT: 59.4 % (ref 50–65)
PDW BLD-RTO: 13.2 % (ref 11.5–14.5)
PLATELET # BLD: 284 K/UL (ref 130–400)
PMV BLD AUTO: 12 FL (ref 9.4–12.3)
POTASSIUM SERPL-SCNC: 4.1 MMOL/L (ref 3.5–5)
RBC # BLD: 5.07 M/UL (ref 4.2–5.4)
SODIUM BLD-SCNC: 144 MMOL/L (ref 136–145)
TOTAL PROTEIN: 7.3 G/DL (ref 6.6–8.7)
VITAMIN D 25-HYDROXY: 29.8 NG/ML
WBC # BLD: 9.7 K/UL (ref 4.8–10.8)

## 2019-12-26 PROCEDURE — 17110 DESTRUCTION B9 LES UP TO 14: CPT | Performed by: NURSE PRACTITIONER

## 2019-12-26 PROCEDURE — 99214 OFFICE O/P EST MOD 30 MIN: CPT | Performed by: NURSE PRACTITIONER

## 2019-12-26 RX ORDER — PREGABALIN 150 MG/1
CAPSULE ORAL
Qty: 90 CAPSULE | Refills: 5 | Status: SHIPPED | OUTPATIENT
Start: 2019-12-26 | End: 2020-07-07

## 2019-12-26 RX ORDER — TIZANIDINE 4 MG/1
4 TABLET ORAL EVERY 8 HOURS PRN
Qty: 90 TABLET | Refills: 1 | Status: SHIPPED | OUTPATIENT
Start: 2019-12-26 | End: 2022-08-12 | Stop reason: SDUPTHER

## 2019-12-26 RX ORDER — DULOXETIN HYDROCHLORIDE 60 MG/1
60 CAPSULE, DELAYED RELEASE ORAL DAILY
Qty: 90 CAPSULE | Refills: 1 | Status: SHIPPED | OUTPATIENT
Start: 2019-12-26 | End: 2020-04-17

## 2019-12-26 RX ORDER — ERGOCALCIFEROL 1.25 MG/1
50000 CAPSULE ORAL WEEKLY
Qty: 4 CAPSULE | Refills: 1 | Status: SHIPPED | OUTPATIENT
Start: 2019-12-26 | End: 2020-07-10

## 2019-12-26 ASSESSMENT — ENCOUNTER SYMPTOMS
COLOR CHANGE: 1
TROUBLE SWALLOWING: 0
ABDOMINAL PAIN: 0
SORE THROAT: 0
COUGH: 0
WHEEZING: 0
EYES NEGATIVE: 1
SHORTNESS OF BREATH: 0

## 2020-01-28 ENCOUNTER — PATIENT MESSAGE (OUTPATIENT)
Dept: PRIMARY CARE CLINIC | Age: 40
End: 2020-01-28

## 2020-01-30 RX ORDER — ONDANSETRON 4 MG/1
4 TABLET, ORALLY DISINTEGRATING ORAL 3 TIMES DAILY PRN
Qty: 21 TABLET | Refills: 0 | Status: SHIPPED | OUTPATIENT
Start: 2020-01-30 | End: 2021-05-28

## 2020-04-09 ENCOUNTER — TELEMEDICINE (OUTPATIENT)
Dept: PRIMARY CARE CLINIC | Age: 40
End: 2020-04-09
Payer: MEDICAID

## 2020-04-09 PROCEDURE — 99214 OFFICE O/P EST MOD 30 MIN: CPT | Performed by: NURSE PRACTITIONER

## 2020-04-09 RX ORDER — POTASSIUM CHLORIDE 750 MG/1
10 TABLET, EXTENDED RELEASE ORAL DAILY
Qty: 30 TABLET | Refills: 5 | Status: SHIPPED | OUTPATIENT
Start: 2020-04-09 | End: 2021-05-28

## 2020-04-09 RX ORDER — OLOPATADINE HYDROCHLORIDE 1 MG/ML
1 SOLUTION/ DROPS OPHTHALMIC 2 TIMES DAILY
Qty: 1 BOTTLE | Refills: 2 | Status: SHIPPED | OUTPATIENT
Start: 2020-04-09 | End: 2020-05-09

## 2020-04-09 RX ORDER — FUROSEMIDE 20 MG/1
20 TABLET ORAL DAILY
Qty: 30 TABLET | Refills: 5 | Status: SHIPPED | OUTPATIENT
Start: 2020-04-09 | End: 2020-11-17 | Stop reason: SDUPTHER

## 2020-04-09 ASSESSMENT — ENCOUNTER SYMPTOMS
CONSTIPATION: 0
RHINORRHEA: 1
SORE THROAT: 0
WHEEZING: 0
EYE ITCHING: 1
EYE DISCHARGE: 1
ABDOMINAL PAIN: 0
DIARRHEA: 0
EYE REDNESS: 0
SHORTNESS OF BREATH: 0
BLOOD IN STOOL: 0
COUGH: 0
TROUBLE SWALLOWING: 0

## 2020-04-09 NOTE — PROGRESS NOTES
(ERGOCALCIFEROL) 1.25 MG (26671 UT) CAPS capsule Take 1 capsule by mouth once a week 4 capsule 1    ondansetron (ZOFRAN ODT) 4 MG disintegrating tablet Take 1 tablet by mouth every 8 hours as needed for Nausea or Vomiting 30 tablet 0     No current facility-administered medications for this visit. Allergies   Allergen Reactions    Hydrocodone-Acetaminophen Nausea Only    Adhesive Tape Rash          Health Maintenance   Topic Date Due    Diabetic retinal exam  01/15/1989    HIV screen  01/15/1994    Hepatitis B vaccine (1 of 3 - Risk 3-dose series) 01/15/1998    Pneumococcal 0-64 years Vaccine (1 of 1 - PPSV23) 11/27/2017    Flu vaccine (Season Ended) 09/01/2020    Lipid screen  10/08/2020    Diabetic foot exam  11/11/2020    A1C test (Diabetic or Prediabetic)  12/26/2020    Diabetic microalbuminuria test  12/26/2020    Potassium monitoring  12/26/2020    Creatinine monitoring  12/26/2020    DTaP/Tdap/Td vaccine (3 - Td) 03/19/2028    Hepatitis A vaccine  Aged Out    Hib vaccine  Aged Out    Meningococcal (ACWY) vaccine  Aged Out        Subjective:     Review of Systems   Constitutional: Negative for appetite change, chills, fever and unexpected weight change. HENT: Positive for postnasal drip and rhinorrhea. Negative for congestion, sore throat and trouble swallowing. Eyes: Positive for discharge and itching. Negative for redness. Respiratory: Negative for cough, shortness of breath and wheezing. Cardiovascular: Positive for leg swelling. Negative for chest pain. Gastrointestinal: Negative for abdominal pain, blood in stool, constipation and diarrhea. Genitourinary: Negative for dysuria. Skin: Negative for rash. Neurological: Negative for weakness. Hematological: Negative for adenopathy. Objective:      Physical Exam  HENT:      Head: Normocephalic.       Nose: Nose normal.   Eyes:      Conjunctiva/sclera: Conjunctivae normal.   Neck:      Musculoskeletal: Normal range

## 2020-04-17 ENCOUNTER — TELEMEDICINE (OUTPATIENT)
Dept: PRIMARY CARE CLINIC | Age: 40
End: 2020-04-17
Payer: MEDICAID

## 2020-04-17 VITALS — DIASTOLIC BLOOD PRESSURE: 92 MMHG | SYSTOLIC BLOOD PRESSURE: 142 MMHG

## 2020-04-17 PROCEDURE — 99214 OFFICE O/P EST MOD 30 MIN: CPT | Performed by: NURSE PRACTITIONER

## 2020-04-17 RX ORDER — LOSARTAN POTASSIUM 25 MG/1
25 TABLET ORAL DAILY
Qty: 30 TABLET | Refills: 5 | Status: SHIPPED | OUTPATIENT
Start: 2020-04-17 | End: 2021-01-28 | Stop reason: SDUPTHER

## 2020-04-17 RX ORDER — DULOXETIN HYDROCHLORIDE 30 MG/1
30 CAPSULE, DELAYED RELEASE ORAL DAILY
Qty: 30 CAPSULE | Refills: 5 | Status: SHIPPED | OUTPATIENT
Start: 2020-04-17 | End: 2020-05-19 | Stop reason: SDUPTHER

## 2020-04-17 ASSESSMENT — ENCOUNTER SYMPTOMS
EYE DISCHARGE: 1
SHORTNESS OF BREATH: 0
TROUBLE SWALLOWING: 0
WHEEZING: 0
DIARRHEA: 0
SORE THROAT: 0
COUGH: 0
EYE REDNESS: 0
CONSTIPATION: 0
BLOOD IN STOOL: 0
EYE ITCHING: 1
RHINORRHEA: 1
ABDOMINAL PAIN: 0

## 2020-04-17 NOTE — PROGRESS NOTES
[address]  [place], [address] [place] [address] Rd  Phone [phone]   Fax [phone]      video VISIT: 2020    Mehrdad Wiley is a 38 y.o. female whopresents today for her medical conditions/complaints as noted below. Mehrdad Wiley isc/o of Allergies; Hypertension; Swelling; Diabetes; and Stress        HPI:      HPI  Allergies  Just got her eye drops due to insurance pa . Started it this morning.     htn  Lasix is helping with her swelling. She hasn't really been checking her bp   But feels that it is probably high  She has been stressed    Diabetes  She started back on ozempic on Saturday. Checking her bs occasionally. Stress  She has been forgetting to take her cymbalta. Wants to restart this  It also helps with her fibromyalgia. Past Medical History:   Diagnosis Date    Chronic back pain     gets injections with [facility].      CPAP (continuous positive airway pressure) dependence     13cm    Fibromyalgia     GERD (gastroesophageal reflux disease)     Hyperlipidemia     Obstructive sleep apnea     AHI: 18.1 per PSG, 2017    Restless leg syndrome     Skin cancer     left breast     Vitamin D deficiency       Past Surgical History:   Procedure Laterality Date    ABDOMINAL EXPLORATION SURGERY       SECTION      x1    CHOLECYSTECTOMY      HYSTERECTOMY, VAGINAL      NECK SURGERY      2 level neck fusion    OVARIAN CYST REMOVAL      [place] EGD TRANSORAL BIOPSY SINGLE/MULTIPLE N/A 2017    Dr Mahmood/dilation over wire, 46 Danish-distal esophageal narrowing-Codie (-)    UPPER GASTROINTESTINAL ENDOSCOPY  2017    Dr Mahmood/dilation over wire, 46 Danish-distal esophageal narrowing-Codie (-)       Family History   Problem Relation Age of Onset    Heart Disease Mother     High Blood Pressure Mother     Diabetes Mother     Heart Disease Father     High Blood Pressure Father     Cancer Maternal Grandfather         esphogeal cancer    Cancer Paternal Grandfather facility-administered medications for this visit. Allergies   Allergen Reactions    Hydrocodone-Acetaminophen Nausea Only    Adhesive Tape Rash          Health Maintenance   Topic Date Due    Diabetic retinal exam  01/15/1989    HIV screen  01/15/1994    Hepatitis B vaccine (1 of 3 - Risk 3-dose series) 01/15/1998    Pneumococcal 0-64 years Vaccine (1 of 1 - PPSV23) 11/27/2017    Flu vaccine (Season Ended) 09/01/2020    Lipid screen  10/08/2020    Diabetic foot exam  11/11/2020    A1C test (Diabetic or Prediabetic)  12/26/2020    Diabetic microalbuminuria test  12/26/2020    Potassium monitoring  12/26/2020    Creatinine monitoring  12/26/2020    DTaP/Tdap/Td vaccine (3 - Td) 03/19/2028    Hepatitis A vaccine  Aged Out    Hib vaccine  Aged Out    Meningococcal (ACWY) vaccine  Aged Out        Subjective:     Review of Systems   Constitutional: Negative for appetite change, chills, fever and unexpected weight change. HENT: Positive for postnasal drip and rhinorrhea. Negative for congestion, sore throat and trouble swallowing. Eyes: Positive for discharge and itching. Negative for redness. Respiratory: Negative for cough, shortness of breath and wheezing. Cardiovascular: Positive for leg swelling (improved). Negative for chest pain. Gastrointestinal: Negative for abdominal pain, blood in stool, constipation and diarrhea. Genitourinary: Negative for dysuria. Skin: Negative for rash. Neurological: Negative for weakness. Hematological: Negative for adenopathy. Objective:      Physical Exam  HENT:      Head: Normocephalic. Nose: Nose normal.   Eyes:      Conjunctiva/sclera: Conjunctivae normal.   Neck:      Musculoskeletal: Normal range of motion. Pulmonary:      Effort: Pulmonary effort is normal.   Neurological:      Mental Status: She is alert and oriented to person, place, and time.    Psychiatric:         Mood and Affect: Mood normal.         Behavior: Behavior normal.       BP (!) 142/92   LMP 01/01/2005     Assessment:           ICD-10-CM    1. Type 2 diabetes mellitus with other specified complication, without long-term current use of insulin (HCC) E11.69    2. Allergic conjunctivitis of both eyes H10.13    3. Elevated BP without diagnosis of hypertension R03.0 losartan (COZAAR) 25 MG tablet   4. Anxiety F41.9 DULoxetine (CYMBALTA) 30 MG extended release capsule       Plan:      Jean-Paul Suero is a 38 y.o. female being evaluated by a Virtual Visit (video visit) encounter to address concerns as mentioned above. Verbal consent was given to conduct a teleheath visit. A caregiver was present when appropriate. Due to this being a TeleHealth encounter (During [facility]-19 public health emergency), evaluation of the following organ systems was limited: Vitals/Constitutional/EENT/Resp/CV/GI//MS/Neuro/Skin/Heme-Lymph-Imm. Pursuant to the emergency declaration under the [address] [place] [address], [address] [place] [address] and the [employer] and [employer] Act, this Virtual Visit was conducted with patient's (and/or legal guardian's) consent, to reduce the patient's risk of exposure to COVID-19 and provide necessary medical care. The patient (and/or legal guardian) has also been advised to contact this office for worsening conditions or problems, and seek emergency medical treatment and/or call 911 if deemed necessary. Services were provided through a video synchronous discussion virtually to substitute for in-person clinic visit. Patient was located at their individual home and provider was located at the office of [facility]. Due to patients risk for potential exposure of COVID 19 pandemic, a virtual visit was initiated. Provider performed history of present illness and review of systems. Diagnosis and treatment plan was discussed with patient.  Pharmacy of choice was reviewed along with past medical

## 2020-05-19 ENCOUNTER — TELEMEDICINE (OUTPATIENT)
Dept: PRIMARY CARE CLINIC | Age: 40
End: 2020-05-19
Payer: MEDICAID

## 2020-05-19 PROCEDURE — 99214 OFFICE O/P EST MOD 30 MIN: CPT | Performed by: NURSE PRACTITIONER

## 2020-05-19 RX ORDER — DULOXETIN HYDROCHLORIDE 60 MG/1
60 CAPSULE, DELAYED RELEASE ORAL DAILY
Qty: 30 CAPSULE | Refills: 11 | Status: SHIPPED | OUTPATIENT
Start: 2020-05-19 | End: 2021-02-17 | Stop reason: SDUPTHER

## 2020-05-19 ASSESSMENT — ENCOUNTER SYMPTOMS
BLOOD IN STOOL: 0
RHINORRHEA: 0
COUGH: 0
EYE DISCHARGE: 0
CONSTIPATION: 0
DIARRHEA: 0
WHEEZING: 0
TROUBLE SWALLOWING: 0
SORE THROAT: 0
EYE REDNESS: 0
ABDOMINAL PAIN: 0

## 2020-05-19 NOTE — PROGRESS NOTES
ERIK Bar   pregabalin (LYRICA) 150 MG capsule TAKE 1 CAPSULE BY MOUTH IN THE MORNING AND 2 AT BEDTIME  ERIK Bar   metFORMIN (GLUCOPHAGE) 1000 MG tablet Take 1 tablet by mouth 2 times daily (with meals)  ERIK Bar   tiZANidine (ZANAFLEX) 4 MG tablet Take 1 tablet by mouth every 8 hours as needed (muscle spasms/pain)  ERIK Bar   Semaglutide,0.25 or 0.5MG/DOS, 2 MG/1.5ML SOPN Inject 0.5 mg into the skin once a week  ERIK Bar   vitamin D (ERGOCALCIFEROL) 1.25 MG (30307 UT) CAPS capsule Take 1 capsule by mouth once a week  ERIK Bar   ondansetron (ZOFRAN ODT) 4 MG disintegrating tablet Take 1 tablet by mouth every 8 hours as needed for Nausea or Vomiting  ERIK Felton       Social History     Tobacco Use    Smoking status: Former Smoker     Packs/day: 1.00     Years: 15.00     Pack years: 15.00     Types: Cigarettes     Last attempt to quit: 2018     Years since quittin.7    Smokeless tobacco: Never Used    Tobacco comment: smokes 1-2 cigarretts per day/ patient has tried but nothing has worked   Substance Use Topics    Alcohol use: No    Drug use: No        Allergies   Allergen Reactions    Hydrocodone-Acetaminophen Nausea Only    Adhesive Tape Rash   ,   Past Medical History:   Diagnosis Date    Chronic back pain     gets injections with [facility].      CPAP (continuous positive airway pressure) dependence     13cm    Fibromyalgia     GERD (gastroesophageal reflux disease)     Hyperlipidemia     Obstructive sleep apnea     AHI: 18.1 per PSG, 2017    Restless leg syndrome     Skin cancer     left breast     Vitamin D deficiency    ,   Past Surgical History:   Procedure Laterality Date    ABDOMINAL EXPLORATION SURGERY       SECTION      x1    CHOLECYSTECTOMY      HYSTERECTOMY, VAGINAL      NECK SURGERY      2 level neck fusion    OVARIAN CYST REMOVAL      [place] EGD home.   Will increase cymbalta. Return in about 3 months (around 8/19/2020), or if symptoms worsen or fail to improve, for bp, dm, anxiety. Cely Cna is a 38 y.o. female being evaluated by a Virtual Visit (video visit) encounter to address concerns as mentioned above. Verbal consent was given to conduct a teleheath visit. A caregiver was present when appropriate. Due to this being a TeleHealth encounter (During [facility]-31 public health emergency), evaluation of the following organ systems was limited: Vitals/Constitutional/EENT/Resp/CV/GI//MS/Neuro/Skin/Heme-Lymph-Imm. Pursuant to the emergency declaration under the [address] [place] [address] authority and the [employer] and [employer] Act, this Virtual Visit was conducted with patient's (and/or legal guardian's) consent, to reduce the patient's risk of exposure to COVID-19 and provide necessary medical care. The patient (and/or legal guardian) has also been advised to contact this office for worsening conditions or problems, and seek emergency medical treatment and/or call 911 if deemed necessary. Services were provided through a video synchronous discussion virtually to substitute for in-person clinic visit. Patient was located at their individual home and provider was located at the office of [facility]. Patient identification was verified at the start of the visit: Yes    Total time spent on this encounter: Not billed by time    Due to patients risk for potential exposure of COVID 19 pandemic, a virtual visit was initiated. Provider performed history of present illness and review of systems. Diagnosis and treatment plan was discussed with patient. Pharmacy of choice was reviewed along with past medical history, medication allergies, and current medications.  Education provided to patient or patient parents/guardian with current illness diagnosis as well as when to seek additional healthcare due to changing or for worsening symptoms. Patient voiced understanding. --ERIK Galvan on 5/19/2020 at 3:58 PM    An electronic signature was used to authenticate this note.

## 2020-07-07 ENCOUNTER — TELEMEDICINE (OUTPATIENT)
Dept: PRIMARY CARE CLINIC | Age: 40
End: 2020-07-07
Payer: MEDICAID

## 2020-07-07 DIAGNOSIS — R10.9 RIGHT FLANK PAIN: ICD-10-CM

## 2020-07-07 DIAGNOSIS — E55.9 VITAMIN D DEFICIENCY: ICD-10-CM

## 2020-07-07 DIAGNOSIS — E11.69 TYPE 2 DIABETES MELLITUS WITH OTHER SPECIFIED COMPLICATION, WITHOUT LONG-TERM CURRENT USE OF INSULIN (HCC): ICD-10-CM

## 2020-07-07 LAB
ALBUMIN SERPL-MCNC: 4.1 G/DL (ref 3.5–5.2)
ALP BLD-CCNC: 93 U/L (ref 35–104)
ALT SERPL-CCNC: 52 U/L (ref 5–33)
ANION GAP SERPL CALCULATED.3IONS-SCNC: 15 MMOL/L (ref 7–19)
AST SERPL-CCNC: 34 U/L (ref 5–32)
BASOPHILS ABSOLUTE: 0.1 K/UL (ref 0–0.2)
BASOPHILS RELATIVE PERCENT: 0.8 % (ref 0–1)
BILIRUB SERPL-MCNC: <0.2 MG/DL (ref 0.2–1.2)
BILIRUBIN URINE: NEGATIVE
BLOOD, URINE: NEGATIVE
BUN BLDV-MCNC: 10 MG/DL (ref 6–20)
CALCIUM SERPL-MCNC: 9.4 MG/DL (ref 8.6–10)
CHLORIDE BLD-SCNC: 107 MMOL/L (ref 98–111)
CLARITY: CLEAR
CO2: 21 MMOL/L (ref 22–29)
COLOR: YELLOW
CREAT SERPL-MCNC: 0.7 MG/DL (ref 0.5–0.9)
EOSINOPHILS ABSOLUTE: 0.2 K/UL (ref 0–0.6)
EOSINOPHILS RELATIVE PERCENT: 1.9 % (ref 0–5)
GFR NON-AFRICAN AMERICAN: >60
GLUCOSE BLD-MCNC: 112 MG/DL (ref 74–109)
GLUCOSE URINE: NEGATIVE MG/DL
HBA1C MFR BLD: 5.8 % (ref 4–6)
HCT VFR BLD CALC: 45.5 % (ref 37–47)
HEMOGLOBIN: 14.6 G/DL (ref 12–16)
IMMATURE GRANULOCYTES #: 0 K/UL
KETONES, URINE: NEGATIVE MG/DL
LEUKOCYTE ESTERASE, URINE: NEGATIVE
LYMPHOCYTES ABSOLUTE: 3.9 K/UL (ref 1.1–4.5)
LYMPHOCYTES RELATIVE PERCENT: 36.3 % (ref 20–40)
MCH RBC QN AUTO: 30.4 PG (ref 27–31)
MCHC RBC AUTO-ENTMCNC: 32.1 G/DL (ref 33–37)
MCV RBC AUTO: 94.6 FL (ref 81–99)
MONOCYTES ABSOLUTE: 0.6 K/UL (ref 0–0.9)
MONOCYTES RELATIVE PERCENT: 5.8 % (ref 0–10)
NEUTROPHILS ABSOLUTE: 5.9 K/UL (ref 1.5–7.5)
NEUTROPHILS RELATIVE PERCENT: 54.8 % (ref 50–65)
NITRITE, URINE: NEGATIVE
PDW BLD-RTO: 14.2 % (ref 11.5–14.5)
PH UA: 6 (ref 5–8)
PLATELET # BLD: 263 K/UL (ref 130–400)
PMV BLD AUTO: 11.9 FL (ref 9.4–12.3)
POTASSIUM SERPL-SCNC: 4.2 MMOL/L (ref 3.5–5)
PROTEIN UA: NEGATIVE MG/DL
RBC # BLD: 4.81 M/UL (ref 4.2–5.4)
SODIUM BLD-SCNC: 143 MMOL/L (ref 136–145)
SPECIFIC GRAVITY UA: 1.01 (ref 1–1.03)
TOTAL PROTEIN: 7.3 G/DL (ref 6.6–8.7)
UROBILINOGEN, URINE: 0.2 E.U./DL
VITAMIN D 25-HYDROXY: 27.4 NG/ML
WBC # BLD: 10.8 K/UL (ref 4.8–10.8)

## 2020-07-07 PROCEDURE — 99214 OFFICE O/P EST MOD 30 MIN: CPT | Performed by: NURSE PRACTITIONER

## 2020-07-07 RX ORDER — PREGABALIN 150 MG/1
150 CAPSULE ORAL 3 TIMES DAILY
Qty: 90 CAPSULE | Refills: 5 | Status: SHIPPED | OUTPATIENT
Start: 2020-07-07 | End: 2021-01-15 | Stop reason: SDUPTHER

## 2020-07-07 RX ORDER — CIPROFLOXACIN 500 MG/1
500 TABLET, FILM COATED ORAL 2 TIMES DAILY
Qty: 20 TABLET | Refills: 0 | Status: SHIPPED | OUTPATIENT
Start: 2020-07-07 | End: 2020-07-17

## 2020-07-07 ASSESSMENT — ENCOUNTER SYMPTOMS
DIARRHEA: 0
BACK PAIN: 0
RESPIRATORY NEGATIVE: 1
ABDOMINAL PAIN: 0
VOMITING: 0
NAUSEA: 0

## 2020-07-07 NOTE — TELEPHONE ENCOUNTER
Received fax from pharmacy requesting refill on pts medication(s). Pt was last seen in office on VV 7/7/20 and has a follow up scheduled for Visit date not found. Will send request to  Jess Humphries  for authorization. Requested Prescriptions     Pending Prescriptions Disp Refills    pregabalin (LYRICA) 150 MG capsule [Pharmacy Med Name: Pregabalin 150 MG Oral Capsule] 90 capsule 5     Sig: Take 1 capsule by mouth 3 times daily for 30 days.  TAKE 1 CAPSULE BY MOUTH IN THE MORNING AND 2 AT BEDTIME

## 2020-07-07 NOTE — PATIENT INSTRUCTIONS
Patient Education        Painful Urination (Dysuria): Care Instructions  Your Care Instructions  Burning pain with urination (dysuria) is a common symptom of a urinary tract infection or other urinary problems. The bladder may become inflamed. This can cause pain when the bladder fills and empties. You may also feel pain if the tube that carries urine from the bladder to the outside of the body (urethra) gets irritated or infected. Sexually transmitted infections (STIs) also may cause pain when you urinate. Sometimes the pain can be caused by things other than an infection. The urethra can be irritated by soaps, perfumes, or foreign objects in the urethra. Kidney stones can cause pain when they pass through the urethra. The cause may be hard to find. You may need tests. Treatment for painful urination depends on the cause. Follow-up care is a key part of your treatment and safety. Be sure to make and go to all appointments, and call your doctor if you are having problems. It's also a good idea to know your test results and keep a list of the medicines you take. How can you care for yourself at home? · Drink extra water for the next day or two. This will help make the urine less concentrated. (If you have kidney, heart, or liver disease and have to limit fluids, talk with your doctor before you increase the amount of fluids you drink.)  · Avoid drinks that are carbonated or have caffeine. They can irritate the bladder. · Urinate often. Try to empty your bladder each time. For women:  · Urinate right after you have sex. · After going to the bathroom, wipe from front to back. · Avoid douches, bubble baths, and feminine hygiene sprays. And avoid other feminine hygiene products that have deodorants. When should you call for help? Call your doctor now or seek immediate medical care if:  · You have new symptoms, such as fever, nausea, or vomiting. · You have new or worse symptoms of a urinary problem.  For example:  ? You have blood or pus in your urine. ? You have chills or body aches. ? It hurts worse to urinate. ? You have groin or belly pain. ? You have pain in your back just below your rib cage (the flank area). Watch closely for changes in your health, and be sure to contact your doctor if you have any problems. Where can you learn more? Go to https://[employer]web[employer]. org and sign in to your [employer] account. Enter C133 in the [employer] box to learn more about \"Painful Urination (Dysuria): Care Instructions. \"     If you do not have an account, please click on the \"Sign Up Now\" link. Current as of: August 22, 2019               Content Version: 12.5  © 6189-8225 [employer]. Care instructions adapted under license by [facility] ([facility]). If you have questions about a medical condition or this instruction, always ask your healthcare professional. [address] any warranty or liability for your use of this information.

## 2020-07-07 NOTE — PROGRESS NOTES
Nayeli [address]  [place], [address]  Phone [phone]   Fax [phone]            TELEMEDICINE visit by my chart    OFFICE VISIT: 2020    Ky Saliva- : 1980      Reason For Visit:  John Zimmerman is a 38 y.o. femalewho is here for Urinary Tract Infection (not urinating well  like in past)         Health Maintenance    HPI      Patient on video for issues urinating  Reports the last week noticed not urinating as much   And notes some tenderness on right side  Despite drinking lot of fluids    Denies any fever  She reports her glucose has been doing well  And complaint with medication           vitals were not taken for this visit. There is no height or weight on file to calculate BMI.     Results for orders placed or performed in visit on 19   Vitamin D 25 Hydroxy   Result Value Ref Range    Vit D, 25-Hydroxy 29.8 (L) >=30 ng/mL   Microalbumin / Creatinine Urine Ratio   Result Value Ref Range    Microalbumin, Random Urine <1.20 0.00 - 19.00 mg/dL    Creatinine, Ur 31.7 4.2 - 622.0 mg/dL    Microalbumin Creatinine Ratio see below mg/g   CBC Auto Differential   Result Value Ref Range    WBC 9.7 4.8 - 10.8 K/uL    RBC 5.07 4.20 - 5.40 M/uL    Hemoglobin 15.0 12.0 - 16.0 g/dL    Hematocrit 47.0 37.0 - 47.0 %    MCV 92.7 81.0 - 99.0 fL    MCH 29.6 27.0 - 31.0 pg    MCHC 31.9 (L) 33.0 - 37.0 g/dL    RDW 13.2 11.5 - 14.5 %    Platelets 066 701 - 425 K/uL    MPV 12.0 9.4 - 12.3 fL    Neutrophils % 59.4 50.0 - 65.0 %    Lymphocytes % 31.8 20.0 - 40.0 %    Monocytes % 6.4 0.0 - 10.0 %    Eosinophils % 1.2 0.0 - 5.0 %    Basophils % 0.8 0.0 - 1.0 %    Neutrophils Absolute 5.8 1.5 - 7.5 K/uL    Immature Granulocytes # 0.0 K/uL    Lymphocytes Absolute 3.1 1.1 - 4.5 K/uL    Monocytes Absolute 0.60 0.00 - 0.90 K/uL    Eosinophils Absolute 0.10 0.00 - 0.60 K/uL    Basophils Absolute 0.10 0.00 - 0.20 K/uL   Comprehensive Metabolic Panel   Result Value Ref Range    Sodium 144 136 - 145 mmol/L    Potassium 4.1 3.5 - 5.0 mmol/L    Chloride 105 98 - 111 mmol/L    CO2 22 22 - 29 mmol/L    Anion Gap 17 7 - 19 mmol/L    Glucose 88 74 - 109 mg/dL    BUN 12 6 - 20 mg/dL    CREATININE 0.7 0.5 - 0.9 mg/dL    GFR Non-African American >60 >60    Calcium 9.3 8.6 - 10.0 mg/dL    Total Protein 7.3 6.6 - 8.7 g/dL    Alb 4.1 3.5 - 5.2 g/dL    Total Bilirubin <0.2 0.2 - 1.2 mg/dL    Alkaline Phosphatase 84 35 - 104 U/L    ALT 55 (H) 5 - 33 U/L    AST 34 (H) 5 - 32 U/L   Hemoglobin A1C   Result Value Ref Range    Hemoglobin A1C 6.1 (H) 4.0 - 6.0 %       I have reviewed the following with the Ms. Tian   Lab Review   No visits with results within 6 Month(s) from this visit.    Latest known visit with results is:   Orders Only on 12/26/2019   Component Date Value    Vit D, 25-Hydroxy 12/26/2019 29.8*    Microalbumin, Random Uri* 12/26/2019 <1.20     Creatinine, Ur 12/26/2019 31.7     Microalbumin Creatinine * 12/26/2019 see below     WBC 12/26/2019 9.7     RBC 12/26/2019 5.07     Hemoglobin 12/26/2019 15.0     Hematocrit 12/26/2019 47.0     MCV 12/26/2019 92.7     MCH 12/26/2019 29.6     MCHC 12/26/2019 31.9*    RDW 12/26/2019 13.2     Platelets 78/11/3434 284     MPV 12/26/2019 12.0     Neutrophils % 12/26/2019 59.4     Lymphocytes % 12/26/2019 31.8     Monocytes % 12/26/2019 6.4     Eosinophils % 12/26/2019 1.2     Basophils % 12/26/2019 0.8     Neutrophils Absolute 12/26/2019 5.8     Immature Granulocytes # 12/26/2019 0.0     Lymphocytes Absolute 12/26/2019 3.1     Monocytes Absolute 12/26/2019 0.60     Eosinophils Absolute 12/26/2019 0.10     Basophils Absolute 12/26/2019 0.10     Sodium 12/26/2019 144     Potassium 12/26/2019 4.1     Chloride 12/26/2019 105     CO2 12/26/2019 22     Anion Gap 12/26/2019 17     Glucose 12/26/2019 88     BUN 12/26/2019 12     CREATININE 12/26/2019 0.7     GFR Non- 12/26/2019 >60     Calcium 12/26/2019 9.3     Total Protein 12/26/2019 7.3     Alb 12/26/2019 dependence     13cm    Fibromyalgia     GERD (gastroesophageal reflux disease)     Hyperlipidemia     Obstructive sleep apnea     AHI: 18.1 per PSG, 2017    Restless leg syndrome     Skin cancer     left breast     Vitamin D deficiency        Past Surgical History:   Procedure Laterality Date    ABDOMINAL EXPLORATION SURGERY       SECTION      x1    CHOLECYSTECTOMY      HYSTERECTOMY, VAGINAL      NECK SURGERY      2 level neck fusion    OVARIAN CYST REMOVAL      [place] EGD TRANSORAL BIOPSY SINGLE/MULTIPLE N/A 2017    Dr Meier-w/dilation over wire, 46 Japanese-distal esophageal narrowing-Codie (-)    UPPER GASTROINTESTINAL ENDOSCOPY  2017    Dr Meier-w/dilation over wire, 46 Japanese-distal esophageal narrowing-Codie (-)       Social History     Tobacco Use    Smoking status: Former Smoker     Packs/day: 1.00     Years: 15.00     Pack years: 15.00     Types: Cigarettes     Last attempt to quit: 2018     Years since quittin.8    Smokeless tobacco: Never Used    Tobacco comment: smokes 1-2 cigarretts per day/ patient has tried but nothing has worked   Substance Use Topics    Alcohol use: No       Review of Systems   Constitutional: Positive for activity change. Negative for appetite change, fatigue and fever. HENT: Negative. Negative for congestion. Respiratory: Negative. Cardiovascular: Negative. Gastrointestinal: Negative for abdominal pain, diarrhea, nausea and vomiting. Genitourinary: Positive for decreased urine volume, difficulty urinating and flank pain (right greater than left). Musculoskeletal: Negative for arthralgias and back pain. Skin: Negative for rash. Psychiatric/Behavioral: Negative for behavioral problems and suicidal ideas. The patient is not nervous/anxious and is not hyperactive. Physical Exam  Constitutional:       Appearance: Normal appearance. Skin:     General: Skin is warm.       Capillary Refill: Capillary refill takes less than 2 seconds. Findings: No rash. Neurological:      Mental Status: She is alert and oriented to person, place, and time. Psychiatric:         Mood and Affect: Mood normal.         Behavior: Behavior normal.         ASSESSMENT/ PLAN    Lamont Cleary is  being evaluated by a Virtual Visit (video visit) encounter to address concerns as mentioned above. A caregiver was present when appropriate. Due to this being a TeleHealth encounter (During [facility] public health emergency), evaluation of the following organ systems was limited: Vitals/Constitutional/EENT/Resp/CV/GI//MS/Neuro/Skin/Heme-Lymph-Imm. Pursuant to the emergency declaration under the [address] [place] [address] authority and the [employer] Act, this Virtual Visit was conducted with patient's (and/or legal guardian's) consent, to reduce the patient's risk of exposure to COVID-19 and provide necessary medical care. The patient (and/or legal guardian) has also been advised to contact this office for worsening conditions or problems, and seek emergency medical treatment and/or call 911 if deemed necessary. Services were provided through a video synchronous discussion virtually to substitute for in-person clinic visit. Patient and provider were located at their individual homes. 1. Type 2 diabetes mellitus with other specified complication, without long-term current use of insulin ([facility] [address]  Will check labs  As having issues  Oliguria   - CBC Auto Differential; Future  - Comprehensive Metabolic Panel; Future    2. Right flank pain  Will increase fluids   - Urinalysis Reflex to Culture; Future  - Hemoglobin A1C; Future  - ciprofloxacin (CIPRO) 500 MG tablet; Take 1 tablet by mouth 2 times daily for 10 days  Dispense: 20 tablet;  Refill: 0      Orders Placed This Encounter   Procedures    CBC Auto Differential    Comprehensive Metabolic Panel    Urinalysis Reflex to Culture    Hemoglobin A1C    Hemoglobin A1C        Return in about 10 days (around 7/17/2020) for diabetes and urine follow up. Patient Instructions     Patient Education        Painful Urination (Dysuria): Care Instructions  Your Care Instructions  Burning pain with urination (dysuria) is a common symptom of a urinary tract infection or other urinary problems. The bladder may become inflamed. This can cause pain when the bladder fills and empties. You may also feel pain if the tube that carries urine from the bladder to the outside of the body (urethra) gets irritated or infected. Sexually transmitted infections (STIs) also may cause pain when you urinate. Sometimes the pain can be caused by things other than an infection. The urethra can be irritated by soaps, perfumes, or foreign objects in the urethra. Kidney stones can cause pain when they pass through the urethra. The cause may be hard to find. You may need tests. Treatment for painful urination depends on the cause. Follow-up care is a key part of your treatment and safety. Be sure to make and go to all appointments, and call your doctor if you are having problems. It's also a good idea to know your test results and keep a list of the medicines you take. How can you care for yourself at home? · Drink extra water for the next day or two. This will help make the urine less concentrated. (If you have kidney, heart, or liver disease and have to limit fluids, talk with your doctor before you increase the amount of fluids you drink.)  · Avoid drinks that are carbonated or have caffeine. They can irritate the bladder. · Urinate often. Try to empty your bladder each time. For women:  · Urinate right after you have sex. · After going to the bathroom, wipe from front to back. · Avoid douches, bubble baths, and feminine hygiene sprays. And avoid other feminine hygiene products that have deodorants. When should you call for help?    Call your doctor now or seek immediate medical care if:  · You have new symptoms, such as fever, nausea, or vomiting. · You have new or worse symptoms of a urinary problem. For example:  ? You have blood or pus in your urine. ? You have chills or body aches. ? It hurts worse to urinate. ? You have groin or belly pain. ? You have pain in your back just below your rib cage (the flank area). Watch closely for changes in your health, and be sure to contact your doctor if you have any problems. Where can you learn more? Go to [email or web address].[email or web address] Game [email or web address] org and sign in to your Cloudnexa account. Enter P907 in the [employer] box to learn more about \"Painful Urination (Dysuria): Care Instructions. \"     If you do not have an account, please click on the \"Sign Up Now\" link. Current as of: August 22, 2019               Content Version: 12.5  © 4668-0215 Set.fm. Care instructions adapted under license by [facility] ([facility]). If you have questions about a medical condition or this instruction, always ask your healthcare professional. [place] [address] any warranty or liability for your use of this information. Controlled Substances Monitoring: Additional Instructions: As always, patient is advisedto bring in medication bottles in order to correctly reconcile with our current list.      Sanju Jacobsen received counseling on the following healthy behaviors: none    Patient giveneducational materials on fluids    I have instructed Sanju Jacobsen to complete a self tracking handout on blood sugar and instructed them to bring it with them to her next appointment. Discussed use, benefit, and side effects of prescribed medications. Barriers to medication compliance addressed. All patient questions answered. Pt voiced understanding.      ERIK Wu

## 2020-07-10 ENCOUNTER — TELEPHONE (OUTPATIENT)
Dept: PRIMARY CARE CLINIC | Age: 40
End: 2020-07-10

## 2020-07-10 RX ORDER — ERGOCALCIFEROL 1.25 MG/1
50000 CAPSULE ORAL WEEKLY
Qty: 4 CAPSULE | Refills: 1 | Status: SHIPPED | OUTPATIENT
Start: 2020-07-10 | End: 2021-01-28 | Stop reason: SDUPTHER

## 2020-07-10 NOTE — TELEPHONE ENCOUNTER
----- Message from Jordin Washington IslandERIK sent at 7/7/2020  1:10 PM CDT -----  Cmp electrolytes and kidneys wnl  Fasting glucose little high cont tight control  Liver slight elevation   Encourage low fat diet and exercise  Your vitamin d level is low  Suggest start vitamin d 28315 units 1 po weekly for 8 weeks #4 1 refill  Then recheck level

## 2020-07-10 NOTE — TELEPHONE ENCOUNTER
----- Message from ERIK Barney sent at 7/7/2020 12:02 PM CDT -----  CBC normal no anemia or concerns

## 2020-07-10 NOTE — TELEPHONE ENCOUNTER
Called patient, spoke with: Patient regarding the results of the patients most recent labs. I advised Patient of Dolores Smith recommendations. Patient and Spouse did voice understanding    Sent rx to pharmacy for pt. She is also needing refill on metformin. Will send that in as well.      Requested Prescriptions     Signed Prescriptions Disp Refills    metFORMIN (GLUCOPHAGE) 1000 MG tablet 60 tablet 5     Sig: Take 1 tablet by mouth 2 times daily (with meals)     Authorizing Provider: Terra Franco     Ordering User: Jacqueline Ramirez    vitamin D (ERGOCALCIFEROL) 1.25 MG (16151 UT) CAPS capsule 4 capsule 1     Sig: Take 1 capsule by mouth once a week     Authorizing Provider: Terra Espinoza User: Jacqueline Ramirez

## 2020-07-17 ENCOUNTER — TELEMEDICINE (OUTPATIENT)
Dept: PRIMARY CARE CLINIC | Age: 40
End: 2020-07-17
Payer: MEDICAID

## 2020-07-17 PROCEDURE — 99213 OFFICE O/P EST LOW 20 MIN: CPT | Performed by: NURSE PRACTITIONER

## 2020-07-17 ASSESSMENT — ENCOUNTER SYMPTOMS
CONSTIPATION: 0
ABDOMINAL PAIN: 0
COUGH: 0
SHORTNESS OF BREATH: 0
NAUSEA: 0
BACK PAIN: 0
DIARRHEA: 0
VOMITING: 0

## 2020-07-17 NOTE — PROGRESS NOTES
Nayeli [address]  [place], [address]  Phone [phone]   Fax [phone]            TELEMEDICINE visit by my chart    OFFICE VISIT: 2020    Eulalio Sinha- : 1980      Reason For Visit:  Betty Christianson is a 38 y.o. femalewho is here for Follow-up (diabetes and urine follow up)         Health Maintenance     HPI    Patient for my chart follow up  For urination issues after 10 days    Had labs  And kidneys normal  Had urine culture but not needed      She was dealing with fatigue  But busy finishing taxes  Noted low vitamin d  Started on 8 weeks of supplements    She reports she is feeling better  Reports her right side is   She denies worse with movement  Just hurts all the time  No ovaries  Reports it is pretty much constant       vitals were not taken for this visit. There is no height or weight on file to calculate BMI.     Results for orders placed or performed in visit on 20   Comprehensive Metabolic Panel   Result Value Ref Range    Sodium 143 136 - 145 mmol/L    Potassium 4.2 3.5 - 5.0 mmol/L    Chloride 107 98 - 111 mmol/L    CO2 21 (L) 22 - 29 mmol/L    Anion Gap 15 7 - 19 mmol/L    Glucose 112 (H) 74 - 109 mg/dL    BUN 10 6 - 20 mg/dL    CREATININE 0.7 0.5 - 0.9 mg/dL    GFR Non-African American >60 >60    Calcium 9.4 8.6 - 10.0 mg/dL    Total Protein 7.3 6.6 - 8.7 g/dL    Alb 4.1 3.5 - 5.2 g/dL    Total Bilirubin <0.2 0.2 - 1.2 mg/dL    Alkaline Phosphatase 93 35 - 104 U/L    ALT 52 (H) 5 - 33 U/L    AST 34 (H) 5 - 32 U/L   CBC Auto Differential   Result Value Ref Range    WBC 10.8 4.8 - 10.8 K/uL    RBC 4.81 4.20 - 5.40 M/uL    Hemoglobin 14.6 12.0 - 16.0 g/dL    Hematocrit 45.5 37.0 - 47.0 %    MCV 94.6 81.0 - 99.0 fL    MCH 30.4 27.0 - 31.0 pg    MCHC 32.1 (L) 33.0 - 37.0 g/dL    RDW 14.2 11.5 - 14.5 %    Platelets 084 375 - 360 K/uL    MPV 11.9 9.4 - 12.3 fL    Neutrophils % 54.8 50.0 - 65.0 %    Lymphocytes % 36.3 20.0 - 40.0 %    Monocytes % 5.8 0.0 - 10.0 %    Eosinophils % 1.9 0.0 - 5.0 %    Basophils % 0.8 0.0 - 1.0 %    Neutrophils Absolute 5.9 1.5 - 7.5 K/uL    Immature Granulocytes # 0.0 K/uL    Lymphocytes Absolute 3.9 1.1 - 4.5 K/uL    Monocytes Absolute 0.60 0.00 - 0.90 K/uL    Eosinophils Absolute 0.20 0.00 - 0.60 K/uL    Basophils Absolute 0.10 0.00 - 0.20 K/uL   Vitamin D 25 Hydroxy   Result Value Ref Range    Vit D, 25-Hydroxy 27.4 (L) >=30 ng/mL   Hemoglobin A1C   Result Value Ref Range    Hemoglobin A1C 5.8 4.0 - 6.0 %   Urinalysis Reflex to Culture   Result Value Ref Range    Color, UA YELLOW Straw/Yellow    Clarity, UA Clear Clear    Glucose, Ur Negative Negative mg/dL    Bilirubin Urine Negative Negative    Ketones, Urine Negative Negative mg/dL    Specific Gravity, UA 1.007 1.005 - 1.030    Blood, Urine Negative Negative    pH, UA 6.0 5.0 - 8.0    Protein, UA Negative Negative mg/dL    Urobilinogen, Urine 0.2 <2.0 E.U./dL    Nitrite, Urine Negative Negative    Leukocyte Esterase, Urine Negative Negative       I have reviewed the following with the Ms. Tian   Lab Review   Orders Only on 07/07/2020   Component Date Value    Sodium 07/07/2020 143     Potassium 07/07/2020 4.2     Chloride 07/07/2020 107     CO2 07/07/2020 21*    Anion Gap 07/07/2020 15     Glucose 07/07/2020 112*    BUN 07/07/2020 10     CREATININE 07/07/2020 0.7     GFR Non- 07/07/2020 >60     Calcium 07/07/2020 9.4     Total Protein 07/07/2020 7.3     Alb 07/07/2020 4.1     Total Bilirubin 07/07/2020 <0.2     Alkaline Phosphatase 07/07/2020 93     ALT 07/07/2020 52*    AST 07/07/2020 34*    WBC 07/07/2020 10.8     RBC 07/07/2020 4.81     Hemoglobin 07/07/2020 14.6     Hematocrit 07/07/2020 45.5     MCV 07/07/2020 94.6     MCH 07/07/2020 30.4     MCHC 07/07/2020 32.1*    RDW 07/07/2020 14.2     Platelets 09/71/1900 263     MPV 07/07/2020 11.9     Neutrophils % 07/07/2020 54.8     Lymphocytes % 07/07/2020 36.3     Monocytes % 07/07/2020 5.8     Eosinophils % 07/07/2020 1.9     Basophils % 07/07/2020 0.8     Neutrophils Absolute 07/07/2020 5.9     Immature Granulocytes # 07/07/2020 0.0     Lymphocytes Absolute 07/07/2020 3.9     Monocytes Absolute 07/07/2020 0.60     Eosinophils Absolute 07/07/2020 0.20     Basophils Absolute 07/07/2020 0.10     Vit D, 25-Hydroxy 07/07/2020 27.4*    Hemoglobin A1C 07/07/2020 5.8     Color, UA 07/07/2020 YELLOW     Clarity, UA 07/07/2020 Clear     Glucose, Ur 07/07/2020 Negative     Bilirubin Urine 07/07/2020 Negative     Ketones, Urine 07/07/2020 Negative     Specific Gravity, UA 07/07/2020 1.007     Blood, Urine 07/07/2020 Negative     pH, UA 07/07/2020 6.0     Protein, UA 07/07/2020 Negative     Urobilinogen, Urine 07/07/2020 0.2     Nitrite, Urine 07/07/2020 Negative     Leukocyte Esterase, Urine 07/07/2020 Negative      Copies of these are in the chart. Prior to Visit Medications    Medication Sig Taking? Authorizing Provider   metFORMIN (GLUCOPHAGE) 1000 MG tablet Take 1 tablet by mouth 2 times daily (with meals)  ERIK Simon   vitamin D (ERGOCALCIFEROL) 1.25 MG (09466 UT) CAPS capsule Take 1 capsule by mouth once a week  ERIK Simon   ciprofloxacin (CIPRO) 500 MG tablet Take 1 tablet by mouth 2 times daily for 10 days  ERIK Simon   pregabalin (LYRICA) 150 MG capsule Take 1 capsule by mouth 3 times daily for 30 days.  TAKE 1 CAPSULE BY MOUTH IN THE MORNING AND 2 AT BEDTIME  ERIK Simon   DULoxetine (CYMBALTA) 60 MG extended release capsule Take 1 capsule by mouth daily  ERIK Quintanilla   losartan (COZAAR) 25 MG tablet Take 1 tablet by mouth daily  ERIK Quintanilla   furosemide (LASIX) 20 MG tablet Take 1 tablet by mouth daily  ERIK Quintanilla   potassium chloride (KLOR-CON M) 10 MEQ extended release tablet Take 1 tablet by mouth daily  ERIK Quintanilla   ondansetron (ZOFRAN-ODT) 4 MG disintegrating tablet Take 1 tablet by mouth 3 times daily as needed for Nausea or Vomiting  ERIK Ashraf   tiZANidine (ZANAFLEX) 4 MG tablet Take 1 tablet by mouth every 8 hours as needed (muscle spasms/pain)  ERIK Ashraf   Semaglutide,0.25 or 0.5MG/DOS, 2 MG/1.5ML SOPN Inject 0.5 mg into the skin once a week  ERIK Ashraf   ondansetron (ZOFRAN ODT) 4 MG disintegrating tablet Take 1 tablet by mouth every 8 hours as needed for Nausea or Vomiting  Charlie White APRN       Allergies: Hydrocodone-acetaminophen and Adhesive tape    Past Medical History:   Diagnosis Date    Chronic back pain     gets injections with [facility].  CPAP (continuous positive airway pressure) dependence     13cm    Fibromyalgia     GERD (gastroesophageal reflux disease)     Hyperlipidemia     Obstructive sleep apnea     AHI: 18.1 per PSG, 2017    Restless leg syndrome     Skin cancer     left breast     Vitamin D deficiency        Past Surgical History:   Procedure Laterality Date    ABDOMINAL EXPLORATION SURGERY       SECTION      x1    CHOLECYSTECTOMY      HYSTERECTOMY, VAGINAL      NECK SURGERY      2 level neck fusion    OVARIAN CYST REMOVAL      [place] EGD TRANSORAL BIOPSY SINGLE/MULTIPLE N/A 2017    Dr Meier-w/dilation over wire, 46 French-distal esophageal narrowing-Codie (-)    UPPER GASTROINTESTINAL ENDOSCOPY  2017    Dr Meier-sushma/dilation over wire, 46 French-distal esophageal narrowing-Codie (-)       Social History     Tobacco Use    Smoking status: Former Smoker     Packs/day: 1.00     Years: 15.00     Pack years: 15.00     Types: Cigarettes     Last attempt to quit: 2018     Years since quittin.8    Smokeless tobacco: Never Used    Tobacco comment: smokes 1-2 cigarretts per day/ patient has tried but nothing has worked   Substance Use Topics    Alcohol use: No       Review of Systems   Constitutional: Positive for activity change. Negative for appetite change, fatigue and fever. (Tylenol), ibuprofen (Advil, Motrin), or naproxen (Aleve). Read and follow all instructions on the label. · If your doctor prescribed antibiotics, take them as directed. Do not stop taking them just because you feel better. You need to take the full course of antibiotics. · To apply heat, put a warm water bottle, a heating pad set on low, or a warm cloth on the painful area. Do not go to sleep with a heating pad on your skin. · To prevent dehydration, drink plenty of fluids, enough so that your urine is light yellow or clear like water. Choose water and other caffeine-free clear liquids until you feel better. If you have kidney, heart, or liver disease and have to limit fluids, talk with your doctor before you increase the amount of fluids you drink. When should you call for help? Call your doctor now or seek immediate medical care if:  · Your flank pain gets worse. · You have new symptoms, such as fever, nausea, or vomiting. · You have symptoms of a urinary problem. For example:  ? You have blood or pus in your urine. ? You have chills or body aches. ? It hurts to urinate. ? You have groin or belly pain. Watch closely for changes in your health, and be sure to contact your doctor if you do not get better as expected. Where can you learn more? Go to https://[email or web address] [email or web address] Motors.Ornicept. org and sign in to your Brayola account. Enter S191 in the Neurolink box to learn more about \"Flank Pain: Care Instructions. \"     If you do not have an account, please click on the \"Sign Up Now\" link. Current as of: June 26, 2019               Content Version: 12.5  © 0834-9724 Jebbit. Care instructions adapted under license by [facility] If you have questions about a medical condition or this instruction, always ask your healthcare professional. [place] [address] any warranty or liability for your use of this information.                Controlled Substances Monitoring: Additional Instructions: As always, patient is advisedto bring in medication bottles in order to correctly reconcile with our current list.      Betty Christianson received counseling on the following healthy behaviors: none    Patient giveneducational materials on plan of care    I have instructed Betty Christianson to complete a self tracking handout on none and instructed them to bring it with them to her next appointment. Discussed use, benefit, and side effects of prescribed medications. Barriers to medication compliance addressed. All patient questions answered. Pt voiced understanding.      ERIK Perry

## 2020-07-21 ENCOUNTER — HOSPITAL ENCOUNTER (OUTPATIENT)
Dept: GENERAL RADIOLOGY | Age: 40
Discharge: HOME | End: 2020-07-21
Payer: MEDICAID

## 2020-07-21 ENCOUNTER — TELEPHONE (OUTPATIENT)
Dept: PRIMARY CARE CLINIC | Age: 40
End: 2020-07-21

## 2020-07-21 PROCEDURE — 74018 RADEX ABDOMEN 1 VIEW: CPT

## 2020-11-03 ENCOUNTER — TELEMEDICINE (OUTPATIENT)
Dept: PRIMARY CARE CLINIC | Age: 40
End: 2020-11-03
Payer: MEDICAID

## 2020-11-03 PROCEDURE — 99213 OFFICE O/P EST LOW 20 MIN: CPT | Performed by: NURSE PRACTITIONER

## 2020-11-03 RX ORDER — PHENAZOPYRIDINE HYDROCHLORIDE 200 MG/1
200 TABLET, FILM COATED ORAL 3 TIMES DAILY PRN
Qty: 9 TABLET | Refills: 0 | Status: SHIPPED | OUTPATIENT
Start: 2020-11-03 | End: 2021-05-28 | Stop reason: SDUPTHER

## 2020-11-03 RX ORDER — CIPROFLOXACIN 500 MG/1
500 TABLET, FILM COATED ORAL 2 TIMES DAILY
Qty: 20 TABLET | Refills: 0 | Status: SHIPPED | OUTPATIENT
Start: 2020-11-03 | End: 2021-05-28 | Stop reason: SDUPTHER

## 2020-11-03 ASSESSMENT — ENCOUNTER SYMPTOMS
COUGH: 0
NAUSEA: 0
ABDOMINAL PAIN: 0
VOMITING: 0
DIARRHEA: 0
SHORTNESS OF BREATH: 0
CONSTIPATION: 0
BACK PAIN: 0

## 2020-11-03 ASSESSMENT — PATIENT HEALTH QUESTIONNAIRE - PHQ9
SUM OF ALL RESPONSES TO PHQ9 QUESTIONS 1 & 2: 0
SUM OF ALL RESPONSES TO PHQ QUESTIONS 1-9: 0
2. FEELING DOWN, DEPRESSED OR HOPELESS: 0
1. LITTLE INTEREST OR PLEASURE IN DOING THINGS: 0
SUM OF ALL RESPONSES TO PHQ QUESTIONS 1-9: 0
SUM OF ALL RESPONSES TO PHQ QUESTIONS 1-9: 0

## 2020-11-03 NOTE — PATIENT INSTRUCTIONS
Patient Education        Painful Urination (Dysuria): Care Instructions  Your Care Instructions  Burning pain with urination (dysuria) is a common symptom of a urinary tract infection or other urinary problems. The bladder may become inflamed. This can cause pain when the bladder fills and empties. You may also feel pain if the tube that carries urine from the bladder to the outside of the body (urethra) gets irritated or infected. Sexually transmitted infections (STIs) also may cause pain when you urinate. Sometimes the pain can be caused by things other than an infection. The urethra can be irritated by soaps, perfumes, or foreign objects in the urethra. Kidney stones can cause pain when they pass through the urethra. The cause may be hard to find. You may need tests. Treatment for painful urination depends on the cause. Follow-up care is a key part of your treatment and safety. Be sure to make and go to all appointments, and call your doctor if you are having problems. It's also a good idea to know your test results and keep a list of the medicines you take. How can you care for yourself at home? · Drink extra water for the next day or two. This will help make the urine less concentrated. (If you have kidney, heart, or liver disease and have to limit fluids, talk with your doctor before you increase the amount of fluids you drink.)  · Avoid drinks that are carbonated or have caffeine. They can irritate the bladder. · Urinate often. Try to empty your bladder each time. For women:  · Urinate right after you have sex. · After going to the bathroom, wipe from front to back. · Avoid douches, bubble baths, and feminine hygiene sprays. And avoid other feminine hygiene products that have deodorants. When should you call for help? Call your doctor now or seek immediate medical care if:    · You have new symptoms, such as fever, nausea, or vomiting.     · You have new or worse symptoms of a urinary problem. For example:  ? You have blood or pus in your urine. ? You have chills or body aches. ? It hurts worse to urinate. ? You have groin or belly pain. ? You have pain in your back just below your rib cage (the flank area). Watch closely for changes in your health, and be sure to contact your doctor if you have any problems. Where can you learn more? Go to https://Baby.com.brpepiceweb.LocusLabs. org and sign in to your NotaryAct account. Enter I109 in the MumsWay box to learn more about \"Painful Urination (Dysuria): Care Instructions. \"     If you do not have an account, please click on the \"Sign Up Now\" link. Current as of: June 29, 2020               Content Version: 12.6  © 2006-2020 Healthwise, Incorporated. Care instructions adapted under license by [facility] ([facility]). If you have questions about a medical condition or this instruction, always ask your healthcare professional. [place] [address] any warranty or liability for your use of this information. Patient Education        Flank Pain: Care Instructions  Your Care Instructions  Flank pain is pain on the side of the back just below the rib cage and above the waist. It can be on one or both sides. Flank pain has many possible causes, including a kidney stone, a urinary tract infection, or back strain. Flank pain may get better on its own. But don't ignore new symptoms, such as fever, nausea and vomiting, urination problems, pain that gets worse, and dizziness. These may be signs of a more serious problem. You may have to have tests or other treatment. Follow-up care is a key part of your treatment and safety. Be sure to make and go to all appointments, and call your doctor if you are having problems. It's also a good idea to know your test results and keep a list of the medicines you take. How can you care for yourself at home? · Rest until you feel better. · Take pain medicines exactly as directed.   ? If the doctor gave you a prescription medicine for pain, take it as prescribed. ? If you are not taking a prescription pain medicine, ask your doctor if you can take an over-the-counter pain medicine, such as acetaminophen (Tylenol), ibuprofen (Advil, Motrin), or naproxen (Aleve). Read and follow all instructions on the label. · If your doctor prescribed antibiotics, take them as directed. Do not stop taking them just because you feel better. You need to take the full course of antibiotics. · To apply heat, put a warm water bottle, a heating pad set on low, or a warm cloth on the painful area. Do not go to sleep with a heating pad on your skin. · To prevent dehydration, drink plenty of fluids, enough so that your urine is light yellow or clear like water. Choose water and other caffeine-free clear liquids until you feel better. If you have kidney, heart, or liver disease and have to limit fluids, talk with your doctor before you increase the amount of fluids you drink. When should you call for help? Call your doctor now or seek immediate medical care if:    · Your flank pain gets worse.     · You have new symptoms, such as fever, nausea, or vomiting.     · You have symptoms of a urinary problem. For example:  ? You have blood or pus in your urine. ? You have chills or body aches. ? It hurts to urinate. ? You have groin or belly pain. Watch closely for changes in your health, and be sure to contact your doctor if you do not get better as expected. Where can you learn more? Go to https://WicronparkerTrackerSphere.Frodio. org and sign in to your BioScience account. Enter S191 in the [facility] box to learn more about \"Flank Pain: Care Instructions. \"     If you do not have an account, please click on the \"Sign Up Now\" link. Current as of: June 26, 2019               Content Version: 12.6  © 3751-1067 [employer]ring Ltd, Incorporated. Care instructions adapted under license by [facility] ([facility]).  If you have questions about a

## 2020-11-03 NOTE — PROGRESS NOTES
[address]  [place], [address]  Phone [phone]   Fax [phone]      OFFICE VISIT: 11/3/2020    Talita Schafer- : 1980      Reason For Visit:  Ansley Meyer is a 38 y.o. Tessa Hoffmann is here for Urinary Tract Infection (with flank pain)         Health Maintenance     HPI      Patient on my chart visit  For dysuria Friday night into Saturday am  Reports that has urinating increase   And not empty  Reports on blood  Noted bilat flank pain  No fever that knows of     Not had a UTI this year  She is working and trying to increase fluids           vitals were not taken for this visit. There is no height or weight on file to calculate BMI.     Results for orders placed or performed in visit on 20   Comprehensive Metabolic Panel   Result Value Ref Range    Sodium 143 136 - 145 mmol/L    Potassium 4.2 3.5 - 5.0 mmol/L    Chloride 107 98 - 111 mmol/L    CO2 21 (L) 22 - 29 mmol/L    Anion Gap 15 7 - 19 mmol/L    Glucose 112 (H) 74 - 109 mg/dL    BUN 10 6 - 20 mg/dL    CREATININE 0.7 0.5 - 0.9 mg/dL    GFR Non-African American >60 >60    Calcium 9.4 8.6 - 10.0 mg/dL    Total Protein 7.3 6.6 - 8.7 g/dL    Alb 4.1 3.5 - 5.2 g/dL    Total Bilirubin <0.2 0.2 - 1.2 mg/dL    Alkaline Phosphatase 93 35 - 104 U/L    ALT 52 (H) 5 - 33 U/L    AST 34 (H) 5 - 32 U/L   CBC Auto Differential   Result Value Ref Range    WBC 10.8 4.8 - 10.8 K/uL    RBC 4.81 4.20 - 5.40 M/uL    Hemoglobin 14.6 12.0 - 16.0 g/dL    Hematocrit 45.5 37.0 - 47.0 %    MCV 94.6 81.0 - 99.0 fL    MCH 30.4 27.0 - 31.0 pg    MCHC 32.1 (L) 33.0 - 37.0 g/dL    RDW 14.2 11.5 - 14.5 %    Platelets 169 653 - 329 K/uL    MPV 11.9 9.4 - 12.3 fL    Neutrophils % 54.8 50.0 - 65.0 %    Lymphocytes % 36.3 20.0 - 40.0 %    Monocytes % 5.8 0.0 - 10.0 %    Eosinophils % 1.9 0.0 - 5.0 %    Basophils % 0.8 0.0 - 1.0 %    Neutrophils Absolute 5.9 1.5 - 7.5 K/uL    Immature Granulocytes # 0.0 K/uL    Lymphocytes Absolute 3.9 1.1 - 4.5 K/uL    Monocytes Absolute 0.60 0.00 - 0.90 K/uL    Eosinophils Absolute 0.20 0.00 - 0.60 K/uL    Basophils Absolute 0.10 0.00 - 0.20 K/uL   Vitamin D 25 Hydroxy   Result Value Ref Range    Vit D, 25-Hydroxy 27.4 (L) >=30 ng/mL   Hemoglobin A1C   Result Value Ref Range    Hemoglobin A1C 5.8 4.0 - 6.0 %   Urinalysis Reflex to Culture   Result Value Ref Range    Color, UA YELLOW Straw/Yellow    Clarity, UA Clear Clear    Glucose, Ur Negative Negative mg/dL    Bilirubin Urine Negative Negative    Ketones, Urine Negative Negative mg/dL    Specific Gravity, UA 1.007 1.005 - 1.030    Blood, Urine Negative Negative    pH, UA 6.0 5.0 - 8.0    Protein, UA Negative Negative mg/dL    Urobilinogen, Urine 0.2 <2.0 E.U./dL    Nitrite, Urine Negative Negative    Leukocyte Esterase, Urine Negative Negative       I have reviewed the following with the Ms. Tian   Lab Review   Orders Only on 07/07/2020   Component Date Value    Sodium 07/07/2020 143     Potassium 07/07/2020 4.2     Chloride 07/07/2020 107     CO2 07/07/2020 21*    Anion Gap 07/07/2020 15     Glucose 07/07/2020 112*    BUN 07/07/2020 10     CREATININE 07/07/2020 0.7     GFR Non- 07/07/2020 >60     Calcium 07/07/2020 9.4     Total Protein 07/07/2020 7.3     Alb 07/07/2020 4.1     Total Bilirubin 07/07/2020 <0.2     Alkaline Phosphatase 07/07/2020 93     ALT 07/07/2020 52*    AST 07/07/2020 34*    WBC 07/07/2020 10.8     RBC 07/07/2020 4.81     Hemoglobin 07/07/2020 14.6     Hematocrit 07/07/2020 45.5     MCV 07/07/2020 94.6     MCH 07/07/2020 30.4     MCHC 07/07/2020 32.1*    RDW 07/07/2020 14.2     Platelets 05/38/9813 263     MPV 07/07/2020 11.9     Neutrophils % 07/07/2020 54.8     Lymphocytes % 07/07/2020 36.3     Monocytes % 07/07/2020 5.8     Eosinophils % 07/07/2020 1.9     Basophils % 07/07/2020 0.8     Neutrophils Absolute 07/07/2020 5.9     Immature Granulocytes # 07/07/2020 0.0     Lymphocytes Absolute 07/07/2020 3.9     Monocytes Absolute 07/07/2020 0.60     Eosinophils Absolute 07/07/2020 0.20     Basophils Absolute 07/07/2020 0.10     Vit D, 25-Hydroxy 07/07/2020 27.4*    Hemoglobin A1C 07/07/2020 5.8     Color, UA 07/07/2020 YELLOW     Clarity, UA 07/07/2020 Clear     Glucose, Ur 07/07/2020 Negative     Bilirubin Urine 07/07/2020 Negative     Ketones, Urine 07/07/2020 Negative     Specific Gravity, UA 07/07/2020 1.007     Blood, Urine 07/07/2020 Negative     pH, UA 07/07/2020 6.0     Protein, UA 07/07/2020 Negative     Urobilinogen, Urine 07/07/2020 0.2     Nitrite, Urine 07/07/2020 Negative     Leukocyte Esterase, Urine 07/07/2020 Negative      Copies of these are in the chart. Prior to Visit Medications    Medication Sig Taking? Authorizing Provider   ciprofloxacin (CIPRO) 500 MG tablet Take 1 tablet by mouth 2 times daily for 10 days Yes ERIK Scott   phenazopyridine (PYRIDIUM) 200 MG tablet Take 1 tablet by mouth 3 times daily as needed for Pain Yes ERIK Scott   metFORMIN (GLUCOPHAGE) 1000 MG tablet Take 1 tablet by mouth 2 times daily (with meals)  ERIK Scott   vitamin D (ERGOCALCIFEROL) 1.25 MG (69352 UT) CAPS capsule Take 1 capsule by mouth once a week  ERIK Scott   pregabalin (LYRICA) 150 MG capsule Take 1 capsule by mouth 3 times daily for 30 days.  TAKE 1 CAPSULE BY MOUTH IN THE MORNING AND 2 AT BEDTIME  ERIK Scott   DULoxetine (CYMBALTA) 60 MG extended release capsule Take 1 capsule by mouth daily  ERIK Quintanilla   losartan (COZAAR) 25 MG tablet Take 1 tablet by mouth daily  ERIK Quintanilla   furosemide (LASIX) 20 MG tablet Take 1 tablet by mouth daily  ERIK Quintanilla   potassium chloride (KLOR-CON M) 10 MEQ extended release tablet Take 1 tablet by mouth daily  ERIK Quintanilla   ondansetron (ZOFRAN-ODT) 4 MG disintegrating tablet Take 1 tablet by mouth 3 times daily as needed for Nausea or Vomiting  ERIK Scott tiZANidine (ZANAFLEX) 4 MG tablet Take 1 tablet by mouth every 8 hours as needed (muscle spasms/pain)  ERIK Stephens   Semaglutide,0.25 or 0.5MG/DOS, 2 MG/1.5ML SOPN Inject 0.5 mg into the skin once a week  ERIK Stephens   ondansetron (ZOFRAN ODT) 4 MG disintegrating tablet Take 1 tablet by mouth every 8 hours as needed for Nausea or Vomiting  ERIK Camp       Allergies: Hydrocodone-acetaminophen and Adhesive tape    Past Medical History:   Diagnosis Date    Chronic back pain     gets injections with [facility] pain management.  CPAP (continuous positive airway pressure) dependence     13cm    Fibromyalgia     GERD (gastroesophageal reflux disease)     Hyperlipidemia     Obstructive sleep apnea     AHI: 18.1 per [facility], 2017    Restless leg syndrome     Skin cancer     left breast     Vitamin D deficiency        Past Surgical History:   Procedure Laterality Date    ABDOMINAL EXPLORATION SURGERY       SECTION      x1    CHOLECYSTECTOMY      HYSTERECTOMY, VAGINAL      NECK SURGERY      2 level neck fusion    OVARIAN CYST REMOVAL      UT EGD TRANSORAL BIOPSY SINGLE/MULTIPLE N/A 2017    Dr Mahmood/dilation over wire, 46 French-distal esophageal narrowing-Codie (-)    UPPER GASTROINTESTINAL ENDOSCOPY  2017    Dr Mahmood/dilation over wire, 46 French-distal esophageal narrowing-Codie (-)       Social History     Tobacco Use    Smoking status: Former Smoker     Packs/day: 1.00     Years: 15.00     Pack years: 15.00     Types: Cigarettes     Last attempt to quit: 2018     Years since quittin.1    Smokeless tobacco: Never Used    Tobacco comment: smokes 1-2 cigarretts per day/ patient has tried but nothing has worked   Substance Use Topics    Alcohol use: No       Review of Systems   Constitutional: Positive for activity change. Negative for appetite change, fatigue and fever. Respiratory: Negative for cough and shortness of breath. Cardiovascular: Negative for chest pain and leg swelling. Gastrointestinal: Negative for abdominal pain, constipation, diarrhea, nausea and vomiting. Genitourinary: Positive for difficulty urinating, flank pain and frequency. Negative for menstrual problem and pelvic pain. Bilat cva tenderness per patient     Musculoskeletal: Negative for arthralgias and back pain. Skin: Negative for rash. Psychiatric/Behavioral: Negative for behavioral problems and sleep disturbance. The patient is not nervous/anxious and is not hyperactive. Physical Exam  Constitutional:       Appearance: Normal appearance. She is obese. Comments: Video     Cardiovascular:      Rate and Rhythm: Normal rate. Comments: Denies    Abdominal:      Tenderness: There is right CVA tenderness (nagging pain) and left CVA tenderness. Genitourinary:     Comments: Over bladder    Skin:     Findings: No rash. Neurological:      Mental Status: She is alert and oriented to person, place, and time. Psychiatric:         Mood and Affect: Mood normal.         Behavior: Behavior normal.         ASSESSMENT/ PLAN  Ambrocio Purdy is  being evaluated by a Virtual Visit my chart encounter to address concerns as mentioned above. A caregiver was present when appropriate. Due to this being a TeleHealth encounter (During [facility] public health emergency), evaluation of the following organ systems was limited: Vitals/Constitutional/EENT/Resp/CV/GI//MS/Neuro/Skin/Heme-Lymph-Imm. Pursuant to the emergency declaration under the [facility], [address] [place] [address] authority and the [place] [employer] Act, this Virtual Visit was conducted with patient's (and/or legal guardian's) consent, to reduce the patient's risk of exposure to COVID-19 and provide necessary medical care.   The patient (and/or legal guardian) has also been advised to contact this office for worsening (Dysuria): Care Instructions. \"     If you do not have an account, please click on the \"Sign Up Now\" link. Current as of: June 29, 2020               Content Version: 12.6  © 2006-2020 Healthwise, Incorporated. Care instructions adapted under license by [facility] ([facility]). If you have questions about a medical condition or this instruction, always ask your healthcare professional. [address] any warranty or liability for your use of this information. Patient Education        Flank Pain: Care Instructions  Your Care Instructions  Flank pain is pain on the side of the back just below the rib cage and above the waist. It can be on one or both sides. Flank pain has many possible causes, including a kidney stone, a urinary tract infection, or back strain. Flank pain may get better on its own. But don't ignore new symptoms, such as fever, nausea and vomiting, urination problems, pain that gets worse, and dizziness. These may be signs of a more serious problem. You may have to have tests or other treatment. Follow-up care is a key part of your treatment and safety. Be sure to make and go to all appointments, and call your doctor if you are having problems. It's also a good idea to know your test results and keep a list of the medicines you take. How can you care for yourself at home? · Rest until you feel better. · Take pain medicines exactly as directed. ? If the doctor gave you a prescription medicine for pain, take it as prescribed. ? If you are not taking a prescription pain medicine, ask your doctor if you can take an over-the-counter pain medicine, such as acetaminophen (Tylenol), ibuprofen (Advil, Motrin), or naproxen (Aleve). Read and follow all instructions on the label. · If your doctor prescribed antibiotics, take them as directed. Do not stop taking them just because you feel better. You need to take the full course of antibiotics.   · To apply heat, put a warm water bottle, a Trav Zepeda to complete a self tracking handout on blood pressure and instructed them to bring it with them to her next appointment. Discussed use, benefit, and side effects of prescribed medications. Barriers to medication compliance addressed. All patient questions answered. Pt voiced understanding.      ERIK Howard poct u

## 2020-11-17 RX ORDER — FUROSEMIDE 20 MG/1
20 TABLET ORAL DAILY
Qty: 30 TABLET | Refills: 5 | Status: SHIPPED | OUTPATIENT
Start: 2020-11-17 | End: 2021-02-17 | Stop reason: SDUPTHER

## 2020-12-21 RX ORDER — MONTELUKAST SODIUM 10 MG/1
10 TABLET ORAL NIGHTLY
Qty: 30 TABLET | Refills: 5 | Status: SHIPPED | OUTPATIENT
Start: 2020-12-21 | End: 2021-05-28

## 2021-01-15 DIAGNOSIS — M79.7 FIBROMYALGIA: ICD-10-CM

## 2021-01-15 RX ORDER — PREGABALIN 150 MG/1
CAPSULE ORAL
Qty: 90 CAPSULE | Refills: 0 | OUTPATIENT
Start: 2021-01-15

## 2021-01-15 RX ORDER — PREGABALIN 150 MG/1
150 CAPSULE ORAL 3 TIMES DAILY
Qty: 90 CAPSULE | Refills: 5 | Status: SHIPPED | OUTPATIENT
Start: 2021-01-15 | End: 2021-06-17 | Stop reason: SDUPTHER

## 2021-01-28 DIAGNOSIS — E55.9 VITAMIN D DEFICIENCY: ICD-10-CM

## 2021-01-28 DIAGNOSIS — E66.01 MORBID OBESITY WITH BMI OF 40.0-44.9, ADULT (HCC): ICD-10-CM

## 2021-01-28 DIAGNOSIS — R03.0 ELEVATED BP WITHOUT DIAGNOSIS OF HYPERTENSION: ICD-10-CM

## 2021-01-28 DIAGNOSIS — E11.9 NEW ONSET TYPE 2 DIABETES MELLITUS (HCC): ICD-10-CM

## 2021-01-28 RX ORDER — ERGOCALCIFEROL 1.25 MG/1
50000 CAPSULE ORAL WEEKLY
Qty: 12 CAPSULE | Refills: 1 | Status: SHIPPED | OUTPATIENT
Start: 2021-01-28 | End: 2021-05-28

## 2021-01-28 RX ORDER — LOSARTAN POTASSIUM 25 MG/1
25 TABLET ORAL DAILY
Qty: 90 TABLET | Refills: 3 | Status: SHIPPED | OUTPATIENT
Start: 2021-01-28 | End: 2022-02-15

## 2021-01-28 NOTE — TELEPHONE ENCOUNTER
Received call from pt requesting refill on pts medication(s). Pt was last seen in office on 11/3/2020  and has a follow up scheduled for Visit date not found. Will send request to  Cy Lai  for patient.      Requested Prescriptions     Pending Prescriptions Disp Refills    metFORMIN (GLUCOPHAGE) 1000 MG tablet 180 tablet 3     Sig: Take 1 tablet by mouth 2 times daily (with meals)    losartan (COZAAR) 25 MG tablet 90 tablet 3     Sig: Take 1 tablet by mouth daily    vitamin D (ERGOCALCIFEROL) 1.25 MG (44276 UT) CAPS capsule 12 capsule 1     Sig: Take 1 capsule by mouth once a week

## 2021-02-17 ENCOUNTER — TELEMEDICINE (OUTPATIENT)
Dept: PRIMARY CARE CLINIC | Age: 41
End: 2021-02-17
Payer: MEDICAID

## 2021-02-17 DIAGNOSIS — R07.9 CHEST PAIN, UNSPECIFIED TYPE: ICD-10-CM

## 2021-02-17 DIAGNOSIS — E78.2 MIXED HYPERLIPIDEMIA: ICD-10-CM

## 2021-02-17 DIAGNOSIS — R60.9 PERIPHERAL EDEMA: ICD-10-CM

## 2021-02-17 DIAGNOSIS — E11.42 TYPE 2 DIABETES MELLITUS WITH DIABETIC POLYNEUROPATHY, WITHOUT LONG-TERM CURRENT USE OF INSULIN (HCC): ICD-10-CM

## 2021-02-17 DIAGNOSIS — M79.7 FIBROMYALGIA: ICD-10-CM

## 2021-02-17 DIAGNOSIS — E55.9 VITAMIN D DEFICIENCY: ICD-10-CM

## 2021-02-17 DIAGNOSIS — F51.01 PRIMARY INSOMNIA: ICD-10-CM

## 2021-02-17 DIAGNOSIS — I10 ESSENTIAL HYPERTENSION: ICD-10-CM

## 2021-02-17 DIAGNOSIS — G62.9 NEUROPATHY: ICD-10-CM

## 2021-02-17 DIAGNOSIS — R53.83 FATIGUE, UNSPECIFIED TYPE: ICD-10-CM

## 2021-02-17 DIAGNOSIS — F41.9 ANXIETY: ICD-10-CM

## 2021-02-17 DIAGNOSIS — F17.219 CIGARETTE NICOTINE DEPENDENCE WITH NICOTINE-INDUCED DISORDER: Primary | ICD-10-CM

## 2021-02-17 PROCEDURE — 99406 BEHAV CHNG SMOKING 3-10 MIN: CPT | Performed by: PEDIATRICS

## 2021-02-17 PROCEDURE — 99205 OFFICE O/P NEW HI 60 MIN: CPT | Performed by: PEDIATRICS

## 2021-02-17 RX ORDER — NORTRIPTYLINE HYDROCHLORIDE 25 MG/1
25 CAPSULE ORAL NIGHTLY
Qty: 30 CAPSULE | Refills: 11 | Status: SHIPPED | OUTPATIENT
Start: 2021-02-17 | End: 2021-05-28

## 2021-02-17 RX ORDER — DULOXETIN HYDROCHLORIDE 60 MG/1
60 CAPSULE, DELAYED RELEASE ORAL 2 TIMES DAILY
Qty: 60 CAPSULE | Refills: 11 | Status: SHIPPED | OUTPATIENT
Start: 2021-02-17 | End: 2022-04-01

## 2021-02-17 RX ORDER — VARENICLINE TARTRATE 1 MG/1
1 TABLET, FILM COATED ORAL 2 TIMES DAILY
Qty: 60 TABLET | Refills: 4 | Status: SHIPPED | OUTPATIENT
Start: 2021-02-17 | End: 2021-09-17

## 2021-02-17 RX ORDER — FUROSEMIDE 40 MG/1
40 TABLET ORAL DAILY
Qty: 30 TABLET | Refills: 5 | Status: SHIPPED | OUTPATIENT
Start: 2021-02-17 | End: 2021-03-17 | Stop reason: SINTOL

## 2021-02-17 RX ORDER — VARENICLINE TARTRATE
KIT
Qty: 1 BOX | Refills: 0 | Status: SHIPPED | OUTPATIENT
Start: 2021-02-17 | End: 2021-05-28

## 2021-02-17 ASSESSMENT — ENCOUNTER SYMPTOMS
DIARRHEA: 0
BACK PAIN: 0
ABDOMINAL PAIN: 0
SHORTNESS OF BREATH: 1
NAUSEA: 0
COUGH: 0
VOMITING: 0
CONSTIPATION: 0

## 2021-02-17 NOTE — PROGRESS NOTES
[address] [facility], [address]  Phone [phone]   Fax [phone]      OFFICE VISIT: 2021    Koki Pollockass: 1980      HPI  Reason For Visit:  Haydee Alarcon is a 42 y.o. Diabetes    Patient presents on follow-up for multiple health issues. Present concerns:  Multiple as below      Diabetes Mellitus Type 2  Not as well controlled as it has been. Diet compliance:  compliant most of the time. Recently she is not eating well. Nutrition Consultation Needed:  no  Medication:   Metformin 1000 mg twice daily   Semaglutide 0.5 mg subcu weekly  Medication compliance:  compliant most of the time  Weight trend: stable  Current exercise: no  Checkin times daily  Home blood sugar records: fasting range: running higher recently  Low BG:  no  Eye exam current (within one year): yes  Checking Feet regularly:  yes -no sores  ACE/ARB:  yes -Cozaar 25 mg daily  Aspirin: No: But recommended  She is taking Lyrica 150 mg 3 times daily for her neuropathy. She is also taking Cymbalta 60 mg daily for neuropathy  Tobacco history: She  reports that she quit smoking about 2 years ago. Her smoking use included cigarettes. She has a 15.00 pack-year smoking history. She has never used smokeless tobacco.    Lab Results   Component Value Date    LABA1C 5.8 2020    LABA1C 6.1 (H) 2019    LABA1C 6.2 2019     Lab Results   Component Value Date    LABMICR <1.20 2019    CREATININE 0.7 2020       Hypertension:   BP today was   BP Readings from Last 1 Encounters:   20 (!) 142/92      Recent BP readings:    BP Readings from Last 3 Encounters:   20 (!) 142/92   19 122/80   19 130/80     Medication   Cozaar 25 mg daily  Medication compliance:  compliant most of the time  Home blood pressure monitoring: Yes - controlled. She is not adherent to a low sodium diet.      Symptoms: none  Laboratory:  Lab Results   Component Value Date    BUN 10 2020 CREATININE 0.7 07/07/2020       Hyperlipidemia:   Lipids have not been checked in over a year. Medication:   None  Low Fat, Low Choleterol Diet:  yes -to some degree  Myalgias or GI upset: no  The patient exercises intermittently. Laboratory:    Lab Results   Component Value Date    CHOL 187 10/08/2019    TRIG 138 10/08/2019    HDL 40 (L) 10/08/2019    LDLCALC 119 10/08/2019      Lab Results   Component Value Date    ALT 52 (H) 07/07/2020    AST 34 (H) 07/07/2020       Vitamin D deficiency:  Medication:   Ergocalciferol 50,000 units weekly. She is not taking any additional vitamin D. Symptoms: None      She is having a lot of peripheral edema  This is very scary for her . She is taking Lasix on a routine basis. This sometimes makes her urinate and other times it does not      Borderline low thyroid: We are going to need to check this   She does have multiple symptoms of hypothyroidism. Fatigue, dry skin, hair loss, constipation, cold intolerance, edema      She has episodes of chest pain. These are associated with mild shortness of breath. The chest pain does radiate to her left shoulder. She is not really exerting herself due to her fibromyalgia. She did have a normal stress test back in October 2017. She does have multiple risk factors for coronary artery disease including diabetes, hypertension, hyperlipidemia, smoking. Nicotine dependence  She is presently smoking a little over a pack a day. She does seem to be motivated to quit  We discussed this and she is wanting to try chantix. Fibromyalgia:  Medication:   Lyrica 150 mg in the morning and 300 mg at bedtime   Cymbalta 60 mg daily  Symptoms: She continues to be limited by her fibromyalgia symptoms. She is unable to exercise due to her pain       vitals were not taken for this visit. There is no height or weight on file to calculate BMI. I have reviewed the following with the MsNickolas  Asmita   Lab Review  No visits with varenicline (CHANTIX STARTING MONTH PAK) 0.5 MG X 11 & 1 MG X 42 tablet Take by mouth. 1 box 0    varenicline (CHANTIX CONTINUING MONTH PAK) 1 MG tablet Take 1 tablet by mouth 2 times daily 60 tablet 4    DULoxetine (CYMBALTA) 60 MG extended release capsule Take 1 capsule by mouth 2 times daily 60 capsule 11    nortriptyline (PAMELOR) 25 MG capsule Take 1 capsule by mouth nightly 30 capsule 11    furosemide (LASIX) 40 MG tablet Take 1 tablet by mouth daily 30 tablet 5    metFORMIN (GLUCOPHAGE) 1000 MG tablet Take 1 tablet by mouth 2 times daily (with meals) 180 tablet 3    losartan (COZAAR) 25 MG tablet Take 1 tablet by mouth daily 90 tablet 3    vitamin D (ERGOCALCIFEROL) 1.25 MG (54807 UT) CAPS capsule Take 1 capsule by mouth once a week 12 capsule 1    pregabalin (LYRICA) 150 MG capsule Take 1 capsule by mouth 3 times daily for 30 days. TAKE 1 CAPSULE BY MOUTH IN THE MORNING AND 2 AT BEDTIME 90 capsule 5    montelukast (SINGULAIR) 10 MG tablet Take 1 tablet by mouth nightly 30 tablet 5    potassium chloride (KLOR-CON M) 10 MEQ extended release tablet Take 1 tablet by mouth daily 30 tablet 5    ondansetron (ZOFRAN-ODT) 4 MG disintegrating tablet Take 1 tablet by mouth 3 times daily as needed for Nausea or Vomiting 21 tablet 0    tiZANidine (ZANAFLEX) 4 MG tablet Take 1 tablet by mouth every 8 hours as needed (muscle spasms/pain) 90 tablet 1    Semaglutide,0.25 or 0.5MG/DOS, 2 MG/1.5ML SOPN Inject 0.5 mg into the skin once a week 1 pen 11     No current facility-administered medications for this visit. Allergies: Hydrocodone-acetaminophen and Adhesive tape     Past Medical History:   Diagnosis Date    Chronic back pain     gets injections with [facility].      CPAP (continuous positive airway pressure) dependence     13cm    Fibromyalgia     GERD (gastroesophageal reflux disease)     Hyperlipidemia     Obstructive sleep apnea     AHI: 18.1 per [facility], 11/2017    Restless leg syndrome     Skin cancer     left breast     Vitamin D deficiency        Family History   Problem Relation Age of Onset    Heart Disease Mother     High Blood Pressure Mother     Diabetes Mother     Heart Disease Father     High Blood Pressure Father     Cancer Maternal Grandfather         esphogeal cancer    Cancer Paternal Grandfather         colon ca    Colon Cancer Paternal Grandfather     Colon Polyps Neg Hx     Liver Cancer Neg Hx     Liver Disease Neg Hx     Rectal Cancer Neg Hx     Stomach Cancer Neg Hx        Past Surgical History:   Procedure Laterality Date    ABDOMINAL EXPLORATION SURGERY       SECTION      x1    CHOLECYSTECTOMY      HYSTERECTOMY, VAGINAL      NECK SURGERY      2 level neck fusion    OVARIAN CYST REMOVAL      MD EGD TRANSORAL BIOPSY SINGLE/MULTIPLE N/A 2017    Dr Meier-w/dilation over wire, 46 [place]-distal esophageal narrowing-Codie (-)    UPPER GASTROINTESTINAL ENDOSCOPY  2017    Dr Meier-sushma/dilation over wire, 46 [place]-distal esophageal narrowing-Codie (-)       Social History     Tobacco Use    Smoking status: Former Smoker     Packs/day: 1.00     Years: 15.00     Pack years: 15.00     Types: Cigarettes     Quit date: 2018     Years since quittin.4    Smokeless tobacco: Never Used    Tobacco comment: smokes 1-2 cigarretts per day/ patient has tried but nothing has worked   Substance Use Topics    Alcohol use: No        Review of Systems   Constitutional: Positive for activity change (decreased due to pain). Negative for appetite change, fatigue and fever. Respiratory: Positive for shortness of breath (occasional with chest pain). Negative for cough. She is smoking about 1ppd. Cardiovascular: Positive for chest pain (intermittent) and leg swelling (she notes that she is swelling all over. ). Gastrointestinal: Negative for abdominal pain, constipation, diarrhea, nausea and vomiting.    Endocrine: Positive for cold 1. Cigarette nicotine dependence with nicotine-induced disorder  We did discuss options in regards to smoking cessation. We are going to try Chantix. She has utilized Chantix in the past with smoking cessation and was somewhat successful. She has an adhesive allergy which limits her use of patches. We discussed Chantix from a side effect profile risk-benefit ratio. - varenicline (CHANTIX STARTING MONTH PAK) 0.5 MG X 11 & 1 MG X 42 tablet; Take by mouth. Dispense: 1 box; Refill: 0  - varenicline (CHANTIX CONTINUING MONTH PAK) 1 MG tablet; Take 1 tablet by mouth 2 times daily  Dispense: 60 tablet; Refill: 4  - ECHO Stress Test; Future    2. Type 2 diabetes mellitus with diabetic polyneuropathy, without long-term current use of insulin (HCC)  Blood sugars historically have been well controlled. Recently her diet has not been optimal.  We are anticipating that she might have a slight bump in her hemoglobin A1c. She is going to try to do a better job from a dietary standpoint in the future. Her medications have been effective in the past.  We will see what kind of values we get and make adjustments accordingly. She is aware that she is going to need to intervene from a dietary standpoint and exercise standpoint. If we are going to assist her from an exercise perspective, we are going to have to get better control of her fibromyalgia symptoms  - Comprehensive Metabolic Panel; Future  - Hemoglobin A1C; Future  - Microalbumin / Creatinine Urine Ratio; Future  - ECHO Stress Test; Future  - DULoxetine (CYMBALTA) 60 MG extended release capsule; Take 1 capsule by mouth 2 times daily  Dispense: 60 capsule; Refill: 11  - nortriptyline (PAMELOR) 25 MG capsule; Take 1 capsule by mouth nightly  Dispense: 30 capsule; Refill: 11    3. Essential hypertension  The current medical regimen is effective;  continue present plan and medications. - CBC Auto Differential; Future  - Comprehensive Metabolic Panel;  Future  - Microalbumin / Creatinine Urine Ratio; Future  - ECHO Stress Test; Future    4. Mixed hyperlipidemia  Lipids have been elevated in the past.  She is not on any lipid therapy. She is at high risk with multiple comorbidities. We are going to need to recheck lipids as they have not been checked in over a year  - Lipid Panel; Future  - ECHO Stress Test; Future    5. Peripheral edema  We discussed the mechanism of Lasix and that it is a threshold drug. We are going to increase this dose to 40 mg daily. We will also need to follow potassium on this increased dose. She does have a potassium listed on her medication list however she states that she has never gotten that filled. - ECHO Stress Test; Future  - furosemide (LASIX) 40 MG tablet; Take 1 tablet by mouth daily  Dispense: 30 tablet; Refill: 5    6. Chest pain, unspecified type  She is having episodes of chest pain. This can be with exertion and at rest.  She has not really been exerting herself at all due to her fibromyalgia pain. She has had a normal stress test back in 2017 (nuclear stress)  She has significant risk factors for coronary artery disease. We are going to reevaluate with a stress echo. She is going to try to ambulate on the treadmill.  - CBC Auto Differential; Future  - ECHO Stress Test; Future    7. Anxiety  She feels the duloxetine has been helpful for this. We are going to increase this to twice daily  - DULoxetine (CYMBALTA) 60 MG extended release capsule; Take 1 capsule by mouth 2 times daily  Dispense: 60 capsule; Refill: 11    8. Primary insomnia  In order to better facilitate sleep and possibly her neuropathy, and her fibromyalgia, we are going to add nortriptyline 25 mg at bedtime  - nortriptyline (PAMELOR) 25 MG capsule; Take 1 capsule by mouth nightly  Dispense: 30 capsule; Refill: 11    9. Neuropathy  Interventions with nortriptyline and increasing Cymbalta hopefully will help with her neuropathy.   We will also look for alternate causes of neuropathy. If possible, due to her edema, we would like to decrease the Lyrica dosing from 150 mg in the a.m. and 300 mg nightly to 150 mg twice daily. - Vitamin B12; Future    10. Fatigue, unspecified type  Check labs  - Comprehensive Metabolic Panel; Future  - T4, Free; Future  - TSH without Reflex; Future  - Vitamin B12; Future    11. Fibromyalgia  Interventions noted as above. Hopefully this will make a difference in her symptoms, and allow us to potentially decrease her Lyrica dosage  - DULoxetine (CYMBALTA) 60 MG extended release capsule; Take 1 capsule by mouth 2 times daily  Dispense: 60 capsule; Refill: 11  - nortriptyline (PAMELOR) 25 MG capsule; Take 1 capsule by mouth nightly  Dispense: 30 capsule; Refill: 11    12. Vitamin D deficiency  Recheck vitamin D on therapy  - Vitamin D 25 Hydroxy; Future      Orders Placed This Encounter   Procedures    CBC Auto Differential    Comprehensive Metabolic Panel    Hemoglobin A1C    Lipid Panel    T4, Free    TSH without Reflex    Microalbumin / Creatinine Urine Ratio    Vitamin B12    Vitamin D 25 Hydroxy    ECHO Stress Test        Return in about 1 month (around 3/17/2021) for 30. Bernice Rm is a 42 y.o. female being evaluated by a Virtual Visit (video visit) encounter to address concerns as mentioned above. A caregiver was present when appropriate. Due to this being a TeleHealth encounter ([facility]), evaluation of the following organ systems was limited: Vitals/Constitutional/EENT/Resp/CV/GI//MS/Neuro/Skin/Heme-Lymph-Imm. Pursuant to the emergency declaration under the [address] [place] [address], [address] [place] [address] authority and the [employer] Act, this Virtual Visit was conducted with patient's (and/or legal guardian's) consent, to reduce the patient's risk of exposure to COVID-19 and provide necessary medical care.   The patient (and/or legal guardian) has also been advised to contact this office for worsening conditions or problems, and seek emergency medical treatment and/or call 911 if deemed necessary. Patient identification was verified at the start of the visit: Yes    Total time spent for this encounter: 40m    Services were provided through a video synchronous discussion virtually to substitute for in-person clinic visit. Patient and provider were located at their individual homes. --MODESTA Rosas DO on 2/17/2021 at 10:46 AM    An electronic signature was used to authenticate this note.

## 2021-02-22 DIAGNOSIS — R53.83 FATIGUE, UNSPECIFIED TYPE: ICD-10-CM

## 2021-02-22 DIAGNOSIS — G62.9 NEUROPATHY: ICD-10-CM

## 2021-02-22 DIAGNOSIS — I10 ESSENTIAL HYPERTENSION: ICD-10-CM

## 2021-02-22 DIAGNOSIS — E78.2 MIXED HYPERLIPIDEMIA: ICD-10-CM

## 2021-02-22 DIAGNOSIS — E11.42 TYPE 2 DIABETES MELLITUS WITH DIABETIC POLYNEUROPATHY, WITHOUT LONG-TERM CURRENT USE OF INSULIN (HCC): ICD-10-CM

## 2021-02-22 DIAGNOSIS — R07.9 CHEST PAIN, UNSPECIFIED TYPE: ICD-10-CM

## 2021-02-22 DIAGNOSIS — E55.9 VITAMIN D DEFICIENCY: ICD-10-CM

## 2021-02-22 LAB
ALBUMIN SERPL-MCNC: 4 G/DL (ref 3.5–5.2)
ALP BLD-CCNC: 95 U/L (ref 35–104)
ALT SERPL-CCNC: 62 U/L (ref 5–33)
ANION GAP SERPL CALCULATED.3IONS-SCNC: 13 MMOL/L (ref 7–19)
AST SERPL-CCNC: 41 U/L (ref 5–32)
BASOPHILS ABSOLUTE: 0.1 K/UL (ref 0–0.2)
BASOPHILS RELATIVE PERCENT: 0.9 % (ref 0–1)
BILIRUB SERPL-MCNC: <0.2 MG/DL (ref 0.2–1.2)
BUN BLDV-MCNC: 14 MG/DL (ref 6–20)
CALCIUM SERPL-MCNC: 9.4 MG/DL (ref 8.6–10)
CHLORIDE BLD-SCNC: 103 MMOL/L (ref 98–111)
CHOLESTEROL, TOTAL: 206 MG/DL (ref 160–199)
CO2: 24 MMOL/L (ref 22–29)
CREAT SERPL-MCNC: 0.6 MG/DL (ref 0.5–0.9)
EOSINOPHILS ABSOLUTE: 0.2 K/UL (ref 0–0.6)
EOSINOPHILS RELATIVE PERCENT: 1.6 % (ref 0–5)
GFR AFRICAN AMERICAN: >59
GFR NON-AFRICAN AMERICAN: >60
GLUCOSE BLD-MCNC: 110 MG/DL (ref 74–109)
HBA1C MFR BLD: 6.2 % (ref 4–6)
HCT VFR BLD CALC: 47.3 % (ref 37–47)
HDLC SERPL-MCNC: 50 MG/DL (ref 65–121)
HEMOGLOBIN: 15.1 G/DL (ref 12–16)
IMMATURE GRANULOCYTES #: 0 K/UL
LDL CHOLESTEROL CALCULATED: 138 MG/DL
LYMPHOCYTES ABSOLUTE: 3.3 K/UL (ref 1.1–4.5)
LYMPHOCYTES RELATIVE PERCENT: 30.8 % (ref 20–40)
MCH RBC QN AUTO: 29.7 PG (ref 27–31)
MCHC RBC AUTO-ENTMCNC: 31.9 G/DL (ref 33–37)
MCV RBC AUTO: 93.1 FL (ref 81–99)
MONOCYTES ABSOLUTE: 0.5 K/UL (ref 0–0.9)
MONOCYTES RELATIVE PERCENT: 4.6 % (ref 0–10)
NEUTROPHILS ABSOLUTE: 6.7 K/UL (ref 1.5–7.5)
NEUTROPHILS RELATIVE PERCENT: 61.7 % (ref 50–65)
PDW BLD-RTO: 13.2 % (ref 11.5–14.5)
PLATELET # BLD: 304 K/UL (ref 130–400)
PMV BLD AUTO: 11.5 FL (ref 9.4–12.3)
POTASSIUM SERPL-SCNC: 4.3 MMOL/L (ref 3.5–5)
RBC # BLD: 5.08 M/UL (ref 4.2–5.4)
SODIUM BLD-SCNC: 140 MMOL/L (ref 136–145)
T4 FREE: 1 NG/DL (ref 0.93–1.7)
TOTAL PROTEIN: 7.5 G/DL (ref 6.6–8.7)
TRIGL SERPL-MCNC: 88 MG/DL (ref 0–149)
TSH SERPL DL<=0.05 MIU/L-ACNC: 3.63 UIU/ML (ref 0.27–4.2)
VITAMIN B-12: 378 PG/ML (ref 211–946)
VITAMIN D 25-HYDROXY: 25.5 NG/ML
WBC # BLD: 10.8 K/UL (ref 4.8–10.8)

## 2021-02-25 ENCOUNTER — TELEPHONE (OUTPATIENT)
Dept: PRIMARY CARE CLINIC | Age: 41
End: 2021-02-25

## 2021-02-25 DIAGNOSIS — E78.2 MIXED HYPERLIPIDEMIA: ICD-10-CM

## 2021-02-25 DIAGNOSIS — E55.9 VITAMIN D DEFICIENCY: Primary | ICD-10-CM

## 2021-02-25 DIAGNOSIS — E11.42 TYPE 2 DIABETES MELLITUS WITH DIABETIC POLYNEUROPATHY, WITHOUT LONG-TERM CURRENT USE OF INSULIN (HCC): ICD-10-CM

## 2021-02-25 RX ORDER — ERGOCALCIFEROL 1.25 MG/1
50000 CAPSULE ORAL WEEKLY
Qty: 4 CAPSULE | Refills: 1 | Status: SHIPPED | OUTPATIENT
Start: 2021-02-25 | End: 2022-02-15

## 2021-02-25 RX ORDER — ATORVASTATIN CALCIUM 20 MG/1
20 TABLET, FILM COATED ORAL NIGHTLY
Qty: 30 TABLET | Refills: 11 | Status: SHIPPED | OUTPATIENT
Start: 2021-02-25 | End: 2022-08-12 | Stop reason: SDUPTHER

## 2021-02-26 NOTE — TELEPHONE ENCOUNTER
----- Message from ERIK Meehan sent at 2/23/2021  3:20 PM CST -----  Your vitamin d level is low  Suggest start vitamin d 85706 units 1 po weekly for 8 weeks #4 1 refill  Then recheck level  b12 low of normal  Take orally daily to see if helps  Thyroid wnl  Lipids  suggest start lipitor 20mg 1 po qpm #30 11rf  Recheck in 6 weeks alt and lipids  Cmp electrolytes and kidneys wnl  Liver slightly elevated suggest diet control  a1c 6.2 cont tight control  CBC normal no anemia or concerns

## 2021-03-02 ENCOUNTER — HOSPITAL ENCOUNTER (OUTPATIENT)
Dept: NON INVASIVE DIAGNOSTICS | Age: 41
Discharge: HOME | End: 2021-03-02
Payer: MEDICAID

## 2021-03-02 DIAGNOSIS — E78.2 MIXED HYPERLIPIDEMIA: ICD-10-CM

## 2021-03-02 DIAGNOSIS — R07.9 CHEST PAIN, UNSPECIFIED TYPE: ICD-10-CM

## 2021-03-02 DIAGNOSIS — F17.219 CIGARETTE NICOTINE DEPENDENCE WITH NICOTINE-INDUCED DISORDER: ICD-10-CM

## 2021-03-02 DIAGNOSIS — R60.9 PERIPHERAL EDEMA: ICD-10-CM

## 2021-03-02 DIAGNOSIS — E11.42 TYPE 2 DIABETES MELLITUS WITH DIABETIC POLYNEUROPATHY, WITHOUT LONG-TERM CURRENT USE OF INSULIN (HCC): ICD-10-CM

## 2021-03-02 DIAGNOSIS — I10 ESSENTIAL HYPERTENSION: ICD-10-CM

## 2021-03-02 LAB
LV EF: 50 %
LVEF MODALITY: NORMAL

## 2021-03-02 PROCEDURE — 6360000004 HC RX CONTRAST MEDICATION: Performed by: INTERNAL MEDICINE

## 2021-03-02 PROCEDURE — C8928 TTE W OR W/O FOL W/CON,STRES: HCPCS

## 2021-03-02 RX ADMIN — PERFLUTREN 2.2 MG: 6.52 INJECTION, SUSPENSION INTRAVENOUS at 09:23

## 2021-03-04 ENCOUNTER — TELEPHONE (OUTPATIENT)
Dept: PRIMARY CARE CLINIC | Age: 41
End: 2021-03-04

## 2021-03-04 NOTE — TELEPHONE ENCOUNTER
----- Message from [address] [facility],[address], DO sent at 3/2/2021  2:40 PM CST -----  Stress test was normal  Good news.

## 2021-03-17 ENCOUNTER — TELEMEDICINE (OUTPATIENT)
Dept: PRIMARY CARE CLINIC | Age: 41
End: 2021-03-17
Payer: MEDICAID

## 2021-03-17 DIAGNOSIS — E11.69 DIABETES MELLITUS TYPE 2 IN OBESE (HCC): Primary | ICD-10-CM

## 2021-03-17 DIAGNOSIS — E78.2 MIXED HYPERLIPIDEMIA: ICD-10-CM

## 2021-03-17 DIAGNOSIS — E66.9 DIABETES MELLITUS TYPE 2 IN OBESE (HCC): Primary | ICD-10-CM

## 2021-03-17 DIAGNOSIS — R60.9 PERIPHERAL EDEMA: ICD-10-CM

## 2021-03-17 DIAGNOSIS — M79.7 FIBROMYALGIA: ICD-10-CM

## 2021-03-17 DIAGNOSIS — I10 ESSENTIAL HYPERTENSION: ICD-10-CM

## 2021-03-17 DIAGNOSIS — F17.219 CIGARETTE NICOTINE DEPENDENCE WITH NICOTINE-INDUCED DISORDER: ICD-10-CM

## 2021-03-17 PROBLEM — R60.0 PERIPHERAL EDEMA: Status: ACTIVE | Noted: 2021-03-17

## 2021-03-17 PROCEDURE — 99214 OFFICE O/P EST MOD 30 MIN: CPT | Performed by: PEDIATRICS

## 2021-03-17 ASSESSMENT — ENCOUNTER SYMPTOMS
SHORTNESS OF BREATH: 1
CONSTIPATION: 0
BACK PAIN: 0
DIARRHEA: 0
COUGH: 0
NAUSEA: 0
ABDOMINAL PAIN: 0
VOMITING: 0

## 2021-03-17 NOTE — PATIENT INSTRUCTIONS
Patient Education        Low Sodium Diet (2,000 Milligram): Care Instructions  Overview     Limiting sodium can be an important part of managing some health problems. The most common source of sodium is salt. People get most of the salt in their diet from canned, prepared, and packaged foods. Fast food and restaurant meals also are very high in sodium. Your doctor will probably limit your sodium to less than 2,000 milligrams (mg) a day. This limit counts all the sodium in prepared and packaged foods and any salt you add to your food. Follow-up care is a key part of your treatment and safety. Be sure to make and go to all appointments, and call your doctor if you are having problems. It's also a good idea to know your test results and keep a list of the medicines you take. How can you care for yourself at home? Read food labels  · Read labels on cans and food packages. The labels tell you how much sodium is in each serving. Make sure that you look at the serving size. If you eat more than the serving size, you have eaten more sodium. · Food labels also tell you the Percent Daily Value for sodium. Choose products with low Percent Daily Values for sodium. · Be aware that sodium can come in forms other than salt, including monosodium glutamate (MSG), sodium citrate, and sodium bicarbonate (baking soda). MSG is often added to Asian food. When you eat out, you can sometimes ask for food without MSG or added salt. Buy low-sodium foods  · Buy foods that are labeled \"unsalted\" (no salt added), \"sodium-free\" (less than 5 mg of sodium per serving), or \"low-sodium\" (140 mg or less of sodium per serving). Foods labeled \"reduced-sodium\" and \"light sodium\" may still have too much sodium. Be sure to read the label to see how much sodium you are getting. · Buy fresh vegetables, or frozen vegetables without added sauces. Buy low-sodium versions of canned vegetables, soups, and other canned goods.   Prepare low-sodium meals · Cut back on the amount of salt you use in cooking. This will help you adjust to the taste. Do not add salt after cooking. One teaspoon of salt has about 2,300 mg of sodium. · Take the salt shaker off the table. · Flavor your food with garlic, lemon juice, onion, vinegar, herbs, and spices. Do not use soy sauce, lite soy sauce, steak sauce, onion salt, garlic salt, celery salt, or ketchup on your food. · Use low-sodium salad dressings, sauces, and ketchup. Or make your own salad dressings and sauces without adding salt. · Use less salt (or none) when recipes call for it. You can often use half the salt a recipe calls for without losing flavor. Other foods such as rice, pasta, and grains do not need added salt. · Rinse canned vegetables, and cook them in fresh water. This removes somebut not allof the salt. · Avoid water that is naturally high in sodium or that has been treated with water softeners, which add sodium. If you buy bottled water, read the label and choose a sodium-free brand. Avoid high-sodium foods  · Avoid eating:  ? Smoked, cured, salted, and canned meat, fish, and poultry. ? Ham, becerril, hot dogs, and luncheon meats. ? Regular, hard, and processed cheese and regular peanut butter. ? Crackers with salted tops, and other salted snack foods such as pretzels, chips, and salted popcorn. ? Frozen prepared meals, unless labeled low-sodium. ? Canned and dried soups, broths, and bouillon, unless labeled sodium-free or low-sodium. ? Canned vegetables, unless labeled sodium-free or low-sodium. ? Western Jasmine fries, pizza, tacos, and other fast foods. ? Pickles, olives, ketchup, and other condiments, especially soy sauce, unless labeled sodium-free or low-sodium. Where can you learn more? Go to https://kevin.healthForward Financial [email or web address] org and sign in to your N(i)Â² account. Enter Z797 in the [facility] box to learn more about \"Low Sodium Diet (2,000 Milligram): Care Instructions. \"     If you do not have an account, please click on the \"Sign Up Now\" link. Current as of: December 17, 2020               Content Version: 12.8  © 2006-2021 Healthwise, [employer]. Care instructions adapted under license by [facility] ([facility]). If you have questions about a medical condition or this instruction, always ask your healthcare professional. [address] any warranty or liability for your use of this information. Patient Education        Learning About Low-Sodium Foods  What foods are low in sodium? The foods you eat contain nutrients, such as vitamins and minerals. Sodium is a nutrient. Your body needs the right amount to stay healthy and work as it should. You can use the list below to help you make choices about which foods to eat. Fruits  · Fresh, frozen, canned, or dried fruit  Vegetables  · Fresh or frozen vegetables, with no added salt  · Canned vegetables, low-sodium or with no added salt  Grains  · Bagels without salted tops  · Cereal with no added salt  · Corn tortillas  · Crackers with no added salt  · Oatmeal, cooked without salt  · Popcorn with no salt  · Pasta and noodles, cooked without salt  · Rice, cooked without salt  · Unsalted pretzels  Dairy and dairy alternatives  · Butter, unsalted  · Cream cheese  · Ice cream  · Milk  · Soy milk  Meats and other protein foods  · Beans and peas, canned with no salt  · Eggs  · Fresh fish (not smoked)  · Fresh meats (not smoked or cured)  · Nuts and nut butter, prepared without salt  · Poultry, not packaged with sodium solution  · Tofu, unseasoned  · Tuna, canned without salt  Seasonings  · Garlic  · Herbs and spices  · Lemon juice  · Mustard  · Olive oil  · Salt-free seasoning mixes  · Vinegar  Work with your doctor to find out how much of this nutrient you need. Depending on your health, you may need more or less of it in your diet. Where can you learn more? Go to https://chnavdeepeb.[email or web address] org and sign in to your Kuaidi [employer]ache account. Enter Z248 in the [facility] box to learn more about \"Learning About Low-Sodium Foods. \"     If you do not have an account, please click on the \"Sign Up Now\" link. Current as of: December 17, 2020               Content Version: 12.8  © 2006-2021 [employer]. Care instructions adapted under license by [facility] ([facility]). If you have questions about a medical condition or this instruction, always ask your healthcare professional. [address] any warranty or liability for your use of this information. Patient Education        How to Read a Food Label to Limit Sodium: Care Instructions  Overview  Limiting sodium can be an important part of managing some health problems. Processed foods, fast food, and restaurant foods are the major sources of dietary sodium. The most common name for sodium is salt. Most packaged foods have a Nutrition Facts label. This will tell you how much sodium is in one serving of food. Follow-up care is a key part of your treatment and safety. Be sure to make and go to all appointments, and call your doctor if you are having problems. It's also a good idea to know your test results and keep a list of the medicines you take. How can you care for yourself at home? Read ingredient lists on food labels  · Read the list of ingredients on food labels to help you find how much sodium is in a food. The label lists the ingredients in a food in descending order (from the most to the least). If salt or sodium is high on the list, there may be a lot of sodium in the food. · Know that sodium has different names. Sodium is also called monosodium glutamate (MSG, common in [facility] food), sodium citrate, sodium alginate, and sodium phosphate. Read Nutrition Facts labels  · On most foods, there is a Nutrition Facts label. This will tell you how much sodium is in one serving of food. Look at both the serving size and the sodium amount.  The serving size is located at the top of the label, usually right under the \"Nutrition Facts\" title. The amount of sodium is given in the list under the title. It is given in milligrams (mg). ? Check the serving size carefully. A single serving is often very small, and you may eat more than one serving. If this is the case, you will eat more sodium than listed on the label. For example, if the serving size for a canned soup is 1 cup and the sodium amount is 470 mg, if you have 2 cups you will eat 940 mg of sodium. · The nutrition facts for fresh fruits and vegetables are not listed on the food. They may be listed somewhere in the store. These foods usually have no sodium or low sodium. · The Nutrition Facts label also gives you the Percent Daily Value for sodium. This is how much of the recommended amount of sodium a serving contains. The daily value for sodium is less than 2,300 mg. So if the Percent Daily Value says 50%, this means one serving is giving you half of this, or 1,150 mg. Buy low-sodium foods  · Look for foods that are made with less sodium. Watch for the following words on the label. ? \"Unsalted\" means there is no sodium added to the food. But there may be sodium already in the food naturally. ? \"Sodium-free\" means a serving has less than 5 mg of sodium. ? \"Very low sodium\" means a serving has 35 mg or less of sodium. ? \"Low-sodium\" means a serving has 140 mg or less of sodium. · \"Reduced-sodium\" means that there is 25% less sodium than what the food normally has. This is still usually too much sodium. Try not to buy foods with this on the label. · Buy fresh vegetables, or frozen vegetables without added sauces. Buy low-sodium versions of canned vegetables, soups, and other canned goods. Where can you learn more? Go to https://kevin.CultureAlley. org and sign in to your [facility] account.  Enter [id number] in the [facility] box to learn more about \"How to Read a Food Label to processed and packaged foods like chips, crackers, and cookies. · Be active. Exercise can improve your cholesterol level. Get at least 30 minutes of exercise on most days of the week. Walking is a good choice. You also may want to do other activities, such as running, swimming, cycling, or playing tennis or team sports. · Stay at a healthy weight. Lose weight if you need to. · Don't smoke. If you need help quitting, talk to your doctor about stop-smoking programs and medicines. These can increase your chances of quitting for good. How is high cholesterol treated? The goal of treatment is to reduce your chances of having a heart attack or stroke. The goal is not to lower your cholesterol numbers only. · You may make lifestyle changes, such as eating healthy foods, not smoking, losing weight, and being more active. · You may have to take medicine. Follow-up care is a key part of your treatment and safety. Be sure to make and go to all appointments, and call your doctor if you are having problems. It's also a good idea to know your test results and keep a list of the medicines you take. Where can you learn more? Go to https://Gen9pepicHarbor Technologies.Beceem [email or web address] org and sign in to your [employer] account. Enter A632 in the [facility] box to learn more about \"Learning About High Cholesterol. \"     If you do not have an account, please click on the \"Sign Up Now\" link. Current as of: August 31, 2020               Content Version: 12.8  © 2006-2021 Healthwise, [facility]. Care instructions adapted under license by [facility] ([facility]). If you have questions about a medical condition or this instruction, always ask your healthcare professional. [place] [address] any warranty or liability for your use of this information. Patient Education        Learning About Statins for People With Diabetes  Introduction  Statins are medicines that help with your cholesterol.  Cholesterol is a type of fat in your blood. If you have too much of this fat, it can build up in blood vessels. This raises your risk of heart disease, heart attack, and stroke. Many people with diabetes take statins. Diabetes can cause problems in your body that may also lead to heart disease. That means your risks of heart attack and stroke are higher when you have diabetes. Statins can lower your risk. Your doctor may prescribe a statin if:  · You have diabetes. · AND you are age 36 to 76. Statins may help people with diabetes at other ages too. Your doctor can help you decide if statins may help you. What are some examples of statins? Here are some examples of statins. For each item in the list, the generic name is first, followed by any brand names. · atorvastatin (Lipitor)  · lovastatin (Altoprev, Mevacor)  · pravastatin (Pravachol)  · rosuvastatin (Crestor)  · simvastatin (Zocor)  This is not a complete list of statins. Possible side effects  Some people who take statins report that they have more muscle aches. But it's not clear whether these are actually a side effect of statins. Most side effects will go away if you stop taking the medicine. You may have other side effects not described here. Check the information that comes with your medicine. What to know about taking this medicine  · You must take statins on a regular basis for them to work well. If you stop, your risk for heart attack and stroke may go back up. · Be safe with medicines. Take your medicines exactly as prescribed. Call your doctor if you think you are having a problem with your medicine. · If you have side effects that bother you, talk to your doctor. You may be able to take a different statin. · Check with your doctor or pharmacist before you use any other medicines. This includes over-the-counter medicines. Make sure your doctor knows all of the medicines, vitamins, herbal products, and supplements you take.  Taking some medicines together can cause problems. Where can you learn more? Go to https://chpepiceweb.deeplocal. org and sign in to your Friends Around account. Enter K508 in the [facility] box to learn more about \"Learning About Statins for People With Diabetes. \"     If you do not have an account, please click on the \"Sign Up Now\" link. Current as of: August 31, 2020               Content Version: 12.8  © 2006-2021 [employer]go. Care instructions adapted under license by [facility] ([facility]). If you have questions about a medical condition or this instruction, always ask your healthcare professional. [place] [address] any warranty or liability for your use of this information. Patient Education        High Cholesterol: Care Instructions  Your Care Instructions     Cholesterol is a type of fat in your blood. It is needed for many body functions, such as making new cells. Cholesterol is made by your body. It also comes from food you eat. High cholesterol means that you have too much of the fat in your blood. This raises your risk of a heart attack and stroke. LDL and HDL are part of your total cholesterol. LDL is the \"bad\" cholesterol. High LDL can raise your risk for heart disease, heart attack, and stroke. HDL is the \"good\" cholesterol. It helps clear bad cholesterol from the body. High HDL is linked with a lower risk of heart disease, heart attack, and stroke. Your cholesterol levels help your doctor find out your risk for having a heart attack or stroke. You and your doctor can talk about whether you need to lower your risk and what treatment is best for you. A heart-healthy lifestyle along with medicines can help lower your cholesterol and your risk. The way you choose to lower your risk will depend on how high your risk is for heart attack and stroke. It will also depend on how you feel about taking medicines. Follow-up care is a key part of your treatment and safety.  Be sure to make and go to all appointments, and call your doctor if you are having problems. It's also a good idea to know your test results and keep a list of the medicines you take. How can you care for yourself at home? · Eat a variety of foods every day. Good choices include fruits, vegetables, whole grains (like oatmeal), dried beans and peas, nuts and seeds, soy products (like tofu), and fat-free or low-fat dairy products. · Replace butter, margarine, and hydrogenated or partially hydrogenated oils with olive and canola oils. (Canola oil margarine without trans fat is fine.)  · Replace red meat with fish, poultry, and soy protein (like tofu). · Limit processed and packaged foods like chips, crackers, and cookies. · Bake, broil, or steam foods. Don't toscano them. · Be physically active. Get at least 30 minutes of exercise on most days of the week. Walking is a good choice. You also may want to do other activities, such as running, swimming, cycling, or playing tennis or team sports. · Stay at a healthy weight or lose weight by making the changes in eating and physical activity listed above. Losing just a small amount of weight, even 5 to 10 pounds, can reduce your risk for having a heart attack or stroke. · Do not smoke. When should you call for help? Watch closely for changes in your health, and be sure to contact your doctor if:    · You need help making lifestyle changes.     · You have questions about your medicine. Where can you learn more? Go to https://[employer].Cleeng. org and sign in to your [employer] account. Enter H761 in the [facility] box to learn more about \"High Cholesterol: Care Instructions. \"     If you do not have an account, please click on the \"Sign Up Now\" link. Current as of: August 31, 2020               Content Version: 12.8  © 0488-8195 Healthwise, [employer]orporated. Care instructions adapted under license by [facility] ([facility]).  If you have questions about a medical condition or this When you quit smoking, you improve the health of everyone who now breathes in your smoke. · Their heart, lung, and cancer risks drop, much like yours. · They are sick less. For babies and small children, living smoke-free means they're less likely to have ear infections, pneumonia, and bronchitis. · If you're a woman who is or will be pregnant someday, quitting smoking means a healthier . · Children who are close to you are less likely to become adult smokers. Where can you learn more? Go to https://Qliance Medical Managementpegabriela1st [employer].Morning Tec. org and sign in to your Arrayit account. Enter [id number] in the [employer] box to learn more about \"Learning About Benefits From Quitting Smoking. \"     If you do not have an account, please click on the \"Sign Up Now\" link. Current as of: 2020               Content Version: 12.8  © 0760-1865 [employer]. Care instructions adapted under license by [facility] ([facility]). If you have questions about a medical condition or this instruction, always ask your healthcare professional. [place] [address] any warranty or liability for your use of this information. Patient Education        Deciding About Using Medicines To Quit Smoking  How can you decide about using medicines to quit smoking? What are the medicines you can use? Your doctor may prescribe varenicline (Chantix) or bupropion (Zyban). These medicines can help you cope with cravings for tobacco. They are pills that don't contain nicotine. You also can use nicotine replacement products. These do contain nicotine. There are many types. · Gum and lozenges slowly release nicotine into your mouth. · Patches stick to your skin. They slowly release nicotine into your bloodstream.  · An inhaler has a piña that contains nicotine. You breathe in a puff of nicotine vapor through your mouth and throat. · Nasal spray releases a mist that contains nicotine.   What are key points about this decision? · Using medicines can double your chances of quitting smoking. They can ease cravings and withdrawal symptoms. · Getting counseling along with using medicine can raise your chances of quitting even more. · If you smoke fewer than 5 cigarettes a day, you may not need medicines to help you quit smoking. · These medicines have less nicotine than cigarettes. And by itself, nicotine is not nearly as harmful as smoking. The tars, carbon monoxide, and other toxic chemicals in tobacco cause the harmful effects. · The side effects of nicotine replacement products depend on the type of product. For example, a patch can make your skin red and itchy. Medicines in pill form can make you sick to your stomach. They can also cause dry mouth and trouble sleeping. For most people, the side effects are not bad enough to make them stop using the products. Why might you choose to use medicines to quit smoking? · You have tried on your own to stop smoking, but you were not able to stop. · You smoke more than 5 cigarettes a day. · You want to increase your chances of quitting smoking. · You want to reduce your cravings and withdrawal symptoms. · You feel the benefits of medicine outweigh the side effects. Why might you choose not to use medicine? · You want to try quitting on your own by stopping all at once (\"cold turkey\"). · You want to cut back slowly on the number of cigarettes you smoke. · You smoke fewer than 5 cigarettes a day. · You do not like using medicine. · You feel the side effects of medicines outweigh the benefits. · You are worried about the cost of medicines. Your decision  Thinking about the facts and your feelings can help you make a decision that is right for you. Be sure you understand the benefits and risks of your options, and think about what else you need to do before you make the decision. Where can you learn more? Go to https://kevin.health-partners. org and sign in to your "[employer] account. Enter R190 in the [facility] box to learn more about \"Deciding About Using Medicines To Quit Smoking. \"     If you do not have an account, please click on the \"Sign Up Now\" link. Current as of: March 12, 2020               Content Version: 12.8  © 6979-7015 Healthwise, [facility]. Care instructions adapted under license by [facility] ([facility]). If you have questions about a medical condition or this instruction, always ask your healthcare professional. [place] [address] any warranty or liability for your use of this information. Patient Education        Stopping Smoking: Care Instructions  Your Care Instructions     Cigarette smokers crave the nicotine in cigarettes. Giving it up is much harder than simply changing a habit. Your body has to stop craving the nicotine. It is hard to quit, but you can do it. There are many tools that people use to quit smoking. You may find that combining tools works best for you. There are several steps to quitting. First you get ready to quit. Then you get support to help you. After that, you learn new skills and behaviors to become a nonsmoker. For many people, a necessary step is getting and using medicine. Your doctor will help you set up the plan that best meets your needs. You may want to attend a smoking cessation program to help you quit smoking. When you choose a program, look for one that has proven success. Ask your doctor for ideas. You will greatly increase your chances of success if you take medicine as well as get counseling or join a cessation program.  Some of the changes you feel when you first quit tobacco are uncomfortable. Your body will miss the nicotine at first, and you may feel short-tempered and grumpy. You may have trouble sleeping or concentrating. Medicine can help you deal with these symptoms. You may struggle with changing your smoking habits and rituals.  The last step is the tricky one: Be prepared for the smoking urge to continue for a time. This is a lot to deal with, but keep at it. You will feel better. Follow-up care is a key part of your treatment and safety. Be sure to make and go to all appointments, and call your doctor if you are having problems. It's also a good idea to know your test results and keep a list of the medicines you take. How can you care for yourself at home? · Ask your family, friends, and coworkers for support. You have a better chance of quitting if you have help and support. · Join a support group, such as Nicotine Anonymous, for people who are trying to quit smoking. · Consider signing up for a smoking cessation program, such as the American Lung Association's Freedom from Smoking program.  · Get text messaging support. Go to the website at www.smokefree. gov to sign up for the [facility] program.  · Set a quit date. Pick your date carefully so that it is not right in the middle of a big deadline or stressful time. Once you quit, do not even take a puff. Get rid of all ashtrays and lighters after your last cigarette. Clean your house and your clothes so that they do not smell of smoke. · Learn how to be a nonsmoker. Think about ways you can avoid those things that make you reach for a cigarette. ? Avoid situations that put you at greatest risk for smoking. For some people, it is hard to have a drink with friends without smoking. For others, they might skip a coffee break with coworkers who smoke. ? Change your daily routine. Take a different route to work or eat a meal in a different place. · Cut down on stress. Calm yourself or release tension by doing an activity you enjoy, such as reading a book, taking a hot bath, or gardening. · Talk to your doctor or pharmacist about nicotine replacement therapy, which replaces the nicotine in your body.  You still get nicotine but you do not use tobacco. Nicotine replacement products help you slowly reduce the amount of nicotine you need. These products come in several forms, many of them available over-the-counter:  ? Nicotine patches  ? Nicotine gum and lozenges  ? Nicotine inhaler  · Ask your doctor about bupropion (Wellbutrin) or varenicline (Chantix), which are prescription medicines. They do not contain nicotine. They help you by reducing withdrawal symptoms, such as stress and anxiety. · Some people find hypnosis, acupuncture, and massage helpful for ending the smoking habit. · Eat a healthy diet and get regular exercise. Having healthy habits will help your body move past its craving for nicotine. · Be prepared to keep trying. Most people are not successful the first few times they try to quit. Do not get mad at yourself if you smoke again. Make a list of things you learned and think about when you want to try again, such as next week, next month, or next year. Where can you learn more? Go to https://Vivakor.contrib.com. org and sign in to your [employer] account. Enter Q035 in the MyWedding box to learn more about \"Stopping Smoking: Care Instructions. \"     If you do not have an account, please click on the \"Sign Up Now\" link. Current as of: March 12, 2020               Content Version: 12.8  © 5498-4098 Healthwise, Incorporated. Care instructions adapted under license by [facility] ([facility]). If you have questions about a medical condition or this instruction, always ask your healthcare professional. [place] [address] any warranty or liability for your use of this information.

## 2021-03-17 NOTE — PROGRESS NOTES
[address] [facility] [place], [address]  Phone [phone]   Fax [phone]      OFFICE VISIT: 3/17/2021    Chantell Shade: 1980      HPI  Reason For Visit:  Maty Gilliland is a 42 y.o. Diabetes, Hypertension, Hyperlipidemia, Leg Swelling, and Chronic Pain    She is getting dehydrated due to increasing her lasix. She is having a little swelling in her legs since backing off. She is concerned about her weight. Diabetes Mellitus Type 2  Not as well controlled as it has been. Diet compliance:  compliant most of the time. Recently she is not eating well. Nutrition Consultation Needed:  no  Medication:              Metformin 1000 mg twice daily              Semaglutide 0.5 mg subcu weekly  Medication compliance:  compliant most of the time  Weight trend: down 5 lb. Current exercise: no, but she is going to begin. Checkin times daily  Home blood sugar records: fasting range: running lower 100's  Low BG:  no  Eye exam current (within one year): yes  Checking Feet regularly:  yes -no sores  ACE/ARB:  yes -Cozaar 25 mg daily  Aspirin: No: But recommended  She is taking Lyrica 150 mg 3 times daily for her neuropathy. She is also taking Cymbalta 60 mg daily for neuropathy  Tobacco history: She  reports that she quit smoking about 2 years ago. Her smoking use included cigarettes. She has a 15.00 pack-year smoking history.  She has never used smokeless tobacco.    Lab Results   Component Value Date    LABA1C 6.2 (H) 2021    LABA1C 5.8 2020    LABA1C 6.1 (H) 2019     Lab Results   Component Value Date    LABMICR <1.20 2019    CREATININE 0.6 2021       Hypertension:   BP today was   BP Readings from Last 1 Encounters:   20 (!) 142/92      Recent BP readings:    BP Readings from Last 3 Encounters:   20 (!) 142/92   19 122/80   19 130/80     Medication   Cozaar 25 mg daily  Medication compliance:  compliant most of the time  Home blood pressure monitoring: No.  Her wrist cuff is not working  She is not adherent to a low sodium diet. Symptoms: none  Laboratory:  Lab Results   Component Value Date    BUN 14 02/22/2021    CREATININE 0.6 02/22/2021       Hyperlipidemia:   Medication:   atorvastatin (Lipitor)  She is tolerating this quite well  Low Fat, Low Choleterol Diet:  yes - to some degree. Myalgias or GI upset: no  The patient exercises rarely. Laboratory:    Lab Results   Component Value Date    CHOL 206 (H) 02/22/2021    TRIG 88 02/22/2021    HDL 50 (L) 02/22/2021    LDLCALC 138 02/22/2021      Lab Results   Component Value Date    ALT 62 (H) 02/22/2021    AST 41 (H) 02/22/2021       Nicotine dependence:   Medication   Chantix. This is causing some nausea symptoms. We discussed potentially cutting the pill in half and seeing if this helps. She has cut down on her smoking to some degree but she does not feel that the Chantix is helping all that much      Fibromyalgia:  She was unable to tolerate nortriptyline as this caused her to be too sleepy during the day and she did not want to get up the next day. She stopped this medication completely. She feels that her fibromyalgia is relatively under control as of right now. We did discuss that exercise will be beneficial in the long run if she can persist with it      Peripheral edema: We did try Lasix 40 mg daily however she states that this caused her to be dehydrated. She was not able to drink enough to keep herself hydrated appropriately. She stopped this medication. She has had some of her edema come back. We discussed options including weight loss, low-sodium diet, and potentially SGLT2 inhibitors for her diabetes. We are going to try a combination of all of these and see if this makes a difference      BMI 43:  We did discuss the importance of weight loss and helping to manage all of her health issues.   Hopefully with a combination of low-sodium diet, increasing exercise, GLP-1 agonist and SGLT2 inhibitors for her diabetes treatment, and watching her diet, we may be able to get her weight down significantly. vitals were not taken for this visit. There is no height or weight on file to calculate BMI. I have reviewed the following with the Ms. Tian   Lab Review  Orders Only on 02/22/2021   Component Date Value    Vit D, 25-Hydroxy 02/22/2021 25.5*    Vitamin B-12 02/22/2021 378     TSH 02/22/2021 3.630     T4 Free 02/22/2021 1.00     Cholesterol, Total 02/22/2021 206*    Triglycerides 02/22/2021 88     HDL 02/22/2021 50*    LDL Calculated 02/22/2021 138     Hemoglobin A1C 02/22/2021 6.2*    Sodium 02/22/2021 140     Potassium 02/22/2021 4.3     Chloride 02/22/2021 103     CO2 02/22/2021 24     Anion Gap 02/22/2021 13     Glucose 02/22/2021 110*    BUN 02/22/2021 14     CREATININE 02/22/2021 0.6     GFR Non- 02/22/2021 >60     GFR  02/22/2021 >59     Calcium 02/22/2021 9.4     Total Protein 02/22/2021 7.5     Albumin 02/22/2021 4.0     Total Bilirubin 02/22/2021 <0.2     Alkaline Phosphatase 02/22/2021 95     ALT 02/22/2021 62*    AST 02/22/2021 41*    WBC 02/22/2021 10.8     RBC 02/22/2021 5.08     Hemoglobin 02/22/2021 15.1     Hematocrit 02/22/2021 47.3*    MCV 02/22/2021 93.1     MCH 02/22/2021 29.7     MCHC 02/22/2021 31.9*    RDW 02/22/2021 13.2     Platelets 14/39/2543 304     MPV 02/22/2021 11.5     Neutrophils % 02/22/2021 61.7     Lymphocytes % 02/22/2021 30.8     Monocytes % 02/22/2021 4.6     Eosinophils % 02/22/2021 1.6     Basophils % 02/22/2021 0.9     Neutrophils Absolute 02/22/2021 6.7     Immature Granulocytes # 02/22/2021 0.0     Lymphocytes Absolute 02/22/2021 3.3     Monocytes Absolute 02/22/2021 0.50     Eosinophils Absolute 02/22/2021 0.20     Basophils Absolute 02/22/2021 0.10      Copies of these are in the chart.     Current Outpatient Medications Fibromyalgia     GERD (gastroesophageal reflux disease)     Hyperlipidemia     Obstructive sleep apnea     AHI: 18.1 per PSG, 2017    Restless leg syndrome     Skin cancer     left breast     Vitamin D deficiency        Family History   Problem Relation Age of Onset    Heart Disease Mother     High Blood Pressure Mother     Diabetes Mother     Heart Disease Father     High Blood Pressure Father     Cancer Maternal Grandfather         esphogeal cancer    Cancer Paternal Grandfather         colon ca    Colon Cancer Paternal Grandfather     Colon Polyps Neg Hx     Liver Cancer Neg Hx     Liver Disease Neg Hx     Rectal Cancer Neg Hx     Stomach Cancer Neg Hx        Past Surgical History:   Procedure Laterality Date    ABDOMINAL EXPLORATION SURGERY       SECTION      x1    CHOLECYSTECTOMY      HYSTERECTOMY, VAGINAL      NECK SURGERY      2 level neck fusion    OVARIAN CYST REMOVAL      ID EGD TRANSORAL BIOPSY SINGLE/MULTIPLE N/A 2017    Dr Meier-w/dilation over wire, [address] [place]-distal esophageal narrowing-Codie (-)    UPPER GASTROINTESTINAL ENDOSCOPY  2017    Dr Meier-sushma/dilation over wire, 45 [place]-distal esophageal narrowing-Codei (-)       Social History     Tobacco Use    Smoking status: Former Smoker     Packs/day: 1.00     Years: 15.00     Pack years: 15.00     Types: Cigarettes     Quit date: 2018     Years since quittin.5    Smokeless tobacco: Never Used    Tobacco comment: smokes 1-2 cigarretts per day/ patient has tried but nothing has worked   Substance Use Topics    Alcohol use: No        Review of Systems   Constitutional: Positive for activity change (decreased due to pain) and fatigue. Negative for appetite change and fever. Respiratory: Positive for shortness of breath (occasional with chest pain ). Negative for cough. She is smoking about 1/2 ppd. Cardiovascular: Positive for leg swelling (she notes that she is swelling all over. ). Negative for chest pain. Gastrointestinal: Negative for abdominal pain, constipation, diarrhea, nausea and vomiting. Endocrine: Positive for cold intolerance. Genitourinary: Negative for difficulty urinating, flank pain, frequency, menstrual problem and pelvic pain. Musculoskeletal: Positive for myalgias (diffuse). Negative for arthralgias and back pain. Skin: Negative for rash. Psychiatric/Behavioral: Negative for behavioral problems and sleep disturbance. The patient is not nervous/anxious and is not hyperactive. Physical Exam  Physical exam was not performed today as this was a video teleconference visit using my chart      ASSESSMENT      ICD-10-CM    1. Diabetes mellitus type 2 in obese (HCC)  E11.69     E66.9    2. Essential hypertension  I10    3. Mixed hyperlipidemia  E78.2    4. Fibromyalgia  M79.7    5. Cigarette nicotine dependence with nicotine-induced disorder  F17.219    6. Peripheral edema  R60.9    7. BMI 40.0-44.9, adult ([facility]  Z68.41          PLAN    1. Diabetes mellitus type 2 in obese (HCC)  Blood sugars seem to be fairly well controlled on present medication regimen. We are going to see if we can add Farxiga to her medication regimen. This will hopefully control her blood sugars even better and may even facilitate weight loss    2. Essential hypertension  Blood pressure still elevated. The addition of [place] should hopefully bring this down as well    3. Mixed hyperlipidemia  We do need to recheck lipids now on therapy. She has been on therapy for just under a month. We need to control her risk factors as best possible to prevent complications from diabetes    4. Fibromyalgia  Again stressed the importance of exercise since she does not tolerate some of the medications    5. Cigarette nicotine dependence with nicotine-induced disorder  Recommend that she cut her Chantix in half to avoid symptoms of nausea. 6. Peripheral edema  She is not tolerating Lasix.   The addition of Charly Hubbard should also result in some fluid reduction which may help her edema    7. BMI 40.0-44.9, adult ([address]  Again Charly Hubbard typically does have weight loss associated with it. We will also contribute to weight loss with getting rid of excess fluid      No orders of the defined types were placed in this encounter. Return in about 3 months (around 6/17/2021) for Kristen. Palma Edmonds, was evaluated through a synchronous (real-time) audio-video encounter. The patient (or guardian if applicable) is aware that this is a billable service. Verbal consent to proceed has been obtained within the past 12 months. The visit was conducted pursuant to the emergency declaration under the [address] [place] [address], [address] [place] [address] authority and the [employer] and [employer] General Act. Patient identification was verified, and a caregiver was present when appropriate. The patient was located in a state where the provider was credentialed to provide care. Total time spent for this encounter: 30m    --MODESTA Martinez DO on 3/17/2021 at 7:01 PM    An electronic signature was used to authenticate this note.

## 2021-05-28 ENCOUNTER — OFFICE VISIT (OUTPATIENT)
Dept: PRIMARY CARE CLINIC | Age: 41
End: 2021-05-28
Payer: MEDICAID

## 2021-05-28 VITALS
BODY MASS INDEX: 42.6 KG/M2 | WEIGHT: 256 LBS | HEART RATE: 88 BPM | SYSTOLIC BLOOD PRESSURE: 124 MMHG | DIASTOLIC BLOOD PRESSURE: 88 MMHG | OXYGEN SATURATION: 98 %

## 2021-05-28 DIAGNOSIS — N30.00 ACUTE CYSTITIS WITHOUT HEMATURIA: ICD-10-CM

## 2021-05-28 DIAGNOSIS — R10.9 FLANK PAIN: ICD-10-CM

## 2021-05-28 DIAGNOSIS — R31.9 HEMATURIA, UNSPECIFIED TYPE: ICD-10-CM

## 2021-05-28 DIAGNOSIS — R30.0 DYSURIA: Primary | ICD-10-CM

## 2021-05-28 DIAGNOSIS — R30.0 DYSURIA: ICD-10-CM

## 2021-05-28 LAB
APPEARANCE FLUID: CLEAR
BILIRUBIN, POC: ABNORMAL
BLOOD URINE, POC: ABNORMAL
CLARITY, POC: CLEAR
COLOR, POC: YELLOW
GLUCOSE URINE, POC: ABNORMAL
KETONES, POC: ABNORMAL
LEUKOCYTE EST, POC: ABNORMAL
NITRITE, POC: ABNORMAL
PH, POC: 6
PROTEIN, POC: ABNORMAL
SPECIFIC GRAVITY, POC: 1.02
UROBILINOGEN, POC: 0.2

## 2021-05-28 PROCEDURE — 99213 OFFICE O/P EST LOW 20 MIN: CPT | Performed by: NURSE PRACTITIONER

## 2021-05-28 PROCEDURE — 81002 URINALYSIS NONAUTO W/O SCOPE: CPT | Performed by: NURSE PRACTITIONER

## 2021-05-28 RX ORDER — CIPROFLOXACIN 500 MG/1
500 TABLET, FILM COATED ORAL 2 TIMES DAILY
Qty: 20 TABLET | Refills: 0 | Status: SHIPPED | OUTPATIENT
Start: 2021-05-28 | End: 2021-09-10 | Stop reason: SDUPTHER

## 2021-05-28 RX ORDER — PHENAZOPYRIDINE HYDROCHLORIDE 200 MG/1
200 TABLET, FILM COATED ORAL 3 TIMES DAILY PRN
Qty: 9 TABLET | Refills: 0 | Status: SHIPPED | OUTPATIENT
Start: 2021-05-28 | End: 2021-09-10 | Stop reason: SDUPTHER

## 2021-05-28 ASSESSMENT — ENCOUNTER SYMPTOMS
ABDOMINAL PAIN: 0
COUGH: 0
CONSTIPATION: 0
VOMITING: 0
SHORTNESS OF BREATH: 0
DIARRHEA: 0
NAUSEA: 0
BACK PAIN: 0

## 2021-05-28 NOTE — PROGRESS NOTES
Sonia Hanson (:  1980) is a 42 y.o. female,Established patient, here for evaluation of the following chief complaint(s):  Urinary Tract Infection      ASSESSMENT/PLAN:    ICD-10-CM    1. Dysuria  R30.0 ciprofloxacin (CIPRO) 500 MG tablet     phenazopyridine (PYRIDIUM) 200 MG tablet     Culture, Urine  Will do cipro after culture     2. Hematuria, unspecified type  R31.9 POCT Urinalysis no Micro   3. Acute cystitis without hematuria  N30.00 ciprofloxacin (CIPRO) 500 MG tablet     phenazopyridine (PYRIDIUM) 200 MG tablet  Increase fluids  Monitor for cystitis as UA negative  But having issues will culture     Culture, Urine   4. Flank pain  R10.9 ciprofloxacin (CIPRO) 500 MG tablet     phenazopyridine (PYRIDIUM) 200 MG tablet     Culture, Urine       No follow-ups on file. SUBJECTIVE/OBJECTIVE:  HPI    Patient is here for dysuria  Started 2 days ago  Take OTC AZO with some relief  But has also been having a cough and leaking when coughs    Reports that burns with urination  Denies discharge or itching    History of UTI  Last 2020  No fever   Slight flank pain      /88 (Site: Right Upper Arm, Position: Sitting, Cuff Size: Large Adult)   Pulse 88   Wt 256 lb (116.1 kg)   LMP 2005   SpO2 98%   BMI 42.60 kg/m²   Review of Systems   Constitutional: Positive for activity change. Negative for appetite change, fatigue and fever. Respiratory: Negative for cough and shortness of breath. Cardiovascular: Negative for chest pain and leg swelling. Gastrointestinal: Negative for abdominal pain, constipation, diarrhea, nausea and vomiting. Genitourinary: Positive for flank pain and frequency. Negative for difficulty urinating, menstrual problem and pelvic pain. Bilat cva tenderness per patient     Musculoskeletal: Negative for arthralgias and back pain. Skin: Negative for rash. Psychiatric/Behavioral: Negative for behavioral problems and sleep disturbance.  The patient is not nervous/anxious and is not hyperactive. Physical Exam  Constitutional:       General: She is not in acute distress. Appearance: Normal appearance. She is not ill-appearing. HENT:      Head: Normocephalic. Right Ear: There is no impacted cerumen. Left Ear: Tympanic membrane normal. There is no impacted cerumen. Mouth/Throat:      Pharynx: No posterior oropharyngeal erythema. Cardiovascular:      Rate and Rhythm: Normal rate and regular rhythm. Pulses: Normal pulses. Heart sounds: No murmur heard. Pulmonary:      Effort: Pulmonary effort is normal.      Breath sounds: Normal breath sounds. No wheezing or rhonchi. Abdominal:      General: Bowel sounds are normal.   Musculoskeletal:      Right lower leg: No edema. Left lower leg: No edema. Skin:     Findings: No rash. Neurological:      General: No focal deficit present. Mental Status: She is alert and oriented to person, place, and time. Psychiatric:         Mood and Affect: Mood normal.         Behavior: Behavior normal.               An electronic signature was used to authenticate this note.     --ERIK Gallardo

## 2021-05-30 LAB
ORGANISM: ABNORMAL
URINE CULTURE, ROUTINE: ABNORMAL
URINE CULTURE, ROUTINE: ABNORMAL

## 2021-06-01 ENCOUNTER — TELEPHONE (OUTPATIENT)
Dept: PRIMARY CARE CLINIC | Age: 41
End: 2021-06-01

## 2021-06-01 NOTE — TELEPHONE ENCOUNTER
----- Message from ERIK Juan sent at 5/31/2021  5:02 PM CDT -----  Sensitive to antibiotic cont and take all of medication

## 2021-06-04 DIAGNOSIS — R60.9 SWELLING: ICD-10-CM

## 2021-06-04 DIAGNOSIS — R60.9 PERIPHERAL EDEMA: ICD-10-CM

## 2021-06-04 RX ORDER — FUROSEMIDE 40 MG/1
40 TABLET ORAL DAILY
Qty: 30 TABLET | Refills: 5 | Status: SHIPPED | OUTPATIENT
Start: 2021-06-04 | End: 2022-02-15

## 2021-06-04 RX ORDER — POTASSIUM CHLORIDE 750 MG/1
10 TABLET, EXTENDED RELEASE ORAL DAILY
Qty: 30 TABLET | Refills: 5 | Status: SHIPPED | OUTPATIENT
Start: 2021-06-04 | End: 2022-02-15

## 2021-06-17 ENCOUNTER — TELEMEDICINE (OUTPATIENT)
Dept: PRIMARY CARE CLINIC | Age: 41
End: 2021-06-17
Payer: MEDICAID

## 2021-06-17 DIAGNOSIS — E11.69 DIABETES MELLITUS TYPE 2 IN OBESE (HCC): Primary | ICD-10-CM

## 2021-06-17 DIAGNOSIS — E78.2 MIXED HYPERLIPIDEMIA: ICD-10-CM

## 2021-06-17 DIAGNOSIS — M79.7 FIBROMYALGIA: ICD-10-CM

## 2021-06-17 DIAGNOSIS — I10 ESSENTIAL HYPERTENSION: ICD-10-CM

## 2021-06-17 DIAGNOSIS — E66.9 DIABETES MELLITUS TYPE 2 IN OBESE (HCC): Primary | ICD-10-CM

## 2021-06-17 DIAGNOSIS — R53.83 FATIGUE, UNSPECIFIED TYPE: ICD-10-CM

## 2021-06-17 PROCEDURE — 99214 OFFICE O/P EST MOD 30 MIN: CPT | Performed by: PEDIATRICS

## 2021-06-17 RX ORDER — PREGABALIN 150 MG/1
150 CAPSULE ORAL 3 TIMES DAILY
Qty: 90 CAPSULE | Refills: 5 | Status: SHIPPED | OUTPATIENT
Start: 2021-06-17 | End: 2022-07-01

## 2021-06-17 ASSESSMENT — ENCOUNTER SYMPTOMS
RESPIRATORY NEGATIVE: 1
EYES NEGATIVE: 1
GASTROINTESTINAL NEGATIVE: 1
ALLERGIC/IMMUNOLOGIC NEGATIVE: 1

## 2021-06-17 NOTE — PROGRESS NOTES
[address] [facility], [address]  Phone [phone]   Fax [phone]      OFFICE VISIT: 2021    Yuri Hutchins: 1980      HPI  Reason For Visit:  Alondra Jane is a 42 y.o. Diabetes, Hypertension, and Hyperlipidemia    Patient presents via telephone conference visit on follow-up for multiple health issues. She was initially scheduled as a video conferencing utilizing [employer]     Present concerns:  No new concerns. Diabetes Mellitus Type 2  Diet compliance:  compliant most of the time.  Recently she is not eating well. Nutrition Consultation Needed:  no  Medication:              Metformin 1000 mg twice daily (she has weaned off of this medication)   Farxiga 5 mg daily              Semaglutide 0.5 mg subcu weekly  Medication compliance:  compliant most of the time  Weight trend: down 5 lb. Current exercise: no, but she is going to begin. Checkin times daily  Home blood sugar records: fasting range: running lower 100's  Low BG:  no  Eye exam current (within one year): yes  Checking Feet regularly:  yes -no sores  ACE/ARB:  yes -Cozaar 25 mg daily  Aspirin: No: But recommended  She is taking Lyrica 150 mg 3 times daily for her neuropathy. She is also taking Cymbalta 60 mg daily for neuropathy  Tobacco history: She  reports that she quit smoking about 2 years ago. Her smoking use included cigarettes. She has a 15.00 pack-year smoking history.  She has never used smokeless tobacco.    Lab Results   Component Value Date    LABA1C 6.2 (H) 2021    LABA1C 5.8 2020    LABA1C 6.1 (H) 2019     Lab Results   Component Value Date    LABMICR <1.20 2019    CREATININE 0.6 2021       Hypertension:   BP today was   BP Readings from Last 1 Encounters:   21 124/88      Recent BP readings:    BP Readings from Last 3 Encounters:   21 124/88   20 (!) 142/92   19 122/80     Medication   Cozaar 25 mg daily   Farxiga 5 mg daily   Lasix 40 mg daily as needed  Medication compliance:  compliant most of the time  Home blood pressure monitoring: No.  Issues with bp cuff  She is somewhat adherent to a low sodium diet. Symptoms: none  Laboratory:  Lab Results   Component Value Date    BUN 14 02/22/2021    CREATININE 0.6 02/22/2021       Hyperlipidemia:   Medication:   atorvastatin (Lipitor)  Low Fat, Low Choleterol Diet:  yes - she is trying  Myalgias or GI upset: no  The patient exercises she is active in her job. .  Laboratory:    Lab Results   Component Value Date    CHOL 206 (H) 02/22/2021    TRIG 88 02/22/2021    HDL 50 (L) 02/22/2021    LDLCALC 138 02/22/2021      Lab Results   Component Value Date    ALT 62 (H) 02/22/2021    AST 41 (H) 02/22/2021       She had a normal stress echo on 3/2/2021     vitals were not taken for this visit. There is no height or weight on file to calculate BMI. I have reviewed the following with the Ms. Tian   Lab Review  Orders Only on 05/28/2021   Component Date Value    Urine Culture, Routine 05/28/2021 *                    Value:>100,000 CFU/ml  Mixed skin marques present      Organism 05/28/2021 Escherichia coli*    Urine Culture, Routine 05/28/2021 Moderate growth    Office Visit on 05/28/2021   Component Date Value    Color, UA 05/28/2021 yellow     Clarity, UA 05/28/2021 clear     Glucose, UA POC 05/28/2021 neg     Bilirubin, UA 05/28/2021 neg     Ketones, UA 05/28/2021 neg     Spec Grav, UA 05/28/2021 1.020     Blood, UA POC 05/28/2021 neg     pH, UA 05/28/2021 6.0     Protein, UA POC 05/28/2021 neg     Urobilinogen, UA 05/28/2021 0.2     Leukocytes, UA 05/28/2021 neg     Nitrite, UA 05/28/2021 neg     Appearance, Fluid 05/28/2021 [address] Outpatient Visit on 03/02/2021   Component Date Value    Left Ventricular Ejectio* 03/02/2021 50     LVEF MODALITY 03/02/2021 ECHO    Orders Only on 02/22/2021   Component Date Value    Vit D, 25-Hydroxy 02/22/2021 25.5*  Vitamin B-12 02/22/2021 378     TSH 02/22/2021 3.630     T4 Free 02/22/2021 1.00     Cholesterol, Total 02/22/2021 206*    Triglycerides 02/22/2021 88     HDL 02/22/2021 50*    LDL Calculated 02/22/2021 138     Hemoglobin A1C 02/22/2021 6.2*    Sodium 02/22/2021 140     Potassium 02/22/2021 4.3     Chloride 02/22/2021 103     CO2 02/22/2021 24     Anion Gap 02/22/2021 13     Glucose 02/22/2021 110*    BUN 02/22/2021 14     CREATININE 02/22/2021 0.6     GFR Non- 02/22/2021 >60     GFR  02/22/2021 >59     Calcium 02/22/2021 9.4     Total Protein 02/22/2021 7.5     Albumin 02/22/2021 4.0     Total Bilirubin 02/22/2021 <0.2     Alkaline Phosphatase 02/22/2021 95     ALT 02/22/2021 62*    AST 02/22/2021 41*    WBC 02/22/2021 10.8     RBC 02/22/2021 5.08     Hemoglobin 02/22/2021 15.1     Hematocrit 02/22/2021 47.3*    MCV 02/22/2021 93.1     MCH 02/22/2021 29.7     MCHC 02/22/2021 31.9*    RDW 02/22/2021 13.2     Platelets 32/96/5389 304     MPV 02/22/2021 11.5     Neutrophils % 02/22/2021 61.7     Lymphocytes % 02/22/2021 30.8     Monocytes % 02/22/2021 4.6     Eosinophils % 02/22/2021 1.6     Basophils % 02/22/2021 0.9     Neutrophils Absolute 02/22/2021 6.7     Immature Granulocytes # 02/22/2021 0.0     Lymphocytes Absolute 02/22/2021 3.3     Monocytes Absolute 02/22/2021 0.50     Eosinophils Absolute 02/22/2021 0.20     Basophils Absolute 02/22/2021 0.10      Copies of these are in the chart. Current Outpatient Medications   Medication Sig Dispense Refill    pregabalin (LYRICA) 150 MG capsule Take 1 capsule by mouth 3 times daily for 30 days.  TAKE 1 CAPSULE BY MOUTH IN THE MORNING AND 2 AT BEDTIME 90 capsule 5    furosemide (LASIX) 40 MG tablet Take 1 tablet by mouth daily 30 tablet 5    potassium chloride (KLOR-CON M) 10 MEQ extended release tablet Take 1 tablet by mouth daily 30 tablet 5    dapagliflozin (FARXIGA) 5 MG tablet Take 1 tablet by mouth every morning 90 tablet 3    atorvastatin (LIPITOR) 20 MG tablet Take 1 tablet by mouth nightly 30 tablet 11    vitamin D (ERGOCALCIFEROL) 1.25 MG (95305 UT) CAPS capsule Take 1 capsule by mouth once a week 4 capsule 1    varenicline (CHANTIX CONTINUING MONTH HOLDEN) 1 MG tablet Take 1 tablet by mouth 2 times daily 60 tablet 4    DULoxetine (CYMBALTA) 60 MG extended release capsule Take 1 capsule by mouth 2 times daily 60 capsule 11    metFORMIN (GLUCOPHAGE) 1000 MG tablet Take 1 tablet by mouth 2 times daily (with meals) 180 tablet 3    losartan (COZAAR) 25 MG tablet Take 1 tablet by mouth daily 90 tablet 3    tiZANidine (ZANAFLEX) 4 MG tablet Take 1 tablet by mouth every 8 hours as needed (muscle spasms/pain) 90 tablet 1    Semaglutide,0.25 or 0.5MG/DOS, 2 MG/1.5ML SOPN Inject 0.5 mg into the skin once a week 1 pen 11     No current facility-administered medications for this visit. Allergies: Hydrocodone-acetaminophen and Adhesive tape     Past Medical History:   Diagnosis Date    Chronic back pain     gets injections with [facility] [place] [facility].      CPAP (continuous positive airway pressure) dependence     13cm    Fibromyalgia     GERD (gastroesophageal reflux disease)     Hyperlipidemia     Obstructive sleep apnea     AHI: 18.1 per PSG, 11/2017    Restless leg syndrome     Skin cancer     left breast     Vitamin D deficiency        Family History   Problem Relation Age of Onset    Heart Disease Mother     High Blood Pressure Mother     Diabetes Mother     Heart Disease Father     High Blood Pressure Father     Cancer Maternal Grandfather         esphogeal cancer    Cancer Paternal Grandfather         colon ca    Colon Cancer Paternal Grandfather     Colon Polyps Neg Hx     Liver Cancer Neg Hx     Liver Disease Neg Hx     Rectal Cancer Neg Hx     Stomach Cancer Neg Hx        Past Surgical History:   Procedure Laterality Date    ABDOMINAL EXPLORATION SURGERY       SECTION      x1    CHOLECYSTECTOMY      HYSTERECTOMY, VAGINAL      NECK SURGERY      2 level neck fusion    OVARIAN CYST REMOVAL      MA EGD TRANSORAL BIOPSY SINGLE/MULTIPLE N/A 2017    Dr Meier-w/dilation over wire, [address] [place]-distal esophageal narrowing-Codie (-)    UPPER GASTROINTESTINAL ENDOSCOPY  2017    Dr Meier-w/dilation over wire, [address] [place]-distal esophageal narrowing-Codie (-)       Social History     Tobacco Use    Smoking status: Former Smoker     Packs/day: 1.00     Years: 15.00     Pack years: 15.00     Types: Cigarettes     Quit date: 2018     Years since quittin.7    Smokeless tobacco: Never Used    Tobacco comment: smokes 1-2 cigarretts per day/ patient has tried but nothing has worked   Substance Use Topics    Alcohol use: No        Review of Systems   Constitutional: Negative. HENT: Negative. Eyes: Negative. Respiratory: Negative. Cardiovascular: Positive for leg swelling. BP is controlled   Gastrointestinal: Negative. Endocrine: Negative. Bg controlled. She weaned off of metformin due to GI SE. She is tolerating ozempic without problem. Genitourinary: Positive for frequency (but expected with [place]). Negative for vaginal discharge (no yeast infection). She did have a uti, but this is better. Musculoskeletal: Positive for arthralgias and myalgias (but tolerable. ). Skin: Negative. Allergic/Immunologic: Negative. Neurological: Negative. Hematological: Negative. Psychiatric/Behavioral: Negative. Physical Exam  Physical exam was not performed as this was a video teleconference visit using [address]    1. Diabetes mellitus type 2 in obese (HCC)  E11.69     E66.9    2. Fibromyalgia  M79.7 pregabalin (LYRICA) 150 MG capsule   3. Essential hypertension  I10    4. Mixed hyperlipidemia  E78.2          PLAN    1.  Fibromyalgia  She did have a flare of her fibromyalgia blood pressure routinely as her blood pressure cuff is nonfunctional presently. Historical review shows excellent blood pressure control    4. Mixed hyperlipidemia  Lipids were elevated on last evaluation. We do need to recheck this and make sure that these are well controlled. Our goal is an LDL less than 100. Labs were ordered today      No orders of the defined types were placed in this encounter. Return in about 3 months (around 9/17/2021) for 30. Due to patients co-morbidities and risk for potential exposure of COVID 19 pandemic. Patient was contacted and agreed to proceed with a telephone virtual visit. The risks and benefits of converting to a telephone virtual visit were discussed in light of the current infectious disease epidemic. Patient also understood that insurance coverage and co-pays are up to their individual insurance plans. Provider performed history of present illness and review of systems. Diagnosis and treatment plan was discussed with patient. Pharmacy of choice was reviewed along with past medical history, medication allergies, and current medications. Education provided to patient or patient parents/guardian with current illness diagnosis as well as when to seek additional healthcare due to changing or for worsening symptoms. Patient voiced understanding. 30 minutes were spent on the phone with patient. Cori Cardenas, was evaluated through a synchronous (real-time) audio-video encounter. The patient (or guardian if applicable) is aware that this is a billable service. Verbal consent to proceed has been obtained within the past 12 months. The visit was conducted pursuant to the emergency declaration under the [facility], [address] [place] [address] authority and the [employer] Act. Patient identification was verified, and a caregiver was present when appropriate.  The patient was located in a state where the provider was credentialed to provide care. Total time spent for this encounter: 30m    --MODESTA Enriquez DO on 6/17/2021 at 8:45 AM    An electronic signature was used to authenticate this note.

## 2021-07-08 RX ORDER — ONDANSETRON 4 MG/1
4 TABLET, ORALLY DISINTEGRATING ORAL 3 TIMES DAILY PRN
Qty: 21 TABLET | Refills: 0 | Status: SHIPPED | OUTPATIENT
Start: 2021-07-08 | End: 2021-09-17 | Stop reason: SDUPTHER

## 2021-08-16 ENCOUNTER — TELEPHONE (OUTPATIENT)
Dept: PRIMARY CARE CLINIC | Age: 41
End: 2021-08-16

## 2021-08-16 DIAGNOSIS — E55.9 VITAMIN D DEFICIENCY: ICD-10-CM

## 2021-08-16 DIAGNOSIS — R74.8 ELEVATED LIVER ENZYMES: ICD-10-CM

## 2021-08-16 DIAGNOSIS — E78.2 MIXED HYPERLIPIDEMIA: ICD-10-CM

## 2021-08-16 DIAGNOSIS — Z79.899 MEDICATION MANAGEMENT: Primary | ICD-10-CM

## 2021-08-16 DIAGNOSIS — I10 ESSENTIAL HYPERTENSION: ICD-10-CM

## 2021-08-16 NOTE — TELEPHONE ENCOUNTER
----- Message from Abdon Solorzano DO sent at 8/16/2021 12:16 PM CDT -----  Blood sugar control is excellent. Hemoglobin A1c is 6.0Thyroid is normal.Cholesterol is significantly elevated. Have you been taking atorvastatin 20 mg nightly? If not then I would recommend that you start. If you have been taking this, then we are going to need to change to rosuvastatin 20 mg nightly. Recheck lipids and ALT in 4 to 6 weeks. Liver enzymes remain mildly elevated. Recommend hepatitis profile, and ultrasound of the liver. Your WBC, (infection fighting ability) Hgb and Hct, (oxygen carrying cells) are normal; as is your percentage of each cell type.       Anna Re, ERIK  [facility] Clinical Staff  Vitamin d low of normal   Due to your alk phos   Suggest calcium and vitamin d twice a day   Recheck in 3 months

## 2021-08-18 NOTE — TELEPHONE ENCOUNTER
Pt aware of results. She has not been taking her cholesterol med, so will restart. All tests ordered.

## 2021-09-10 DIAGNOSIS — R10.9 FLANK PAIN: ICD-10-CM

## 2021-09-10 DIAGNOSIS — R30.0 DYSURIA: ICD-10-CM

## 2021-09-10 DIAGNOSIS — N30.00 ACUTE CYSTITIS WITHOUT HEMATURIA: ICD-10-CM

## 2021-09-10 RX ORDER — CIPROFLOXACIN 500 MG/1
500 TABLET, FILM COATED ORAL 2 TIMES DAILY
Qty: 20 TABLET | Refills: 0 | Status: SHIPPED | OUTPATIENT
Start: 2021-09-10 | End: 2021-09-20

## 2021-09-10 RX ORDER — PHENAZOPYRIDINE HYDROCHLORIDE 200 MG/1
200 TABLET, FILM COATED ORAL 3 TIMES DAILY PRN
Qty: 9 TABLET | Refills: 0 | Status: SHIPPED | OUTPATIENT
Start: 2021-09-10 | End: 2021-09-13

## 2021-09-17 ENCOUNTER — OFFICE VISIT (OUTPATIENT)
Dept: PRIMARY CARE CLINIC | Age: 41
End: 2021-09-17
Payer: MEDICAID

## 2021-09-17 VITALS
HEART RATE: 71 BPM | HEIGHT: 65 IN | DIASTOLIC BLOOD PRESSURE: 80 MMHG | TEMPERATURE: 98 F | OXYGEN SATURATION: 98 % | BODY MASS INDEX: 42.99 KG/M2 | SYSTOLIC BLOOD PRESSURE: 130 MMHG | WEIGHT: 258 LBS

## 2021-09-17 DIAGNOSIS — G47.33 OBSTRUCTIVE SLEEP APNEA: ICD-10-CM

## 2021-09-17 DIAGNOSIS — R11.0 NAUSEA: ICD-10-CM

## 2021-09-17 DIAGNOSIS — M79.7 FIBROMYALGIA: ICD-10-CM

## 2021-09-17 DIAGNOSIS — E66.9 DIABETES MELLITUS TYPE 2 IN OBESE (HCC): ICD-10-CM

## 2021-09-17 DIAGNOSIS — M15.9 PRIMARY OSTEOARTHRITIS INVOLVING MULTIPLE JOINTS: ICD-10-CM

## 2021-09-17 DIAGNOSIS — I10 ESSENTIAL HYPERTENSION: Primary | ICD-10-CM

## 2021-09-17 DIAGNOSIS — E78.2 MIXED HYPERLIPIDEMIA: ICD-10-CM

## 2021-09-17 DIAGNOSIS — E11.69 DIABETES MELLITUS TYPE 2 IN OBESE (HCC): ICD-10-CM

## 2021-09-17 PROCEDURE — 99214 OFFICE O/P EST MOD 30 MIN: CPT | Performed by: PEDIATRICS

## 2021-09-17 RX ORDER — TRAMADOL HYDROCHLORIDE 50 MG/1
50 TABLET ORAL EVERY 4 HOURS PRN
Qty: 18 TABLET | Refills: 0 | Status: SHIPPED | OUTPATIENT
Start: 2021-09-17 | End: 2021-09-20

## 2021-09-17 RX ORDER — ONDANSETRON 4 MG/1
4 TABLET, ORALLY DISINTEGRATING ORAL 3 TIMES DAILY PRN
Qty: 21 TABLET | Refills: 0 | Status: SHIPPED | OUTPATIENT
Start: 2021-09-17 | End: 2022-03-07 | Stop reason: SDUPTHER

## 2021-09-17 ASSESSMENT — PATIENT HEALTH QUESTIONNAIRE - PHQ9
1. LITTLE INTEREST OR PLEASURE IN DOING THINGS: 0
SUM OF ALL RESPONSES TO PHQ9 QUESTIONS 1 & 2: 0
SUM OF ALL RESPONSES TO PHQ QUESTIONS 1-9: 0
1. LITTLE INTEREST OR PLEASURE IN DOING THINGS: NOT AT ALL
SUM OF ALL RESPONSES TO PHQ9 QUESTIONS 1 & 2: 0
2. FEELING DOWN, DEPRESSED OR HOPELESS: NOT AT ALL
SUM OF ALL RESPONSES TO PHQ QUESTIONS 1-9: 0
SUM OF ALL RESPONSES TO PHQ QUESTIONS 1-9: 0
2. FEELING DOWN, DEPRESSED OR HOPELESS: 0

## 2021-09-17 ASSESSMENT — ENCOUNTER SYMPTOMS
EYES NEGATIVE: 1
ALLERGIC/IMMUNOLOGIC NEGATIVE: 1
RESPIRATORY NEGATIVE: 1
GASTROINTESTINAL NEGATIVE: 1

## 2021-09-17 NOTE — PROGRESS NOTES
[place]  [address], [address]  Phone [phone]   Fax [phone]      OFFICE VISIT: 2021    Srinivasa Tapia: 1980      HPI  Reason For Visit:  Beryl Morales is a 42 y.o.     3 Month Follow-Up (needs a rx for new cpap machine ) and Pain (all over, fibromyalgia flare this morning )    Patient resents on 3-month follow-up. RERE:  She is in need of a new CPAP machine. Her present machine is now 3 yrs old  She follows with sleep medicine. She has had difficulty getting in to see them. She is not feeling refreshed in the AM.  She is restless sleeper. She has daytime hypersomnolence. Sleep study has not been done in several years. Fibromyalgia  She also complains of diffuse pain. This is mostly in the mornings. Present medication:   Cymbalta 60 mg twice daily   Lyrica 150 mg 3 times daily  Symptoms: as above. Diabetes Mellitus Type 2  Diet compliance:  compliant most of the time.  Recently she is not eating well. Nutrition Consultation Needed:  no  Medication:              Metformin 1000 mg twice daily               Farxiga 5 mg daily (she is not on this any more)              Semaglutide 0.5 mg subcu weekly (she is not on this any more)  Medication compliance:  compliant most of the time  Weight trend: up 2 lb. Current exercise: no, but she is going to begin. Checkin times daily  Home blood sugar records: fasting range: running lower 100's  Low BG:  no  Eye exam current (within one year): yes  Checking Feet regularly:  yes -no sores  ACE/ARB:  yes -Cozaar 25 mg daily  Aspirin: No: But recommended  She is taking Lyrica 150 mg 3 times daily for her neuropathy. She is also taking Cymbalta 60 mg daily for neuropathy  Tobacco history: She  reports that she quit smoking about 3 years ago. Her smoking use included cigarettes. She has a 15.00 pack-year smoking history.  She has never used smokeless tobacco.    Lab Results   Component Value Date    LABA1C 6.0 08/16/2021    LABA1C 6.2 (H) 02/22/2021    LABA1C 5.8 07/07/2020     Lab Results   Component Value Date    LABMICR <1.20 12/26/2019    CREATININE 0.7 08/16/2021       Hypertension:   BP today was   BP Readings from Last 1 Encounters:   09/17/21 130/80      Recent BP readings:    BP Readings from Last 3 Encounters:   09/17/21 130/80   05/28/21 124/88   04/17/20 (!) 142/92     Medication   Losartan 25 mg daily   Lasix 40 mg as needed  Medication compliance:  compliant most of the time  Home blood pressure monitoring: No.    She is not adherent to a low sodium diet. Symptoms: none  Laboratory:  Lab Results   Component Value Date    BUN 9 08/16/2021    CREATININE 0.7 08/16/2021       Hyperlipidemia:   Medication:   atorvastatin (Lipitor)  Low Fat, Low Choleterol Diet: To some degree  Myalgias or GI upset: no  The patient exercises She is active in her work. Laboratory:    Lab Results   Component Value Date    CHOL 219 (H) 08/16/2021    TRIG 135 08/16/2021    HDL 47 (L) 08/16/2021    LDLCALC 145 08/16/2021      Lab Results   Component Value Date    ALT 57 (H) 08/16/2021    AST 34 (H) 08/16/2021           height is 5' 5\" (1.651 m) and weight is 258 lb (117 kg). Her temporal temperature is 98 °F (36.7 °C). Her blood pressure is 130/80 and her pulse is 71. Her oxygen saturation is 98%. Body mass index is 42.93 kg/m². I have reviewed the following with the Ms. Tian   Lab Review  Orders Only on 08/16/2021   Component Date Value    TSH 08/16/2021 3.410     Cholesterol, Total 08/16/2021 219*    Triglycerides 08/16/2021 135     HDL 08/16/2021 47*    LDL Calculated 08/16/2021 145     Hemoglobin A1C 08/16/2021 6.0     Sodium 08/16/2021 142     Potassium 08/16/2021 4.2     Chloride 08/16/2021 106     CO2 08/16/2021 26     Anion Gap 08/16/2021 10     Glucose 08/16/2021 123*    BUN 08/16/2021 9     CREATININE 08/16/2021 0.7     GFR Non- 08/16/2021 >60     GFR  08/16/2021 >59     Calcium 08/16/2021 9.5     Total Protein 08/16/2021 7.6     Albumin 08/16/2021 4.2     Total Bilirubin 08/16/2021 <0.2     Alkaline Phosphatase 08/16/2021 109*    ALT 08/16/2021 57*    AST 08/16/2021 34*    WBC 08/16/2021 8.3     RBC 08/16/2021 5.18     Hemoglobin 08/16/2021 15.1     Hematocrit 08/16/2021 49.1*    MCV 08/16/2021 94.8     MCH 08/16/2021 29.2     MCHC 08/16/2021 30.8*    RDW 08/16/2021 13.7     Platelets 58/94/9628 293     MPV 08/16/2021 11.1     Neutrophils % 08/16/2021 56.0     Lymphocytes % 08/16/2021 35.2     Monocytes % 08/16/2021 6.1     Eosinophils % 08/16/2021 1.5     Basophils % 08/16/2021 0.8     Neutrophils Absolute 08/16/2021 4.6     Immature Granulocytes # 08/16/2021 0.0     Lymphocytes Absolute 08/16/2021 2.9     Monocytes Absolute 08/16/2021 0.50     Eosinophils Absolute 08/16/2021 0.10     Basophils Absolute 08/16/2021 0.10     Vit D, 25-Hydroxy 08/16/2021 31.7    Orders Only on 05/28/2021   Component Date Value    Urine Culture, Routine 05/28/2021 *                    Value:>100,000 CFU/ml  Mixed skin marques present      Organism 05/28/2021 Escherichia coli*    Urine Culture, Routine 05/28/2021 Moderate growth    Office Visit on 05/28/2021   Component Date Value    Color, UA 05/28/2021 yellow     Clarity, UA 05/28/2021 clear     Glucose, UA POC 05/28/2021 neg     Bilirubin, UA 05/28/2021 neg     Ketones, UA 05/28/2021 neg     Spec Grav, UA 05/28/2021 1.020     Blood, UA POC 05/28/2021 neg     pH, UA 05/28/2021 6.0     Protein, UA POC 05/28/2021 neg     Urobilinogen, UA 05/28/2021 0.2     Leukocytes, UA 05/28/2021 neg     Nitrite, UA 05/28/2021 neg     Appearance, Fluid 05/28/2021 Clear      Copies of these are in the chart. Current Outpatient Medications   Medication Sig Dispense Refill    traMADol (ULTRAM) 50 MG tablet Take 1 tablet by mouth every 4 hours as needed for Pain for up to 3 days. Intended supply: 3 days.  Take lowest dose possible to manage pain 18 tablet 0    ondansetron (ZOFRAN-ODT) 4 MG disintegrating tablet Take 1 tablet by mouth 3 times daily as needed for Nausea or Vomiting 21 tablet 0    Semaglutide,0.25 or 0.5MG/DOS, 2 MG/1.5ML SOPN Inject 0.5 mg into the skin once a week 1 pen 11    ciprofloxacin (CIPRO) 500 MG tablet Take 1 tablet by mouth 2 times daily for 10 days 20 tablet 0    furosemide (LASIX) 40 MG tablet Take 1 tablet by mouth daily 30 tablet 5    potassium chloride (KLOR-CON M) 10 MEQ extended release tablet Take 1 tablet by mouth daily 30 tablet 5    atorvastatin (LIPITOR) 20 MG tablet Take 1 tablet by mouth nightly 30 tablet 11    vitamin D (ERGOCALCIFEROL) 1.25 MG (20621 UT) CAPS capsule Take 1 capsule by mouth once a week 4 capsule 1    DULoxetine (CYMBALTA) 60 MG extended release capsule Take 1 capsule by mouth 2 times daily 60 capsule 11    metFORMIN (GLUCOPHAGE) 1000 MG tablet Take 1 tablet by mouth 2 times daily (with meals) 180 tablet 3    losartan (COZAAR) 25 MG tablet Take 1 tablet by mouth daily 90 tablet 3    tiZANidine (ZANAFLEX) 4 MG tablet Take 1 tablet by mouth every 8 hours as needed (muscle spasms/pain) 90 tablet 1    pregabalin (LYRICA) 150 MG capsule Take 1 capsule by mouth 3 times daily for 30 days. TAKE 1 CAPSULE BY MOUTH IN THE MORNING AND 2 AT BEDTIME 90 capsule 5     No current facility-administered medications for this visit. Allergies: Hydrocodone-acetaminophen and Adhesive tape     Past Medical History:   Diagnosis Date    Chronic back pain     gets injections with [facility].      CPAP (continuous positive airway pressure) dependence     13cm    Fibromyalgia     GERD (gastroesophageal reflux disease)     Hyperlipidemia     Obstructive sleep apnea     AHI: 18.1 per PS[facility], 11/2017    Restless leg syndrome     Skin cancer     left breast     Vitamin D deficiency        Family History   Problem Relation Age of Onset    Heart Disease Mother    Conchita Pavon High Blood Pressure Mother     Diabetes Mother     Heart Disease Father     High Blood Pressure Father     Cancer Maternal Grandfather         esphogeal cancer    Cancer Paternal Grandfather         colon ca    Colon Cancer Paternal Grandfather     Colon Polyps Neg Hx     Liver Cancer Neg Hx     Liver Disease Neg Hx     Rectal Cancer Neg Hx     Stomach Cancer Neg Hx        Past Surgical History:   Procedure Laterality Date    ABDOMINAL EXPLORATION SURGERY       SECTION      x1    CHOLECYSTECTOMY      HYSTERECTOMY, VAGINAL      NECK SURGERY      2 level neck fusion    OVARIAN CYST REMOVAL      IL EGD TRANSORAL BIOPSY SINGLE/MULTIPLE N/A 2017    Dr Meier-w/dilation over wire, 45 [place]-distal esophageal narrowing-Codie (-)    UPPER GASTROINTESTINAL ENDOSCOPY  2017    Dr Meier-w/dilation over wire, 45 [place]-distal esophageal narrowing-Codie (-)       Social History     Tobacco Use    Smoking status: Former Smoker     Packs/day: 1.00     Years: 15.00     Pack years: 15.00     Types: Cigarettes     Quit date: 2018     Years since quitting: 3.0    Smokeless tobacco: Never Used    Tobacco comment: smokes 1-2 cigarretts per day/ patient has tried but nothing has worked   Substance Use Topics    Alcohol use: No        Review of Systems   Constitutional: Negative. HENT: Negative. Eyes: Negative. Respiratory: Negative. Cardiovascular: Positive for leg swelling. BP is controlled   Gastrointestinal: Negative. Endocrine: Negative. Bg controlled. She weaned off of metformin due to GI SE. She is tolerating ozempic without problem. Genitourinary: Positive for frequency (but expected with [place]). Negative for vaginal discharge (no yeast infection). She did have a uti, but this is better. Musculoskeletal: Positive for arthralgias and myalgias (but tolerable. ). Skin: Negative. Allergic/Immunologic: Negative. Neurological: Negative. Hematological: Negative. Psychiatric/Behavioral: Negative. Physical Exam  Constitutional:       General: She is not in acute distress. Appearance: Normal appearance. She is not ill-appearing. HENT:      Head: Normocephalic. Right Ear: Tympanic membrane, ear canal and external ear normal. There is no impacted cerumen. Left Ear: Tympanic membrane, ear canal and external ear normal. There is no impacted cerumen. Nose: Nose normal.      Mouth/Throat:      Mouth: Mucous membranes are moist.      Pharynx: Oropharynx is clear. No posterior oropharyngeal erythema. Eyes:      Extraocular Movements: Extraocular movements intact. Conjunctiva/sclera: Conjunctivae normal.      Pupils: Pupils are equal, round, and reactive to light. Cardiovascular:      Rate and Rhythm: Normal rate and regular rhythm. Pulses: Normal pulses. Heart sounds: No murmur heard. Pulmonary:      Effort: Pulmonary effort is normal.      Breath sounds: Normal breath sounds. No wheezing or rhonchi. Abdominal:      General: Bowel sounds are normal.   Musculoskeletal:      Right lower leg: No edema. Left lower leg: No edema. Skin:     Findings: No rash. Neurological:      General: No focal deficit present. Mental Status: She is alert and oriented to person, place, and time. Psychiatric:         Mood and Affect: Mood normal.         Behavior: Behavior normal.             ASSESSMENT      ICD-10-CM    1. Essential hypertension  I10    2. Diabetes mellitus type 2 in obese (HCC)  E11.69     E66.9    3. Mixed hyperlipidemia  E78.2 ALT     Lipid Panel   4. Fibromyalgia  M79.7 traMADol (ULTRAM) 50 MG tablet   5. Obstructive sleep apnea  G47.33 Sleep Study with PAP Titration     [facility]   6. Primary osteoarthritis involving multiple joints  M89.49 traMADol (ULTRAM) 50 MG tablet   7.  Nausea  R11.0 ondansetron (ZOFRAN-ODT) 4 MG disintegrating tablet PLAN    1. Essential hypertension  Blood pressure is appropriately controlled on present medication regimen. Continue the same    2. Diabetes mellitus type 2 in obese [facility])  This has been excellently controlled on present medication regimen. We will continue the same. Blood sugars however have been noted to be higher recently,  We are going to go back on semaglutide. This was ordered today    3. Mixed hyperlipidemia  Strongly encouraged her to take Lipitor on a nightly basis. Recheck lipids in 4 to 6 weeks    4. Fibromyalgia  She is is presently having a flare in her fibromyalgia. She does not feel that she needs adjustment in her fibromyalgia medication she has been smoking to help her through this time. We are going to give her some tramadol that she can take on a as needed basis  - traMADol (ULTRAM) 50 MG tablet; Take 1 tablet by mouth every 4 hours as needed for Pain for up to 3 days. Intended supply: 3 days. Take lowest dose possible to manage pain  Dispense: 18 tablet; Refill: 0    5. Obstructive sleep apnea  [place] score is significantly elevated. She does not feel that her CPAP is providing appropriate ventilation during the night  - Sleep Study with PAP Titration; Future  - [facility] [place]    6. Primary osteoarthritis involving multiple joints  Also use tramadol for her arthritis  - traMADol (ULTRAM) 50 MG tablet; Take 1 tablet by mouth every 4 hours as needed for Pain for up to 3 days. Intended supply: 3 days. Take lowest dose possible to manage pain  Dispense: 18 tablet; Refill: 0    7. Nausea  We will refill her Zofran as before  - ondansetron (ZOFRAN-ODT) 4 MG disintegrating tablet; Take 1 tablet by mouth 3 times daily as needed for Nausea or Vomiting  Dispense: 21 tablet;  Refill: 0        Orders Placed This Encounter   Procedures    ALT   [address] [address] [address] [facility] Sleep [facility]    Sleep Study with PAP Titration Return in about 3 months (around 12/17/2021) for 30. This was an in-house visit.

## 2021-10-25 ENCOUNTER — OFFICE VISIT (OUTPATIENT)
Dept: PULMONOLOGY | Age: 41
End: 2021-10-25
Payer: MEDICAID

## 2021-10-25 ENCOUNTER — HOSPITAL ENCOUNTER (OUTPATIENT)
Dept: GENERAL RADIOLOGY | Age: 41
Discharge: HOME | End: 2021-10-25
Payer: MEDICAID

## 2021-10-25 VITALS
HEIGHT: 66 IN | BODY MASS INDEX: 41.11 KG/M2 | HEART RATE: 93 BPM | WEIGHT: 255.8 LBS | TEMPERATURE: 97 F | OXYGEN SATURATION: 95 % | DIASTOLIC BLOOD PRESSURE: 82 MMHG | SYSTOLIC BLOOD PRESSURE: 128 MMHG

## 2021-10-25 DIAGNOSIS — G47.10 HYPERSOMNIA: ICD-10-CM

## 2021-10-25 DIAGNOSIS — R06.81 WITNESSED APNEIC SPELLS: ICD-10-CM

## 2021-10-25 DIAGNOSIS — F17.210 CIGARETTE NICOTINE DEPENDENCE WITHOUT COMPLICATION: ICD-10-CM

## 2021-10-25 DIAGNOSIS — G47.33 OBSTRUCTIVE SLEEP APNEA: Primary | ICD-10-CM

## 2021-10-25 DIAGNOSIS — R06.83 SNORING: ICD-10-CM

## 2021-10-25 DIAGNOSIS — G47.8 NON-RESTORATIVE SLEEP: ICD-10-CM

## 2021-10-25 PROCEDURE — 99204 OFFICE O/P NEW MOD 45 MIN: CPT | Performed by: INTERNAL MEDICINE

## 2021-10-25 PROCEDURE — 99406 BEHAV CHNG SMOKING 3-10 MIN: CPT | Performed by: INTERNAL MEDICINE

## 2021-10-25 PROCEDURE — 71046 X-RAY EXAM CHEST 2 VIEWS: CPT

## 2021-10-25 RX ORDER — PREGABALIN 150 MG/1
150 CAPSULE ORAL 2 TIMES DAILY
COMMUNITY
End: 2022-05-20

## 2021-10-25 ASSESSMENT — ENCOUNTER SYMPTOMS
APNEA: 1
ABDOMINAL PAIN: 0
CHEST TIGHTNESS: 0
WHEEZING: 0
RHINORRHEA: 0
SHORTNESS OF BREATH: 0
ANAL BLEEDING: 0
ABDOMINAL DISTENTION: 0
BACK PAIN: 0
COUGH: 0

## 2021-11-30 ENCOUNTER — HOSPITAL ENCOUNTER (OUTPATIENT)
Dept: SLEEP CENTER | Age: 41
Discharge: HOME | End: 2021-12-02
Payer: MEDICAID

## 2021-11-30 PROCEDURE — 95811 POLYSOM 6/>YRS CPAP 4/> PARM: CPT

## 2021-11-30 PROCEDURE — 95811 POLYSOM 6/>YRS CPAP 4/> PARM: CPT | Performed by: INTERNAL MEDICINE

## 2021-12-01 ENCOUNTER — TELEMEDICINE (OUTPATIENT)
Dept: PRIMARY CARE CLINIC | Age: 41
End: 2021-12-01
Payer: MEDICAID

## 2021-12-01 DIAGNOSIS — E11.69 DIABETES MELLITUS TYPE 2 IN OBESE (HCC): ICD-10-CM

## 2021-12-01 DIAGNOSIS — E66.9 DIABETES MELLITUS TYPE 2 IN OBESE (HCC): ICD-10-CM

## 2021-12-01 DIAGNOSIS — I10 ESSENTIAL HYPERTENSION: Primary | ICD-10-CM

## 2021-12-01 DIAGNOSIS — E78.2 MIXED HYPERLIPIDEMIA: ICD-10-CM

## 2021-12-01 DIAGNOSIS — R53.83 FATIGUE, UNSPECIFIED TYPE: ICD-10-CM

## 2021-12-01 DIAGNOSIS — Z99.89 CPAP (CONTINUOUS POSITIVE AIRWAY PRESSURE) DEPENDENCE: ICD-10-CM

## 2021-12-01 DIAGNOSIS — F17.210 CIGARETTE NICOTINE DEPENDENCE WITHOUT COMPLICATION: ICD-10-CM

## 2021-12-01 DIAGNOSIS — G47.33 OBSTRUCTIVE SLEEP APNEA: ICD-10-CM

## 2021-12-01 PROCEDURE — 99406 BEHAV CHNG SMOKING 3-10 MIN: CPT | Performed by: PEDIATRICS

## 2021-12-01 PROCEDURE — 99214 OFFICE O/P EST MOD 30 MIN: CPT | Performed by: PEDIATRICS

## 2021-12-01 ASSESSMENT — ENCOUNTER SYMPTOMS
RESPIRATORY NEGATIVE: 1
GASTROINTESTINAL NEGATIVE: 1
ALLERGIC/IMMUNOLOGIC NEGATIVE: 1
EYES NEGATIVE: 1

## 2021-12-01 NOTE — PROGRESS NOTES
[address] [facility], [address]  Phone [phone]   Fax [phone]      OFFICE VISIT: 2021    Rosemarie Buchanan: 1980      [facility]  Reason For Visit:  Yohan Bernardo is a 42 y.o. Diabetes, Hypertension, and Hyperlipidemia    Patient presents on follow-up for multiple health issues. Present concerns:  No new concerns      Diabetes Mellitus Type 2  Diet compliance:  compliant most of the time.  Recently she is not eating well. Nutrition Consultation Needed:  no  Medication:              Metformin 1000 mg twice daily               Semaglutide 0.5 mg subcu weekly   Medication compliance:  compliant most of the time  Weight trend: stable by self report. Current exercise: no, but she is going to begin. Checkin times daily  Home blood sugar records: fasting range: running lower 100's  Low BG:  no  Eye exam current (within one year): yes  Checking Feet regularly:  yes -no sores  ACE/ARB:  yes -Cozaar 25 mg daily  Aspirin: No: But recommended  She is taking Lyrica 150 mg 3 times daily for her neuropathy. She is also taking Cymbalta 60 mg daily for neuropathy  Tobacco history: She  reports that she quit smoking about 3 years ago. Her smoking use included cigarettes. She has a 15.00 pack-year smoking history. She has never used smokeless tobacco.    Lab Results   Component Value Date    LABA1C 6.0 2021    LABA1C 6.2 (H) 2021    LABA1C 5.8 2020     Lab Results   Component Value Date    LABMICR <1.20 2019    CREATININE 0.7 2021       Hypertension:   BP today was   BP Readings from Last 1 Encounters:   10/25/21 128/82      Recent BP readings:    BP Readings from Last 3 Encounters:   10/25/21 128/82   21 130/80   21 124/88     Medication              Losartan 25 mg daily              Lasix 40 mg as needed   Medication compliance:  compliant most of the time  Home blood pressure monitoring: No.    She is not adherent to a low sodium diet. Symptoms: none  Laboratory:  Lab Results   Component Value Date    BUN 9 08/16/2021    CREATININE 0.7 08/16/2021       Hyperlipidemia:   Medication:   atorvastatin (Lipitor)  Low Fat, Low Choleterol Diet:  yes - to some degree  Myalgias or GI upset: no  The patient exercises intermittently. Laboratory:    Lab Results   Component Value Date    CHOL 219 (H) 08/16/2021    TRIG 135 08/16/2021    HDL 47 (L) 08/16/2021    LDLCALC 145 08/16/2021      Lab Results   Component Value Date    ALT 57 (H) 08/16/2021    AST 34 (H) 08/16/2021       Obstructive sleep apnea on CPAP  We ordered a new machine for her. We also ordered a sleep study. She was seen by pulmonary/sleep medicine and she was placed on CPAP at 13 cm  She had a sleep study last night. Nicotine dependence:  Strongly recommend smoking cessation. vitals were not taken for this visit. There is no height or weight on file to calculate BMI. I have reviewed the following with the Ms. Tian   Lab Review  Orders Only on 08/16/2021   Component Date Value    TSH 08/16/2021 3.410     Cholesterol, Total 08/16/2021 219*    Triglycerides 08/16/2021 135     HDL 08/16/2021 47*    LDL Calculated 08/16/2021 145     Hemoglobin A1C 08/16/2021 6.0     Sodium 08/16/2021 142     Potassium 08/16/2021 4.2     Chloride 08/16/2021 106     CO2 08/16/2021 26     Anion Gap 08/16/2021 10     Glucose 08/16/2021 123*    BUN 08/16/2021 9     CREATININE 08/16/2021 0.7     GFR Non- 08/16/2021 >60     GFR  08/16/2021 >59     Calcium 08/16/2021 9.5     Total Protein 08/16/2021 7.6     Albumin 08/16/2021 4.2     Total Bilirubin 08/16/2021 <0.2     Alkaline Phosphatase 08/16/2021 109*    ALT 08/16/2021 57*    AST 08/16/2021 34*    WBC 08/16/2021 8.3     RBC 08/16/2021 5.18     Hemoglobin 08/16/2021 15.1     Hematocrit 08/16/2021 49.1*    MCV 08/16/2021 94.8     MCH 08/16/2021 29.2     MCHC 08/16/2021 30.8*    RDW 08/16/2021 13.7     Platelets 24/67/2574 293     MPV 08/16/2021 11.1     Neutrophils % 08/16/2021 56.0     Lymphocytes % 08/16/2021 35.2     Monocytes % 08/16/2021 6.1     Eosinophils % 08/16/2021 1.5     Basophils % 08/16/2021 0.8     Neutrophils Absolute 08/16/2021 4.6     Immature Granulocytes # 08/16/2021 0.0     Lymphocytes Absolute 08/16/2021 2.9     Monocytes Absolute 08/16/2021 0.50     Eosinophils Absolute 08/16/2021 0.10     Basophils Absolute 08/16/2021 0.10     Vit D, 25-Hydroxy 08/16/2021 31.7      Copies of these are in the chart. Current Outpatient Medications   Medication Sig Dispense Refill    pregabalin (LYRICA) 150 MG capsule Take 150 mg by mouth 2 times daily.  ondansetron (ZOFRAN-ODT) 4 MG disintegrating tablet Take 1 tablet by mouth 3 times daily as needed for Nausea or Vomiting 21 tablet 0    Semaglutide,0.25 or 0.5MG/DOS, 2 MG/1.5ML SOPN Inject 0.5 mg into the skin once a week 1 pen 11    pregabalin (LYRICA) 150 MG capsule Take 1 capsule by mouth 3 times daily for 30 days.  TAKE 1 CAPSULE BY MOUTH IN THE MORNING AND 2 AT BEDTIME 90 capsule 5    furosemide (LASIX) 40 MG tablet Take 1 tablet by mouth daily 30 tablet 5    potassium chloride (KLOR-CON M) 10 MEQ extended release tablet Take 1 tablet by mouth daily 30 tablet 5    atorvastatin (LIPITOR) 20 MG tablet Take 1 tablet by mouth nightly 30 tablet 11    vitamin D (ERGOCALCIFEROL) 1.25 MG (61862 UT) CAPS capsule Take 1 capsule by mouth once a week 4 capsule 1    DULoxetine (CYMBALTA) 60 MG extended release capsule Take 1 capsule by mouth 2 times daily 60 capsule 11    metFORMIN (GLUCOPHAGE) 1000 MG tablet Take 1 tablet by mouth 2 times daily (with meals) 180 tablet 3    losartan (COZAAR) 25 MG tablet Take 1 tablet by mouth daily 90 tablet 3    tiZANidine (ZANAFLEX) 4 MG tablet Take 1 tablet by mouth every 8 hours as needed (muscle spasms/pain) 90 tablet 1     No current facility-administered medications for this visit. Allergies: Hydrocodone-acetaminophen and Adhesive tape     Past Medical History:   Diagnosis Date    Chronic back pain     gets injections with [facility].  CPAP (continuous positive airway pressure) dependence     13cm    Fibromyalgia     GERD (gastroesophageal reflux disease)     Hyperlipidemia     Obstructive sleep apnea     AHI: 18.1 per PSG, 2017    Restless leg syndrome     Skin cancer     left breast     Vitamin D deficiency        Family History   Problem Relation Age of Onset    Heart Disease Mother     High Blood Pressure Mother     Diabetes Mother     Heart Disease Father     High Blood Pressure Father     Cancer Maternal Grandfather         esphogeal cancer    Cancer Paternal Grandfather         colon ca    Colon Cancer Paternal Grandfather     Colon Polyps Neg Hx     Liver Cancer Neg Hx     Liver Disease Neg Hx     Rectal Cancer Neg Hx     Stomach Cancer Neg Hx        Past Surgical History:   Procedure Laterality Date    ABDOMINAL EXPLORATION SURGERY       SECTION      x1    CHOLECYSTECTOMY      HYSTERECTOMY, VAGINAL      NECK SURGERY      2 level neck fusion    OVARIAN CYST REMOVAL      IL EGD TRANSORAL BIOPSY SINGLE/MULTIPLE N/A 2017    Dr Meier-w/dilation over wire, [address] [place]-distal esophageal narrowing-Codie (-)    UPPER GASTROINTESTINAL ENDOSCOPY  2017    Dr Meier-sushma/dilation over wire, 46 [place]-distal esophageal narrowing-Codie (-)       Social History     Tobacco Use    Smoking status: Former Smoker     Packs/day: 1.00     Years: 15.00     Pack years: 15.00     Types: Cigarettes     Quit date: 2018     Years since quitting: 3.2    Smokeless tobacco: Never Used    Tobacco comment: smokes 1-2 cigarretts per day/ patient has tried but nothing has worked   Substance Use Topics    Alcohol use: No        Review of Systems   Constitutional: Negative. HENT: Negative. Eyes: Negative. Respiratory: Negative. Cardiovascular: Positive for leg swelling. BP is controlled   Gastrointestinal: Negative. Endocrine: Negative. Bg controlled. She weaned off of metformin due to GI SE. She is tolerating ozempic without problem. Genitourinary: Positive for frequency (but expected with [place]). Negative for vaginal discharge (no yeast infection). She did have a uti, but this is better. Musculoskeletal: Positive for arthralgias and myalgias (but tolerable. ). Skin: Negative. Allergic/Immunologic: Negative. Neurological: Negative. Hematological: Negative. Psychiatric/Behavioral: Negative. Physical Exam  Physical exam was not performed today as this was a video teleconference visit using [address] [employer] [address]    1. Essential hypertension  I10 CBC Auto Differential     Comprehensive Metabolic Panel     Microalbumin / Creatinine Urine Ratio   2. Diabetes mellitus type 2 in obese ([facility])  E11.69 Hemoglobin A1C    E66.9 Microalbumin / Creatinine Urine Ratio   3. Mixed hyperlipidemia  E78.2 Lipid Panel   4. Obstructive sleep apnea  G47.33    5. CPAP (continuous positive airway pressure) dependence  Z99.89 CBC Auto Differential     Comprehensive Metabolic Panel   6. Cigarette nicotine dependence without complication  H54.232    7. Fatigue, unspecified type  R53.83 T4, Free     TSH without Reflex         PLAN    1. Essential hypertension  Controlled on present medication regimen per  Continue the same  - CBC Auto Differential; Future  - Comprehensive Metabolic Panel; Future  - Microalbumin / Creatinine Urine Ratio; Future    2. Diabetes mellitus type 2 in obese ([facility] [place] [address]  Do need to recheck A1c on therapy. This has been previously well controlled  - Hemoglobin A1C; Future  - Microalbumin / Creatinine Urine Ratio; Future    3. Mixed hyperlipidemia  Lipids have not been well controlled historically. She states that she is taking her statin medication on a regular basis.   We will recheck lipids  - Lipid Panel; Future    4. Obstructive sleep apnea  She just completed her sleep study last night. Interpretation is pending in that regard. She is presently using CPAP at 13 cm H2O    5. CPAP (continuous positive airway pressure) dependence  Using her CPAP regularly. She states that the technician told her that she may have to increase her CPAP pressure. We are awaiting formal interpretation of that study  - CBC Auto Differential; Future  - Comprehensive Metabolic Panel; Future    6. Cigarette nicotine dependence without complication  Strongly encouraged her to do her best to quit smoking. Discussion on tobacco cessation:  We had a discussion on tobacco cessation including the harms of smoking as well as smoking-related diseases. We also discussed the benefits of smoking cessation. We discussed lung healing and the limitations therein. This discussion was between 3-10 minutes in length    7. Fatigue, unspecified type  Check thyroid with next blood draw  - T4, Free; Future  - TSH without Reflex; Future      Orders Placed This Encounter   Procedures    CBC Auto Differential    Comprehensive Metabolic Panel    Hemoglobin A1C    Lipid Panel    T4, Free    TSH without Reflex    Microalbumin / Creatinine Urine Ratio        Return in about 3 months (around 3/1/2022) for Kristen. Hemal Rizo, was evaluated through a synchronous (real-time) audio-video encounter. The patient (or guardian if applicable) is aware that this is a billable service. Verbal consent to proceed has been obtained within the past 12 months. The visit was conducted pursuant to the emergency declaration under the [facility], [address] [place] [address] [facility] authority and the [employer] General Act. Patient identification was verified, and a caregiver was present when appropriate.  The patient was located in a state where the provider was credentialed to provide care. Total time spent for this encounter: 30m    --MODESTA Chan DO on 12/1/2021 at 7:26 PM    An electronic signature was used to authenticate this note.

## 2021-12-01 NOTE — PROGRESS NOTES
[place] [address]  [address]  Phone [phone] Fax [phone]     Sleep Study Technician Review    Patient Name:  Joce Blanc  :   1980  Referring Provider: Eric Pelayo DO    Brief History:  Joce Blanc is a 42 y.o. femalewith a history of Fibromyalgia, Diabetes, Hypertension and RERE who has been referred for a titration study. She is in need of a new CPAP machine. Her present machine is now 3 yrs old. She follows with sleep medicine. She has had difficulty getting in to see them. She is not feeling refreshed in the AM. She is restless sleeper. She has daytime hypersomnolence. Sleep study has not been done in several years. Height: 5'5\"  Weight:  258 lbs  BMI: 42.93  Neck Circ: 16\"  MALLAMPATI: Type 4  ESS:      Type of Study: Titration  Time Stage Position Snore Hypopnea Obs Apnea Cele Apnea PAP O2   2200 2 Supine No No No No Cpap 8 RA   2300 2 Supine No No No No Cpap 9 RA   2400 2 Supine No No No No Cpap 9 RA   0100 2 Supine No No No No Cpap 10 RA   0200 2 Supine No No No No Cpap 10 RA   0300 2 Supine No No No No Cpap 10 RA   0400 REM Supine No No No No Cpap 10 RA   0415 Awake Supine No No No No Cpap 10 RA                Summary: Pt tolerated mask well. DME: Marquis Alba     Final PAP settings:  Cpap 10   Mask Type:  Full Face   Mask: Vitera   Mask Size:  Small    The study was reviewed briefly with Joce Blanc. She will be notified of the formal results and recommendations after the study is scored and interpreted. The report will be sent to his referring provider.     Technician: ELOY Jacobs

## 2021-12-02 NOTE — PROGRESS NOTES
[place] [address]  [place], [address]  Phone [phone] Fax [phone]    Polysomnography Report  Patient Name Byron Seay Account Number [de-identified]    1980 Referring Provider MODESTA Stock M.D.   Age/ Gender 41 years/F Interpreting physician Anahi Choi M.D., [facility]   Neck circumference/  Mallampati classification 16.5 in/class 4 Night Technician Ritchie Schultz RRT, RPSGT   [place] score 18/24 Scoring Technician Ritchie Schultz, RRT, RPSGT   Height 65.0 in Indications for the test Obstructive Sleep Apnea   Weight 258.0 lbs Test Titration Polysomnogram   BMI 42.9 Date of test 2021     Procedure  A Titration Polysomnogram was conducted on the night of 2021. The study was performed and scored per AASM guidelines. The following were monitored: frontal, central, and occipital EEG, electrooculogram (EOG), submentalis EMG, nasal and oral airflow, intranasal pressure, thoracic plethysmography, abdominal plethysmography, anterior tibialis EMG, electrocardiogram, body position, and positive airway pressure (PAP). Arterial oxygen saturation was monitored with a pulse oximeter. The study was scored utilizing 30 second epochs. Hypopneas were scored using per AASM definition VIII, D, 1B.     Sleep Scoring Data  Lights out 9:27:44 PM Sleep latency 20.2 min Time in N1 9.0 min N1% 2.4%   Lights on 4:14:20 AM WASO 15.9 min Time in N2 324.0 min N2% 87.4%   TIB/.6 min Sleep efficiency 97.4% Time in N3 0.0 min N3% 0.0%   .5 min REM latency 169.0 min Time in R 37.5 min R% 10.1%     Respiratory Events Summary   NREM REM Total   Hypopnea index 0.5 1.6 0.6   Apnea index 0.0 0.0 0.0   RERA index 0.0 0.0 0.0   AHI 0.5 1.6 0.6   RDI (AHI + RERA index) 0.5 1.6 0.6     Respiratory Events by Sleep Stage   Obstructive Apneas OA Index Central Apneas CA Index Mixed Apneas MA Index   Hypopneas H Index   RERAs   R Index   NREM 0 0.0 0 0.0 0 0.0 3 0.5 0 0.0   REM 0 0.0 0 0.0 0 0.0 1 1.6 0 0.0   Total 0 0.0 0 0.0 0 0.0 5 0.6 0 0.0     Respiratory Events by Body Position   Time (min) Obstructive Apneas OA Index Central Apneas CA Index Mixed Apneas MA Index   Hypopneas H Index   RERAs RERA  Index Total  AHI Total RDI   Supine 380.5  0 0.0 0 0.0 0 0.0 4 0.6 0 0.0 0.6 0.6   R/Supine                                           L/Supine                                           Right                                           Left                                           Prone                                           R/Prone                                           L/Prone                                           Up                                           Snoring  Snoring Rating (loudness 0-4) 1   Snoring Amount (% of total sleep time with snoring) 0%     Oximetry Data              Wake NREM REM TST   Average Saturation  93% 91% 93% 92%   Desaturation Index (#/hour)  0.7 1.6 1.0   Desaturation Max Duration (sec)  12.0 24.0 24.0   Minimum O2 Saturation    85%     Oximetry Distribution              WaKe REM NREM TOTAL %TIB   <90% (min) 0.4 0.3 27.8 28.5 7.0%   <85% (min) 0.0 0.0 0.0 0.0 0.0%   <80% (min) 0.0 0.0 0.0 0.0 0.0%   <75% (min) 0.0 0.0 0.0 0.0 0.0%   <70% (min) 0.0 0.0 0.0 0.0 0.0%   <60% (min) 0.0 0.0 0.0 0.0 0.0%   <50% (min) 0.0 0.0 0.0 0.0 0.0%   Fail    (min) 0.0 0.0 0.0 0.0      Respiratory Pattern   Present Absent Duration   Hypoventilation  ? N/A   Cheyne Sandoval Respirations  ? N/A   Periodic Breathing  ? N/A     Heart Rate   Mean heart rate (bmp) Maximum heart rate (bmp)   During recording       95   During sleep 77.9 95     Heart Rhythm Summary  Normal sinus rhythm     Heart Rhythm Events  Type of events Present Absent Rate-Duration/Total Duration   Bradycardia        ? N/A   Asystole    ? N/A   Sinus Tachycardia During Sleep  ? N/A     Narrow Complex Tachycardia  ? N/A   Wide Complex Tachycardia  ? N/A   Atrial Fibrillation  ?  N/A Other Arrhythmias  ? N/A     Arousals   NREM REM Total Arousal Index   Spontaneous 3 0 3 0.5   Respiratory 1 0 1 0.2   Snore 0 0 0 0.0   Leg movement 0 0 0 0.0   Total 4 0 6 1.0     Periodic Limb Movement Data (legs unless otherwise noted)   Leg Movements PLMS PLMS with arousal   # of events 0 0 0   Index (#/hr TST) 0 0 0     Therapy Titration  CPAP TIB Sleep REM Apneas Hypopneas RERAs   Min    (min) (min) (min) CA# OA# MA# Index # Index # Index AHI RDI SpO2    6 11.1 11.1 0.0 0 0 0 0.0 0 0.0 0 0.0 0.0 0.0 88    8 30.9 30.9 0.0 0 0 0 0.0 1 1.9 0 0.0 1.9 1.9 88    9 133.6 133.6 7.2 0 0 0 0.0 2 0.9 0 0.0 0.9 0.9 87    10 204.0 194.0 30.0 0 0 0 0.0 1 0.3 0 0.0 0.3 0.3 85                       PUSHPA Owens. [facility]                       Board Certified Sleep Physician    Note:   The interpreting physician named above, reviewed this record in its entirety, including sleep staging, EMG activity, EEG, EKG, breathing parameters, oxygen saturation, body position, and behavior unless otherwise noted. The interpretation is based on this information in addition to available clinical history and physical examination data. Interpretation:     1. Obstructive sleep apnea. 2. Subjective hypersomnia. 3. Adequate PAP titration. Recommendations:    1. CPAP at 10 cm water presser. 2. Weight loss and exercise. 3. Avoid risky activity such as driving if sleepy. 4. Discuss sleep hygiene with the patient. 5. Monitor PAP use and effectiveness.        Oleg Carbajal MD, [facility], [facility]

## 2021-12-03 DIAGNOSIS — G47.33 OBSTRUCTIVE SLEEP APNEA: Primary | ICD-10-CM

## 2022-01-06 DIAGNOSIS — M79.7 FIBROMYALGIA: ICD-10-CM

## 2022-01-07 RX ORDER — PREGABALIN 150 MG/1
150 CAPSULE ORAL 3 TIMES DAILY
Qty: 90 CAPSULE | Refills: 0 | Status: SHIPPED | OUTPATIENT
Start: 2022-01-07 | End: 2022-02-21 | Stop reason: SDUPTHER

## 2022-02-15 DIAGNOSIS — R60.9 SWELLING: ICD-10-CM

## 2022-02-15 DIAGNOSIS — E55.9 VITAMIN D DEFICIENCY: ICD-10-CM

## 2022-02-15 DIAGNOSIS — R03.0 ELEVATED BP WITHOUT DIAGNOSIS OF HYPERTENSION: ICD-10-CM

## 2022-02-15 DIAGNOSIS — R60.9 PERIPHERAL EDEMA: ICD-10-CM

## 2022-02-15 RX ORDER — POTASSIUM CHLORIDE 750 MG/1
10 TABLET, EXTENDED RELEASE ORAL DAILY
Qty: 90 TABLET | Refills: 3 | Status: SHIPPED | OUTPATIENT
Start: 2022-02-15

## 2022-02-15 RX ORDER — FUROSEMIDE 40 MG/1
40 TABLET ORAL DAILY
Qty: 90 TABLET | Refills: 3 | Status: SHIPPED | OUTPATIENT
Start: 2022-02-15

## 2022-02-15 RX ORDER — ERGOCALCIFEROL 1.25 MG/1
50000 CAPSULE ORAL WEEKLY
Qty: 12 CAPSULE | Refills: 1 | Status: SHIPPED | OUTPATIENT
Start: 2022-02-15 | End: 2022-05-20

## 2022-02-15 RX ORDER — LOSARTAN POTASSIUM 25 MG/1
25 TABLET ORAL DAILY
Qty: 90 TABLET | Refills: 3 | Status: SHIPPED | OUTPATIENT
Start: 2022-02-15 | End: 2022-08-12 | Stop reason: SDUPTHER

## 2022-02-15 NOTE — TELEPHONE ENCOUNTER
Received fax from pharmacy requesting refill on pts medication(s). Pt was last seen in office on 12/1/2021  and has a follow up scheduled for Visit date not found. Will send request to  Dr. Albert Ross  for patient.      Requested Prescriptions     Pending Prescriptions Disp Refills    losartan (COZAAR) 25 MG tablet [Pharmacy Med Name: Losartan Potassium 25 MG Oral Tablet] 90 tablet 0     Sig: Take 1 tablet by mouth once daily    furosemide (LASIX) 40 MG tablet [Pharmacy Med Name: Furosemide 40 MG Oral Tablet] 30 tablet 0     Sig: Take 1 tablet by mouth once daily    potassium chloride (KLOR-CON M) 10 MEQ extended release tablet [Pharmacy Med Name: Potassium Chloride Sarah ER 10 MEQ Oral Tablet Extended Release] 30 tablet 0     Sig: Take 1 tablet by mouth once daily    vitamin D (ERGOCALCIFEROL) 1.25 MG (17215 UT) CAPS capsule [Pharmacy Med Name: Vitamin D (Ergocalciferol) 1.25 MG (05066 UT) Oral Capsule] 12 capsule 0     Sig: Take 1 capsule by mouth once a week

## 2022-02-21 DIAGNOSIS — M79.7 FIBROMYALGIA: ICD-10-CM

## 2022-02-21 RX ORDER — PREGABALIN 150 MG/1
150 CAPSULE ORAL 3 TIMES DAILY
Qty: 90 CAPSULE | Refills: 3 | Status: SHIPPED | OUTPATIENT
Start: 2022-02-21 | End: 2022-05-20

## 2022-02-21 RX ORDER — PREGABALIN 150 MG/1
150 CAPSULE ORAL 3 TIMES DAILY
Qty: 90 CAPSULE | Refills: 5 | Status: SHIPPED | OUTPATIENT
Start: 2022-02-21 | End: 2022-07-01

## 2022-03-02 ENCOUNTER — PATIENT MESSAGE (OUTPATIENT)
Dept: PRIMARY CARE CLINIC | Age: 42
End: 2022-03-02

## 2022-03-02 DIAGNOSIS — R11.0 NAUSEA: ICD-10-CM

## 2022-03-03 NOTE — TELEPHONE ENCOUNTER
From: Manish Trujillo  To: Dr. Yolanda Florian: 3/2/2022 4:53 PM CST  Subject: Meds    Doc the kids all ended up with the 24 hour stomach bug and they wiped me out of his old friend can I get you to send in another prescription for that please

## 2022-03-07 RX ORDER — ONDANSETRON 4 MG/1
4 TABLET, ORALLY DISINTEGRATING ORAL 3 TIMES DAILY PRN
Qty: 21 TABLET | Refills: 0 | Status: SHIPPED | OUTPATIENT
Start: 2022-03-07

## 2022-04-01 DIAGNOSIS — F41.9 ANXIETY: ICD-10-CM

## 2022-04-01 DIAGNOSIS — M79.7 FIBROMYALGIA: ICD-10-CM

## 2022-04-01 DIAGNOSIS — E11.42 TYPE 2 DIABETES MELLITUS WITH DIABETIC POLYNEUROPATHY, WITHOUT LONG-TERM CURRENT USE OF INSULIN (HCC): ICD-10-CM

## 2022-04-01 RX ORDER — DULOXETIN HYDROCHLORIDE 60 MG/1
CAPSULE, DELAYED RELEASE ORAL
Qty: 60 CAPSULE | Refills: 0 | Status: SHIPPED | OUTPATIENT
Start: 2022-04-01 | End: 2022-05-10

## 2022-05-10 DIAGNOSIS — M79.7 FIBROMYALGIA: ICD-10-CM

## 2022-05-10 DIAGNOSIS — E11.42 TYPE 2 DIABETES MELLITUS WITH DIABETIC POLYNEUROPATHY, WITHOUT LONG-TERM CURRENT USE OF INSULIN (HCC): ICD-10-CM

## 2022-05-10 DIAGNOSIS — F41.9 ANXIETY: ICD-10-CM

## 2022-05-10 RX ORDER — DULOXETIN HYDROCHLORIDE 60 MG/1
CAPSULE, DELAYED RELEASE ORAL
Qty: 60 CAPSULE | Refills: 0 | Status: SHIPPED | OUTPATIENT
Start: 2022-05-10 | End: 2022-06-20

## 2022-05-10 NOTE — TELEPHONE ENCOUNTER
Received fax from pharmacy requesting refill on pts medication(s). Pt was last seen in office on 12/1/2021  and has a follow up scheduled for Visit date not found. Will send request to  Dr. Kiko Baer  for authorization.      Requested Prescriptions     Pending Prescriptions Disp Refills    DULoxetine (CYMBALTA) 60 MG extended release capsule [Pharmacy Med Name: DULoxetine HCl 60 MG Oral Capsule Delayed Release Particles] 60 capsule 0     Sig: Take 1 capsule by mouth twice daily

## 2022-05-20 ENCOUNTER — TELEPHONE (OUTPATIENT)
Dept: PRIMARY CARE CLINIC | Age: 42
End: 2022-05-20

## 2022-05-20 ENCOUNTER — OFFICE VISIT (OUTPATIENT)
Dept: PRIMARY CARE CLINIC | Age: 42
End: 2022-05-20
Payer: MEDICAID

## 2022-05-20 VITALS
TEMPERATURE: 97.5 F | DIASTOLIC BLOOD PRESSURE: 88 MMHG | OXYGEN SATURATION: 96 % | WEIGHT: 268 LBS | SYSTOLIC BLOOD PRESSURE: 138 MMHG | HEART RATE: 98 BPM | BODY MASS INDEX: 43.26 KG/M2

## 2022-05-20 DIAGNOSIS — E66.9 DIABETES MELLITUS TYPE 2 IN OBESE (HCC): ICD-10-CM

## 2022-05-20 DIAGNOSIS — E66.01 MORBID OBESITY WITH BMI OF 40.0-44.9, ADULT (HCC): ICD-10-CM

## 2022-05-20 DIAGNOSIS — M79.7 FIBROMYALGIA: ICD-10-CM

## 2022-05-20 DIAGNOSIS — R53.83 FATIGUE, UNSPECIFIED TYPE: ICD-10-CM

## 2022-05-20 DIAGNOSIS — K59.03 DRUG-INDUCED CONSTIPATION: ICD-10-CM

## 2022-05-20 DIAGNOSIS — E78.2 MIXED HYPERLIPIDEMIA: ICD-10-CM

## 2022-05-20 DIAGNOSIS — R45.86 MOOD SWINGS: ICD-10-CM

## 2022-05-20 DIAGNOSIS — R30.0 DYSURIA: Primary | ICD-10-CM

## 2022-05-20 DIAGNOSIS — I10 ESSENTIAL HYPERTENSION: ICD-10-CM

## 2022-05-20 DIAGNOSIS — Z99.89 CPAP (CONTINUOUS POSITIVE AIRWAY PRESSURE) DEPENDENCE: ICD-10-CM

## 2022-05-20 DIAGNOSIS — E11.9 CONTROLLED TYPE 2 DIABETES MELLITUS WITHOUT COMPLICATION, WITHOUT LONG-TERM CURRENT USE OF INSULIN (HCC): Primary | ICD-10-CM

## 2022-05-20 DIAGNOSIS — R23.2 HOT FLASHES: ICD-10-CM

## 2022-05-20 DIAGNOSIS — E11.69 DIABETES MELLITUS TYPE 2 IN OBESE (HCC): ICD-10-CM

## 2022-05-20 DIAGNOSIS — K58.1 IRRITABLE BOWEL SYNDROME WITH CONSTIPATION: ICD-10-CM

## 2022-05-20 LAB
ALBUMIN SERPL-MCNC: 3.8 G/DL (ref 3.5–5.2)
ALP BLD-CCNC: 121 U/L (ref 35–104)
ALT SERPL-CCNC: 37 U/L (ref 5–33)
ANION GAP SERPL CALCULATED.3IONS-SCNC: 16 MMOL/L (ref 7–19)
AST SERPL-CCNC: 27 U/L (ref 5–32)
BASOPHILS ABSOLUTE: 0.1 K/UL (ref 0–0.2)
BASOPHILS RELATIVE PERCENT: 0.8 % (ref 0–1)
BILIRUB SERPL-MCNC: <0.2 MG/DL (ref 0.2–1.2)
BUN BLDV-MCNC: 15 MG/DL (ref 6–20)
CALCIUM SERPL-MCNC: 9.3 MG/DL (ref 8.6–10)
CHLORIDE BLD-SCNC: 107 MMOL/L (ref 98–111)
CHOLESTEROL, TOTAL: 191 MG/DL (ref 160–199)
CO2: 19 MMOL/L (ref 22–29)
CREAT SERPL-MCNC: 0.7 MG/DL (ref 0.5–0.9)
CREATININE URINE: 147.1 MG/DL (ref 4.2–622)
EOSINOPHILS ABSOLUTE: 0.2 K/UL (ref 0–0.6)
EOSINOPHILS RELATIVE PERCENT: 1.7 % (ref 0–5)
GFR AFRICAN AMERICAN: >59
GFR NON-AFRICAN AMERICAN: >60
GLUCOSE BLD-MCNC: 131 MG/DL (ref 74–109)
HBA1C MFR BLD: 7.1 % (ref 4–6)
HCT VFR BLD CALC: 47.5 % (ref 37–47)
HDLC SERPL-MCNC: 48 MG/DL (ref 65–121)
HEMOGLOBIN: 14.3 G/DL (ref 12–16)
IMMATURE GRANULOCYTES #: 0 K/UL
LDL CHOLESTEROL CALCULATED: 121 MG/DL
LYMPHOCYTES ABSOLUTE: 3.5 K/UL (ref 1.1–4.5)
LYMPHOCYTES RELATIVE PERCENT: 36.7 % (ref 20–40)
MCH RBC QN AUTO: 28.3 PG (ref 27–31)
MCHC RBC AUTO-ENTMCNC: 30.1 G/DL (ref 33–37)
MCV RBC AUTO: 93.9 FL (ref 81–99)
MICROALBUMIN UR-MCNC: 1.2 MG/DL (ref 0–19)
MICROALBUMIN/CREAT UR-RTO: 8.2 MG/G
MONOCYTES ABSOLUTE: 0.6 K/UL (ref 0–0.9)
MONOCYTES RELATIVE PERCENT: 5.9 % (ref 0–10)
NEUTROPHILS ABSOLUTE: 5.2 K/UL (ref 1.5–7.5)
NEUTROPHILS RELATIVE PERCENT: 54.5 % (ref 50–65)
PDW BLD-RTO: 14.2 % (ref 11.5–14.5)
PLATELET # BLD: 275 K/UL (ref 130–400)
PMV BLD AUTO: 11.5 FL (ref 9.4–12.3)
POTASSIUM SERPL-SCNC: 4.4 MMOL/L (ref 3.5–5)
RBC # BLD: 5.06 M/UL (ref 4.2–5.4)
SODIUM BLD-SCNC: 142 MMOL/L (ref 136–145)
T4 FREE: 0.8 NG/DL (ref 0.93–1.7)
TOTAL PROTEIN: 7.1 G/DL (ref 6.6–8.7)
TRIGL SERPL-MCNC: 111 MG/DL (ref 0–149)
TSH SERPL DL<=0.05 MIU/L-ACNC: 2.71 UIU/ML (ref 0.27–4.2)
WBC # BLD: 9.5 K/UL (ref 4.8–10.8)

## 2022-05-20 PROCEDURE — 99214 OFFICE O/P EST MOD 30 MIN: CPT | Performed by: NURSE PRACTITIONER

## 2022-05-20 RX ORDER — LEVOTHYROXINE SODIUM 0.03 MG/1
25 TABLET ORAL DAILY
Qty: 30 TABLET | Refills: 5 | Status: SHIPPED | OUTPATIENT
Start: 2022-05-20

## 2022-05-20 RX ORDER — ESTRADIOL 0.5 MG/1
0.5 TABLET ORAL DAILY
Qty: 21 TABLET | Refills: 3 | Status: SHIPPED | OUTPATIENT
Start: 2022-05-20 | End: 2022-07-01

## 2022-05-20 NOTE — PATIENT INSTRUCTIONS
Patient Education        Constipation: Care Instructions  Overview     Constipation means that you have a hard time passing stools (bowel movements). People pass stools from 3 times a day to once every 3 days. What is normal for you may be different. Constipation may occur with pain in the rectum and cramping. The pain may get worse when you try to pass stools. Sometimes there are small amounts of bright red blood on toilet paper or the surface of stools. This is because of enlarged veins near the rectum (hemorrhoids). A few changes in your diet and lifestyle may help you avoid ongoingconstipation. Your doctor may also prescribe medicine to help loosen your stool. Some medicines can cause constipation. These include pain medicines and antidepressants. Tell your doctor about all the medicines you take. Your doctormay want to make a medicine change to ease your symptoms. Follow-up care is a key part of your treatment and safety. Be sure to make and go to all appointments, and call your doctor if you are having problems. It's also a good idea to know your test results and keep alist of the medicines you take. How can you care for yourself at home?  Drink plenty of fluids. If you have kidney, heart, or liver disease and have to limit fluids, talk with your doctor before you increase the amount of fluids you drink.  Include high-fiber foods in your diet each day. These include fruits, vegetables, beans, and whole grains.  Get at least 30 minutes of exercise on most days of the week. Walking is a good choice. You also may want to do other activities, such as running, swimming, cycling, or playing tennis or team sports.  Take a fiber supplement, such as Citrucel or Metamucil, every day. Read and follow all instructions on the label.  Schedule time each day for a bowel movement. A daily routine may help. Take your time having a bowel movement, but don't sit for more than 10 minutes at a time.  And don't strain too much.  Support your feet with a small step stool when you sit on the toilet. This helps flex your hips and places your pelvis in a squatting position.  Your doctor may recommend an over-the-counter laxative to relieve your constipation. Examples are Milk of Magnesia and MiraLax. Read and follow all instructions on the label. Do not use laxatives on a long-term basis. When should you call for help? Call your doctor now or seek immediate medical care if:     You have new or worse belly pain.      You have new or worse nausea or vomiting.      You have blood in your stools. Watch closely for changes in your health, and be sure to contact your doctor if:     Your constipation is getting worse.      You do not get better as expected. Where can you learn more? Go to https://Predect.TapRush. org and sign in to your [employer] account. Enter 21 [phone] in the Opbeat box to learn more about \"Constipation: Care Instructions. \"     If you do not have an account, please click on the \"Sign Up Now\" link. Current as of: July 1, 2021               Content Version: 13.2  © 8014-9867 Perfect. Care instructions adapted under license by [facility] ([facility]). If you have questions about a medical condition or this instruction, always ask your healthcare professional. [address] any warranty or liability for your use of this information. Patient Education        Noninsulin Medicines for Type 2 Diabetes: Care Instructions  Overview     There are different types of noninsulin medicines for diabetes. Each works in a different way. But they all help you control your blood sugar. Some types help your body make insulin to lower your blood sugar. Others lower how much insulin your body needs. Some can slow how fast your body digests sugars. And some canremove extra glucose through your urine. You may need to take more than one medicine for diabetes.  Two or more medicinesmay work better to lower your blood sugar level than just one does.  Metformin. This lowers how much glucose your liver makes. And it helps you respond better to insulin. It also lowers the amount of stored sugar that your liver releases when you are not eating.  Sulfonylureas. These help your body release more insulin. Some work for many hours. They can cause low blood sugar if you don't eat as you planned. An example is glipizide.  Thiazolidinediones. These reduce the amount of blood glucose. They also help you respond better to insulin. An example is pioglitazone.  SGLT2 inhibitors. These help to remove extra glucose through your urine. They may also help some people lose weight. An example is ertugliflozin.  DPP-4 inhibitors. These help your body raise the level of insulin after you eat. They also help your body make less of a hormone that raises blood sugar. An example is alogliptin.  GLP-1 receptor agonists. These help your body make a protein that can raise your insulin level and make you less hungry. They're given as shots or pills. An example is semaglutide.  Meglitinides. These help your body release insulin. They also help slow how your body digests sugars. So they can keep your blood sugar from rising too fast after you eat.  Alpha-glucosidase inhibitors. These keep starches from breaking down. This means that they lower the amount of glucose absorbed when you eat. They don't help your body make more insulin. So they will not cause low blood sugar unless you use them with other medicines for diabetes. Follow-up care is a key part of your treatment and safety. Be sure to make and go to all appointments, and call your doctor if you are having problems. It's also a good idea to know your test results and keep alist of the medicines you take. How can you care for yourself at home?  Eat a healthy diet. Get some exercise each day.  This may help you to reduce how much medicine you need.  Do not take other prescription or over-the-counter medicines, vitamins, herbal products, or supplements without talking to your doctor first. Some medicines for type 2 diabetes can cause problems with other medicines or supplements.  Tell your doctor if you plan to get pregnant. Some of these drugs are not safe for pregnant women.  Be safe with medicines. Take your medicines exactly as prescribed. Meglitinides and sulfonylureas can cause your blood sugar to drop very low. Call your doctor if you think you are having a problem with your medicine.  Check your blood sugar often. You can use a glucose monitor. Keeping track can help you know how certain foods, activities, and medicines affect your blood sugar. And it can help you keep your blood sugar from getting so low that it's not safe. When should you call for help? Call 911 anytime you think you may need emergency care. For example, call if:     You passed out (lost consciousness).      You are confused or cannot think clearly.      Your blood sugar is very high or very low. Watch closely for changes in your health, and be sure to contact your doctor if:     Your blood sugar stays outside the level your doctor set for you.      You have any problems. Where can you learn more? Go to https://[email or web address].NVELO. org and sign in to your [employer] account. Enter H153 in the [facility] box to learn more about \"Noninsulin Medicines for Type 2 Diabetes: Care Instructions. \"     If you do not have an account, please click on the \"Sign Up Now\" link. Current as of: July 28, 2021               Content Version: 13.2  © 2006-2022 [employer]. Care instructions adapted under license by [facility] ([facility]). If you have questions about a medical condition or this instruction, always ask your healthcare professional. [place] [address] any warranty or liability for your use of this information.

## 2022-05-20 NOTE — PROGRESS NOTES
Raissa Alvarez (:  1980) is a 36 y.o. female,Established patient, here for evaluation of the following chief complaint(s):  Discuss Medications (thyroid and horomones)      ASSESSMENT/PLAN:    ICD-10-CM    1. Controlled type 2 diabetes mellitus without complication, without long-term current use of insulin ([facility])  E11.9 metFORMIN (GLUCOPHAGE) 1000 MG tablet     Semaglutide,0.25 or 0.5MG/DOS, 2 MG/1.5ML SOPN     dapagliflozin (FARXIGA) 5 MG tablet  Will restart diabetes medications  She had issues with constipation  Try higher metfromin if still constipation  Start linzess and adjust  Return ozempic . 25mg weekly for 2-4 weeks  Then go up        DIABETES FOOT EXAM   2. Morbid obesity with BMI of 40.0-44.9, adult ([facility])  E66.01 metFORMIN (GLUCOPHAGE) 1000 MG tablet    Z68.41 Semaglutide,0.25 or 0.5MG/DOS, 2 MG/1.5ML SOPN     dapagliflozin (FARXIGA) 5 MG tablet   3. Fibromyalgia  M79.7 LYRICA 150 MG capsule  Due to swelling and fatigue  Will try to change to brand name as side effects noted with lyrica as swelling cause   4. BMI 40.0-44.9, adult ([facility] [address] [address]  Z68.41    5. Drug-induced constipation  K59.03 linaclotide (LINZESS) 145 MCG capsule   6. Irritable bowel syndrome with constipation  K58.1 linaclotide (LINZESS) 145 MCG capsule  Will start after metfromin returned       7. Weight gain : higher of george thyroid  Discussed risks but all symptoms hair and nails  Will start synthroid 25mcg daily    8. Hot flashes and mood swings  Discussed try estrogen again  Discussed risks and she reports \"cant keep this like it is \"  Do least effective dose and least effective time    Return in about 6 weeks (around 2022) for thyroid hot flashes mood swings and diabetes and labs. SUBJECTIVE/OBJECTIVE:  HPI  Diabetes Mellitus Type 2  Diet compliance:  compliant most of the time.  Recently she is not eating well.   Nutrition Consultation Needed:  no  Medication:              Metformin 1000 mg twice daily   faxiga 5mg daily             Semaglutide 0.5 mg subcu weekly  (but wants to return)  Medication compliance:  compliant most of the time  Weight trend: increased by 15 lbs    Current exercise: no, but she is going to begin. Checking:   Home blood sugar records: fasting range:   Low BG:  no  Eye exam current (within one year): yes  Checking Feet regularly:  yes -no sores  ACE/ARB:  yes -Cozaar 25 mg daily  Aspirin: No: But recommended  She is taking Lyrica 150 mg 3 times daily for her neuropathy. She is also taking Cymbalta 60 mg daily for neuropathy  Tobacco history: She  reports that she quit smoking about 3 years ago. Her smoking use included cigarettes. She has a 15.00 pack-year smoking history. She has never used smokeless tobacco.           Lab Results   Component Value Date     LABA1C 6.0 08/16/2021     LABA1C 6.2 (H) 02/22/2021     LABA1C 5.8 07/07/2020            Lab Results   Component Value Date     LABMICR <1.20 12/26/2019     CREATININE 0.7 08/16/2021         Hypertension:   BP today was       BP Readings from Last 1 Encounters:   10/25/21 128/82      Recent BP readings:        BP Readings from Last 3 Encounters:   10/25/21 128/82   09/17/21 130/80   05/28/21 124/88      Medication              Losartan 25 mg daily              Lasix 40 mg as needed            With K at same time    Medication compliance:  compliant most of the time  Home blood pressure monitoring: No.    She is not adherent to a low sodium diet. notes some swelling at present pitting 1 plus to bigger    Symptoms: none  Laboratory:        Lab Results   Component Value Date     BUN 9 08/16/2021     CREATININE 0.7 08/16/2021         Hyperlipidemia:   Medication:              atorvastatin (Lipitor)  Low Fat, Low Choleterol Diet:  yes - to some degree  Myalgias or GI upset: no  The patient exercises intermittently.   Laboratory:           Lab Results   Component Value Date     CHOL 219 (H) 08/16/2021     TRIG 135 08/16/2021     HDL 47 (L) 08/16/2021     [address] 145 08/16/2021            Lab Results   Component Value Date     ALT 57 (H) 08/16/2021     AST 34 (H) 08/16/2021         Obstructive sleep apnea on CPAP  We ordered a new machine for her. We also ordered a sleep study. She was seen by pulmonary/sleep medicine and she was placed on CPAP at 10cm  Retested and doing well and adjusted   No problems        Swelling and fibro  She has to take lyrica 1 am and 2 pm  Last filled 2/21 #90 rf  Notes she swells pitting even with lasix    Chronic constipation  She reports worse on regimen diabetes  And the lyrica  Reports generally once a week  Even with miralax daily  Along with laxatives   This has continued    Fatigue   Notes brittle nails  Shedding and fatigued  Would like low dose thyroid  Thyroid higher of normal    Reports having issues with hormones  Hot flashes and night sweats  She had hyst at 25  But all problems she had quit them  Was on for aprox 5 years  Reports guevara  (fighting and she knows it )  Total hyst : with endometrosis :    She is still smoking  But emotional and hot flashes severe  And mood swings  Feels needs to restart          /88 (Site: Right Upper Arm, Position: Sitting, Cuff Size: Large Adult)   Pulse 98   Temp 97.5 °F (36.4 °C) (Temporal)   Wt 268 lb (121.6 kg)   LMP 01/01/2005   SpO2 96%   BMI 43.26 kg/m²   Review of Systems   Constitutional: Positive for activity change and fatigue. HENT: Negative. Eyes: Negative. Respiratory: Negative. Cpap at night   Cardiovascular: Positive for leg swelling (pitting in feet). BP is controlled   Gastrointestinal: Negative. Endocrine: Negative. Negative for polydipsia, polyphagia and polyuria. Bg controlled. Genitourinary: Negative for frequency and vaginal discharge (no yeast infection). Musculoskeletal: Positive for arthralgias and myalgias (but tolerable. ). Skin: Negative. Negative for rash. Allergic/Immunologic: Negative. Neurological: Negative. Hematological: Negative. Psychiatric/Behavioral: Negative. Physical Exam  Vitals reviewed. Constitutional:       Appearance: Normal appearance. She is obese. Comments: Mask     HENT:      Right Ear: Tympanic membrane normal. There is no impacted cerumen. Left Ear: Tympanic membrane normal. There is no impacted cerumen. Nose: No rhinorrhea. Cardiovascular:      Rate and Rhythm: Normal rate and regular rhythm. Pulses: Normal pulses. Pulmonary:      Effort: Pulmonary effort is normal.      Breath sounds: Normal breath sounds. No wheezing or rhonchi. Musculoskeletal:      Right lower leg: Edema (pitting 1 plus) present. Left lower leg: Edema (pitting 1 plus) present. Skin:     Findings: No rash. Neurological:      Mental Status: She is alert. Psychiatric:         Mood and Affect: Mood normal.         Behavior: Behavior normal.         Diabetic Foot Exam:  Visual inspection:  Deformity/amputation: absent  Skin lesions/pre-ulcerative calluses: absent  Edema: right- trace, left- trace    Monofilament Exam Reveals:  Pulses: normal  Edema:normal  Skin Lesions:normal    Right Foot:    Left Foot:  Normal sensation at 1-10   Normal sensation at 1-10   Diminished sensation at    Diminished sensation at    No sensation at     No sensation at                          An electronic signature was used to authenticate this note.     --ERIK Luo

## 2022-05-20 NOTE — TELEPHONE ENCOUNTER
ERIK Persaud   5/20/2022  2:55 PM CDT Back to Top        Please call patient and let them know results. Normal thyroid  Cholesterol has improved but is still elevated.  Recommend increasing Lipitor to 40 mg daily. Metabolic panel was normal except for elevated blood sugar at 131  Blood sugars are not controlled with an A1c of 7.1.  This is gone up over a point from previous check.  Recommend working on decreasing carbohydrates and sugars in diet and exercise     Repeat fasting CMP, A1c and lipids in 3 months    ERIK Persaud   5/20/2022 12:25 PM CDT         Please call patient and let them know results.    Normal blood cts         [facility]

## 2022-05-23 ENCOUNTER — TELEPHONE (OUTPATIENT)
Dept: PRIMARY CARE CLINIC | Age: 42
End: 2022-05-23

## 2022-05-23 NOTE — TELEPHONE ENCOUNTER
----- Message from ERIK Linda sent at 5/23/2022 10:05 AM CDT -----  Please call patient and let them know results. No pathologic protein in urine.

## 2022-06-18 DIAGNOSIS — M79.7 FIBROMYALGIA: ICD-10-CM

## 2022-06-18 DIAGNOSIS — E11.42 TYPE 2 DIABETES MELLITUS WITH DIABETIC POLYNEUROPATHY, WITHOUT LONG-TERM CURRENT USE OF INSULIN (HCC): ICD-10-CM

## 2022-06-18 DIAGNOSIS — F41.9 ANXIETY: ICD-10-CM

## 2022-06-20 RX ORDER — DULOXETIN HYDROCHLORIDE 60 MG/1
60 CAPSULE, DELAYED RELEASE ORAL 2 TIMES DAILY
Qty: 180 CAPSULE | Refills: 3 | Status: SHIPPED | OUTPATIENT
Start: 2022-06-20

## 2022-06-20 NOTE — TELEPHONE ENCOUNTER
Received fax from pharmacy requesting refill on pts medication(s). Pt was last seen in office on 5/20/2022  and has a follow up scheduled for 7/1/2022. Will send request to  Anastacia Patino  for patient.      Requested Prescriptions     Pending Prescriptions Disp Refills    DULoxetine (CYMBALTA) 60 MG extended release capsule [Pharmacy Med Name: DULoxetine HCl 60 MG Oral Capsule Delayed Release Particles] 60 capsule 0     Sig: Take 1 capsule by mouth twice daily

## 2022-07-01 ENCOUNTER — OFFICE VISIT (OUTPATIENT)
Dept: PRIMARY CARE CLINIC | Age: 42
End: 2022-07-01
Payer: MEDICAID

## 2022-07-01 VITALS
SYSTOLIC BLOOD PRESSURE: 132 MMHG | DIASTOLIC BLOOD PRESSURE: 86 MMHG | BODY MASS INDEX: 41.72 KG/M2 | HEART RATE: 73 BPM | WEIGHT: 258.5 LBS | OXYGEN SATURATION: 97 % | TEMPERATURE: 97.1 F

## 2022-07-01 DIAGNOSIS — E03.4 HYPOTHYROIDISM DUE TO ACQUIRED ATROPHY OF THYROID: Primary | ICD-10-CM

## 2022-07-01 DIAGNOSIS — E11.9 CONTROLLED TYPE 2 DIABETES MELLITUS WITHOUT COMPLICATION, WITHOUT LONG-TERM CURRENT USE OF INSULIN (HCC): ICD-10-CM

## 2022-07-01 DIAGNOSIS — E11.9 CONTROLLED TYPE 2 DIABETES MELLITUS WITHOUT COMPLICATION, WITHOUT LONG-TERM CURRENT USE OF INSULIN (HCC): Primary | ICD-10-CM

## 2022-07-01 DIAGNOSIS — M79.7 FIBROMYALGIA: ICD-10-CM

## 2022-07-01 DIAGNOSIS — E03.4 HYPOTHYROIDISM DUE TO ACQUIRED ATROPHY OF THYROID: ICD-10-CM

## 2022-07-01 DIAGNOSIS — K59.03 DRUG-INDUCED CONSTIPATION: ICD-10-CM

## 2022-07-01 LAB
ALBUMIN SERPL-MCNC: 4.2 G/DL (ref 3.5–5.2)
ALP BLD-CCNC: 130 U/L (ref 35–104)
ALT SERPL-CCNC: 32 U/L (ref 5–33)
ANION GAP SERPL CALCULATED.3IONS-SCNC: 14 MMOL/L (ref 7–19)
AST SERPL-CCNC: 24 U/L (ref 5–32)
BASOPHILS ABSOLUTE: 0.1 K/UL (ref 0–0.2)
BASOPHILS RELATIVE PERCENT: 1 % (ref 0–1)
BILIRUB SERPL-MCNC: 0.3 MG/DL (ref 0.2–1.2)
BUN BLDV-MCNC: 9 MG/DL (ref 6–20)
CALCIUM SERPL-MCNC: 9.5 MG/DL (ref 8.6–10)
CHLORIDE BLD-SCNC: 102 MMOL/L (ref 98–111)
CO2: 22 MMOL/L (ref 22–29)
CREAT SERPL-MCNC: 0.7 MG/DL (ref 0.5–0.9)
CREATININE URINE: 119.9 MG/DL (ref 4.2–622)
EOSINOPHILS ABSOLUTE: 0.2 K/UL (ref 0–0.6)
EOSINOPHILS RELATIVE PERCENT: 1.4 % (ref 0–5)
GFR AFRICAN AMERICAN: >59
GFR NON-AFRICAN AMERICAN: >60
GLUCOSE BLD-MCNC: 106 MG/DL (ref 74–109)
HBA1C MFR BLD: 6.6 % (ref 4–6)
HCT VFR BLD CALC: 48.1 % (ref 37–47)
HEMOGLOBIN: 15 G/DL (ref 12–16)
HEPATITIS C ANTIBODY INTERPRETATION: NORMAL
IMMATURE GRANULOCYTES #: 0 K/UL
LYMPHOCYTES ABSOLUTE: 3.8 K/UL (ref 1.1–4.5)
LYMPHOCYTES RELATIVE PERCENT: 36.1 % (ref 20–40)
MCH RBC QN AUTO: 29.1 PG (ref 27–31)
MCHC RBC AUTO-ENTMCNC: 31.2 G/DL (ref 33–37)
MCV RBC AUTO: 93.4 FL (ref 81–99)
MICROALBUMIN UR-MCNC: <1.2 MG/DL (ref 0–19)
MICROALBUMIN/CREAT UR-RTO: NORMAL MG/G
MONOCYTES ABSOLUTE: 0.6 K/UL (ref 0–0.9)
MONOCYTES RELATIVE PERCENT: 5.7 % (ref 0–10)
NEUTROPHILS ABSOLUTE: 5.8 K/UL (ref 1.5–7.5)
NEUTROPHILS RELATIVE PERCENT: 55.5 % (ref 50–65)
PDW BLD-RTO: 14.2 % (ref 11.5–14.5)
PLATELET # BLD: 287 K/UL (ref 130–400)
PMV BLD AUTO: 12 FL (ref 9.4–12.3)
POTASSIUM SERPL-SCNC: 4.1 MMOL/L (ref 3.5–5)
RBC # BLD: 5.15 M/UL (ref 4.2–5.4)
SODIUM BLD-SCNC: 138 MMOL/L (ref 136–145)
T4 FREE: 0.98 NG/DL (ref 0.93–1.7)
TOTAL PROTEIN: 7.4 G/DL (ref 6.6–8.7)
TSH SERPL DL<=0.05 MIU/L-ACNC: 2.47 UIU/ML (ref 0.27–4.2)
WBC # BLD: 10.4 K/UL (ref 4.8–10.8)

## 2022-07-01 PROCEDURE — 3051F HG A1C>EQUAL 7.0%<8.0%: CPT | Performed by: NURSE PRACTITIONER

## 2022-07-01 PROCEDURE — 99214 OFFICE O/P EST MOD 30 MIN: CPT | Performed by: NURSE PRACTITIONER

## 2022-07-01 RX ORDER — PREGABALIN 100 MG/1
100 CAPSULE ORAL 3 TIMES DAILY
Qty: 90 CAPSULE | Refills: 5 | Status: SHIPPED | OUTPATIENT
Start: 2022-07-01 | End: 2022-08-12 | Stop reason: SDUPTHER

## 2022-07-01 ASSESSMENT — PATIENT HEALTH QUESTIONNAIRE - PHQ9
SUM OF ALL RESPONSES TO PHQ QUESTIONS 1-9: 0
SUM OF ALL RESPONSES TO PHQ9 QUESTIONS 1 & 2: 0
SUM OF ALL RESPONSES TO PHQ QUESTIONS 1-9: 0
2. FEELING DOWN, DEPRESSED OR HOPELESS: 0
SUM OF ALL RESPONSES TO PHQ QUESTIONS 1-9: 0
SUM OF ALL RESPONSES TO PHQ QUESTIONS 1-9: 0
1. LITTLE INTEREST OR PLEASURE IN DOING THINGS: 0

## 2022-07-01 ASSESSMENT — ENCOUNTER SYMPTOMS
EYES NEGATIVE: 1
RESPIRATORY NEGATIVE: 1
GASTROINTESTINAL NEGATIVE: 1
ALLERGIC/IMMUNOLOGIC NEGATIVE: 1

## 2022-07-01 NOTE — PATIENT INSTRUCTIONS
Patient Education        Diabetes Foot Health: Care Instructions  Your Care Instructions     When you have diabetes, your feet need extra care and attention. Diabetes can damage the nerve endings and blood vessels in your feet, making you less likely to notice when your feet are injured. Diabetes also limits your body's ability to fight infection and get blood to areas that need it. If you get a minor foot injury, it could become an ulcer or a serious infection. With good foot care,you can prevent most of these problems. Caring for your feet can be quick and easy. Most of the care can be done whenyou are bathing or getting ready for bed. Follow-up care is a key part of your treatment and safety. Be sure to make and go to all appointments, and call your doctor if you are having problems. It's also a good idea to know your test results and keep alist of the medicines you take. How can you care for yourself at home?  Keep your blood sugar close to normal by watching what and how much you eat, monitoring blood sugar, taking medicines if prescribed, and getting regular exercise.  Do not smoke. Smoking affects blood flow and can make foot problems worse. If you need help quitting, talk to your doctor about stop-smoking programs and medicines. These can increase your chances of quitting for good.  Eat a diet that is low in fats. High fat intake can cause fat to build up in your blood vessels and decrease blood flow.  Inspect your feet daily for blisters, cuts, cracks, or sores. If you cannot see well, use a mirror or have someone help you.  Take care of your feet:  ? Wash your feet every day. Use warm (not hot) water. Check the water temperature with your wrists or other part of your body, not your feet. ? Dry your feet well. Pat them dry. Do not rub the skin on your feet too hard. Dry well between your toes.  If the skin on your feet stays moist, bacteria or a fungus can grow, which can lead to infection. ? Keep your skin soft. Use moisturizing skin cream to keep the skin on your feet soft and prevent calluses and cracks. But do not put the cream between your toes, and stop using any cream that causes a rash. ? Clean underneath your toenails carefully. Do not use a sharp object to clean underneath your toenails. Use the blunt end of a nail file or other rounded tool. ? Trim and file your toenails straight across to prevent ingrown toenails. Use a nail clipper, not scissors. Use an emery board to smooth the edges.  Change socks daily. Socks without seams are best, because seams often rub the feet. You can find socks for people with diabetes from specialty catalogs.  Look inside your shoes every day for things like gravel or torn linings, which could cause blisters or sores.  Buy shoes that fit well:  ? Look for shoes that have plenty of space around the toes. This helps prevent bunions and blisters. ? Try on shoes while wearing the kind of socks you will usually wear with the shoes. ? Avoid plastic shoes. They may rub your feet and cause blisters. Good shoes should be made of materials that are flexible and breathable, such as leather or cloth. ? Break in new shoes slowly by wearing them for no more than an hour a day for several days. Take extra time to check your feet for red areas, blisters, or other problems after you wear new shoes.  Do not go barefoot. Do not wear sandals, and do not wear shoes with very thin soles. Thin soles are easy to puncture. They also do not protect your feet from hot pavement or cold weather.  Have your doctor check your feet during each visit. If you have a foot problem, see your doctor. Do not try to treat an early foot problem at home. Home remedies or treatments that you can buy without a prescription (such as corn removers) can be harmful.  Always get early treatment for foot problems.  A minor irritation can lead to a major problem if not properly cared for early. When should you call for help? Call your doctor now or seek immediate medical care if:     You have a foot sore, an ulcer or break in the skin that is not healing after 4 days, bleeding corns or calluses, or an ingrown toenail.      You have blue or black areas, which can mean bruising or blood flow problems.      You have peeling skin or tiny blisters between your toes or cracking or oozing of the skin.      You have a fever for more than 24 hours and a foot sore.      You have new numbness or tingling in your feet that does not go away after you move your feet or change positions.      You have unexplained or unusual swelling of the foot or ankle. Watch closely for changes in your health, and be sure to contact your doctor if:     You cannot do proper foot care. Where can you learn more? Go to [email or web address]. org and sign in to your [employer] account. Enter A739 in the [employer] box to learn more about \"Diabetes Foot Health: Care Instructions. \"     If you do not have an account, please click on the \"Sign Up Now\" link. Current as of: July 28, 2021               Content Version: 13.3  © 2006-2022 [employer]. Care instructions adapted under license by [facility] ([facility]). If you have questions about a medical condition or this instruction, always ask your healthcare professional. [place] [address] any warranty or liability for your use of this information. Patient Education        Hypothyroidism: Care Instructions  Your Care Instructions     When you have hypothyroidism, your body doesn't make enough thyroid hormone. This hormone helps your body use energy. If your thyroid level is low, you may feel tired, be constipated, have an increase in your blood pressure, or have dry skin or memory problems. You may also get cold easily, even when it iswarm. Women with low thyroid levels may have heavy menstrual periods.   A blood test to find your thyroid-stimulating hormone (TSH) level is used tocheck for hypothyroidism. A high TSH level may mean that you have it. The treatment for hypothyroidism is thyroid hormone pills. You should start to feel better in 1 to 2 weeks. Most people need treatment for the rest of their lives. You will need regular visits with your doctor to make sure you are doingwell and that you have the right dose of medicine. Follow-up care is a key part of your treatment and safety. Be sure to make and go to all appointments, and call your doctor if you are having problems. It's also a good idea to know your test results and keep alist of the medicines you take. How can you care for yourself at home?  Take your thyroid hormone medicine exactly as prescribed. Call your doctor if you think you are having a problem with your medicine. Most people do not have side effects if they take the right amount of medicine regularly. ? Take the medicine 30 minutes before breakfast, and do not take it with calcium, vitamins, or iron. ? Do not take extra doses of your thyroid medicine. It will not help you get better any faster, and it may cause side effects. ? If you forget to take a dose, do NOT take a double dose of medicine. Take your usual dose the next day.  Tell your doctor about all prescription, herbal, or over-the-counter products you take.  Take care of yourself. Eat a healthy diet, get enough sleep, and get regular exercise. When should you call for help? Call 911 anytime you think you may need emergency care. For example, call if:     You passed out (lost consciousness).      You have severe trouble breathing.      You have a very slow heartbeat (less than 60 beats a minute).      You have a low body temperature (95°F or below).    Call your doctor now or seek immediate medical care if:     You feel tired, sluggish, or weak.      You have trouble remembering things or concentrating.      You do not begin to feel better 2 weeks after starting your medicine. Watch closely for changes in your health, and be sure to contact your doctor ifyou have any problems. Where can you learn more? Go to [email or web address]://BrayolapeZuki.[employer] [email or web address] org and sign in to your [facility] account. Enter T072 in the LuckyCal box to learn more about \"Hypothyroidism: Care Instructions. \"     If you do not have an account, please click on the \"Sign Up Now\" link. Current as of: April 13, 2022               Content Version: 13.3  © 5991-4958 [employer]. Care instructions adapted under license by [facility] ([facility]). If you have questions about a medical condition or this instruction, always ask your healthcare professional. [address] any warranty or liability for your use of this information.

## 2022-07-01 NOTE — PROGRESS NOTES
Carissa Chase (:  1980) is a 36 y.o. female,Established patient, here for evaluation of the following chief complaint(s):  Follow-up (for diabetes, hypothyroid, hot flashes, mood swings and Lyrica.)      ASSESSMENT/PLAN:    ICD-10-CM    1. Controlled type 2 diabetes mellitus without complication, without long-term current use of insulin (HCC)  E11.9 CBC with Auto Differential     Comprehensive Metabolic Panel     Hemoglobin A1C     Microalbumin / Creatinine Urine Ratio     pregabalin (LYRICA) 100 MG capsule     Hepatitis C Antibody     HIV Screen  Will increase to ozempic .5mg weekly  Cont tight control   2. Drug-induced constipation  K59.03 Great with linzess   Doing well   3. Hypothyroidism due to acquired atrophy of thyroid  E03.4 TSH     T4, Free  Will check today  Consider increasing if needed   4. Fibromyalgia  M79.7 pregabalin (LYRICA) 100 MG capsule  Back down to 100mg   Consider even 75 if tolerable       Return in about 6 weeks (around 2022) for diabetes weight mood issues and fibro adjustment. SUBJECTIVE/OBJECTIVE:  HPI    Patient is here for 1 month follow up for diabetes  Before had issues with constipation from diabetes medication  But desired to try again  On metfromin  At goal dose  ozempic . 25 weekly to . 5 weekly after a month  (start the next time)  farxiga 5mg  (she hasn't been taking it )  reports that when checking it much better  Getting 125-128 fasting    Weight since visit  Down 10 lbs    Fibro pain  We attempted to change to lyrica brand name with swelling issues  She is having issues getting name brand  She has been doing 5 day doses for name brand  She has been taking it three times a day  On lyrica but wonders if decrease could help  She feels the thyroid medication \"wors wonders \"  And feels lyrica need less      Constipation  Started linzess to assist  She is taking the daily   Reports able to go daily   With relief      Underactive thyroid  Started on synthroid 25mcg daily   She has noticed a bid difference   Will check today      Hot flashes   Was severe and was to restart estrogen  She felt very \"crappy on it \"  Massive headaches and felt bad so she again quit it   She is needing a pap and will see in [place] she has apt      /86 (Site: Right Upper Arm, Position: Sitting, Cuff Size: Large Adult)   Pulse 73   Temp 97.1 °F (36.2 °C) (Temporal)   Wt 258 lb 8 oz (117.3 kg)   LMP 01/01/2005   SpO2 97%   BMI 41.72 kg/m²   Review of Systems   Constitutional: Negative for activity change and fatigue. HENT: Negative. Eyes: Negative. Respiratory: Negative. Cpap at night   Cardiovascular: Negative for leg swelling (much improved). BP is controlled   Gastrointestinal: Negative. Endocrine: Negative. Negative for polydipsia, polyphagia and polyuria. Bg controlled. Genitourinary: Negative for frequency and vaginal discharge (no yeast infection). Musculoskeletal: Negative for arthralgias and myalgias (improved at present). Skin: Negative. Negative for rash. Allergic/Immunologic: Negative. Neurological: Negative. Hematological: Negative. Psychiatric/Behavioral: Negative. Physical Exam  Vitals reviewed. Constitutional:       Appearance: Normal appearance. She is obese. Comments: Mask     HENT:      Right Ear: Tympanic membrane normal. There is no impacted cerumen. Left Ear: Tympanic membrane normal. There is no impacted cerumen. Nose: No rhinorrhea. Cardiovascular:      Rate and Rhythm: Normal rate and regular rhythm. Pulses: Normal pulses. Pulmonary:      Effort: Pulmonary effort is normal.      Breath sounds: Normal breath sounds. No wheezing or rhonchi. Musculoskeletal:      Right lower leg: No edema. Left lower leg: No edema. Skin:     Findings: No rash. Neurological:      Mental Status: She is alert.    Psychiatric:         Mood and Affect: Mood normal.         Behavior: Behavior normal.                   An electronic signature was used to authenticate this note.     --Bruce Shipman, APRN

## 2022-08-12 ENCOUNTER — OFFICE VISIT (OUTPATIENT)
Dept: PRIMARY CARE CLINIC | Age: 42
End: 2022-08-12
Payer: MEDICAID

## 2022-08-12 VITALS
OXYGEN SATURATION: 98 % | SYSTOLIC BLOOD PRESSURE: 132 MMHG | WEIGHT: 251.5 LBS | DIASTOLIC BLOOD PRESSURE: 80 MMHG | BODY MASS INDEX: 40.59 KG/M2 | HEART RATE: 89 BPM | TEMPERATURE: 98.3 F

## 2022-08-12 DIAGNOSIS — M79.7 FIBROMYALGIA: ICD-10-CM

## 2022-08-12 DIAGNOSIS — E11.42 TYPE 2 DIABETES MELLITUS WITH DIABETIC POLYNEUROPATHY, WITHOUT LONG-TERM CURRENT USE OF INSULIN (HCC): Primary | ICD-10-CM

## 2022-08-12 DIAGNOSIS — E03.4 HYPOTHYROIDISM DUE TO ACQUIRED ATROPHY OF THYROID: ICD-10-CM

## 2022-08-12 DIAGNOSIS — R03.0 ELEVATED BP WITHOUT DIAGNOSIS OF HYPERTENSION: ICD-10-CM

## 2022-08-12 DIAGNOSIS — E11.9 CONTROLLED TYPE 2 DIABETES MELLITUS WITHOUT COMPLICATION, WITHOUT LONG-TERM CURRENT USE OF INSULIN (HCC): ICD-10-CM

## 2022-08-12 DIAGNOSIS — E78.2 MIXED HYPERLIPIDEMIA: ICD-10-CM

## 2022-08-12 DIAGNOSIS — F33.9 RECURRENT DEPRESSION (HCC): ICD-10-CM

## 2022-08-12 LAB
T4 FREE: 1 NG/DL (ref 0.93–1.7)
TSH SERPL DL<=0.05 MIU/L-ACNC: 1.99 UIU/ML (ref 0.27–4.2)

## 2022-08-12 PROCEDURE — 3044F HG A1C LEVEL LT 7.0%: CPT | Performed by: NURSE PRACTITIONER

## 2022-08-12 PROCEDURE — 99214 OFFICE O/P EST MOD 30 MIN: CPT | Performed by: NURSE PRACTITIONER

## 2022-08-12 RX ORDER — PREGABALIN 100 MG/1
100 CAPSULE ORAL 3 TIMES DAILY
Qty: 270 CAPSULE | Refills: 3 | Status: SHIPPED | OUTPATIENT
Start: 2022-08-12 | End: 2022-09-11

## 2022-08-12 RX ORDER — TIZANIDINE 4 MG/1
4 TABLET ORAL EVERY 8 HOURS PRN
Qty: 90 TABLET | Refills: 1 | Status: SHIPPED | OUTPATIENT
Start: 2022-08-12

## 2022-08-12 RX ORDER — ASPIRIN 81 MG/1
81 TABLET ORAL DAILY
Qty: 30 TABLET | Refills: 5 | Status: SHIPPED | OUTPATIENT
Start: 2022-08-12

## 2022-08-12 RX ORDER — ATORVASTATIN CALCIUM 20 MG/1
20 TABLET, FILM COATED ORAL NIGHTLY
Qty: 30 TABLET | Refills: 11 | Status: SHIPPED | OUTPATIENT
Start: 2022-08-12

## 2022-08-12 RX ORDER — SEMAGLUTIDE 1.34 MG/ML
1 INJECTION, SOLUTION SUBCUTANEOUS WEEKLY
Qty: 3 ML | Refills: 11 | Status: SHIPPED | OUTPATIENT
Start: 2022-08-12

## 2022-08-12 RX ORDER — LOSARTAN POTASSIUM 25 MG/1
25 TABLET ORAL DAILY
Qty: 90 TABLET | Refills: 3 | Status: SHIPPED | OUTPATIENT
Start: 2022-08-12

## 2022-08-12 SDOH — ECONOMIC STABILITY: FOOD INSECURITY: WITHIN THE PAST 12 MONTHS, YOU WORRIED THAT YOUR FOOD WOULD RUN OUT BEFORE YOU GOT MONEY TO BUY MORE.: NEVER TRUE

## 2022-08-12 SDOH — ECONOMIC STABILITY: FOOD INSECURITY: WITHIN THE PAST 12 MONTHS, THE FOOD YOU BOUGHT JUST DIDN'T LAST AND YOU DIDN'T HAVE MONEY TO GET MORE.: NEVER TRUE

## 2022-08-12 ASSESSMENT — ENCOUNTER SYMPTOMS
ALLERGIC/IMMUNOLOGIC NEGATIVE: 1
GASTROINTESTINAL NEGATIVE: 1
RESPIRATORY NEGATIVE: 1
EYES NEGATIVE: 1

## 2022-08-12 ASSESSMENT — SOCIAL DETERMINANTS OF HEALTH (SDOH): HOW HARD IS IT FOR YOU TO PAY FOR THE VERY BASICS LIKE FOOD, HOUSING, MEDICAL CARE, AND HEATING?: NOT HARD AT ALL

## 2022-08-12 NOTE — PROGRESS NOTES
Jaun Pugh (:  1980) is a 36 y.o. female,Established patient, here for evaluation of the following chief complaint(s):  Follow-up (Diabetes follow up. Diabetes has been staying well controlled. Has lost 7 lbs since last time she was seen in office. Still having moods swings. Left eye is staying blood shot to the point her patients and coworkers are noticing.)      ASSESSMENT/PLAN:    ICD-10-CM    1. Type 2 diabetes mellitus with diabetic polyneuropathy, without long-term current use of insulin (HCC)  E11.42 losartan (COZAAR) 25 MG tablet     atorvastatin (LIPITOR) 20 MG tablet     aspirin EC 81 MG EC tablet     Semaglutide, 1 MG/DOSE, (OZEMPIC, 1 MG/DOSE,) 4 MG/3ML SOPN  Will adjust dose to goals        2. Elevated BP without diagnosis of hypertension  R03.0 losartan (COZAAR) 25 MG tablet  Cont present        3. Mixed hyperlipidemia  E78.2 atorvastatin (LIPITOR) 20 MG tablet  At bedtime        4. Controlled type 2 diabetes mellitus without complication, without long-term current use of insulin (HCC)  E11.9 pregabalin (LYRICA) 100 MG capsule  Will do for 90 days        5. Fibromyalgia  M79.7 pregabalin (LYRICA) 100 MG capsule  Cont cymbalta twice a day        6. Recurrent depression (HCC)  F33.9 brexpiprazole (REXULTI) 0.5 MG TABS tablet  Add and monitor            Return in about 4 weeks (around 2022) for diabetes mood swings eye redness. SUBJECTIVE/OBJECTIVE:  HPI  Patient is here for diabetes follow up    Diabetes Mellitus Type 2      Diet compliance:  compliant most of the time  Nutrition Consultation Needed:  no  Medication:   Metformin 1000mg twice a day  Ozempic . 5 weekly   Medication compliance:  compliant most of the time down 7 lbs  Weight trend: decreasing  Current exercise: no  Checkin times daily  Home blood sugar records: not checking  Low BG:  no  Eye exam current (within one year):  will need to go to [facility]  Checking Feet regularly:  yes - no provlems  ACE/ARB:  yes - cozaar  Aspirin: Yes  Moderate intensity statin: lipitor    Known diabetic complications: none  Barriers to success: none  Tobacco history: She  reports that she quit smoking about 3 years ago. Her smoking use included cigarettes. She has a 15.00 pack-year smoking history. She has never used smokeless tobacco.    Lab Results   Component Value Date    LABA1C 6.6 (H) 07/01/2022    GLUCOSE 106 07/01/2022     Lab Results   Component Value Date    LABMICR <1.20 07/01/2022    CREATININE 0.7 07/01/2022       Hypothyroidism:  Medication:   Snythroid 25 mcg  Medication compliance:  compliant most of the time  Patient is  taking her medication consistently on an empty stomach. Symptoms: none.]  Barrier to success:   Laboratory:  No results found for: [facility]  Lab Results   Component Value Date    TSH 2.470 07/01/2022    TSH 2.710 05/20/2022    TSH 3.410 08/16/2021     Constipation  Linzess daily  With great relief benefits outweight risks      Fibro issues  Cymbalta 60mg twice a day  Lyrica 100mg 1 an 2 pm  Reports that is helpful   Last filled 7/1 *90           Mood swings  She has noted lately worse  Feeling \"blah \"  She will have energy and then midway day is wiped out    RERE  Wearing CPAP atnight  With update   Doing well    Eye redness  Some dryness        /80 (Site: Right Upper Arm, Position: Sitting, Cuff Size: Large Adult)   Pulse 89   Temp 98.3 °F (36.8 °C) (Temporal)   Wt 251 lb 8 oz (114.1 kg)   LMP 01/01/2005   SpO2 98%   BMI 40.59 kg/m²   Review of Systems   Constitutional:  Negative for activity change and fatigue. HENT: Negative. Eyes: Negative. Respiratory: Negative. Cpap at night   Cardiovascular:  Negative for leg swelling (much improved). BP is controlled   Gastrointestinal: Negative. Endocrine: Negative. Negative for polydipsia, polyphagia and polyuria. Bg controlled. Genitourinary:  Negative for frequency and vaginal discharge (no yeast infection). Musculoskeletal:  Negative for arthralgias and myalgias (improved at present). Skin: Negative. Negative for rash. Allergic/Immunologic: Negative. Neurological: Negative. Hematological: Negative. Psychiatric/Behavioral:  Positive for sleep disturbance. Negative for self-injury and suicidal ideas. Blah days       Physical Exam  Vitals reviewed. Constitutional:       Appearance: Normal appearance. She is obese. Comments: Mask     HENT:      Right Ear: Tympanic membrane normal. There is no impacted cerumen. Left Ear: Tympanic membrane normal. There is no impacted cerumen. Nose: No rhinorrhea. Eyes:      General:         Left eye: Discharge (redness) present. Cardiovascular:      Rate and Rhythm: Normal rate and regular rhythm. Pulses: Normal pulses. Pulmonary:      Effort: Pulmonary effort is normal.      Breath sounds: Normal breath sounds. No wheezing or rhonchi. Musculoskeletal:      Right lower leg: No edema. Left lower leg: No edema. Skin:     Findings: No rash. Neurological:      Mental Status: She is alert. Psychiatric:         Mood and Affect: Mood normal.         Behavior: Behavior normal.                 An electronic signature was used to authenticate this note.     --ERIK Love

## 2022-10-05 ENCOUNTER — APPOINTMENT (OUTPATIENT)
Dept: GENERAL RADIOLOGY | Age: 42
End: 2022-10-05
Payer: MEDICAID

## 2022-10-05 ENCOUNTER — HOSPITAL ENCOUNTER (EMERGENCY)
Age: 42
Discharge: HOME | End: 2022-10-05
Payer: MEDICAID

## 2022-10-05 VITALS
RESPIRATION RATE: 17 BRPM | WEIGHT: 250 LBS | HEIGHT: 66 IN | BODY MASS INDEX: 40.18 KG/M2 | SYSTOLIC BLOOD PRESSURE: 123 MMHG | DIASTOLIC BLOOD PRESSURE: 79 MMHG | HEART RATE: 64 BPM | TEMPERATURE: 98.2 F | OXYGEN SATURATION: 95 %

## 2022-10-05 DIAGNOSIS — R07.9 CHEST PAIN, UNSPECIFIED TYPE: Primary | ICD-10-CM

## 2022-10-05 LAB
ALBUMIN SERPL-MCNC: 3.9 G/DL (ref 3.5–5.2)
ALP BLD-CCNC: 105 U/L (ref 35–104)
ALT SERPL-CCNC: 25 U/L (ref 5–33)
ANION GAP SERPL CALCULATED.3IONS-SCNC: 13 MMOL/L (ref 7–19)
AST SERPL-CCNC: 22 U/L (ref 5–32)
BASOPHILS ABSOLUTE: 0.1 K/UL (ref 0–0.2)
BASOPHILS RELATIVE PERCENT: 0.5 % (ref 0–1)
BILIRUB SERPL-MCNC: <0.2 MG/DL (ref 0.2–1.2)
BUN BLDV-MCNC: 11 MG/DL (ref 6–20)
CALCIUM SERPL-MCNC: 9.6 MG/DL (ref 8.6–10)
CHLORIDE BLD-SCNC: 105 MMOL/L (ref 98–111)
CO2: 24 MMOL/L (ref 22–29)
CREAT SERPL-MCNC: 0.6 MG/DL (ref 0.5–0.9)
EOSINOPHILS ABSOLUTE: 0.2 K/UL (ref 0–0.6)
EOSINOPHILS RELATIVE PERCENT: 1.1 % (ref 0–5)
GFR AFRICAN AMERICAN: >59
GFR NON-AFRICAN AMERICAN: >60
GLUCOSE BLD-MCNC: 115 MG/DL (ref 74–109)
HCT VFR BLD CALC: 47.5 % (ref 37–47)
HEMOGLOBIN: 15.3 G/DL (ref 12–16)
IMMATURE GRANULOCYTES #: 0.1 K/UL
INR BLD: 0.88 (ref 0.88–1.18)
LYMPHOCYTES ABSOLUTE: 4.7 K/UL (ref 1.1–4.5)
LYMPHOCYTES RELATIVE PERCENT: 33 % (ref 20–40)
MCH RBC QN AUTO: 29.1 PG (ref 27–31)
MCHC RBC AUTO-ENTMCNC: 32.2 G/DL (ref 33–37)
MCV RBC AUTO: 90.5 FL (ref 81–99)
MONOCYTES ABSOLUTE: 0.9 K/UL (ref 0–0.9)
MONOCYTES RELATIVE PERCENT: 6.3 % (ref 0–10)
NEUTROPHILS ABSOLUTE: 8.3 K/UL (ref 1.5–7.5)
NEUTROPHILS RELATIVE PERCENT: 58.7 % (ref 50–65)
PDW BLD-RTO: 13.5 % (ref 11.5–14.5)
PLATELET # BLD: 289 K/UL (ref 130–400)
PMV BLD AUTO: 10.6 FL (ref 9.4–12.3)
POTASSIUM REFLEX MAGNESIUM: 3.8 MMOL/L (ref 3.5–5)
PRO-BNP: 12 PG/ML (ref 0–450)
PROTHROMBIN TIME: 11.8 SEC (ref 12–14.6)
RBC # BLD: 5.25 M/UL (ref 4.2–5.4)
SODIUM BLD-SCNC: 142 MMOL/L (ref 136–145)
TOTAL PROTEIN: 7.3 G/DL (ref 6.6–8.7)
TROPONIN: <0.01 NG/ML (ref 0–0.03)
TROPONIN: <0.01 NG/ML (ref 0–0.03)
WBC # BLD: 14.1 K/UL (ref 4.8–10.8)

## 2022-10-05 PROCEDURE — 36415 COLL VENOUS BLD VENIPUNCTURE: CPT

## 2022-10-05 PROCEDURE — 85025 COMPLETE CBC W/AUTO DIFF WBC: CPT

## 2022-10-05 PROCEDURE — 83880 ASSAY OF NATRIURETIC PEPTIDE: CPT

## 2022-10-05 PROCEDURE — 6370000000 HC RX 637 (ALT 250 FOR IP): Performed by: NURSE PRACTITIONER

## 2022-10-05 PROCEDURE — 71045 X-RAY EXAM CHEST 1 VIEW: CPT

## 2022-10-05 PROCEDURE — 84484 ASSAY OF TROPONIN QUANT: CPT

## 2022-10-05 PROCEDURE — 71045 X-RAY EXAM CHEST 1 VIEW: CPT | Performed by: RADIOLOGY

## 2022-10-05 PROCEDURE — 80053 COMPREHEN METABOLIC PANEL: CPT

## 2022-10-05 PROCEDURE — 99285 EMERGENCY DEPT VISIT HI MDM: CPT

## 2022-10-05 PROCEDURE — 93005 ELECTROCARDIOGRAM TRACING: CPT | Performed by: NURSE PRACTITIONER

## 2022-10-05 PROCEDURE — 85610 PROTHROMBIN TIME: CPT

## 2022-10-05 RX ORDER — ACETAMINOPHEN 500 MG
1000 TABLET ORAL ONCE
Status: COMPLETED | OUTPATIENT
Start: 2022-10-05 | End: 2022-10-05

## 2022-10-05 RX ORDER — ASPIRIN 325 MG
325 TABLET ORAL ONCE
Status: COMPLETED | OUTPATIENT
Start: 2022-10-05 | End: 2022-10-05

## 2022-10-05 RX ADMIN — Medication: at 19:29

## 2022-10-05 RX ADMIN — ACETAMINOPHEN 1000 MG: 500 TABLET ORAL at 19:04

## 2022-10-05 RX ADMIN — ASPIRIN 325 MG: 325 TABLET ORAL at 19:04

## 2022-10-05 ASSESSMENT — HEART SCORE
ECG: 0
ECG: 1

## 2022-10-05 ASSESSMENT — PAIN DESCRIPTION - DESCRIPTORS: DESCRIPTORS: PRESSURE

## 2022-10-05 ASSESSMENT — ENCOUNTER SYMPTOMS
ABDOMINAL PAIN: 0
COLOR CHANGE: 0
SHORTNESS OF BREATH: 1

## 2022-10-05 ASSESSMENT — PAIN DESCRIPTION - LOCATION: LOCATION: CHEST

## 2022-10-05 ASSESSMENT — PAIN DESCRIPTION - ORIENTATION: ORIENTATION: MID

## 2022-10-05 ASSESSMENT — PAIN SCALES - GENERAL: PAINLEVEL_OUTOF10: 5

## 2022-10-05 NOTE — ED PROVIDER NOTES
[facility] EMERGENCY DEPT  eMERGENCY dEPARTMENT eNCOUnter      Pt Name: Tony Abdul  MRN: [id number]  [place] 1980  Date of evaluation: 10/5/2022  Provider: Maria L [address]       Chief Complaint   Patient presents with    Chest Pain     Started 1-2 hours pta         HISTORY OF PRESENT ILLNESS   (Location/Symptom, Timing/Onset,Context/Setting, Quality, Duration, Modifying Factors, Severity)  Note limiting factors. Tony Abdul is a 36 y.o. female who presents to the emergency department with midsternal chest pain that began several hours ago. Patient tells me she had a half a bottle of Tums. It is starting to ease up. Patient does not have a history of any coronary artery disease. The pain did radiate to her back. The history is provided by the patient. Chest Pain  Pain location:  Substernal area  Pain quality: aching, sharp and stabbing    Pain radiates to:  Upper back  Pain severity:  Severe  Onset quality:  Sudden  Duration:  2 hours  Timing:  Constant  Progression:  Improving  Chronicity:  New  Associated symptoms: shortness of breath    Associated symptoms: no abdominal pain and no fever    Risk factors: diabetes mellitus, high cholesterol, hypertension, obesity and smoking    Risk factors: no coronary artery disease      NursingNotes were reviewed. REVIEW OF SYSTEMS    (2-9 systems for level 4, 10 or more for level 5)     Review of Systems   Constitutional:  Negative for activity change and fever. Respiratory:  Positive for shortness of breath. Cardiovascular:  Positive for chest pain. Gastrointestinal:  Negative for abdominal pain. Skin:  Negative for color change. Except as noted above the remainder of the review of systems was reviewed and negative. PAST MEDICAL HISTORY     Past Medical History:   Diagnosis Date    Chronic back pain     gets injections with [facility].      CPAP (continuous positive airway pressure) dependence     13cm Fibromyalgia     GERD (gastroesophageal reflux disease)     Hyperlipidemia     Obstructive sleep apnea     AHI: 18.1 per PS[facility], 2017    Restless leg syndrome     Skin cancer     left breast     Vitamin D deficiency          SURGICALHISTORY       Past Surgical History:   Procedure Laterality Date    ABDOMINAL EXPLORATION SURGERY       SECTION      x1    CHOLECYSTECTOMY      HYSTERECTOMY, VAGINAL      NECK SURGERY      2 level neck fusion    OVARIAN CYST REMOVAL      OK EGD TRANSORAL BIOPSY SINGLE/MULTIPLE N/A 2017    Dr Meier-w/dilation over wire, 46 French-distal esophageal narrowing-Codie (-)    UPPER GASTROINTESTINAL ENDOSCOPY  2017    Dr Meier-w/dilation over wire, 46 French-distal esophageal narrowing-Codie (-)         CURRENT MEDICATIONS       Previous Medications    ASPIRIN EC 81 MG EC TABLET    Take 1 tablet by mouth in the morning. ATORVASTATIN (LIPITOR) 20 MG TABLET    Take 1 tablet by mouth nightly    BREXPIPRAZOLE (REXULTI) 0.5 MG TABS TABLET    Take 1 tablet by mouth in the morning. DULOXETINE (CYMBALTA) 60 MG EXTENDED RELEASE CAPSULE    Take 1 capsule by mouth 2 times daily    FUROSEMIDE (LASIX) 40 MG TABLET    Take 1 tablet by mouth daily    LEVOTHYROXINE (SYNTHROID) 25 MCG TABLET    Take 1 tablet by mouth daily    LINACLOTIDE (LINZESS) 145 MCG CAPSULE    Take 1 capsule by mouth every morning (before breakfast)    LOSARTAN (COZAAR) 25 MG TABLET    Take 1 tablet by mouth in the morning. METFORMIN (GLUCOPHAGE) 1000 MG TABLET    Take 1 tablet by mouth 2 times daily (with meals)    ONDANSETRON (ZOFRAN-ODT) 4 MG DISINTEGRATING TABLET    Take 1 tablet by mouth 3 times daily as needed for Nausea or Vomiting    POTASSIUM CHLORIDE (KLOR-CON M) 10 MEQ EXTENDED RELEASE TABLET    Take 1 tablet by mouth daily    PREGABALIN (LYRICA) 100 MG CAPSULE    Take 1 capsule by mouth in the morning and 1 capsule at noon and 1 capsule before bedtime. Do all this for 30 days.     SEMAGLUTIDE, 1 MG/DOSE, (OZEMPIC, 1 MG/DOSE,) 4 MG/3ML SOPN    Inject 1 mg into the skin once a week    TIZANIDINE (ZANAFLEX) 4 MG TABLET    Take 1 tablet by mouth every 8 hours as needed (muscle spasms/pain)       ALLERGIES     Hydrocodone-acetaminophen and Adhesive tape    FAMILY HISTORY       Family History   Problem Relation Age of Onset    Heart Disease Mother     High Blood Pressure Mother     Diabetes Mother     Heart Disease Father     High Blood Pressure Father     Cancer Maternal Grandfather         esphogeal cancer    Cancer Paternal Grandfather         colon ca    Colon Cancer Paternal Grandfather     Colon Polyps Neg Hx     Liver Cancer Neg Hx     Liver Disease Neg Hx     Rectal Cancer Neg Hx     Stomach Cancer Neg Hx           SOCIAL HISTORY       Social History     Socioeconomic History    Marital status:      Spouse name: None    Number of children: None    Years of education: None    Highest education level: None   Tobacco Use    Smoking status: Former     Packs/day: 1.00     Years: 15.00     Pack years: 15.00     Types: Cigarettes     Quit date: 2018     Years since quittin.0    Smokeless tobacco: Never    Tobacco comments:     smokes 1-2 cigarretts per day/ patient has tried but nothing has worked   Vaping Use    Vaping Use: Never used   Substance and Sexual Activity    Alcohol use: No    Drug use: No    Sexual activity: Not Currently     Partners: Male     Social Determinants of Health     Financial Resource Strain: Low Risk     Difficulty of Paying Living Expenses: Not hard at all   Food Insecurity: No Food Insecurity    Worried About Running Out of Food in the Last Year: Never true    Ran Out of Food in the Last Year: Never true       SCREENINGS    [place] Coma Scale  Eye Opening: Spontaneous  Best Verbal Response: Oriented  Best Motor Response: Obeys commands  Maggie Coma Scale Score: 15 @FLOW([id number])@      PHYSICAL EXAM    (up to 7 for level 4, 8 or more for level 5)     ED Triage Vitals [10/05/22 1705]   BP Temp Temp src Heart Rate Resp SpO2 Height Weight   (!) 148/87 98.2 °F (36.8 °C) -- 83 18 94 % 5' 6\" (1.676 m) 250 lb (113.4 kg)       Physical Exam  Vitals and nursing note reviewed. Constitutional:       Appearance: Normal appearance. She is well-developed. She is obese. HENT:      Head: Normocephalic and atraumatic. Eyes:      General: No scleral icterus. Right eye: No discharge. Left eye: No discharge. Cardiovascular:      Rate and Rhythm: Normal rate and regular rhythm. Heart sounds: Normal heart sounds. Pulmonary:      Effort: No respiratory distress. Breath sounds: Normal breath sounds. Abdominal:      General: Abdomen is protuberant. Palpations: Abdomen is soft. Musculoskeletal:      Cervical back: Normal range of motion and neck supple. Neurological:      Mental Status: She is alert and oriented to person, place, and time. Psychiatric:         Behavior: Behavior normal.       DIAGNOSTIC RESULTS     EKG: All EKG's are interpreted by the Emergency Department Physician who either signs or Co-signsthis chart in the absence of a cardiologist.  75 sinus rhythm no T wave changes abnormal R wave progression read at 1710 by Dr. Robinson Ordaz  59 sinus rhythm poor R wave progression read at 1915 by Dr. Robinson Ordaz no T wave changes    RADIOLOGY:   Non-plain filmimages such as CT, Ultrasound and MRI are read by the radiologist. Plain radiographic images are visualized and preliminarily interpreted by the emergency physician with the below findings:      Interpretation per the Radiologist below, if available at the time of this note:    XR CHEST PORTABLE   Final Result   No gross consolidation   Recommendation: Follow up as clinically indicated.     Electronically Signed by Johnnie Anglin MD at 05-Oct-2022 08:05:44 PM                     ED BEDSIDEULTRASOUND:   Performed by ED Physician -none    LABS:  Labs Reviewed   CBC WITH AUTO DIFFERENTIAL - Abnormal; Notable for the following components:       Result Value    WBC 14.1 (*)     Hematocrit 47.5 (*)     MCHC 32.2 (*)     Neutrophils Absolute 8.3 (*)     Lymphocytes Absolute 4.7 (*)     All other components within normal limits   COMPREHENSIVE METABOLIC PANEL W/ REFLEX TO MG FOR LOW K - Abnormal; Notable for the following components:    Glucose 115 (*)     Alkaline Phosphatase 105 (*)     All other components within normal limits   PROTIME-INR - Abnormal; Notable for the following components:    Protime 11.8 (*)     All other components within normal limits   TROPONIN   BRAIN NATRIURETIC PEPTIDE   TROPONIN       All other labs were within normal range or not returned as of this dictation. EMERGENCY DEPARTMENT COURSE and DIFFERENTIALDIAGNOSIS/MDM:   Vitals:    Vitals:    10/05/22 1705 10/05/22 1812 10/05/22 1901   BP: (!) 148/87 (!) 135/106 135/80   Pulse: 83 72 63   Resp: 18 16 16   Temp: 98.2 °F (36.8 °C)     SpO2: 94% 97% 94%   Weight: 250 lb (113.4 kg)     Height: 5' 6\" (1.676 m)             MDM  Heart score is 3. Patient got relief after GI cocktail. Offered pt admission and recommended it due to her many risk factors. Pt declined and said she would follow with her pcp. Counseled pt to stop smoking. Will give her haroldo's contact information to schedule an appt with him      CONSULTS:  None    PROCEDURES:  Unless otherwise noted below, none     Procedures    FINAL IMPRESSION      1.  Chest pain, unspecified type        DISPOSITION/PLAN   DISPOSITION Decision To Discharge 10/05/2022 08:35:03 PM      PATIENT REFERRED TO:  ERIK Ordonez  [address] [facility] [address] [place] [address]  [phone]    Schedule an appointment as soon as possible for a visit       DISCHARGE MEDICATIONS:  New Prescriptions    No medications on file          (Please note that portions of this note were completed with a voice recognitionprogram.  Efforts were made to edit the dictations but occasionally words are mis-transcribed.)    ERIK Langley (electronically signed)          ERIK Langley  10/05/22 2055

## 2022-10-06 LAB
EKG P AXIS: 2 DEGREES
EKG P AXIS: 45 DEGREES
EKG P-R INTERVAL: 141 MS
EKG P-R INTERVAL: 153 MS
EKG Q-T INTERVAL: 369 MS
EKG Q-T INTERVAL: 403 MS
EKG QRS DURATION: 90 MS
EKG QRS DURATION: 93 MS
EKG QTC CALCULATION (BAZETT): 400 MS
EKG QTC CALCULATION (BAZETT): 413 MS
EKG T AXIS: 46 DEGREES
EKG T AXIS: 59 DEGREES

## 2023-01-03 DIAGNOSIS — R53.83 FATIGUE, UNSPECIFIED TYPE: ICD-10-CM

## 2023-01-03 DIAGNOSIS — E66.01 MORBID OBESITY WITH BMI OF 40.0-44.9, ADULT (HCC): ICD-10-CM

## 2023-01-04 RX ORDER — LEVOTHYROXINE SODIUM 0.03 MG/1
25 TABLET ORAL DAILY
Qty: 30 TABLET | Refills: 0 | Status: SHIPPED | OUTPATIENT
Start: 2023-01-04

## 2023-01-04 NOTE — TELEPHONE ENCOUNTER
Received fax from pharmacy requesting refill on pts medication(s). Pt was last seen in office on 8/12/2022  and has a follow up scheduled for Visit date not found. Will send request to  Sammie Montaño  for authorization.      Requested Prescriptions     Pending Prescriptions Disp Refills    levothyroxine (SYNTHROID) 25 MCG tablet [Pharmacy Med Name: Levothyroxine Sodium 25 MCG Oral Tablet] 30 tablet 0     Sig: Take 1 tablet by mouth once daily

## 2023-02-09 DIAGNOSIS — R53.83 FATIGUE, UNSPECIFIED TYPE: ICD-10-CM

## 2023-02-09 DIAGNOSIS — E66.01 MORBID OBESITY WITH BMI OF 40.0-44.9, ADULT (HCC): ICD-10-CM

## 2023-02-10 RX ORDER — LEVOTHYROXINE SODIUM 0.03 MG/1
25 TABLET ORAL DAILY
Qty: 90 TABLET | Refills: 3 | Status: SHIPPED | OUTPATIENT
Start: 2023-02-10

## 2023-02-10 NOTE — TELEPHONE ENCOUNTER
Received fax from pharmacy requesting refill on pts medication(s). Pt was last seen in office on 08/12/22and has a follow up scheduled for Visit date not found. Will send request to  Nael Parra  for patient.      Requested Prescriptions     Pending Prescriptions Disp Refills    levothyroxine (SYNTHROID) 25 MCG tablet [Pharmacy Med Name: Levothyroxine Sodium 25 MCG Oral Tablet] 30 tablet 0     Sig: Take 1 tablet by mouth once daily

## 2023-03-19 DIAGNOSIS — M79.7 FIBROMYALGIA: ICD-10-CM

## 2023-03-19 DIAGNOSIS — R60.9 SWELLING: ICD-10-CM

## 2023-03-19 DIAGNOSIS — R60.9 PERIPHERAL EDEMA: ICD-10-CM

## 2023-03-19 DIAGNOSIS — E11.9 CONTROLLED TYPE 2 DIABETES MELLITUS WITHOUT COMPLICATION, WITHOUT LONG-TERM CURRENT USE OF INSULIN (HCC): ICD-10-CM

## 2023-03-20 RX ORDER — FUROSEMIDE 40 MG/1
40 TABLET ORAL DAILY
Qty: 90 TABLET | Refills: 0 | Status: SHIPPED | OUTPATIENT
Start: 2023-03-20

## 2023-03-20 NOTE — TELEPHONE ENCOUNTER
Received fax from pharmacy requesting refill on pts medication(s). Pt was last seen in office on Visit date not found  and has a follow up scheduled for Visit date not found. Will send request to  Suki Chavira  for authorization.      Requested Prescriptions     Pending Prescriptions Disp Refills    furosemide (LASIX) 40 MG tablet [Pharmacy Med Name: Furosemide 40 MG Oral Tablet] 90 tablet 0     Sig: Take 1 tablet by mouth daily

## 2023-03-20 NOTE — TELEPHONE ENCOUNTER
Received fax from pharmacy requesting refill on pts medication(s). Pt was last seen in office on Visit date not found  and has a follow up scheduled for 3/19/2023. Will send request to  Chance Romano  for authorization.      Requested Prescriptions     Pending Prescriptions Disp Refills    pregabalin (LYRICA) 100 MG capsule [Pharmacy Med Name: Pregabalin 100 MG Oral Capsule] 270 capsule 0     Sig: TAKE 1 CAPSULE BY MOUTH IN THE MORNING 1 AT NOON AND 1 AT BEDTIME

## 2023-03-21 RX ORDER — PREGABALIN 100 MG/1
CAPSULE ORAL
Qty: 90 CAPSULE | Refills: 0 | Status: SHIPPED | OUTPATIENT
Start: 2023-03-21 | End: 2023-06-19

## 2023-03-24 ENCOUNTER — PATIENT MESSAGE (OUTPATIENT)
Dept: FAMILY MEDICINE CLINIC | Age: 43
End: 2023-03-24

## 2023-03-24 DIAGNOSIS — B00.1 FEVER BLISTER: Primary | ICD-10-CM

## 2023-03-24 RX ORDER — VALACYCLOVIR HYDROCHLORIDE 1 G/1
TABLET, FILM COATED ORAL
Qty: 4 TABLET | Refills: 3 | Status: SHIPPED | OUTPATIENT
Start: 2023-03-24

## 2023-03-24 NOTE — TELEPHONE ENCOUNTER
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 Over the counter stuff is not working, is there something you can do? Didn't know, mine tend to get really huge. After my withdrawals on Tuesday and the a fibro flare I now have these and possibly another one coming up in the center of my lower lip.  They are trying to spread onto the skin around my lip

## 2023-04-20 DIAGNOSIS — M79.7 FIBROMYALGIA: ICD-10-CM

## 2023-04-20 DIAGNOSIS — E11.9 CONTROLLED TYPE 2 DIABETES MELLITUS WITHOUT COMPLICATION, WITHOUT LONG-TERM CURRENT USE OF INSULIN (HCC): ICD-10-CM

## 2023-04-20 RX ORDER — PREGABALIN 100 MG/1
100 CAPSULE ORAL 3 TIMES DAILY
Qty: 90 CAPSULE | Refills: 0 | Status: SHIPPED | OUTPATIENT
Start: 2023-04-20 | End: 2023-07-19

## 2023-04-24 ENCOUNTER — OFFICE VISIT (OUTPATIENT)
Dept: FAMILY MEDICINE CLINIC | Age: 43
End: 2023-04-24
Payer: MEDICAID

## 2023-04-24 VITALS
WEIGHT: 260.5 LBS | OXYGEN SATURATION: 96 % | HEART RATE: 84 BPM | BODY MASS INDEX: 41.87 KG/M2 | SYSTOLIC BLOOD PRESSURE: 120 MMHG | TEMPERATURE: 97.2 F | HEIGHT: 66 IN | DIASTOLIC BLOOD PRESSURE: 86 MMHG

## 2023-04-24 DIAGNOSIS — E11.9 CONTROLLED TYPE 2 DIABETES MELLITUS WITHOUT COMPLICATION, WITHOUT LONG-TERM CURRENT USE OF INSULIN (HCC): ICD-10-CM

## 2023-04-24 DIAGNOSIS — R60.9 PERIPHERAL EDEMA: ICD-10-CM

## 2023-04-24 DIAGNOSIS — M79.7 FIBROMYALGIA: ICD-10-CM

## 2023-04-24 DIAGNOSIS — R60.9 SWELLING: ICD-10-CM

## 2023-04-24 DIAGNOSIS — F33.9 RECURRENT DEPRESSION (HCC): ICD-10-CM

## 2023-04-24 DIAGNOSIS — I10 ESSENTIAL HYPERTENSION: ICD-10-CM

## 2023-04-24 DIAGNOSIS — B96.89 ACUTE BACTERIAL SINUSITIS: ICD-10-CM

## 2023-04-24 DIAGNOSIS — J01.90 ACUTE BACTERIAL SINUSITIS: ICD-10-CM

## 2023-04-24 DIAGNOSIS — E11.9 CONTROLLED TYPE 2 DIABETES MELLITUS WITHOUT COMPLICATION, WITHOUT LONG-TERM CURRENT USE OF INSULIN (HCC): Primary | ICD-10-CM

## 2023-04-24 DIAGNOSIS — E11.42 TYPE 2 DIABETES MELLITUS WITH DIABETIC POLYNEUROPATHY, WITHOUT LONG-TERM CURRENT USE OF INSULIN (HCC): ICD-10-CM

## 2023-04-24 DIAGNOSIS — E78.2 MIXED HYPERLIPIDEMIA: ICD-10-CM

## 2023-04-24 DIAGNOSIS — R53.83 OTHER FATIGUE: ICD-10-CM

## 2023-04-24 DIAGNOSIS — R53.83 FATIGUE, UNSPECIFIED TYPE: ICD-10-CM

## 2023-04-24 DIAGNOSIS — K59.03 DRUG-INDUCED CONSTIPATION: ICD-10-CM

## 2023-04-24 DIAGNOSIS — E66.01 MORBID OBESITY WITH BMI OF 40.0-44.9, ADULT (HCC): ICD-10-CM

## 2023-04-24 LAB
ALBUMIN SERPL-MCNC: 4.2 G/DL (ref 3.5–5.2)
ALP SERPL-CCNC: 93 U/L (ref 35–104)
ALT SERPL-CCNC: 74 U/L (ref 5–33)
ANION GAP SERPL CALCULATED.3IONS-SCNC: 16 MMOL/L (ref 7–19)
AST SERPL-CCNC: 56 U/L (ref 5–32)
BACTERIA #/AREA URNS HPF: ABNORMAL /HPF
BASOPHILS # BLD: 0.1 K/UL (ref 0–0.2)
BASOPHILS NFR BLD: 0.7 % (ref 0–1)
BILIRUB SERPL-MCNC: 0.4 MG/DL (ref 0.2–1.2)
BILIRUB UR QL STRIP: NEGATIVE
BUN SERPL-MCNC: 10 MG/DL (ref 6–20)
CALCIUM SERPL-MCNC: 9.5 MG/DL (ref 8.6–10)
CHLORIDE SERPL-SCNC: 101 MMOL/L (ref 98–111)
CHOLEST SERPL-MCNC: 212 MG/DL (ref 160–199)
CLARITY UR: CLEAR
CO2 SERPL-SCNC: 23 MMOL/L (ref 22–29)
COLOR UR: YELLOW
CREAT SERPL-MCNC: 0.7 MG/DL (ref 0.5–0.9)
CREAT UR-MCNC: 67.4 MG/DL (ref 4.2–622)
EOSINOPHIL # BLD: 0.1 K/UL (ref 0–0.6)
EOSINOPHIL NFR BLD: 0.7 % (ref 0–5)
ERYTHROCYTE [DISTWIDTH] IN BLOOD BY AUTOMATED COUNT: 13.3 % (ref 11.5–14.5)
GLUCOSE SERPL-MCNC: 139 MG/DL (ref 74–109)
GLUCOSE UR STRIP.AUTO-MCNC: NEGATIVE MG/DL
HBA1C MFR BLD: 7.3 % (ref 4–6)
HCT VFR BLD AUTO: 45.3 % (ref 37–47)
HDLC SERPL-MCNC: 50 MG/DL (ref 65–121)
HGB BLD-MCNC: 14.5 G/DL (ref 12–16)
HGB UR STRIP.AUTO-MCNC: NEGATIVE MG/L
IMM GRANULOCYTES # BLD: 0 K/UL
KETONES UR STRIP.AUTO-MCNC: ABNORMAL MG/DL
LDLC SERPL CALC-MCNC: 136 MG/DL
LEUKOCYTE ESTERASE UR QL STRIP.AUTO: ABNORMAL
LYMPHOCYTES # BLD: 2.1 K/UL (ref 1.1–4.5)
LYMPHOCYTES NFR BLD: 24.3 % (ref 20–40)
MCH RBC QN AUTO: 29.8 PG (ref 27–31)
MCHC RBC AUTO-ENTMCNC: 32 G/DL (ref 33–37)
MCV RBC AUTO: 93 FL (ref 81–99)
MICROALBUMIN UR-MCNC: <1.2 MG/DL (ref 0–19)
MICROALBUMIN/CREAT UR-RTO: NORMAL MG/G
MONOCYTES # BLD: 0.5 K/UL (ref 0–0.9)
MONOCYTES NFR BLD: 5.6 % (ref 0–10)
NEUTROPHILS # BLD: 6 K/UL (ref 1.5–7.5)
NEUTS SEG NFR BLD: 68.4 % (ref 50–65)
NITRITE UR QL STRIP.AUTO: NEGATIVE
PH UR STRIP.AUTO: 5.5 [PH] (ref 5–8)
PLATELET # BLD AUTO: 304 K/UL (ref 130–400)
PMV BLD AUTO: 11.4 FL (ref 9.4–12.3)
POTASSIUM SERPL-SCNC: 3.8 MMOL/L (ref 3.5–5)
PROT SERPL-MCNC: 7.9 G/DL (ref 6.6–8.7)
PROT UR STRIP.AUTO-MCNC: NEGATIVE MG/DL
RBC # BLD AUTO: 4.87 M/UL (ref 4.2–5.4)
RBC #/AREA URNS HPF: ABNORMAL /HPF (ref 0–2)
SODIUM SERPL-SCNC: 140 MMOL/L (ref 136–145)
SP GR UR STRIP.AUTO: 1.01 (ref 1–1.03)
SQUAMOUS #/AREA URNS HPF: ABNORMAL /HPF
T4 FREE SERPL-MCNC: 1.13 NG/DL (ref 0.93–1.7)
TRIGL SERPL-MCNC: 130 MG/DL (ref 0–149)
TSH SERPL DL<=0.005 MIU/L-ACNC: 2.3 UIU/ML (ref 0.27–4.2)
UROBILINOGEN UR STRIP.AUTO-MCNC: 1 E.U./DL
VIT B12 SERPL-MCNC: 588 PG/ML (ref 211–946)
WBC # BLD AUTO: 8.8 K/UL (ref 4.8–10.8)
WBC #/AREA URNS HPF: ABNORMAL /HPF (ref 0–5)

## 2023-04-24 PROCEDURE — 3074F SYST BP LT 130 MM HG: CPT | Performed by: NURSE PRACTITIONER

## 2023-04-24 PROCEDURE — 99214 OFFICE O/P EST MOD 30 MIN: CPT | Performed by: NURSE PRACTITIONER

## 2023-04-24 PROCEDURE — 3079F DIAST BP 80-89 MM HG: CPT | Performed by: NURSE PRACTITIONER

## 2023-04-24 RX ORDER — DULAGLUTIDE 0.75 MG/.5ML
0.75 INJECTION, SOLUTION SUBCUTANEOUS WEEKLY
Qty: 4 ADJUSTABLE DOSE PRE-FILLED PEN SYRINGE | Refills: 3 | Status: SHIPPED | OUTPATIENT
Start: 2023-04-24

## 2023-04-24 RX ORDER — ROSUVASTATIN CALCIUM 10 MG/1
10 TABLET, COATED ORAL NIGHTLY
Qty: 30 TABLET | Refills: 11 | Status: SHIPPED | OUTPATIENT
Start: 2023-04-24

## 2023-04-24 RX ORDER — POTASSIUM CHLORIDE 750 MG/1
TABLET, EXTENDED RELEASE ORAL
Qty: 90 TABLET | Refills: 0 | OUTPATIENT
Start: 2023-04-24

## 2023-04-24 RX ORDER — CEFDINIR 300 MG/1
300 CAPSULE ORAL 2 TIMES DAILY
Qty: 20 CAPSULE | Refills: 0 | Status: SHIPPED | OUTPATIENT
Start: 2023-04-24 | End: 2023-05-04

## 2023-04-24 RX ORDER — PREGABALIN 100 MG/1
100 CAPSULE ORAL 3 TIMES DAILY
Qty: 90 CAPSULE | Refills: 5 | Status: CANCELLED | OUTPATIENT
Start: 2023-04-24 | End: 2023-07-23

## 2023-04-24 RX ORDER — METHYLPREDNISOLONE 4 MG/1
TABLET ORAL
Qty: 1 KIT | Refills: 0 | Status: SHIPPED | OUTPATIENT
Start: 2023-04-24 | End: 2023-04-30

## 2023-04-24 RX ORDER — ASPIRIN 81 MG/1
81 TABLET ORAL DAILY
Qty: 30 TABLET | Refills: 11 | Status: SHIPPED | OUTPATIENT
Start: 2023-04-24

## 2023-04-24 ASSESSMENT — ENCOUNTER SYMPTOMS
DIARRHEA: 0
VOMITING: 0
WHEEZING: 0
COUGH: 0
SINUS PRESSURE: 1
TROUBLE SWALLOWING: 0
CONSTIPATION: 0
SORE THROAT: 1
NAUSEA: 0
SHORTNESS OF BREATH: 0
ABDOMINAL PAIN: 0

## 2023-04-24 ASSESSMENT — PATIENT HEALTH QUESTIONNAIRE - PHQ9
4. FEELING TIRED OR HAVING LITTLE ENERGY: 0
10. IF YOU CHECKED OFF ANY PROBLEMS, HOW DIFFICULT HAVE THESE PROBLEMS MADE IT FOR YOU TO DO YOUR WORK, TAKE CARE OF THINGS AT HOME, OR GET ALONG WITH OTHER PEOPLE: 0
3. TROUBLE FALLING OR STAYING ASLEEP: 0
1. LITTLE INTEREST OR PLEASURE IN DOING THINGS: 0
SUM OF ALL RESPONSES TO PHQ QUESTIONS 1-9: 0
8. MOVING OR SPEAKING SO SLOWLY THAT OTHER PEOPLE COULD HAVE NOTICED. OR THE OPPOSITE, BEING SO FIGETY OR RESTLESS THAT YOU HAVE BEEN MOVING AROUND A LOT MORE THAN USUAL: 0
5. POOR APPETITE OR OVEREATING: 0
2. FEELING DOWN, DEPRESSED OR HOPELESS: 0
SUM OF ALL RESPONSES TO PHQ9 QUESTIONS 1 & 2: 0
6. FEELING BAD ABOUT YOURSELF - OR THAT YOU ARE A FAILURE OR HAVE LET YOURSELF OR YOUR FAMILY DOWN: 0
SUM OF ALL RESPONSES TO PHQ QUESTIONS 1-9: 0
7. TROUBLE CONCENTRATING ON THINGS, SUCH AS READING THE NEWSPAPER OR WATCHING TELEVISION: 0
SUM OF ALL RESPONSES TO PHQ QUESTIONS 1-9: 0
SUM OF ALL RESPONSES TO PHQ QUESTIONS 1-9: 0
9. THOUGHTS THAT YOU WOULD BE BETTER OFF DEAD, OR OF HURTING YOURSELF: 0

## 2023-04-24 NOTE — TELEPHONE ENCOUNTER
----- Message from Julane Fothergill, APRN sent at 4/24/2023  1:51 PM CDT -----   7.3 a1c higher than we want  Start trulicity . 75mg weekly #4 pen 3 refills  Recheck in 3 months

## 2023-04-24 NOTE — TELEPHONE ENCOUNTER
----- Message from ERIK Kennedy sent at 4/24/2023  1:43 PM CDT -----  Urine good no abnormal protein noted

## 2023-04-24 NOTE — TELEPHONE ENCOUNTER
----- Message from ERIK Ivory sent at 4/24/2023  1:03 PM CDT -----  CBC normal no anemia or concerns

## 2023-04-24 NOTE — TELEPHONE ENCOUNTER
Called patient, spoke with: Patient regarding the results of the patients most recent labs. I advised Patient of Marielena Stewart recommendations.    Patient did voice understanding      Medication pending

## 2023-04-24 NOTE — TELEPHONE ENCOUNTER
----- Message from ERIK Mena sent at 4/24/2023  1:31 PM CDT -----  UA negative  Lipids LDL  taking liptor nightly  Change to crestor 10mg 1 po qpm 330 11rf   Recheck in 6 weeks alt and lipids  Cmp electrolytes wnl  Liver slightly increased suggest low cholesterol low fat diet  Recheck in 3 months  Thyroid wnl

## 2023-04-24 NOTE — TELEPHONE ENCOUNTER
Patient is no longer taking.      Requested Prescriptions     Refused Prescriptions Disp Refills    potassium chloride (KLOR-CON M) 10 MEQ extended release tablet [Pharmacy Med Name: Potassium Chloride Sarah ER 10 MEQ Oral Tablet Extended Release] 90 tablet 0     Sig: Take 1 tablet by mouth once daily

## 2023-04-24 NOTE — PROGRESS NOTES
Adelaida Mckeon (:  1980) is a 39 y.o. female,Established patient, here for evaluation of the following chief complaint(s):  Follow-up (For diabetes and medications), Pharyngitis, and Migraine      ASSESSMENT/PLAN:    ICD-10-CM    1. Controlled type 2 diabetes mellitus without complication, without long-term current use of insulin ([facility])  E11.9 metFORMIN (GLUCOPHAGE) 1000 MG tablet  Check today  As issues with ozempic with nausea  Consider different GLP if not at goal     LYRICA 150 MG capsule      2. Morbid obesity with BMI of 40.0-44.9, adult ([facility])  E66.01 metFORMIN (GLUCOPHAGE) 1000 MG tablet    Z68.41       3. Type 2 diabetes mellitus with diabetic polyneuropathy, without long-term current use of insulin ([facility])  E11.42 aspirin EC 81 MG EC tablet  Will check today  Encourage execise       LYRICA 150 MG capsule      4. Fibromyalgia  M79.7 LYRICA 150 MG capsule      5. Recurrent depression ([facility])  F33.9 brexpiprazole (REXULTI) 0.5 MG TABS tablet  Doing well on dose        6. Drug-induced constipation  K59.03 Resolved can use linzess as needed      7. Acute bacterial sinusitis  J01.90 cefdinir (OMNICEF) 300 MG capsule    B96.89 methylPREDNISolone (MEDROL DOSEPACK) 4 MG tablet      8. Other fatigue  R53.83 Vitamin B12          Return in about 3 months (around 2023) for 3 month diabetes follow up. SUBJECTIVE/OBJECTIVE:  HPI  Patient is here for diabetes follow up     Diabetes Mellitus Type 2        Diet compliance:  compliant most of the time  Nutrition Consultation Needed:  no  Medication:              Metformin 1000mg twice a day  Ozempic . 5 weekly ( she has stopped as was puking )  Medication compliance:  compliant most of the time down 7 lbs  Weight trend: up 10 lbs  Current exercise: no  Checkin times daily  Home blood sugar records: not checking  Low BG:  no  Eye exam current (within one year):  will need to go to [facility]  Checking Feet regularly:  yes - no provlems  ACE/ARB:  yes -

## 2023-04-26 ENCOUNTER — PATIENT MESSAGE (OUTPATIENT)
Dept: FAMILY MEDICINE CLINIC | Age: 43
End: 2023-04-26

## 2023-04-26 ENCOUNTER — TELEPHONE (OUTPATIENT)
Dept: FAMILY MEDICINE CLINIC | Age: 43
End: 2023-04-26

## 2023-04-26 DIAGNOSIS — E11.9 CONTROLLED TYPE 2 DIABETES MELLITUS WITHOUT COMPLICATION, WITHOUT LONG-TERM CURRENT USE OF INSULIN (HCC): Primary | ICD-10-CM

## 2023-04-26 LAB
BACTERIA UR CULT: ABNORMAL
BACTERIA UR CULT: ABNORMAL
ORGANISM: ABNORMAL

## 2023-04-26 RX ORDER — PROCHLORPERAZINE 25 MG/1
SUPPOSITORY RECTAL
Qty: 3 EACH | Refills: 11 | Status: SHIPPED | OUTPATIENT
Start: 2023-04-26

## 2023-04-26 RX ORDER — PROCHLORPERAZINE 25 MG/1
SUPPOSITORY RECTAL
Qty: 1 EACH | Refills: 3 | Status: SHIPPED | OUTPATIENT
Start: 2023-04-26

## 2023-04-26 RX ORDER — PROCHLORPERAZINE 25 MG/1
SUPPOSITORY RECTAL
Qty: 1 EACH | Refills: 0 | Status: SHIPPED | OUTPATIENT
Start: 2023-04-26

## 2023-04-26 NOTE — TELEPHONE ENCOUNTER
The message with results and recommendatins. Asked that pt call back with any questions or concerns.

## 2023-04-26 NOTE — TELEPHONE ENCOUNTER
----- Message from ERIK Ford sent at 4/26/2023  8:15 AM CDT -----  Urine noted e coli  Sensitive to antibiotic  Cont present regimen and finish all doses

## 2023-04-26 NOTE — TELEPHONE ENCOUNTER
From: Marilee Peterson  To: Alba Moritz  Sent: 4/26/2023 7:30 AM CDT  Subject: Testing sugar    What about using dexcom for keeping a check on my sugar. One of my younger brothers has type 1 and is having major trouble with it. I want to stay on top of this better.

## 2023-04-27 ENCOUNTER — TELEPHONE (OUTPATIENT)
Dept: FAMILY MEDICINE CLINIC | Age: 43
End: 2023-04-27

## 2023-04-27 NOTE — TELEPHONE ENCOUNTER
Deniedtoday  This request has not been approved. Based on the information submitted for review, you did not meet our guideline rules for the requested drug. In order for your request to be approved, your provider would need to show that you have met the guideline rules below. The details below are written in medical language. If you have questions, please contact your provider. In some cases, the requested medication or alternatives offered may have additional approval requirements. Our guideline named CONTINUOUS GLUCOSE METERS requires the following rule(s) be met for approval: A. The member has one of the followin. Insulin-dependent Type 1 diabetes mellitus [a disorder with high blood sugar]. 2. Insulin-dependent Type 2 diabetes mellitus [a disorder with high blood sugar]. 3. Gestational diabetes mellitus [a disorder with high blood sugar in pregnancy] and the member is insulin dependent. Your doctor told us that you have a diagnosis of type 2 diabetes (a disorder with high blood sugar). We do not have information showing that you are insulin dependent. This is why your request is not approved. Please work with your doctor to use a different product or get us more information if it will allow us to approve this request. A written notification letter will follow with additional details.

## 2023-05-26 ENCOUNTER — APPOINTMENT (OUTPATIENT)
Dept: GENERAL RADIOLOGY | Age: 43
DRG: 313 | End: 2023-05-26
Payer: MEDICAID

## 2023-05-26 ENCOUNTER — HOSPITAL ENCOUNTER (INPATIENT)
Age: 43
LOS: 1 days | Discharge: HOME | DRG: 313 | End: 2023-05-27
Attending: EMERGENCY MEDICINE | Admitting: HOSPITALIST
Payer: MEDICAID

## 2023-05-26 DIAGNOSIS — E66.9 OBESITY, UNSPECIFIED CLASSIFICATION, UNSPECIFIED OBESITY TYPE, UNSPECIFIED WHETHER SERIOUS COMORBIDITY PRESENT: ICD-10-CM

## 2023-05-26 DIAGNOSIS — E11.65 TYPE 2 DIABETES MELLITUS WITH HYPERGLYCEMIA, WITHOUT LONG-TERM CURRENT USE OF INSULIN (HCC): ICD-10-CM

## 2023-05-26 DIAGNOSIS — R07.9 CHEST PAIN, UNSPECIFIED TYPE: Primary | ICD-10-CM

## 2023-05-26 PROBLEM — E03.9 HYPOTHYROIDISM: Status: ACTIVE | Noted: 2023-05-26

## 2023-05-26 LAB
ALBUMIN SERPL-MCNC: 4.3 G/DL (ref 3.5–5.2)
ALP SERPL-CCNC: 90 U/L (ref 35–104)
ALT SERPL-CCNC: 118 U/L (ref 5–33)
ANION GAP SERPL CALCULATED.3IONS-SCNC: 13 MMOL/L (ref 7–19)
APTT PPP: 27.9 SEC (ref 26–36.2)
AST SERPL-CCNC: 124 U/L (ref 5–32)
BASOPHILS # BLD: 0.1 K/UL (ref 0–0.2)
BASOPHILS NFR BLD: 0.9 % (ref 0–1)
BILIRUB SERPL-MCNC: 0.3 MG/DL (ref 0.2–1.2)
BNP BLD-MCNC: <36 PG/ML (ref 0–124)
BUN SERPL-MCNC: 15 MG/DL (ref 6–20)
CALCIUM SERPL-MCNC: 10.4 MG/DL (ref 8.6–10)
CHLORIDE SERPL-SCNC: 103 MMOL/L (ref 98–111)
CHOLEST SERPL-MCNC: 183 MG/DL (ref 160–199)
CO2 SERPL-SCNC: 25 MMOL/L (ref 22–29)
CREAT SERPL-MCNC: 0.7 MG/DL (ref 0.5–0.9)
D DIMER PPP FEU-MCNC: 0.3 UG/ML FEU (ref 0–0.48)
EOSINOPHIL # BLD: 0.2 K/UL (ref 0–0.6)
EOSINOPHIL NFR BLD: 1.7 % (ref 0–5)
ERYTHROCYTE [DISTWIDTH] IN BLOOD BY AUTOMATED COUNT: 13.6 % (ref 11.5–14.5)
GLUCOSE BLD-MCNC: 126 MG/DL (ref 70–99)
GLUCOSE SERPL-MCNC: 149 MG/DL (ref 74–109)
HBA1C MFR BLD: 7.2 % (ref 4–6)
HCT VFR BLD AUTO: 43.3 % (ref 37–47)
HDLC SERPL-MCNC: 44 MG/DL (ref 65–121)
HGB BLD-MCNC: 14.1 G/DL (ref 12–16)
IMM GRANULOCYTES # BLD: 0 K/UL
INR PPP: 1 (ref 0.88–1.18)
LDLC SERPL CALC-MCNC: 115 MG/DL
LIPASE SERPL-CCNC: 35 U/L (ref 13–60)
LYMPHOCYTES # BLD: 3.8 K/UL (ref 1.1–4.5)
LYMPHOCYTES NFR BLD: 32.6 % (ref 20–40)
MAGNESIUM SERPL-MCNC: 2.1 MG/DL (ref 1.6–2.6)
MCH RBC QN AUTO: 29.8 PG (ref 27–31)
MCHC RBC AUTO-ENTMCNC: 32.6 G/DL (ref 33–37)
MCV RBC AUTO: 91.5 FL (ref 81–99)
MONOCYTES # BLD: 0.7 K/UL (ref 0–0.9)
MONOCYTES NFR BLD: 5.6 % (ref 0–10)
NEUTROPHILS # BLD: 6.9 K/UL (ref 1.5–7.5)
NEUTS SEG NFR BLD: 59 % (ref 50–65)
PERFORMED ON: ABNORMAL
PHOSPHATE SERPL-MCNC: 4.7 MG/DL (ref 2.5–4.5)
PLATELET # BLD AUTO: 327 K/UL (ref 130–400)
PMV BLD AUTO: 10.7 FL (ref 9.4–12.3)
POTASSIUM SERPL-SCNC: 3.9 MMOL/L (ref 3.5–5)
PROT SERPL-MCNC: 7.6 G/DL (ref 6.6–8.7)
PROTHROMBIN TIME: 12.8 SEC (ref 12–14.6)
RBC # BLD AUTO: 4.73 M/UL (ref 4.2–5.4)
SODIUM SERPL-SCNC: 141 MMOL/L (ref 136–145)
TRIGL SERPL-MCNC: 120 MG/DL (ref 0–149)
TROPONIN T SERPL-MCNC: <0.01 NG/ML (ref 0–0.03)
TSH SERPL DL<=0.005 MIU/L-ACNC: 2.85 UIU/ML (ref 0.35–5.5)
WBC # BLD AUTO: 11.7 K/UL (ref 4.8–10.8)

## 2023-05-26 PROCEDURE — 83036 HEMOGLOBIN GLYCOSYLATED A1C: CPT

## 2023-05-26 PROCEDURE — 6370000000 HC RX 637 (ALT 250 FOR IP): Performed by: HOSPITALIST

## 2023-05-26 PROCEDURE — 36415 COLL VENOUS BLD VENIPUNCTURE: CPT

## 2023-05-26 PROCEDURE — 80053 COMPREHEN METABOLIC PANEL: CPT

## 2023-05-26 PROCEDURE — 94150 VITAL CAPACITY TEST: CPT

## 2023-05-26 PROCEDURE — 6360000002 HC RX W HCPCS: Performed by: HOSPITALIST

## 2023-05-26 PROCEDURE — 2580000003 HC RX 258: Performed by: HOSPITALIST

## 2023-05-26 PROCEDURE — 83880 ASSAY OF NATRIURETIC PEPTIDE: CPT

## 2023-05-26 PROCEDURE — 93005 ELECTROCARDIOGRAM TRACING: CPT

## 2023-05-26 PROCEDURE — 85379 FIBRIN DEGRADATION QUANT: CPT

## 2023-05-26 PROCEDURE — 84100 ASSAY OF PHOSPHORUS: CPT

## 2023-05-26 PROCEDURE — 82962 GLUCOSE BLOOD TEST: CPT

## 2023-05-26 PROCEDURE — 83690 ASSAY OF LIPASE: CPT

## 2023-05-26 PROCEDURE — 71045 X-RAY EXAM CHEST 1 VIEW: CPT

## 2023-05-26 PROCEDURE — 85025 COMPLETE CBC W/AUTO DIFF WBC: CPT

## 2023-05-26 PROCEDURE — 2140000000 HC CCU INTERMEDIATE R&B

## 2023-05-26 PROCEDURE — 99285 EMERGENCY DEPT VISIT HI MDM: CPT

## 2023-05-26 PROCEDURE — 85730 THROMBOPLASTIN TIME PARTIAL: CPT

## 2023-05-26 PROCEDURE — 84484 ASSAY OF TROPONIN QUANT: CPT

## 2023-05-26 PROCEDURE — 6370000000 HC RX 637 (ALT 250 FOR IP): Performed by: EMERGENCY MEDICINE

## 2023-05-26 PROCEDURE — 80061 LIPID PANEL: CPT

## 2023-05-26 PROCEDURE — 83735 ASSAY OF MAGNESIUM: CPT

## 2023-05-26 PROCEDURE — 84443 ASSAY THYROID STIM HORMONE: CPT

## 2023-05-26 PROCEDURE — 85610 PROTHROMBIN TIME: CPT

## 2023-05-26 RX ORDER — FUROSEMIDE 40 MG/1
40 TABLET ORAL DAILY
Status: DISCONTINUED | OUTPATIENT
Start: 2023-05-26 | End: 2023-05-27 | Stop reason: HOSPADM

## 2023-05-26 RX ORDER — POLYETHYLENE GLYCOL 3350 17 G/17G
17 POWDER, FOR SOLUTION ORAL DAILY PRN
Status: DISCONTINUED | OUTPATIENT
Start: 2023-05-26 | End: 2023-05-27 | Stop reason: HOSPADM

## 2023-05-26 RX ORDER — DULOXETIN HYDROCHLORIDE 30 MG/1
60 CAPSULE, DELAYED RELEASE ORAL 2 TIMES DAILY
Status: DISCONTINUED | OUTPATIENT
Start: 2023-05-26 | End: 2023-05-27 | Stop reason: HOSPADM

## 2023-05-26 RX ORDER — SODIUM CHLORIDE 0.9 % (FLUSH) 0.9 %
5-40 SYRINGE (ML) INJECTION EVERY 12 HOURS SCHEDULED
Status: DISCONTINUED | OUTPATIENT
Start: 2023-05-26 | End: 2023-05-27 | Stop reason: HOSPADM

## 2023-05-26 RX ORDER — ATORVASTATIN CALCIUM 40 MG/1
40 TABLET, FILM COATED ORAL NIGHTLY
Status: DISCONTINUED | OUTPATIENT
Start: 2023-05-26 | End: 2023-05-26 | Stop reason: ALTCHOICE

## 2023-05-26 RX ORDER — ENOXAPARIN SODIUM 100 MG/ML
40 INJECTION SUBCUTANEOUS DAILY
Status: DISCONTINUED | OUTPATIENT
Start: 2023-05-26 | End: 2023-05-27

## 2023-05-26 RX ORDER — NITROGLYCERIN 0.4 MG/1
0.4 TABLET SUBLINGUAL EVERY 5 MIN PRN
Status: DISCONTINUED | OUTPATIENT
Start: 2023-05-26 | End: 2023-05-27 | Stop reason: HOSPADM

## 2023-05-26 RX ORDER — ASPIRIN 81 MG/1
81 TABLET ORAL DAILY
Status: DISCONTINUED | OUTPATIENT
Start: 2023-05-26 | End: 2023-05-26 | Stop reason: ALTCHOICE

## 2023-05-26 RX ORDER — ACETAMINOPHEN 650 MG/1
650 SUPPOSITORY RECTAL EVERY 4 HOURS PRN
Status: DISCONTINUED | OUTPATIENT
Start: 2023-05-26 | End: 2023-05-27 | Stop reason: HOSPADM

## 2023-05-26 RX ORDER — MAGNESIUM SULFATE IN WATER 40 MG/ML
2000 INJECTION, SOLUTION INTRAVENOUS PRN
Status: DISCONTINUED | OUTPATIENT
Start: 2023-05-26 | End: 2023-05-27 | Stop reason: HOSPADM

## 2023-05-26 RX ORDER — LOSARTAN POTASSIUM 25 MG/1
25 TABLET ORAL DAILY
Status: DISCONTINUED | OUTPATIENT
Start: 2023-05-26 | End: 2023-05-27 | Stop reason: HOSPADM

## 2023-05-26 RX ORDER — SODIUM CHLORIDE 9 MG/ML
INJECTION, SOLUTION INTRAVENOUS PRN
Status: DISCONTINUED | OUTPATIENT
Start: 2023-05-26 | End: 2023-05-27 | Stop reason: HOSPADM

## 2023-05-26 RX ORDER — ROSUVASTATIN CALCIUM 20 MG/1
40 TABLET, COATED ORAL NIGHTLY
Status: DISCONTINUED | OUTPATIENT
Start: 2023-05-26 | End: 2023-05-27 | Stop reason: HOSPADM

## 2023-05-26 RX ORDER — POTASSIUM CHLORIDE 7.45 MG/ML
10 INJECTION INTRAVENOUS PRN
Status: DISCONTINUED | OUTPATIENT
Start: 2023-05-26 | End: 2023-05-27 | Stop reason: HOSPADM

## 2023-05-26 RX ORDER — ASPIRIN 81 MG/1
81 TABLET, CHEWABLE ORAL DAILY
Status: DISCONTINUED | OUTPATIENT
Start: 2023-05-27 | End: 2023-05-27 | Stop reason: HOSPADM

## 2023-05-26 RX ORDER — TIZANIDINE 4 MG/1
4 TABLET ORAL EVERY 8 HOURS PRN
Status: DISCONTINUED | OUTPATIENT
Start: 2023-05-26 | End: 2023-05-27 | Stop reason: HOSPADM

## 2023-05-26 RX ORDER — SODIUM CHLORIDE 0.9 % (FLUSH) 0.9 %
5-40 SYRINGE (ML) INJECTION PRN
Status: DISCONTINUED | OUTPATIENT
Start: 2023-05-26 | End: 2023-05-27 | Stop reason: HOSPADM

## 2023-05-26 RX ORDER — LEVOTHYROXINE SODIUM 0.03 MG/1
25 TABLET ORAL DAILY
Status: DISCONTINUED | OUTPATIENT
Start: 2023-05-27 | End: 2023-05-27 | Stop reason: HOSPADM

## 2023-05-26 RX ORDER — ONDANSETRON 2 MG/ML
4 INJECTION INTRAMUSCULAR; INTRAVENOUS EVERY 6 HOURS PRN
Status: DISCONTINUED | OUTPATIENT
Start: 2023-05-26 | End: 2023-05-27 | Stop reason: HOSPADM

## 2023-05-26 RX ORDER — PREGABALIN 50 MG/1
100 CAPSULE ORAL 3 TIMES DAILY
Status: DISCONTINUED | OUTPATIENT
Start: 2023-05-26 | End: 2023-05-27 | Stop reason: HOSPADM

## 2023-05-26 RX ORDER — POTASSIUM CHLORIDE 20 MEQ/1
40 TABLET, EXTENDED RELEASE ORAL PRN
Status: DISCONTINUED | OUTPATIENT
Start: 2023-05-26 | End: 2023-05-27 | Stop reason: HOSPADM

## 2023-05-26 RX ORDER — ACETAMINOPHEN 325 MG/1
650 TABLET ORAL EVERY 4 HOURS PRN
Status: DISCONTINUED | OUTPATIENT
Start: 2023-05-26 | End: 2023-05-27 | Stop reason: HOSPADM

## 2023-05-26 RX ORDER — ASPIRIN 81 MG/1
243 TABLET, CHEWABLE ORAL ONCE
Status: COMPLETED | OUTPATIENT
Start: 2023-05-26 | End: 2023-05-26

## 2023-05-26 RX ORDER — ONDANSETRON 4 MG/1
4 TABLET, ORALLY DISINTEGRATING ORAL EVERY 8 HOURS PRN
Status: DISCONTINUED | OUTPATIENT
Start: 2023-05-26 | End: 2023-05-27 | Stop reason: HOSPADM

## 2023-05-26 RX ADMIN — NITROGLYCERIN 0.4 MG: 0.4 TABLET, ORALLY DISINTEGRATING SUBLINGUAL at 17:47

## 2023-05-26 RX ADMIN — SODIUM CHLORIDE, PRESERVATIVE FREE 10 ML: 5 INJECTION INTRAVENOUS at 21:59

## 2023-05-26 RX ADMIN — PREGABALIN 100 MG: 50 CAPSULE ORAL at 21:59

## 2023-05-26 RX ADMIN — ENOXAPARIN SODIUM 40 MG: 100 INJECTION SUBCUTANEOUS at 21:59

## 2023-05-26 RX ADMIN — NITROGLYCERIN 0.4 MG: 0.4 TABLET, ORALLY DISINTEGRATING SUBLINGUAL at 16:47

## 2023-05-26 RX ADMIN — ROSUVASTATIN CALCIUM 40 MG: 20 TABLET, COATED ORAL at 21:59

## 2023-05-26 RX ADMIN — DULOXETINE HYDROCHLORIDE 60 MG: 30 CAPSULE, DELAYED RELEASE ORAL at 21:59

## 2023-05-26 RX ADMIN — ASPIRIN 243 MG: 81 TABLET, CHEWABLE ORAL at 16:28

## 2023-05-26 ASSESSMENT — ENCOUNTER SYMPTOMS
BACK PAIN: 0
VOMITING: 0
NAUSEA: 1
COUGH: 0
ABDOMINAL PAIN: 0

## 2023-05-26 ASSESSMENT — HEART SCORE: ECG: 1

## 2023-05-27 VITALS
OXYGEN SATURATION: 97 % | WEIGHT: 264.2 LBS | DIASTOLIC BLOOD PRESSURE: 62 MMHG | HEIGHT: 66 IN | SYSTOLIC BLOOD PRESSURE: 101 MMHG | BODY MASS INDEX: 42.46 KG/M2 | HEART RATE: 59 BPM | TEMPERATURE: 97.4 F | RESPIRATION RATE: 18 BRPM

## 2023-05-27 LAB
ANION GAP SERPL CALCULATED.3IONS-SCNC: 11 MMOL/L (ref 7–19)
BASOPHILS # BLD: 0.1 K/UL (ref 0–0.2)
BASOPHILS NFR BLD: 1.1 % (ref 0–1)
BUN SERPL-MCNC: 16 MG/DL (ref 6–20)
CALCIUM SERPL-MCNC: 9.6 MG/DL (ref 8.6–10)
CHLORIDE SERPL-SCNC: 103 MMOL/L (ref 98–111)
CO2 SERPL-SCNC: 25 MMOL/L (ref 22–29)
CREAT SERPL-MCNC: 0.8 MG/DL (ref 0.5–0.9)
EOSINOPHIL # BLD: 0.2 K/UL (ref 0–0.6)
EOSINOPHIL NFR BLD: 2.4 % (ref 0–5)
ERYTHROCYTE [DISTWIDTH] IN BLOOD BY AUTOMATED COUNT: 13.7 % (ref 11.5–14.5)
GLUCOSE BLD-MCNC: 125 MG/DL (ref 70–99)
GLUCOSE BLD-MCNC: 190 MG/DL (ref 70–99)
GLUCOSE SERPL-MCNC: 189 MG/DL (ref 74–109)
HCT VFR BLD AUTO: 40.3 % (ref 37–47)
HGB BLD-MCNC: 12.7 G/DL (ref 12–16)
IMM GRANULOCYTES # BLD: 0 K/UL
LV EF: 63 %
LVEF MODALITY: NORMAL
LYMPHOCYTES # BLD: 4.2 K/UL (ref 1.1–4.5)
LYMPHOCYTES NFR BLD: 47.3 % (ref 20–40)
MCH RBC QN AUTO: 29.7 PG (ref 27–31)
MCHC RBC AUTO-ENTMCNC: 31.5 G/DL (ref 33–37)
MCV RBC AUTO: 94.2 FL (ref 81–99)
MONOCYTES # BLD: 0.6 K/UL (ref 0–0.9)
MONOCYTES NFR BLD: 6.4 % (ref 0–10)
NEUTROPHILS # BLD: 3.8 K/UL (ref 1.5–7.5)
NEUTS SEG NFR BLD: 42.5 % (ref 50–65)
PERFORMED ON: ABNORMAL
PERFORMED ON: ABNORMAL
PLATELET # BLD AUTO: 271 K/UL (ref 130–400)
PMV BLD AUTO: 10.3 FL (ref 9.4–12.3)
POTASSIUM SERPL-SCNC: 3.7 MMOL/L (ref 3.5–5)
RBC # BLD AUTO: 4.28 M/UL (ref 4.2–5.4)
SODIUM SERPL-SCNC: 139 MMOL/L (ref 136–145)
TROPONIN T SERPL-MCNC: <0.01 NG/ML (ref 0–0.03)
TROPONIN T SERPL-MCNC: <0.01 NG/ML (ref 0–0.03)
WBC # BLD AUTO: 8.9 K/UL (ref 4.8–10.8)

## 2023-05-27 PROCEDURE — 84484 ASSAY OF TROPONIN QUANT: CPT

## 2023-05-27 PROCEDURE — 94760 N-INVAS EAR/PLS OXIMETRY 1: CPT

## 2023-05-27 PROCEDURE — 80048 BASIC METABOLIC PNL TOTAL CA: CPT

## 2023-05-27 PROCEDURE — 36415 COLL VENOUS BLD VENIPUNCTURE: CPT

## 2023-05-27 PROCEDURE — 6360000004 HC RX CONTRAST MEDICATION: Performed by: HOSPITALIST

## 2023-05-27 PROCEDURE — 85025 COMPLETE CBC W/AUTO DIFF WBC: CPT

## 2023-05-27 PROCEDURE — 93246 EXT ECG>7D<15D RECORDING: CPT

## 2023-05-27 PROCEDURE — 6370000000 HC RX 637 (ALT 250 FOR IP): Performed by: HOSPITALIST

## 2023-05-27 PROCEDURE — 2580000003 HC RX 258: Performed by: HOSPITALIST

## 2023-05-27 PROCEDURE — 82962 GLUCOSE BLOOD TEST: CPT

## 2023-05-27 PROCEDURE — 6360000002 HC RX W HCPCS: Performed by: HOSPITALIST

## 2023-05-27 PROCEDURE — C8929 TTE W OR WO FOL WCON,DOPPLER: HCPCS

## 2023-05-27 RX ORDER — INSULIN LISPRO 100 [IU]/ML
0-4 INJECTION, SOLUTION INTRAVENOUS; SUBCUTANEOUS NIGHTLY
Status: DISCONTINUED | OUTPATIENT
Start: 2023-05-27 | End: 2023-05-27 | Stop reason: HOSPADM

## 2023-05-27 RX ORDER — DEXTROSE MONOHYDRATE 100 MG/ML
INJECTION, SOLUTION INTRAVENOUS CONTINUOUS PRN
Status: DISCONTINUED | OUTPATIENT
Start: 2023-05-27 | End: 2023-05-27 | Stop reason: HOSPADM

## 2023-05-27 RX ORDER — NITROGLYCERIN 0.4 MG/1
0.4 TABLET SUBLINGUAL EVERY 5 MIN PRN
Qty: 25 TABLET | Refills: 3 | Status: SHIPPED | OUTPATIENT
Start: 2023-05-27

## 2023-05-27 RX ORDER — ENOXAPARIN SODIUM 100 MG/ML
30 INJECTION SUBCUTANEOUS 2 TIMES DAILY
Status: DISCONTINUED | OUTPATIENT
Start: 2023-05-27 | End: 2023-05-27 | Stop reason: HOSPADM

## 2023-05-27 RX ORDER — INSULIN LISPRO 100 [IU]/ML
0-4 INJECTION, SOLUTION INTRAVENOUS; SUBCUTANEOUS
Status: DISCONTINUED | OUTPATIENT
Start: 2023-05-27 | End: 2023-05-27 | Stop reason: HOSPADM

## 2023-05-27 RX ORDER — ROSUVASTATIN CALCIUM 40 MG/1
40 TABLET, COATED ORAL NIGHTLY
Qty: 30 TABLET | Refills: 0 | Status: SHIPPED | OUTPATIENT
Start: 2023-05-27

## 2023-05-27 RX ADMIN — FUROSEMIDE 40 MG: 40 TABLET ORAL at 10:40

## 2023-05-27 RX ADMIN — NITROGLYCERIN 1 INCH: 20 OINTMENT TOPICAL at 00:51

## 2023-05-27 RX ADMIN — LEVOTHYROXINE SODIUM 25 MCG: 25 TABLET ORAL at 06:40

## 2023-05-27 RX ADMIN — BREXPIPRAZOLE 0.5 MG: 1 TABLET ORAL at 10:40

## 2023-05-27 RX ADMIN — SODIUM CHLORIDE, PRESERVATIVE FREE 10 ML: 5 INJECTION INTRAVENOUS at 10:41

## 2023-05-27 RX ADMIN — PERFLUTREN 1.5 ML: 6.52 INJECTION, SUSPENSION INTRAVENOUS at 09:31

## 2023-05-27 RX ADMIN — ENOXAPARIN SODIUM 30 MG: 100 INJECTION SUBCUTANEOUS at 10:39

## 2023-05-27 RX ADMIN — DULOXETINE HYDROCHLORIDE 60 MG: 30 CAPSULE, DELAYED RELEASE ORAL at 10:40

## 2023-05-27 RX ADMIN — PREGABALIN 100 MG: 50 CAPSULE ORAL at 10:40

## 2023-05-27 RX ADMIN — LOSARTAN POTASSIUM 25 MG: 25 TABLET, FILM COATED ORAL at 10:40

## 2023-05-27 RX ADMIN — NITROGLYCERIN 1 INCH: 20 OINTMENT TOPICAL at 06:14

## 2023-05-27 RX ADMIN — ASPIRIN 81 MG: 81 TABLET, CHEWABLE ORAL at 10:40

## 2023-05-27 NOTE — PLAN OF CARE
SUBJECTIVE:     Mrs. Vitaliy Riley is a pleasant  39year old, sh had relaed measure the jemal ebnad weuigr dotrw dowl;  She has had HAT chnges that the JNO cabn do that but fi cba aksi vbe vssuied iww        OBJECTIVE:     /77   Pulse 64   Temp (!) 96.6 °F (35.9 °C) (Temporal)   Resp 18   Ht 5' 6\" (1.676 m)   Wt 260 lb (117.9 kg)   LMP 01/01/2005   SpO2 97%   BMI 41.97 kg/m²      Metocvlopramidne        ASSESSMENTS & PLANS:     Chest Pain:  Tele  Admit to cardiac jimenes as inpatient status patient - no vcath util S8g  Cardio consult in AM  Trend TnI  Mag, Phos, TSH, Lipid Panel, HbA1c - will run as add ons to ED labs if able  CBC and BMP with Reflex for following AM  ASA as per protocol (324-325mg chewed STAT unless on 81ASA daily in which case 162mg chewed STAT if not yet given, THEN from following AM 81mg Daily.)  Statin as per protocol (High intensity Statin, if already on high intensity Stain of Simvasttain 80 continue it, if Lipitor 40+ then Lipitor 80 (if less than 40 just double so long as >=40), if Rosuvastatin 20mg+ then Rosuvastatin 40mg PO QHS (if less than 20 just double so long as >=20mg)  NG SL PRN CP  TTE in AM  EKG completed, EKG PRN  Cardiac Stress Testing will be deferred to Cardiology     Chronic Medical Problems:  Continue current Regimen as indicated     Supportive and Prophylactic Txx:  DVT PPx: lovenox SQ  GI (PUD) PPx: ant acid medications  PT: not indicated        Case d/w EP & hospitlaist NP in detail  Chart reviewed   Orders entered by me with CPOE

## 2023-05-27 NOTE — PROGRESS NOTES
4 Eyes Skin Assessment    Tomy Rios is being assessed upon: Admission    I agree that James Campbell, RN, along with parvezRN (either 2 RN's or 1 LPN and 1 RN) have performed a thorough Head to Toe Skin Assessment on the patient. ALL assessment sites listed below have been assessed. Areas assessed by both nurses:     [x]   Head, Face, and Ears   [x]   Shoulders, Back, and Chest  [x]   Arms, Elbows, and Hands   [x]   Coccyx, Sacrum, and Ischium  [x]   Legs, Feet, and Heels    Does the Patient have Skin Breakdown?  No    Antonio Prevention initiated: No  Wound Care Orders initiated: No    WOC nurse consulted for Pressure Injury (Stage 3,4, Unstageable, DTI, NWPT, and Complex wounds) and New or Established Ostomies: No        Primary Nurse eSignature: Alverto Mcgowan RN on 5/26/2023 at 8:21 PM      Co-Signer eSignature: Electronically signed by Susan Yao RN on 5/27/23 at 3:36 AM CDT

## 2023-05-27 NOTE — DISCHARGE SUMMARY
every 5 minutes as needed for Chest pain up to max of 3 total doses. If no relief after 1 dose, call 911. CHANGE how you take these medications      rosuvastatin 40 MG tablet  Commonly known as: CRESTOR  Take 1 tablet by mouth nightly  What changed:   medication strength  how much to take            CONTINUE taking these medications      aspirin EC 81 MG EC tablet  Take 1 tablet by mouth daily     brexpiprazole 0.5 MG Tabs tablet  Commonly known as: Rexulti  Take 1 tablet by mouth daily     Dexcom G6  Alicia  Use as directed     Dexcom G6 Sensor Misc  Change every 10 days     Dexcom G6 Transmitter Misc  Change every 3 months     DULoxetine 60 MG extended release capsule  Commonly known as: CYMBALTA  Take 1 capsule by mouth 2 times daily     furosemide 40 MG tablet  Commonly known as: LASIX  Take 1 tablet by mouth daily     levothyroxine 25 MCG tablet  Commonly known as: SYNTHROID  Take 1 tablet by mouth Daily     losartan 25 MG tablet  Commonly known as: COZAAR  Take 1 tablet by mouth in the morning. metFORMIN 1000 MG tablet  Commonly known as: GLUCOPHAGE  Take 1 tablet by mouth 2 times daily (with meals)     ondansetron 4 MG disintegrating tablet  Commonly known as: ZOFRAN-ODT  Take 1 tablet by mouth 3 times daily as needed for Nausea or Vomiting     tiZANidine 4 MG tablet  Commonly known as: ZANAFLEX  Take 1 tablet by mouth every 8 hours as needed (muscle spasms/pain)     Trulicity 2.12 SW/2.5TY Sopn  Generic drug: Dulaglutide  Inject 0.75 mg into the skin once a week            ASK your doctor about these medications      pregabalin 100 MG capsule  Commonly known as: LYRICA  Take 1 capsule by mouth in the morning, at noon, and at bedtime for 90 days. Max Daily Amount: 300 mg               Where to Get Your Medications        [facility] medications [facility] [place] [facility] [address] [facility] Ericka Arzate [address] [facility] [phone] - F [phone]  [facility]. Ericka Arzate [address] [place] [address], [address] [place] [address]

## 2023-05-27 NOTE — PROGRESS NOTES
Automatic Dose Adjustment of                Subcutaneous Anticoagulant for Prophylaxis    Albina Smith is a 39 y.o. female. Recent Labs     05/26/23  1622   CREATININE 0.7       Estimated Creatinine Clearance: 135 mL/min (based on SCr of 0.7 mg/dL). Weight:  Wt Readings from Last 1 Encounters:   05/26/23 264 lb 3.2 oz (119.8 kg)           Pharmacy has adjusted subcutaneous anticoagulant for prophylaxis to Enoxaparin 30 mg SC twice daily based on the patient's weight and estimated CrCl per [facility] policy.                Electronically signed by Ashwin Elliott [facility] on 5/27/2023 at 4:06 AM

## 2023-05-27 NOTE — H&P
SUBJECTIVE:    Mrs. Sekou Tinajero is a pleasant  39year old, sh had relaed measure the jemal ebnad wealex dotrw dowl;  She has had HAT chnges that the JNO cabn do that but fi cba aksi vbe vssuied iww      OBJECTIVE:    /77   Pulse 64   Temp (!) 96.6 °F (35.9 °C) (Temporal)   Resp 18   Ht 5' 6\" (1.676 m)   Wt 260 lb (117.9 kg)   LMP 01/01/2005   SpO2 97%   BMI 41.97 kg/m²     Metocvlopramidne      ASSESSMENTS & PLANS:    Chest Pain:  Tele  Admit to cardiac jimenes as inpatient status patient - no vcath util S8g  Cardio consult in AM  Trend TnI  Mag, Phos, TSH, Lipid Panel, HbA1c - will run as add ons to ED labs if able  CBC and BMP with Reflex for following AM  ASA as per protocol (324-325mg chewed STAT unless on 81ASA daily in which case 162mg chewed STAT if not yet given, THEN from following AM 81mg Daily.)  Statin as per protocol (High intensity Statin, if already on high intensity Stain of Simvasttain 80 continue it, if Lipitor 40+ then Lipitor 80 (if less than 40 just double so long as >=40), if Rosuvastatin 20mg+ then Rosuvastatin 40mg PO QHS (if less than 20 just double so long as >=20mg)  NG SL PRN CP  TTE in AM  EKG completed, EKG PRN  Cardiac Stress Testing will be deferred to Cardiology    Chronic Medical Problems:  Continue current Regimen as indicated    Supportive and Prophylactic Txx:  DVT PPx: lovenox SQ  GI (PUD) PPx: ant acid medications  PT: not indicated      Case d/w EP & hospitlaist NP in detail  Chart reviewed   Orders entered by me with CPOE
Hydrocodone-acetaminophen and Adhesive tape    Social History:    The patient currently lives at home  Tobacco:   reports that she quit smoking about 4 years ago. Her smoking use included cigarettes. She has a 15.00 pack-year smoking history. She has never used smokeless tobacco.  Alcohol:   reports no history of alcohol use. Illicit Drugs: denies    Family History:      Problem Relation Age of Onset    Heart Disease Mother     High Blood Pressure Mother     Diabetes Mother     Heart Disease Father     High Blood Pressure Father     Cancer Maternal Grandfather         esphogeal cancer    Cancer Paternal Grandfather         colon ca    Colon Cancer Paternal Grandfather     Colon Polyps Neg Hx     Liver Cancer Neg Hx     Liver Disease Neg Hx     Rectal Cancer Neg Hx     Stomach Cancer Neg Hx        Review of Systems:   Review of Systems   Constitutional:  Negative for fever. Cardiovascular:  Positive for chest pain and palpitations. Gastrointestinal:  Negative for abdominal pain and vomiting. All other systems reviewed and are negative. 14 point review of systems is negative except as specifically addressed above. Physical Examination:  /77   Pulse 64   Temp (!) 96.6 °F (35.9 °C) (Temporal)   Resp 18   Ht 5' 6\" (1.676 m)   Wt 264 lb 3.2 oz (119.8 kg)   LMP 01/01/2005   SpO2 97%   BMI 42.64 kg/m²   Physical Exam  Vitals reviewed. Constitutional:       Appearance: Normal appearance. HENT:      Right Ear: External ear normal.      Left Ear: External ear normal.      Mouth/Throat:      Pharynx: Oropharynx is clear. Eyes:      Conjunctiva/sclera: Conjunctivae normal.   Cardiovascular:      Rate and Rhythm: Normal rate and regular rhythm. Pulmonary:      Effort: Pulmonary effort is normal.      Breath sounds: Normal breath sounds. Abdominal:      Tenderness: There is no abdominal tenderness. Musculoskeletal:      Cervical back: Neck supple. Right lower leg: No edema.       Left

## 2023-05-27 NOTE — PROGRESS NOTES
Airam Billingsley arrived to room # [phone]. Presented with: chest pain  Mental Status: Patient is oriented, alert, coherent, logical, thought processes intact, and able to concentrate and follow conversation. Vitals:    05/26/23 2006   BP: 125/77   Pulse: 64   Resp: 18   Temp: (!) 96.6 °F (35.9 °C)   SpO2: 97%     Patient safety contract and falls prevention contract reviewed with patient Yes. Oriented Patient to room. Call light within reach. Yes.   Needs, issues or concerns expressed at this time: no.      Electronically signed by Joann Murray RN on 5/26/2023 at 8:21 PM

## 2023-05-27 NOTE — ED PROVIDER NOTES
[facility]  eMERGENCY dEPARTMENT eNCOUnter      Pt Name: Yaya Almonte  MRN: [id number]  [place] 1980  Date of evaluation: 5/26/2023  Provider: Amalia Doan MD    [address] [place] [address]       Chief Complaint   Patient presents with    Chest Pain     Tightness and pain between shoulders since this AM, worse since 1230         HISTORY OF PRESENT ILLNESS   (Location/Symptom, Timing/Onset,Context/Setting, Quality, Duration, Modifying Factors, Severity)  Note limiting factors. Yaya Almonte is a 39 y.o. female who presents to the emergency department with chest pain. She states it started this morning. The patient had continuous chest pain for 2 to 3 hours. It then went away after she was driving around she was doing some light work earlier today when it started she endorses associated symptoms of nausea diaphoresis and shortness of breath. Patient has no history of DVT or PE. Patient has a history of early family history of coronary artery disease she is a smoker she has diabetes she has obesity she has hypertension she has hypercholesterolemia. Patient tells me she did try to quit smoking in February and has been doing a good job. Patient arrived to the ER she was given 2 nitro for intermittent chest pressure which relieved it. The patient otherwise states that it feels like a ramp pushing in on her chest causing pressure. She is pain-free after nitro she took 1 baby aspirin at home prior to arrival.    The history is provided by the patient and the spouse. NursingNotes were reviewed. REVIEW OF SYSTEMS    (2-9 systems for level 4, 10 or more for level 5)     Review of Systems   Constitutional:  Positive for diaphoresis and fatigue. Negative for fever. Respiratory:  Negative for cough. Cardiovascular:  Positive for chest pain. Gastrointestinal:  Positive for nausea. Negative for abdominal pain. Genitourinary:  Negative for dysuria. Musculoskeletal:  Negative for back pain.

## 2023-05-27 NOTE — PROGRESS NOTES
Pt brought her own home CPAP.      Electronically signed by Eugenia Saul RN on 5/26/2023 at 10:07 PM

## 2023-05-30 ENCOUNTER — TELEPHONE (OUTPATIENT)
Dept: FAMILY MEDICINE CLINIC | Age: 43
End: 2023-05-30

## 2023-05-30 DIAGNOSIS — E27.9 ADRENAL ABNORMALITY (HCC): Primary | ICD-10-CM

## 2023-05-30 LAB
EKG P AXIS: 47 DEGREES
EKG P AXIS: 60 DEGREES
EKG P-R INTERVAL: 150 MS
EKG P-R INTERVAL: 156 MS
EKG Q-T INTERVAL: 378 MS
EKG Q-T INTERVAL: 398 MS
EKG QRS DURATION: 82 MS
EKG QRS DURATION: 88 MS
EKG QTC CALCULATION (BAZETT): 404 MS
EKG QTC CALCULATION (BAZETT): 412 MS
EKG T AXIS: 59 DEGREES
EKG T AXIS: 69 DEGREES

## 2023-05-30 NOTE — TELEPHONE ENCOUNTER
Called patient for TCM, she was asking if you could add adrenal labs with the rest of her labs that she will need drawn for her July visit, she is wanting to get them drawn before her hospital follow up incase you need to make any changes.

## 2023-06-05 RX ORDER — TIZANIDINE 4 MG/1
TABLET ORAL
Qty: 90 TABLET | Refills: 0 | Status: SHIPPED | OUTPATIENT
Start: 2023-06-05

## 2023-06-05 NOTE — TELEPHONE ENCOUNTER
Received fax from pharmacy requesting refill on pts medication(s). Pt was last seen in office on 4/24/2023  and has a follow up scheduled for 6/8/2023. Will send request to  Susanna Hudson  for authorization.      Requested Prescriptions     Pending Prescriptions Disp Refills    tiZANidine (ZANAFLEX) 4 MG tablet [Pharmacy Med Name: tiZANidine HCl 4 MG Oral Tablet] 90 tablet 0     Sig: TAKE 1 TABLET BY MOUTH EVERY 8 HOURS AS NEEDED FOR PAIN FOR MUSCLE SPASM

## 2023-06-08 ENCOUNTER — OFFICE VISIT (OUTPATIENT)
Dept: FAMILY MEDICINE CLINIC | Age: 43
End: 2023-06-08
Payer: MEDICAID

## 2023-06-08 VITALS
OXYGEN SATURATION: 97 % | WEIGHT: 263.75 LBS | HEART RATE: 78 BPM | SYSTOLIC BLOOD PRESSURE: 120 MMHG | DIASTOLIC BLOOD PRESSURE: 88 MMHG | TEMPERATURE: 97.1 F | BODY MASS INDEX: 42.57 KG/M2

## 2023-06-08 DIAGNOSIS — E03.9 ACQUIRED HYPOTHYROIDISM: ICD-10-CM

## 2023-06-08 DIAGNOSIS — G47.33 OBSTRUCTIVE SLEEP APNEA: ICD-10-CM

## 2023-06-08 DIAGNOSIS — R60.9 PERIPHERAL EDEMA: ICD-10-CM

## 2023-06-08 DIAGNOSIS — R07.89 CHEST PRESSURE: ICD-10-CM

## 2023-06-08 DIAGNOSIS — Z09 HOSPITAL DISCHARGE FOLLOW-UP: Primary | ICD-10-CM

## 2023-06-08 DIAGNOSIS — E27.9 ADRENAL ABNORMALITY (HCC): ICD-10-CM

## 2023-06-08 DIAGNOSIS — E11.69 DIABETES MELLITUS TYPE 2 IN OBESE (HCC): ICD-10-CM

## 2023-06-08 DIAGNOSIS — R60.9 SWELLING: ICD-10-CM

## 2023-06-08 DIAGNOSIS — E55.9 VITAMIN D DEFICIENCY: Primary | ICD-10-CM

## 2023-06-08 DIAGNOSIS — E66.9 DIABETES MELLITUS TYPE 2 IN OBESE (HCC): ICD-10-CM

## 2023-06-08 LAB
25(OH)D3 SERPL-MCNC: 24.9 NG/ML
CORTIS SERPL-MCNC: 7.1 UG/DL
T3FREE SERPL-MCNC: 2.9 PG/ML (ref 2–4.4)

## 2023-06-08 PROCEDURE — 3051F HG A1C>EQUAL 7.0%<8.0%: CPT | Performed by: NURSE PRACTITIONER

## 2023-06-08 PROCEDURE — 99214 OFFICE O/P EST MOD 30 MIN: CPT | Performed by: NURSE PRACTITIONER

## 2023-06-08 PROCEDURE — 3079F DIAST BP 80-89 MM HG: CPT | Performed by: NURSE PRACTITIONER

## 2023-06-08 PROCEDURE — 3074F SYST BP LT 130 MM HG: CPT | Performed by: NURSE PRACTITIONER

## 2023-06-08 RX ORDER — DULAGLUTIDE 1.5 MG/.5ML
1.5 INJECTION, SOLUTION SUBCUTANEOUS WEEKLY
Qty: 2 ML | Refills: 0 | Status: SHIPPED | OUTPATIENT
Start: 2023-06-08

## 2023-06-08 RX ORDER — ERGOCALCIFEROL 1.25 MG/1
50000 CAPSULE ORAL WEEKLY
Qty: 4 CAPSULE | Refills: 1 | Status: SHIPPED | OUTPATIENT
Start: 2023-06-08

## 2023-06-08 RX ORDER — POTASSIUM CHLORIDE 750 MG/1
10 TABLET, EXTENDED RELEASE ORAL DAILY
Qty: 30 TABLET | Refills: 5 | Status: SHIPPED | OUTPATIENT
Start: 2023-06-08

## 2023-06-08 RX ORDER — LEVOTHYROXINE SODIUM 25 MCG
25 TABLET ORAL DAILY
Qty: 30 TABLET | Refills: 3 | Status: SHIPPED | OUTPATIENT
Start: 2023-06-08

## 2023-06-08 SDOH — ECONOMIC STABILITY: FOOD INSECURITY: WITHIN THE PAST 12 MONTHS, THE FOOD YOU BOUGHT JUST DIDN'T LAST AND YOU DIDN'T HAVE MONEY TO GET MORE.: SOMETIMES TRUE

## 2023-06-08 SDOH — ECONOMIC STABILITY: HOUSING INSECURITY
IN THE LAST 12 MONTHS, WAS THERE A TIME WHEN YOU DID NOT HAVE A STEADY PLACE TO SLEEP OR SLEPT IN A SHELTER (INCLUDING NOW)?: NO

## 2023-06-08 SDOH — ECONOMIC STABILITY: FOOD INSECURITY: WITHIN THE PAST 12 MONTHS, YOU WORRIED THAT YOUR FOOD WOULD RUN OUT BEFORE YOU GOT MONEY TO BUY MORE.: SOMETIMES TRUE

## 2023-06-08 SDOH — ECONOMIC STABILITY: INCOME INSECURITY: HOW HARD IS IT FOR YOU TO PAY FOR THE VERY BASICS LIKE FOOD, HOUSING, MEDICAL CARE, AND HEATING?: SOMEWHAT HARD

## 2023-06-08 SDOH — ECONOMIC STABILITY: TRANSPORTATION INSECURITY
IN THE PAST 12 MONTHS, HAS LACK OF TRANSPORTATION KEPT YOU FROM MEETINGS, WORK, OR FROM GETTING THINGS NEEDED FOR DAILY LIVING?: NO

## 2023-06-08 NOTE — TELEPHONE ENCOUNTER
----- Message from ERIK Cisneros sent at 6/8/2023  4:08 PM CDT -----  Your vitamin d level is low  Suggest start vitamin d 64751 units 1 po weekly for 8 weeks #4 1 refill  Then recheck level

## 2023-06-08 NOTE — PROGRESS NOTES
178-190s  Low BG:  no  Eye exam current (within one year):  will need to go to [facility]  Checking Feet regularly:  yes - no provlems  ACE/ARB:  yes - cozaar  Aspirin: Yes  Moderate intensity statin: lipitor     Known diabetic complications: none  Barriers to success: none  Tobacco history: She  reports that she quit smoking about 3 years ago. Her smoking use included cigarettes. She has a 15.00 pack-year smoking history. She has never used smokeless tobacco.      Lab Results   Component Value Date    LABA1C 7.2 (H) 05/26/2023    GLUCOSE 189 (H) 05/27/2023     Lab Results   Component Value Date    LABMICR <1.20 04/24/2023    CREATININE 0.8 05/27/2023             Hypothyroidism:  Medication:              Snythroid 25 mcg  Medication compliance:  compliant most of the time  Patient is  taking her medication consistently on an empty stomach. Symptoms: none.]  Barrier to success:   Laboratory:  No results found for: [facility]            Lab Results   Component Value Date     TSH 2.470 07/01/2022     TSH 2.710 05/20/2022     TSH 3.410 08/16/2021      Constipation  Linzess daily  With great relief benefits outweight risks  She has stopped this medication   As no more constipation           Fibro issues  Cymbalta 60mg twice a day  Lyrica 100mg 1 an 2 pm  Reports that is helpful   Last filled 4/20 *90   Reports the last few have been generic   Doesn't work as well      RERE   Reports still feeling exhausted  Even when sleeps for 3-4 hours during day  Notes her hair is still thin  No coffee or sugar    Has quit smoking   As not at goal           Care management risk score Rising risk (score 2-5) and Complex Care (Scores >=6): No Risk Score On File     Non face to face  following discharge, date last encounter closed (first attempt may have been earlier): 05/30/2023    Call initiated 2 business days of discharge:  Yes    ASSESSMENT/PLAN:   Hospital discharge follow-up  Resolved monitor symptoms and spasms    -     CARDIAC

## 2023-06-08 NOTE — TELEPHONE ENCOUNTER
----- Message from ERIK King sent at 6/8/2023  3:50 PM CDT -----  Free t3 normal great news  Cortisol level normal

## 2023-06-08 NOTE — TELEPHONE ENCOUNTER
Called patient, spoke with: Patient regarding the results of the patients most recent labs. I advised Patient of Rudolpho Star recommendations.    Patient did voice understanding      Medication pending

## 2023-06-14 LAB — CORTIS F SERPL-MCNC: 0.18 UG/DL

## 2023-07-08 DIAGNOSIS — K58.1 IRRITABLE BOWEL SYNDROME WITH CONSTIPATION: ICD-10-CM

## 2023-07-08 DIAGNOSIS — E11.69 DIABETES MELLITUS TYPE 2 IN OBESE (HCC): ICD-10-CM

## 2023-07-08 DIAGNOSIS — E66.9 DIABETES MELLITUS TYPE 2 IN OBESE (HCC): ICD-10-CM

## 2023-07-08 DIAGNOSIS — K59.03 DRUG-INDUCED CONSTIPATION: ICD-10-CM

## 2023-07-10 RX ORDER — TIZANIDINE 4 MG/1
4 TABLET ORAL EVERY 8 HOURS PRN
Qty: 90 TABLET | Refills: 0 | Status: SHIPPED | OUTPATIENT
Start: 2023-07-10

## 2023-07-10 RX ORDER — DULAGLUTIDE 1.5 MG/.5ML
1.5 INJECTION, SOLUTION SUBCUTANEOUS WEEKLY
Qty: 4 ML | Refills: 0 | Status: SHIPPED | OUTPATIENT
Start: 2023-07-10

## 2023-07-10 NOTE — TELEPHONE ENCOUNTER
Received fax from pharmacy requesting refill on pts medication(s). Pt was last seen in office on 6/8/2023  and has a follow up scheduled for 7/25/2023. Will send request to  Eddie Kincaid  for authorization.      Requested Prescriptions     Pending Prescriptions Disp Refills    TRULICITY 1.5 LW/3.0IK SC injection [Pharmacy Med Name: Trulicity 1.5 CH/0.6HT Subcutaneous Solution Pen-injector] 4 mL 0     Sig: INJECT 1 SYRINGE SUBCUTANEOUSLY ONCE A WEEK    tiZANidine (ZANAFLEX) 4 MG tablet [Pharmacy Med Name: tiZANidine HCl 4 MG Oral Tablet] 90 tablet 0     Sig: TAKE 1 TABLET BY MOUTH EVERY 8 HOURS AS NEEDED FOR PAIN FOR MUSCLE SPASM    LINZESS 145 MCG capsule [Pharmacy Med Name: Linzess 145 MCG Oral Capsule] 30 capsule 0     Sig: TAKE 1 CAPSULE BY MOUTH ONCE DAILY IN THE MORNING BEFORE BREAKFAST

## 2023-07-11 DIAGNOSIS — R60.9 PERIPHERAL EDEMA: ICD-10-CM

## 2023-07-11 DIAGNOSIS — R60.9 SWELLING: ICD-10-CM

## 2023-07-11 RX ORDER — FUROSEMIDE 40 MG/1
TABLET ORAL
Qty: 90 TABLET | Refills: 0 | Status: SHIPPED | OUTPATIENT
Start: 2023-07-11

## 2023-07-11 NOTE — TELEPHONE ENCOUNTER
Received fax from pharmacy requesting refill on pts medication(s). Pt was last seen in office on 6/8/2023  and has a follow up scheduled for 7/25/2023. Will send request to  Leny Katz  for authorization.      Requested Prescriptions     Pending Prescriptions Disp Refills    furosemide (LASIX) 40 MG tablet [Pharmacy Med Name: Furosemide 40 MG Oral Tablet] 90 tablet 0     Sig: Take 1 tablet by mouth once daily

## 2023-07-19 ENCOUNTER — TELEPHONE (OUTPATIENT)
Dept: FAMILY MEDICINE CLINIC | Age: 43
End: 2023-07-19

## 2023-08-02 ENCOUNTER — TELEPHONE (OUTPATIENT)
Dept: FAMILY MEDICINE CLINIC | Age: 43
End: 2023-08-02

## 2023-08-02 ENCOUNTER — HOSPITAL ENCOUNTER (OUTPATIENT)
Dept: NON INVASIVE DIAGNOSTICS | Age: 43
Discharge: HOME | End: 2023-08-02
Payer: MEDICAID

## 2023-08-02 DIAGNOSIS — R07.89 CHEST PRESSURE: ICD-10-CM

## 2023-08-02 DIAGNOSIS — E03.9 ACQUIRED HYPOTHYROIDISM: ICD-10-CM

## 2023-08-02 DIAGNOSIS — Z09 HOSPITAL DISCHARGE FOLLOW-UP: ICD-10-CM

## 2023-08-02 DIAGNOSIS — E66.9 DIABETES MELLITUS TYPE 2 IN OBESE (HCC): ICD-10-CM

## 2023-08-02 DIAGNOSIS — E11.69 DIABETES MELLITUS TYPE 2 IN OBESE (HCC): ICD-10-CM

## 2023-08-02 DIAGNOSIS — G47.33 OBSTRUCTIVE SLEEP APNEA: ICD-10-CM

## 2023-08-02 PROCEDURE — 93017 CV STRESS TEST TRACING ONLY: CPT

## 2023-08-02 NOTE — TELEPHONE ENCOUNTER
----- Message from ERIK Munguia sent at 8/2/2023  3:09 PM CDT -----  Cardiac stress test is negative. Patient is at low risk for myocardial ischemia. This is great news.

## 2023-08-02 NOTE — TELEPHONE ENCOUNTER
Called patient, spoke with: Patient regarding the results of the patients most recent stress test.  I advised Patient of Ivon Nvaarro recommendations.    Patient did voice understanding

## 2023-08-03 ENCOUNTER — OFFICE VISIT (OUTPATIENT)
Dept: FAMILY MEDICINE CLINIC | Age: 43
End: 2023-08-03
Payer: MEDICAID

## 2023-08-03 VITALS
SYSTOLIC BLOOD PRESSURE: 126 MMHG | TEMPERATURE: 97.6 F | OXYGEN SATURATION: 97 % | HEART RATE: 78 BPM | DIASTOLIC BLOOD PRESSURE: 86 MMHG | BODY MASS INDEX: 42.81 KG/M2 | WEIGHT: 265.25 LBS

## 2023-08-03 DIAGNOSIS — E11.9 CONTROLLED TYPE 2 DIABETES MELLITUS WITHOUT COMPLICATION, WITHOUT LONG-TERM CURRENT USE OF INSULIN (HCC): ICD-10-CM

## 2023-08-03 DIAGNOSIS — R60.0 BILATERAL LEG EDEMA: ICD-10-CM

## 2023-08-03 DIAGNOSIS — R60.0 LEG EDEMA, LEFT: ICD-10-CM

## 2023-08-03 DIAGNOSIS — E66.01 MORBID OBESITY WITH BMI OF 40.0-44.9, ADULT (HCC): ICD-10-CM

## 2023-08-03 DIAGNOSIS — F41.9 ANXIETY: ICD-10-CM

## 2023-08-03 DIAGNOSIS — S91.332A PUNCTURE WOUND OF LEFT FOOT, INITIAL ENCOUNTER: Primary | ICD-10-CM

## 2023-08-03 DIAGNOSIS — E11.42 TYPE 2 DIABETES MELLITUS WITH DIABETIC POLYNEUROPATHY, WITHOUT LONG-TERM CURRENT USE OF INSULIN (HCC): ICD-10-CM

## 2023-08-03 DIAGNOSIS — M79.7 FIBROMYALGIA: ICD-10-CM

## 2023-08-03 PROCEDURE — 3079F DIAST BP 80-89 MM HG: CPT | Performed by: NURSE PRACTITIONER

## 2023-08-03 PROCEDURE — 3051F HG A1C>EQUAL 7.0%<8.0%: CPT | Performed by: NURSE PRACTITIONER

## 2023-08-03 PROCEDURE — 3074F SYST BP LT 130 MM HG: CPT | Performed by: NURSE PRACTITIONER

## 2023-08-03 PROCEDURE — 99214 OFFICE O/P EST MOD 30 MIN: CPT | Performed by: NURSE PRACTITIONER

## 2023-08-03 RX ORDER — DULOXETIN HYDROCHLORIDE 60 MG/1
60 CAPSULE, DELAYED RELEASE ORAL 2 TIMES DAILY
Qty: 180 CAPSULE | Refills: 3 | Status: SHIPPED | OUTPATIENT
Start: 2023-08-03

## 2023-08-03 RX ORDER — CIPROFLOXACIN 500 MG/1
500 TABLET, FILM COATED ORAL 2 TIMES DAILY
Qty: 20 TABLET | Refills: 0 | Status: SHIPPED | OUTPATIENT
Start: 2023-08-03 | End: 2023-08-13

## 2023-08-03 RX ORDER — BUMETANIDE 1 MG/1
1 TABLET ORAL DAILY
Qty: 30 TABLET | Refills: 3 | Status: SHIPPED | OUTPATIENT
Start: 2023-08-03

## 2023-08-03 ASSESSMENT — ENCOUNTER SYMPTOMS
ABDOMINAL PAIN: 0
SHORTNESS OF BREATH: 0
COUGH: 0
WHEEZING: 0
NAUSEA: 0
RECTAL PAIN: 0
DIARRHEA: 0
VOMITING: 0

## 2023-08-03 NOTE — PROGRESS NOTES
Maria Alejandra Baer (:  1980) is a 39 y.o. female,Established patient, here for evaluation of the following chief complaint(s):  Leg Swelling (Redness and swelling in legs)      ASSESSMENT/PLAN:    ICD-10-CM    1. Puncture wound of left foot, initial encounter  G91.945E Clean soap and water twice a day  Neosoprin twice a day  If redness or increase tenderness notify asap  Cipro twice a day  ( hold zanaflex at present       2. Anxiety  F41.9 DULoxetine (CYMBALTA) 60 MG extended release capsule      3. Type 2 diabetes mellitus with diabetic polyneuropathy, without long-term current use of insulin ([facility])  E11.42 DULoxetine (CYMBALTA) 60 MG extended release capsule  Restart farxiga       4. Fibromyalgia  M79.7 DULoxetine (CYMBALTA) 60 MG extended release capsule      5. Leg edema, left  R60.0 bumetanide (BUMEX) 1 MG tablet      6. Bilateral leg edema  R60.0 bumetanide (BUMEX) 1 MG tablet  Instead of lasix  Monitor swelling        7. Controlled type 2 diabetes mellitus without complication, without long-term current use of insulin ([facility])  E11.9 dapagliflozin (FARXIGA) 5 MG tablet      8. Morbid obesity with BMI of 40.0-44.9, adult ([facility])  E66.01 dapagliflozin (FARXIGA) 5 MG tablet    Z68.41           Return in about 1 month (around 9/3/2023) for diabetes foot and PFT .     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

## 2023-10-03 ENCOUNTER — OFFICE VISIT (OUTPATIENT)
Dept: FAMILY MEDICINE CLINIC | Age: 43
End: 2023-10-03
Payer: MEDICAID

## 2023-10-03 VITALS
DIASTOLIC BLOOD PRESSURE: 70 MMHG | SYSTOLIC BLOOD PRESSURE: 128 MMHG | WEIGHT: 264.25 LBS | BODY MASS INDEX: 42.47 KG/M2 | TEMPERATURE: 97.3 F | HEIGHT: 66 IN | HEART RATE: 70 BPM | OXYGEN SATURATION: 98 %

## 2023-10-03 DIAGNOSIS — E55.9 VITAMIN D DEFICIENCY: ICD-10-CM

## 2023-10-03 DIAGNOSIS — Z99.89 CPAP (CONTINUOUS POSITIVE AIRWAY PRESSURE) DEPENDENCE: ICD-10-CM

## 2023-10-03 DIAGNOSIS — F33.42 RECURRENT MAJOR DEPRESSIVE DISORDER, IN FULL REMISSION (HCC): ICD-10-CM

## 2023-10-03 DIAGNOSIS — K59.03 DRUG-INDUCED CONSTIPATION: ICD-10-CM

## 2023-10-03 DIAGNOSIS — M79.7 FIBROMYALGIA: ICD-10-CM

## 2023-10-03 DIAGNOSIS — K58.1 IRRITABLE BOWEL SYNDROME WITH CONSTIPATION: ICD-10-CM

## 2023-10-03 DIAGNOSIS — E55.9 VITAMIN D DEFICIENCY: Primary | ICD-10-CM

## 2023-10-03 DIAGNOSIS — R60.9 PERIPHERAL EDEMA: ICD-10-CM

## 2023-10-03 DIAGNOSIS — E03.9 ACQUIRED HYPOTHYROIDISM: ICD-10-CM

## 2023-10-03 DIAGNOSIS — E11.9 CONTROLLED TYPE 2 DIABETES MELLITUS WITHOUT COMPLICATION, WITHOUT LONG-TERM CURRENT USE OF INSULIN (HCC): ICD-10-CM

## 2023-10-03 DIAGNOSIS — E78.2 MIXED HYPERLIPIDEMIA: ICD-10-CM

## 2023-10-03 DIAGNOSIS — F41.9 ANXIETY: ICD-10-CM

## 2023-10-03 DIAGNOSIS — E11.69 DIABETES MELLITUS TYPE 2 IN OBESE (HCC): Primary | ICD-10-CM

## 2023-10-03 DIAGNOSIS — E11.69 DIABETES MELLITUS TYPE 2 IN OBESE (HCC): ICD-10-CM

## 2023-10-03 DIAGNOSIS — E66.9 DIABETES MELLITUS TYPE 2 IN OBESE (HCC): Primary | ICD-10-CM

## 2023-10-03 DIAGNOSIS — E66.9 DIABETES MELLITUS TYPE 2 IN OBESE (HCC): ICD-10-CM

## 2023-10-03 DIAGNOSIS — R23.2 HOT FLASHES: ICD-10-CM

## 2023-10-03 DIAGNOSIS — E66.01 MORBID OBESITY WITH BMI OF 40.0-44.9, ADULT (HCC): ICD-10-CM

## 2023-10-03 PROBLEM — R40.0 SOMNOLENCE, DAYTIME: Status: RESOLVED | Noted: 2017-11-17 | Resolved: 2023-10-03

## 2023-10-03 PROBLEM — R06.81 WITNESSED APNEIC SPELLS: Status: RESOLVED | Noted: 2017-11-17 | Resolved: 2023-10-03

## 2023-10-03 PROBLEM — R06.83 SNORING: Status: RESOLVED | Noted: 2017-11-17 | Resolved: 2023-10-03

## 2023-10-03 LAB
25(OH)D3 SERPL-MCNC: 29.6 NG/ML
ALBUMIN SERPL-MCNC: 4.2 G/DL (ref 3.5–5.2)
ALP SERPL-CCNC: 96 U/L (ref 35–104)
ALT SERPL-CCNC: 53 U/L (ref 5–33)
ANION GAP SERPL CALCULATED.3IONS-SCNC: 12 MMOL/L (ref 7–19)
AST SERPL-CCNC: 41 U/L (ref 5–32)
BASOPHILS # BLD: 0.1 K/UL (ref 0–0.2)
BASOPHILS NFR BLD: 1 % (ref 0–1)
BILIRUB SERPL-MCNC: 0.3 MG/DL (ref 0.2–1.2)
BUN SERPL-MCNC: 16 MG/DL (ref 6–20)
CALCIUM SERPL-MCNC: 10 MG/DL (ref 8.6–10)
CHLORIDE SERPL-SCNC: 102 MMOL/L (ref 98–111)
CHOLEST SERPL-MCNC: 224 MG/DL (ref 160–199)
CO2 SERPL-SCNC: 25 MMOL/L (ref 22–29)
CREAT SERPL-MCNC: 0.7 MG/DL (ref 0.5–0.9)
EOSINOPHIL # BLD: 0.1 K/UL (ref 0–0.6)
EOSINOPHIL NFR BLD: 1.2 % (ref 0–5)
ERYTHROCYTE [DISTWIDTH] IN BLOOD BY AUTOMATED COUNT: 13.7 % (ref 11.5–14.5)
GLUCOSE SERPL-MCNC: 110 MG/DL (ref 74–109)
HBA1C MFR BLD: 6.7 % (ref 4–6)
HCT VFR BLD AUTO: 47 % (ref 37–47)
HDLC SERPL-MCNC: 50 MG/DL (ref 65–121)
HGB BLD-MCNC: 14.5 G/DL (ref 12–16)
IMM GRANULOCYTES # BLD: 0 K/UL
LDLC SERPL CALC-MCNC: 149 MG/DL
LYMPHOCYTES # BLD: 3.4 K/UL (ref 1.1–4.5)
LYMPHOCYTES NFR BLD: 32.3 % (ref 20–40)
MCH RBC QN AUTO: 28.9 PG (ref 27–31)
MCHC RBC AUTO-ENTMCNC: 30.9 G/DL (ref 33–37)
MCV RBC AUTO: 93.8 FL (ref 81–99)
MONOCYTES # BLD: 0.4 K/UL (ref 0–0.9)
MONOCYTES NFR BLD: 4.1 % (ref 0–10)
NEUTROPHILS # BLD: 6.4 K/UL (ref 1.5–7.5)
NEUTS SEG NFR BLD: 61.1 % (ref 50–65)
PLATELET # BLD AUTO: 311 K/UL (ref 130–400)
PMV BLD AUTO: 11.1 FL (ref 9.4–12.3)
POTASSIUM SERPL-SCNC: 4.5 MMOL/L (ref 3.5–5)
PROT SERPL-MCNC: 7.8 G/DL (ref 6.6–8.7)
RBC # BLD AUTO: 5.01 M/UL (ref 4.2–5.4)
SODIUM SERPL-SCNC: 139 MMOL/L (ref 136–145)
TRIGL SERPL-MCNC: 126 MG/DL (ref 0–149)
WBC # BLD AUTO: 10.5 K/UL (ref 4.8–10.8)

## 2023-10-03 PROCEDURE — 3051F HG A1C>EQUAL 7.0%<8.0%: CPT | Performed by: NURSE PRACTITIONER

## 2023-10-03 PROCEDURE — 99214 OFFICE O/P EST MOD 30 MIN: CPT | Performed by: NURSE PRACTITIONER

## 2023-10-03 PROCEDURE — 3078F DIAST BP <80 MM HG: CPT | Performed by: NURSE PRACTITIONER

## 2023-10-03 PROCEDURE — 3074F SYST BP LT 130 MM HG: CPT | Performed by: NURSE PRACTITIONER

## 2023-10-03 RX ORDER — POTASSIUM CHLORIDE 750 MG/1
10 TABLET, EXTENDED RELEASE ORAL DAILY
Qty: 30 TABLET | Refills: 11 | Status: SHIPPED | OUTPATIENT
Start: 2023-10-03

## 2023-10-03 RX ORDER — FEZOLINETANT 45 MG/1
1 TABLET, FILM COATED ORAL DAILY
Qty: 30 TABLET | Refills: 11 | Status: SHIPPED | OUTPATIENT
Start: 2023-10-03

## 2023-10-03 RX ORDER — LEVOTHYROXINE SODIUM 25 MCG
25 TABLET ORAL DAILY
Qty: 30 TABLET | Refills: 11 | Status: SHIPPED | OUTPATIENT
Start: 2023-10-03

## 2023-10-03 RX ORDER — ATORVASTATIN CALCIUM 20 MG/1
20 TABLET, FILM COATED ORAL NIGHTLY
Qty: 90 TABLET | Refills: 3 | Status: SHIPPED | OUTPATIENT
Start: 2023-10-03

## 2023-10-03 RX ORDER — TIZANIDINE 4 MG/1
4 TABLET ORAL EVERY 8 HOURS PRN
Qty: 90 TABLET | Refills: 0 | Status: SHIPPED | OUTPATIENT
Start: 2023-10-03

## 2023-10-03 RX ORDER — BUMETANIDE 1 MG/1
1 TABLET ORAL DAILY
Qty: 30 TABLET | Refills: 11 | Status: SHIPPED | OUTPATIENT
Start: 2023-10-03

## 2023-10-03 RX ORDER — DULAGLUTIDE 3 MG/.5ML
3 INJECTION, SOLUTION SUBCUTANEOUS WEEKLY
Qty: 2 ML | Refills: 11 | Status: SHIPPED | OUTPATIENT
Start: 2023-10-03

## 2023-10-03 RX ORDER — ERGOCALCIFEROL 1.25 MG/1
50000 CAPSULE ORAL WEEKLY
Qty: 4 CAPSULE | Refills: 1 | Status: SHIPPED | OUTPATIENT
Start: 2023-10-03

## 2023-10-03 RX ORDER — PREGABALIN 100 MG/1
100 CAPSULE ORAL 3 TIMES DAILY
Qty: 90 CAPSULE | Refills: 5 | Status: SHIPPED | OUTPATIENT
Start: 2023-10-03 | End: 2024-03-31

## 2023-10-03 RX ORDER — DULOXETIN HYDROCHLORIDE 60 MG/1
60 CAPSULE, DELAYED RELEASE ORAL 2 TIMES DAILY
Qty: 180 CAPSULE | Refills: 3 | Status: SHIPPED | OUTPATIENT
Start: 2023-10-03

## 2023-10-03 ASSESSMENT — ENCOUNTER SYMPTOMS
RECTAL PAIN: 0
SHORTNESS OF BREATH: 0
DIARRHEA: 0
WHEEZING: 0
NAUSEA: 0
VOMITING: 0
ABDOMINAL PAIN: 0
COUGH: 0

## 2023-10-03 NOTE — TELEPHONE ENCOUNTER
----- Message from ERIK Rey sent at 10/3/2023  1:06 PM CDT -----  6.7 great job   Recheck in 3 months  Your vitamin d level is low  Suggest start vitamin d 31099 units 1 po weekly for 8 weeks #4 1 refill  Then recheck level

## 2023-10-03 NOTE — TELEPHONE ENCOUNTER
Called patient, spoke with: Patient regarding the results of the patients most recent labs. I advised Patient of Galdino Burt recommendations.    Patient did voice understanding      Medication pending

## 2023-10-03 NOTE — TELEPHONE ENCOUNTER
----- Message from ERIK Rainey sent at 10/3/2023 11:44 AM CDT -----  CBC normal no anemia or concerns

## 2023-10-03 NOTE — PROGRESS NOTES
palpitations. Gastrointestinal:  Negative for abdominal pain, diarrhea, nausea, rectal pain and vomiting. Endocrine: Positive for heat intolerance. Food cravings     Genitourinary:  Negative for difficulty urinating, menstrual problem and pelvic pain. Musculoskeletal:  Negative for arthralgias. Skin:  Negative for wound. Psychiatric/Behavioral:  Negative for behavioral problems, self-injury and sleep disturbance. The patient is not nervous/anxious. Physical Exam  Vitals reviewed. Constitutional:       Appearance: Normal appearance. She is obese. She is not ill-appearing. Cardiovascular:      Rate and Rhythm: Normal rate and regular rhythm. Pulses: Normal pulses. Heart sounds: No murmur heard. Pulmonary:      Effort: Pulmonary effort is normal.      Breath sounds: Normal breath sounds. No wheezing or rhonchi. Musculoskeletal:      Right lower leg: Edema (1 plus) present. Left lower leg: Edema (1 plus) present. Skin:     Findings: No rash. Comments: Bottom sole of foot : puncture no redness or swelling     Neurological:      General: No focal deficit present. Mental Status: She is alert and oriented to person, place, and time. Psychiatric:         Mood and Affect: Mood normal.         Behavior: Behavior normal.                 Diabetic Foot Exam:  Visual inspection:  Deformity/amputation: absent  Skin lesions/pre-ulcerative calluses: absent  Edema: right- negative, left- negative    Monofilament Exam Reveals:  Pulses: normal  Edema:normal  Skin Lesions:normal    Right Foot:    Left Foot:  Normal sensation at 1-10   Normal sensation at 1-10   Diminished sensation at    Diminished sensation at    No sensation at     No sensation at                An electronic signature was used to authenticate this note.     --ERIK Vang

## 2023-10-03 NOTE — TELEPHONE ENCOUNTER
----- Message from ERIK Foster sent at 10/3/2023 11:54 AM CDT -----  Cmp electrolytes liver and kidneys wnl  Glucose 110  Lipids  much higher than before   Restart lipitor nightly recheck in 6 weeks alt and lipids

## 2023-11-03 ENCOUNTER — PATIENT MESSAGE (OUTPATIENT)
Dept: FAMILY MEDICINE CLINIC | Age: 43
End: 2023-11-03

## 2023-11-03 RX ORDER — NAPROXEN 500 MG/1
500 TABLET ORAL 2 TIMES DAILY WITH MEALS
Qty: 60 TABLET | Refills: 11 | Status: SHIPPED | OUTPATIENT
Start: 2023-11-03

## 2023-12-01 RX ORDER — TIZANIDINE 4 MG/1
4 TABLET ORAL EVERY 8 HOURS PRN
Qty: 90 TABLET | Refills: 0 | Status: SHIPPED | OUTPATIENT
Start: 2023-12-01

## 2023-12-01 NOTE — TELEPHONE ENCOUNTER
Received fax from pharmacy requesting refill on pts medication(s). Pt was last seen in office on 10/3/2023  and has a follow up scheduled for 12/18/2023. Will send request to  Ирина Vasquez  for authorization.      Requested Prescriptions     Pending Prescriptions Disp Refills    tiZANidine (ZANAFLEX) 4 MG tablet [Pharmacy Med Name: tiZANidine HCl 4 MG Oral Tablet] 90 tablet 0     Sig: TAKE 1 TABLET BY MOUTH EVERY 8 HOURS AS NEEDED FOR MUSCLE SPASMS

## 2023-12-18 PROBLEM — E03.4 HYPOTHYROIDISM DUE TO ACQUIRED ATROPHY OF THYROID: Status: ACTIVE | Noted: 2023-12-18

## 2024-01-12 NOTE — PATIENT INSTRUCTIONS
Quality 226: Preventive Care And Screening: Tobacco Use: Screening And Cessation Intervention: Tobacco Screening not Performed Call your doctor now or seek immediate medical care if:  · Your flank pain gets worse. · You have new symptoms, such as fever, nausea, or vomiting. · You have symptoms of a urinary problem. For example:  ? You have blood or pus in your urine. ? You have chills or body aches. ? It hurts to urinate. ? You have groin or belly pain. Watch closely for changes in your health, and be sure to contact your doctor if you do not get better as expected. Where can you learn more? Go to [email or web address] and sign in to your Local [employer] account. Enter S191 in the Box box to learn more about \"Flank Pain: Care Instructions. \"     If you do not have an account, please click on the \"Sign Up Now\" link. Current as of: June 26, 2019               Content Version: 12.5  © 6377-6096 [employer]wise, Incorporated. Care instructions adapted under license by [facility]. If you have questions about a medical condition or this instruction, always ask your healthcare professional. [address] any warranty or liability for your use of this information. Detail Level: Detailed Quality 402: Tobacco Use And Help With Quitting Among Adolescents: Patient screened for tobacco and never smoked Quality 110: Preventive Care And Screening: Influenza Immunization: Influenza immunization was not ordered or administered, reason not given

## 2024-02-20 RX ORDER — TIZANIDINE 4 MG/1
TABLET ORAL
Qty: 90 TABLET | Refills: 0 | Status: SHIPPED | OUTPATIENT
Start: 2024-02-20

## 2024-02-20 NOTE — TELEPHONE ENCOUNTER
Received fax from pharmacy requesting refill on pts medication(s). Pt was last seen in office on 12/18/2023  and has a follow up scheduled for 3/18/2024. Will send request to  Carmen Perez  for authorization.     Requested Prescriptions     Pending Prescriptions Disp Refills    tiZANidine (ZANAFLEX) 4 MG tablet [Pharmacy Med Name: tiZANidine HCl 4 MG Oral Tablet] 90 tablet 0     Sig: TAKE 1 TABLET BY MOUTH EVERY 8 HOURS AS NEEDED FOR MUSCLE SPASM

## 2024-03-04 ENCOUNTER — PATIENT MESSAGE (OUTPATIENT)
Dept: FAMILY MEDICINE CLINIC | Age: 44
End: 2024-03-04

## 2024-03-04 DIAGNOSIS — E11.42 TYPE 2 DIABETES MELLITUS WITH DIABETIC POLYNEUROPATHY, WITHOUT LONG-TERM CURRENT USE OF INSULIN (HCC): Primary | ICD-10-CM

## 2024-03-04 RX ORDER — SEMAGLUTIDE 1.34 MG/ML
INJECTION, SOLUTION SUBCUTANEOUS
Qty: 3 ML | Refills: 11 | Status: SHIPPED | OUTPATIENT
Start: 2024-03-04

## 2024-03-04 RX ORDER — SEMAGLUTIDE 0.68 MG/ML
INJECTION, SOLUTION SUBCUTANEOUS
Qty: 2 ML | Refills: 0 | Status: SHIPPED | OUTPATIENT
Start: 2024-03-04

## 2024-03-04 NOTE — TELEPHONE ENCOUNTER
Katarzyna Perez APRN 3/4/2024 2:45 PM CST    Change to ozempic .5mg weekly #1 pen no refill  Then ozempic 1mg weekly # 1 pen 11 rf  Diabetes type 2     ----- Message -----  From: Abhishek Flores MA  Sent: 3/4/2024 2:43 PM CST  To: ERIK Gamble  Subject: FW: Shots       ----- Message -----  From: Shirlene Tian  Sent: 3/4/2024 2:15 PM CST  To: [facility] Clinical Staff  Subject: Shots     I have been out of my mongero for a month. The pharmacy cannot get it. No local pharmacy can get it. They do have a simple install if we could do a mid dose of that. I guess. But I'm sure insurance is gonna wanna know why we're going back to ozympic. But I can tell I've been without my shot.

## 2024-03-18 ENCOUNTER — NURSE ONLY (OUTPATIENT)
Dept: FAMILY MEDICINE CLINIC | Age: 44
End: 2024-03-18

## 2024-03-18 DIAGNOSIS — Z13.9 SCREENING DUE: Primary | ICD-10-CM

## 2024-03-18 SDOH — ECONOMIC STABILITY: FOOD INSECURITY: WITHIN THE PAST 12 MONTHS, YOU WORRIED THAT YOUR FOOD WOULD RUN OUT BEFORE YOU GOT MONEY TO BUY MORE.: NEVER TRUE

## 2024-03-18 SDOH — ECONOMIC STABILITY: FOOD INSECURITY: WITHIN THE PAST 12 MONTHS, THE FOOD YOU BOUGHT JUST DIDN'T LAST AND YOU DIDN'T HAVE MONEY TO GET MORE.: NEVER TRUE

## 2024-03-18 SDOH — ECONOMIC STABILITY: INCOME INSECURITY: HOW HARD IS IT FOR YOU TO PAY FOR THE VERY BASICS LIKE FOOD, HOUSING, MEDICAL CARE, AND HEATING?: NOT HARD AT ALL

## 2024-03-18 ASSESSMENT — PATIENT HEALTH QUESTIONNAIRE - PHQ9
8. MOVING OR SPEAKING SO SLOWLY THAT OTHER PEOPLE COULD HAVE NOTICED. OR THE OPPOSITE, BEING SO FIGETY OR RESTLESS THAT YOU HAVE BEEN MOVING AROUND A LOT MORE THAN USUAL: NOT AT ALL
SUM OF ALL RESPONSES TO PHQ QUESTIONS 1-9: 0
4. FEELING TIRED OR HAVING LITTLE ENERGY: NOT AT ALL
SUM OF ALL RESPONSES TO PHQ QUESTIONS 1-9: 0
SUM OF ALL RESPONSES TO PHQ9 QUESTIONS 1 & 2: 0
5. POOR APPETITE OR OVEREATING: NOT AT ALL
1. LITTLE INTEREST OR PLEASURE IN DOING THINGS: NOT AT ALL
7. TROUBLE CONCENTRATING ON THINGS, SUCH AS READING THE NEWSPAPER OR WATCHING TELEVISION: NOT AT ALL
SUM OF ALL RESPONSES TO PHQ QUESTIONS 1-9: 0
10. IF YOU CHECKED OFF ANY PROBLEMS, HOW DIFFICULT HAVE THESE PROBLEMS MADE IT FOR YOU TO DO YOUR WORK, TAKE CARE OF THINGS AT HOME, OR GET ALONG WITH OTHER PEOPLE: NOT DIFFICULT AT ALL
2. FEELING DOWN, DEPRESSED OR HOPELESS: NOT AT ALL
6. FEELING BAD ABOUT YOURSELF - OR THAT YOU ARE A FAILURE OR HAVE LET YOURSELF OR YOUR FAMILY DOWN: NOT AT ALL
9. THOUGHTS THAT YOU WOULD BE BETTER OFF DEAD, OR OF HURTING YOURSELF: NOT AT ALL
SUM OF ALL RESPONSES TO PHQ QUESTIONS 1-9: 0

## 2024-03-18 NOTE — PROGRESS NOTES
[facility] Social Determinants Of Health (Sdoh) Screening Questionnaire    Question 3/15/2024  6:24 PM CDT - Filed by Patient   How hard is it for you to pay for the very basics like food, housing, medical care, and heating? Very hard   Within the past 12 months, you worried that your food would run out before you got the money to buy more. Never true   Within the past 12 months, the food you bought just didn’t last and you didn’t have money to get more. Never true   In the past 12 months, has lack of transportation kept you from meetings, work, or from getting things needed for daily living? No   In the last 12 months, was there a time when you did not have a steady place to sleep or slept in a shelter (including now)? No   Would you like resources for any of these topics? No, thank you

## 2024-04-02 ENCOUNTER — OFFICE VISIT (OUTPATIENT)
Dept: FAMILY MEDICINE CLINIC | Age: 44
End: 2024-04-02
Payer: MEDICAID

## 2024-04-02 ENCOUNTER — PATIENT MESSAGE (OUTPATIENT)
Dept: FAMILY MEDICINE CLINIC | Age: 44
End: 2024-04-02

## 2024-04-02 ENCOUNTER — TELEPHONE (OUTPATIENT)
Dept: FAMILY MEDICINE CLINIC | Age: 44
End: 2024-04-02

## 2024-04-02 VITALS
WEIGHT: 257.5 LBS | HEART RATE: 66 BPM | HEIGHT: 66 IN | OXYGEN SATURATION: 95 % | DIASTOLIC BLOOD PRESSURE: 82 MMHG | SYSTOLIC BLOOD PRESSURE: 132 MMHG | BODY MASS INDEX: 41.38 KG/M2 | TEMPERATURE: 98.5 F

## 2024-04-02 DIAGNOSIS — G93.31 POSTVIRAL FATIGUE SYNDROME: ICD-10-CM

## 2024-04-02 DIAGNOSIS — E11.9 CONTROLLED TYPE 2 DIABETES MELLITUS WITHOUT COMPLICATION, WITHOUT LONG-TERM CURRENT USE OF INSULIN (HCC): ICD-10-CM

## 2024-04-02 DIAGNOSIS — R74.8 ELEVATED LIVER ENZYMES: ICD-10-CM

## 2024-04-02 DIAGNOSIS — E55.9 VITAMIN D DEFICIENCY: Primary | ICD-10-CM

## 2024-04-02 DIAGNOSIS — E78.2 MIXED HYPERLIPIDEMIA: ICD-10-CM

## 2024-04-02 DIAGNOSIS — Z99.89 CPAP (CONTINUOUS POSITIVE AIRWAY PRESSURE) DEPENDENCE: ICD-10-CM

## 2024-04-02 DIAGNOSIS — E55.9 VITAMIN D DEFICIENCY: ICD-10-CM

## 2024-04-02 DIAGNOSIS — Z12.11 SCREEN FOR COLON CANCER: ICD-10-CM

## 2024-04-02 DIAGNOSIS — E03.4 HYPOTHYROIDISM DUE TO ACQUIRED ATROPHY OF THYROID: ICD-10-CM

## 2024-04-02 DIAGNOSIS — M79.7 FIBROMYALGIA: Primary | ICD-10-CM

## 2024-04-02 LAB
25(OH)D3 SERPL-MCNC: 23.6 NG/ML
ALBUMIN SERPL-MCNC: 4.2 G/DL (ref 3.5–5.2)
ALP SERPL-CCNC: 90 U/L (ref 35–104)
ALT SERPL-CCNC: 87 U/L (ref 5–33)
ANION GAP SERPL CALCULATED.3IONS-SCNC: 12 MMOL/L (ref 7–19)
AST SERPL-CCNC: 54 U/L (ref 5–32)
BASOPHILS # BLD: 0.1 K/UL (ref 0–0.2)
BASOPHILS NFR BLD: 1 % (ref 0–1)
BILIRUB SERPL-MCNC: 0.3 MG/DL (ref 0.2–1.2)
BUN SERPL-MCNC: 11 MG/DL (ref 6–20)
CALCIUM SERPL-MCNC: 9.7 MG/DL (ref 8.6–10)
CHLORIDE SERPL-SCNC: 105 MMOL/L (ref 98–111)
CO2 SERPL-SCNC: 25 MMOL/L (ref 22–29)
CREAT SERPL-MCNC: 0.7 MG/DL (ref 0.5–0.9)
EOSINOPHIL # BLD: 0.2 K/UL (ref 0–0.6)
EOSINOPHIL NFR BLD: 2 % (ref 0–5)
ERYTHROCYTE [DISTWIDTH] IN BLOOD BY AUTOMATED COUNT: 13.9 % (ref 11.5–14.5)
GLUCOSE SERPL-MCNC: 101 MG/DL (ref 74–109)
HBA1C MFR BLD: 6.1 % (ref 4–6)
HCT VFR BLD AUTO: 46.9 % (ref 37–47)
HGB BLD-MCNC: 14 G/DL (ref 12–16)
IMM GRANULOCYTES # BLD: 0 K/UL
LYMPHOCYTES # BLD: 3.2 K/UL (ref 1.1–4.5)
LYMPHOCYTES NFR BLD: 31.3 % (ref 20–40)
MCH RBC QN AUTO: 28.6 PG (ref 27–31)
MCHC RBC AUTO-ENTMCNC: 29.9 G/DL (ref 33–37)
MCV RBC AUTO: 95.7 FL (ref 81–99)
MONOCYTES # BLD: 0.5 K/UL (ref 0–0.9)
MONOCYTES NFR BLD: 5.3 % (ref 0–10)
NEUTROPHILS # BLD: 6.1 K/UL (ref 1.5–7.5)
NEUTS SEG NFR BLD: 60.1 % (ref 50–65)
PLATELET # BLD AUTO: 303 K/UL (ref 130–400)
PMV BLD AUTO: 11.4 FL (ref 9.4–12.3)
POTASSIUM SERPL-SCNC: 4.9 MMOL/L (ref 3.5–5)
PROT SERPL-MCNC: 7.5 G/DL (ref 6.6–8.7)
RBC # BLD AUTO: 4.9 M/UL (ref 4.2–5.4)
SODIUM SERPL-SCNC: 142 MMOL/L (ref 136–145)
T4 FREE SERPL-MCNC: 0.94 NG/DL (ref 0.93–1.7)
TSH SERPL DL<=0.005 MIU/L-ACNC: 3.13 UIU/ML (ref 0.27–4.2)
WBC # BLD AUTO: 10.2 K/UL (ref 4.8–10.8)

## 2024-04-02 PROCEDURE — 99214 OFFICE O/P EST MOD 30 MIN: CPT | Performed by: NURSE PRACTITIONER

## 2024-04-02 PROCEDURE — 3079F DIAST BP 80-89 MM HG: CPT | Performed by: NURSE PRACTITIONER

## 2024-04-02 PROCEDURE — 3075F SYST BP GE 130 - 139MM HG: CPT | Performed by: NURSE PRACTITIONER

## 2024-04-02 RX ORDER — MODAFINIL 200 MG/1
200 TABLET ORAL DAILY
Qty: 30 TABLET | Refills: 5 | Status: SHIPPED | OUTPATIENT
Start: 2024-04-02 | End: 2024-09-29

## 2024-04-02 RX ORDER — ATORVASTATIN CALCIUM 20 MG/1
20 TABLET, FILM COATED ORAL NIGHTLY
Qty: 90 TABLET | Refills: 3 | Status: SHIPPED | OUTPATIENT
Start: 2024-04-02

## 2024-04-02 RX ORDER — ERGOCALCIFEROL 1.25 MG/1
50000 CAPSULE ORAL WEEKLY
Qty: 4 CAPSULE | Refills: 1 | Status: SHIPPED | OUTPATIENT
Start: 2024-04-02

## 2024-04-02 RX ORDER — PREGABALIN 100 MG/1
100 CAPSULE ORAL 3 TIMES DAILY
Qty: 90 CAPSULE | Refills: 5 | Status: SHIPPED | OUTPATIENT
Start: 2024-04-02 | End: 2024-09-29

## 2024-04-02 ASSESSMENT — ENCOUNTER SYMPTOMS
SHORTNESS OF BREATH: 0
ABDOMINAL PAIN: 0
DIARRHEA: 0
VOMITING: 0
WHEEZING: 0
RECTAL PAIN: 0
NAUSEA: 0

## 2024-04-02 NOTE — TELEPHONE ENCOUNTER
----- Message from ERIK Gamble sent at 4/2/2024 11:44 AM CDT -----  CBC normal no anemia or concerns

## 2024-04-02 NOTE — TELEPHONE ENCOUNTER
modafinil (PROVIGIL) 200 MG tablet [[id number]]    Order Details  Dose: 200 mg Route: Oral Frequency: DAILY   Dispense Quantity: 30 tablet Refills: 5          Sig: Take 1 tablet by mouth daily for 180 days. Max Daily Amount: 200 mg         Start Date: 04/02/24 End Date: 09/29/24 after 180 doses   Written Date: 04/02/24 Expiration Date: 09/29/24       Associated Diagnoses: CPAP (continuous positive airway pressure) dependence [Z99.89]; Postviral fatigue syndrome [G93.31   Needs a PA

## 2024-04-02 NOTE — TELEPHONE ENCOUNTER
----- Message from ERIK Gamble sent at 4/2/2024 12:19 PM CDT -----  A1c at goal 6.1 great job recheck in 3 months  Thyroid wnl

## 2024-04-02 NOTE — TELEPHONE ENCOUNTER
Called patient, spoke with: Patient regarding the results of the patients most recent labs.  I advised Patient of Carmen Perez recommendations.   Patient did voice understanding       Medication pending

## 2024-04-02 NOTE — TELEPHONE ENCOUNTER
----- Message from ERIK Gamble sent at 4/2/2024 11:58 AM CDT -----  Cmp electrolytes and kidneys wnl  Liver function slightly elevated  Add GGT suggest low fat diet  US RUQ

## 2024-04-02 NOTE — TELEPHONE ENCOUNTER
----- Message from ERIK Gamble sent at 4/2/2024  1:00 PM CDT -----  Your vitamin d level is low  Suggest start vitamin d 67232 units 1 po weekly for 8 weeks #4 1 refill  Then recheck level

## 2024-04-02 NOTE — PROGRESS NOTES
Shirlene Tian (:  1980) is a 44 y.o. female,Established patient, here for evaluation of the following chief complaint(s):  3 Month Follow-Up (Pt is here for a 3 month follow up for diabetes check up ) and Medication Refill (Needs refills on Lipitor )      ASSESSMENT/PLAN:    ICD-10-CM    1. Fibromyalgia  M79.7 pregabalin (LYRICA) 100 MG capsule  Cont 1 am 2 pm   Doing well other stress of work      2. Mixed hyperlipidemia  E78.2 atorvastatin (LIPITOR) 20 MG tablet  Cont at nightly        3. Controlled type 2 diabetes mellitus without complication, without long-term current use of insulin ([facility])  E11.9 pregabalin (LYRICA) 100 MG capsule  Cont ozempic 1mg in a month will message for 2mg dose   Down 8 lbs  Farxiga       4. CPAP (continuous positive airway pressure) dependence  Z99.89 modafinil (PROVIGIL) 200 MG tablet  Cont cpap  Will do trial due to fatigue and sleepy with driving        5. Postviral fatigue syndrome  G93.31 modafinil (PROVIGIL) 200 MG tablet          Return in about 3 months (around 2024) for 3 month diabetes follow up.    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  Has CPAP and wearing complaint  Feels tired all the time   Has to pull over and take a nap  She feels fatigued    Diabetes Mellitus Type 2        Diet compliance:  compliant most of the time  Nutrition Consultation Needed:  no  Medication:              Metformin 1000mg twice a day  Issues getting mounjaro as not able to get it   She went to ozempic will be 1mg tonight  Farxiga 5mg daily         Medication compliance:  stable  Weight trend:  stable   Current exercise: no  Checkin times daily  Home blood sugar records: not checking  Low BG:  no  Eye exam current (within one year):  will need to go to [facility]  Checking Feet regularly:  yes - no provlems  ACE/ARB:  yes - cozaar  Aspirin: Yes  Moderate

## 2024-04-03 ENCOUNTER — TELEPHONE (OUTPATIENT)
Dept: FAMILY MEDICINE CLINIC | Age: 44
End: 2024-04-03

## 2024-04-03 DIAGNOSIS — R74.8 ELEVATED SERUM GGT LEVEL: Primary | ICD-10-CM

## 2024-04-03 LAB — GAMMA GLUTAMYL TRANSFERASE: 60 U/L (ref 7–54)

## 2024-04-03 NOTE — TELEPHONE ENCOUNTER
The brand name, Provigil, is preferred for the member, but requires prior authorization.If the brand is acceptable, please resubmit the ePA under the brand name and continue through the criteria.If there is a clinical reason the patient cannot use the brand formulation (such as an allergy to an ingredient found in the brand that is not in the generic) then please fax the appropriate PA request along with the confirming documentation directly to [employer] at [phone].

## 2024-04-03 NOTE — TELEPHONE ENCOUNTER
----- Message from ERIK Gamble sent at 4/3/2024 11:31 AM CDT -----  Liver level elevated refer to [facility]

## 2024-04-03 NOTE — TELEPHONE ENCOUNTER
Called patient, spoke with: Patient regarding the results of the patients most recent labs.  I advised Patient of Carmen Perez recommendations.   Patient did voice understanding

## 2024-04-03 NOTE — TELEPHONE ENCOUNTER
I called pharmacy, they are still gertting a PA required on brand name, they are supposed to be sending me a prior auth.

## 2024-04-03 NOTE — TELEPHONE ENCOUNTER
Outcome  Approved today  The request has been approved. The authorization is effective from 04/03/2024 to 04/03/2025, as long as the member is enrolled in their current health plan. A written notification letter will follow with additional details.  Authorization Expiration Date: 4/2/2025

## 2024-04-03 NOTE — TELEPHONE ENCOUNTER
Re Burk MA 4/2/2024 11:14 AM CDT      ----- Message -----  From: Shirlene Tian  Sent: 4/2/2024 10:42 AM CDT  To: [facility] Clinical Staff  Subject: New med     The pharmacy says insurance needs a PA

## 2024-04-08 ENCOUNTER — HOSPITAL ENCOUNTER (OUTPATIENT)
Dept: GENERAL RADIOLOGY | Age: 44
Discharge: HOME | End: 2024-04-08
Payer: MEDICAID

## 2024-04-08 DIAGNOSIS — R74.8 ELEVATED LIVER ENZYMES: ICD-10-CM

## 2024-04-08 PROCEDURE — 76705 ECHO EXAM OF ABDOMEN: CPT

## 2024-04-09 ENCOUNTER — TELEPHONE (OUTPATIENT)
Dept: FAMILY MEDICINE CLINIC | Age: 44
End: 2024-04-09

## 2024-04-09 NOTE — TELEPHONE ENCOUNTER
----- Message from ERIK Gamble sent at 4/9/2024  8:13 AM CDT -----  US liver noted fatty liver  Keep apt with GI  Suggest low fat diet and weight loss

## 2024-04-09 NOTE — TELEPHONE ENCOUNTER
Called patient, spoke with: Patient regarding the results of the patients most recent US .  I advised Patient of Carmen Perez recommendations.   Patient did voice understanding

## 2024-05-09 ENCOUNTER — OFFICE VISIT (OUTPATIENT)
Dept: GASTROENTEROLOGY | Age: 44
End: 2024-05-09
Payer: MEDICAID

## 2024-05-09 VITALS
HEIGHT: 66 IN | WEIGHT: 249 LBS | OXYGEN SATURATION: 97 % | BODY MASS INDEX: 40.02 KG/M2 | HEART RATE: 74 BPM | DIASTOLIC BLOOD PRESSURE: 80 MMHG | SYSTOLIC BLOOD PRESSURE: 130 MMHG

## 2024-05-09 DIAGNOSIS — R74.8 ELEVATED SERUM GGT LEVEL: ICD-10-CM

## 2024-05-09 DIAGNOSIS — K59.09 CHRONIC CONSTIPATION: ICD-10-CM

## 2024-05-09 DIAGNOSIS — R74.8 ELEVATED LIVER ENZYMES: ICD-10-CM

## 2024-05-09 DIAGNOSIS — R74.8 ELEVATED LIVER ENZYMES: Primary | ICD-10-CM

## 2024-05-09 LAB
ERYTHROCYTE [DISTWIDTH] IN BLOOD BY AUTOMATED COUNT: 13.5 % (ref 11.5–14.5)
FERRITIN SERPL-MCNC: 195.1 NG/ML (ref 13–150)
HAV IGM SERPL QL IA: NORMAL
HBV CORE IGM SERPL QL IA: NORMAL
HBV SURFACE AG SERPL QL IA: NORMAL
HCT VFR BLD AUTO: 46.4 % (ref 37–47)
HCV AB SERPL QL IA: NORMAL
HGB BLD-MCNC: 14.6 G/DL (ref 12–16)
MCH RBC QN AUTO: 28.9 PG (ref 27–31)
MCHC RBC AUTO-ENTMCNC: 31.5 G/DL (ref 33–37)
MCV RBC AUTO: 91.9 FL (ref 81–99)
PLATELET # BLD AUTO: 314 K/UL (ref 130–400)
PMV BLD AUTO: 11.3 FL (ref 9.4–12.3)
RBC # BLD AUTO: 5.05 M/UL (ref 4.2–5.4)
WBC # BLD AUTO: 9.7 K/UL (ref 4.8–10.8)

## 2024-05-09 PROCEDURE — 3075F SYST BP GE 130 - 139MM HG: CPT | Performed by: NURSE PRACTITIONER

## 2024-05-09 PROCEDURE — 99204 OFFICE O/P NEW MOD 45 MIN: CPT | Performed by: NURSE PRACTITIONER

## 2024-05-09 PROCEDURE — 3079F DIAST BP 80-89 MM HG: CPT | Performed by: NURSE PRACTITIONER

## 2024-05-09 ASSESSMENT — ENCOUNTER SYMPTOMS
NAUSEA: 0
VOMITING: 0
RECTAL PAIN: 0
BLOOD IN STOOL: 0
CHOKING: 0
TROUBLE SWALLOWING: 0
DIARRHEA: 0
ABDOMINAL PAIN: 0
ANAL BLEEDING: 0
CONSTIPATION: 1
ABDOMINAL DISTENTION: 0
COUGH: 0
SHORTNESS OF BREATH: 0

## 2024-05-09 NOTE — PROGRESS NOTES
Subjective:     Patient ID: Shirlene Tian is a 44 y.o. female  PCP: Katarzyna Perez APRN  Referring Provider: Katarzyna Perez APRN    HPI  Patient presents to the office today with the following complaints: Other (Elevated GGT level )      Pt seen today for elevated liver enzymes, elevated GGT.  Recent US noted fatty liver.  Pt denies any known liver issues.  Denies any ETOH use.  Hx DM, HTN, hyperlipidemia.          Hx chronic constipation.  Currently treated with Linzess 290 mcg daily.  She is taking this prn.  Denies any blood in stool.        Last EGD 7/5/2017 - w/dilation over wire, 51 Amharic-distal esophageal narrowing-Codie (-)   She has never had a Colonoscopy   Family hx colon cancer - Paternal Grandfather      Lab Results   Component Value Date     04/02/2024    K 4.9 04/02/2024     04/02/2024    CO2 25 04/02/2024    BUN 11 04/02/2024    CREATININE 0.7 04/02/2024    GLUCOSE 101 04/02/2024    CALCIUM 9.7 04/02/2024    BILITOT 0.3 04/02/2024    ALKPHOS 90 04/02/2024    AST 54 (H) 04/02/2024    ALT 87 (H) 04/02/2024    LABGLOM >90 04/02/2024    GFRAA >59 10/05/2022       Lab Results   Component Value Date    GGT 60 (H) 04/02/2024     Lab Results   Component Value Date     05/09/2024     US Result (most recent):  US GALLBLADDER RUQ 04/08/2024    Narrative  EXAM: ULTRASOUND OF THE RIGHT UPPER QUADRANT (LIMITED ABDOMEN)    HISTORY: Elevated liver enzymes.    TECHNIQUE: Sonography of the right upper quadrant of the abdomen was performed.  Color Doppler imaging and spectral Doppler imaging of the portal vein was also performed.  Images were obtained and stored in a permanent archive.    COMPARISON: None.    FINDINGS:  Pancreas: The pancreas is not visualized due to overlying bowel gas.  Liver: Normal size and diffusely increased echogenicity.  Normal surface contour.  No focal hepatic lesion.  Main portal vein: Normal hepatopetal flow.  Gallbladder: Surgically absent.  Common bile duct

## 2024-05-10 LAB — CERULOPLASMIN SERPL-MCNC: 30 MG/DL (ref 16–45)

## 2024-05-11 LAB
A1AT SERPL-MCNC: 164 MG/DL (ref 90–200)
MITOCHONDRIA M2 IGG SER-ACNC: 2.4 UNITS (ref 0–24.9)
SMA IGG SER-ACNC: 7 UNITS (ref 0–19)

## 2024-06-06 ENCOUNTER — PATIENT MESSAGE (OUTPATIENT)
Dept: FAMILY MEDICINE CLINIC | Age: 44
End: 2024-06-06

## 2024-06-06 NOTE — TELEPHONE ENCOUNTER
"SUBJECTIVE:  Subjective  Regina Silva is a 4 wk.o. female who is here for a  checkup accompanied by both parents.    CAMILLE Tapia is here for her 1 m.o. RHS visit.  Current concerns include None, due for an ultrasound because of breech. Mother declined  depression assessment.    Regina's allergies, medications, history and problem list were updated as appropriate.    Review of  Issues:  Screening tests:              A. State  metabolic screen: normal              B. Hearing screen (OAE, ABR): PASS              C. Bilirubin screenin.4              D. TSH: wnl  There is no immunization history for the selected administration types on file for this patient.    Birth History:  Birth History    Birth     Length: 1' 8.28" (0.515 m)     Weight: 3.6 kg (7 lb 15 oz)     HC 37 cm (14.57")    Apgar     One: 5     Five: 9    Discharge Weight: 3.43 kg (7 lb 9 oz)    Delivery Method: , Low Transverse    Gestation Age: 41 3/7 wks    Days in Hospital: 2.0    Hospital Name: [facility] Location: [place], [place]       Postpartum Depression Screening:       No data to display                 EPDS Score Interpretation Action   Less than 8 Depression not likely Continue support   9 - 11 Depression possible Support, re-screen in 2-4 weeks. Consider referral.   12 - 13 Fairly high possibility of depression Monitor, support and offer education. Refer to PCP.   14 and higher (positive screen) Probable depression Diagnostic assessment and treatment by PCP and/or specialist.   Positive score (1,2, or 3) on question 10 (suicidality risk)  Immediate discussion required. Referral to PCP and/or mental health specialist.     Review of Systems:    Nutrition:  Current diet and frequency: Breast Milk, x 2-3 hours  Difficulties with feeding? Yes, has some reflux issues    Elimination:  Stool consistency and frequency: Yellow and soft/liquidity, x 5-7 times a day     Sleep: " From: Shirlene Tian  To: Katarzyna Perez  Sent: 6/6/2024 3:41 PM CDT  Subject: Changing shots    Is there any way you think we could go back to the [facility] shots. Please    "Sleeps good    Development:  Follows/Regards your face?  Yes  Turns and calms to your voice? Yes  Can suck, swallow and breathe easily? Yes         OBJECTIVE:  Vital signs  Vitals:    05/03/24 1547   Temp: 98.3 °F (36.8 °C)   TempSrc: Axillary   Weight: 4.394 kg (9 lb 11 oz)   Height: 1' 10.25" (0.565 m)   HC: 38.7 cm (15.25")      Change in weight since birth: 22%     Physical Exam  Vitals and nursing note reviewed. Exam conducted with a chaperone present.   Constitutional:       Appearance: Normal appearance. She is well-developed.   HENT:      Head: Normocephalic and atraumatic. Anterior fontanelle is flat.      Right Ear: Tympanic membrane, ear canal and external ear normal.      Left Ear: Tympanic membrane, ear canal and external ear normal.      Nose: Nose normal.      Mouth/Throat:      Pharynx: Oropharynx is clear.   Eyes:      General: Red reflex is present bilaterally. Visual tracking is normal. Lids are normal.      Conjunctiva/sclera: Conjunctivae normal.      Pupils: Pupils are equal, round, and reactive to light.   Cardiovascular:      Rate and Rhythm: Normal rate and regular rhythm.      Pulses: Normal pulses.      Heart sounds: Normal heart sounds, S1 normal and S2 normal.   Pulmonary:      Effort: Pulmonary effort is normal.      Breath sounds: Normal breath sounds and air entry.   Abdominal:      General: The umbilical stump is clean. Bowel sounds are normal. There is no distension.      Palpations: Abdomen is soft.   Genitourinary:     General: Normal vulva.      Rectum: Normal.   Musculoskeletal:         General: Normal range of motion.      Cervical back: Normal range of motion and neck supple.      Right hip: Normal. Negative right Ortolani and negative right Slaughter.      Left hip: Negative left Ortolani and negative left Slaughter.   Skin:     General: Skin is warm.      Capillary Refill: Capillary refill takes less than 2 seconds.      Turgor: Normal.   Neurological:      General: No focal " deficit present.      Mental Status: She is easily aroused.      Motor: No abnormal muscle tone.      Primitive Reflexes: Suck normal. Symmetric Wilton.          ASSESSMENT/PLAN:  Regina was seen today for well child.    Diagnoses and all orders for this visit:    Encounter for well child check without abnormal findings    Encounter for screening for maternal depression  -     Post Partum    Spontaneous breech delivery, single or unspecified fetus  -     US Infant Hips W Manipulation; Future    Gastroesophageal reflux disease, unspecified whether esophagitis present     Reflux precautions  If worsens pepcid    Preventive Health Issues Addressed:  1. Anticipatory guidance discussed and a handout addressing  issues was provided.    2. Immunizations and screening tests today: per orders.    Follow Up:  Follow up in about 1 month (around 2024).

## 2024-06-07 RX ORDER — TIRZEPATIDE 7.5 MG/.5ML
7.5 INJECTION, SOLUTION SUBCUTANEOUS WEEKLY
Qty: 4 ADJUSTABLE DOSE PRE-FILLED PEN SYRINGE | Refills: 11 | Status: SHIPPED | OUTPATIENT
Start: 2024-06-07

## 2024-06-07 NOTE — TELEPHONE ENCOUNTER
Received fax from pharmacy requesting refill on pts medication(s). Pt was last seen in office on 4/2/2024  and has a follow up scheduled for 7/2/2024. Will send request to  Carmen Perez  for patient.     Requested Prescriptions     Pending Prescriptions Disp Refills    Tirzepatide (MOUNJARO) 7.5 MG/0.5ML SOPN SC injection 4 Adjustable Dose Pre-filled Pen Syringe 11     Sig: Inject 0.5 mLs into the skin once a week

## 2024-06-11 ENCOUNTER — HOSPITAL ENCOUNTER (OUTPATIENT)
Age: 44
Setting detail: OUTPATIENT SURGERY
Discharge: HOME | End: 2024-06-11
Attending: INTERNAL MEDICINE | Admitting: INTERNAL MEDICINE
Payer: MEDICAID

## 2024-06-11 ENCOUNTER — ANESTHESIA EVENT (OUTPATIENT)
Dept: ENDOSCOPY | Age: 44
End: 2024-06-11
Payer: MEDICAID

## 2024-06-11 ENCOUNTER — ANESTHESIA (OUTPATIENT)
Dept: ENDOSCOPY | Age: 44
End: 2024-06-11
Payer: MEDICAID

## 2024-06-11 VITALS
HEIGHT: 65 IN | BODY MASS INDEX: 41.48 KG/M2 | RESPIRATION RATE: 18 BRPM | OXYGEN SATURATION: 96 % | WEIGHT: 249 LBS | HEART RATE: 64 BPM | SYSTOLIC BLOOD PRESSURE: 113 MMHG | TEMPERATURE: 97.2 F | DIASTOLIC BLOOD PRESSURE: 73 MMHG

## 2024-06-11 LAB
GLUCOSE BLD-MCNC: 123 MG/DL (ref 70–99)
PERFORMED ON: ABNORMAL

## 2024-06-11 PROCEDURE — 45378 DIAGNOSTIC COLONOSCOPY: CPT | Performed by: INTERNAL MEDICINE

## 2024-06-11 PROCEDURE — 3609027000 HC COLONOSCOPY: Performed by: INTERNAL MEDICINE

## 2024-06-11 PROCEDURE — 2580000003 HC RX 258: Performed by: INTERNAL MEDICINE

## 2024-06-11 PROCEDURE — 7100000011 HC PHASE II RECOVERY - ADDTL 15 MIN: Performed by: INTERNAL MEDICINE

## 2024-06-11 PROCEDURE — 82962 GLUCOSE BLOOD TEST: CPT

## 2024-06-11 PROCEDURE — 2500000003 HC RX 250 WO HCPCS: Performed by: NURSE ANESTHETIST, CERTIFIED REGISTERED

## 2024-06-11 PROCEDURE — 7100000010 HC PHASE II RECOVERY - FIRST 15 MIN: Performed by: INTERNAL MEDICINE

## 2024-06-11 PROCEDURE — 3700000000 HC ANESTHESIA ATTENDED CARE: Performed by: INTERNAL MEDICINE

## 2024-06-11 PROCEDURE — 2709999900 HC NON-CHARGEABLE SUPPLY: Performed by: INTERNAL MEDICINE

## 2024-06-11 PROCEDURE — 3700000001 HC ADD 15 MINUTES (ANESTHESIA): Performed by: INTERNAL MEDICINE

## 2024-06-11 PROCEDURE — 6360000002 HC RX W HCPCS: Performed by: NURSE ANESTHETIST, CERTIFIED REGISTERED

## 2024-06-11 RX ORDER — LIDOCAINE HYDROCHLORIDE 10 MG/ML
INJECTION, SOLUTION INFILTRATION; PERINEURAL PRN
Status: DISCONTINUED | OUTPATIENT
Start: 2024-06-11 | End: 2024-06-11 | Stop reason: SDUPTHER

## 2024-06-11 RX ORDER — PROPOFOL 10 MG/ML
INJECTION, EMULSION INTRAVENOUS PRN
Status: DISCONTINUED | OUTPATIENT
Start: 2024-06-11 | End: 2024-06-11 | Stop reason: SDUPTHER

## 2024-06-11 RX ORDER — SODIUM CHLORIDE, SODIUM LACTATE, POTASSIUM CHLORIDE, CALCIUM CHLORIDE 600; 310; 30; 20 MG/100ML; MG/100ML; MG/100ML; MG/100ML
INJECTION, SOLUTION INTRAVENOUS CONTINUOUS
Status: DISCONTINUED | OUTPATIENT
Start: 2024-06-11 | End: 2024-06-11 | Stop reason: HOSPADM

## 2024-06-11 RX ORDER — GLYCOPYRROLATE 0.2 MG/ML
INJECTION INTRAMUSCULAR; INTRAVENOUS PRN
Status: DISCONTINUED | OUTPATIENT
Start: 2024-06-11 | End: 2024-06-11 | Stop reason: SDUPTHER

## 2024-06-11 RX ADMIN — SODIUM CHLORIDE, POTASSIUM CHLORIDE, SODIUM LACTATE AND CALCIUM CHLORIDE: 600; 310; 30; 20 INJECTION, SOLUTION INTRAVENOUS at 08:12

## 2024-06-11 RX ADMIN — GLYCOPYRROLATE 0.2 MG: 0.2 INJECTION, SOLUTION INTRAMUSCULAR; INTRAVENOUS at 09:00

## 2024-06-11 RX ADMIN — PROPOFOL 260 MG: 10 INJECTION, EMULSION INTRAVENOUS at 08:57

## 2024-06-11 RX ADMIN — LIDOCAINE HYDROCHLORIDE 40 MG: 10 INJECTION, SOLUTION INFILTRATION; PERINEURAL at 08:57

## 2024-06-11 ASSESSMENT — PAIN - FUNCTIONAL ASSESSMENT: PAIN_FUNCTIONAL_ASSESSMENT: 0-10

## 2024-06-11 NOTE — OP NOTE
Patient: Shirlene Tian : 1980  Med Rec#: [id number] Acc#: [id number]   Primary Care Provider Katarzyna Perez APRN    Date of Procedure:  2024    Endoscopist: Dat Perez MD    Referring Provider: Katarzyna Perez APRN,     Operation Performed: Colonoscopy     Indications: screening    Anesthesia:  Sedation was administered by anesthesia who monitored the patient during the procedure.    I met with Shirlene Tian prior to procedure. We discussed the procedure itself, and I have discussed the risks of endoscopy (including-- but not limited to-- pain, discomfort, bleeding potentially requiring second endoscopic procedure and/or blood transfusion, organ perforation requiring operative repair, damage to organs near the colon, infection, aspiration, cardiopulmonary/allergic reaction), benefits, indications to endoscopy. Additionally, we discussed options other than colonoscopy. The patient expressed understanding. All questions answered. The patient decided to proceed with the procedure.  Signed informed consent was placed on the chart.    Blood Loss: minima    Withdrawal time: > 6 minutes  Bowel Prep: adequate     Complications: no immediate complications    DESCRIPTION OF PROCEDURE:     A time out was performed. After written informed consent was obtained, the patient was placed in the left lateral position.     The perianal area was inspected, and a digital rectal exam was performed. A rectal exam was performed: normal tone, no palpable lesions. At this point, a forward viewing Olympus colonoscope was inserted into the anus and carefully advanced to the cecum.  The cecum was identified by the ileocecal valve and the appendiceal orifice. The colonoscope was then slowly withdrawn with careful inspection of the mucosa in a linear and circumferential fashion. The scope was retroflexed in the rectum. Suction was utilized during the procedure to remove as much air as possible from the bowel. The

## 2024-06-11 NOTE — ANESTHESIA POSTPROCEDURE EVALUATION
Department of Anesthesiology  Postprocedure Note    Patient: Shirlene Tian  MRN: [id number]  YOB: 1980  Date of evaluation: 6/11/2024    Procedure Summary       Date: 06/11/24 Room / Location: [place] [address] / [facility]    Anesthesia Start: 0853 Anesthesia Stop:     Procedure: COLORECTAL CANCER SCREENING, NOT HIGH RISK Diagnosis:       FH: colon cancer      Chronic constipation      (FH: colon cancer [Z80.0])      (Chronic constipation [K59.09])    Surgeons: Dat Perez MD Responsible Provider: Fela Vaughn APRN - CRNA    Anesthesia Type: general, TIVA ASA Status: 3            Anesthesia Type: No value filed.    King Phase I: King Score: 10    King Phase II:      Anesthesia Post Evaluation    Patient location during evaluation: bedside  Patient participation: complete - patient participated  Level of consciousness: sleepy but conscious  Pain score: 0  Airway patency: patent  Nausea & Vomiting: no nausea and no vomiting  Cardiovascular status: hemodynamically stable and blood pressure returned to baseline  Respiratory status: acceptable and nasal cannula  Hydration status: stable  Pain management: adequate    No notable events documented.

## 2024-06-11 NOTE — H&P
Patient Name: Shirlene Tian  : 1980  MRN: [id number]  DATE: 24    Allergies:   Allergies   Allergen Reactions    Hydrocodone-Acetaminophen Nausea Only    Adhesive Tape Rash        ENDOSCOPY  History and Physical    Procedure:    [] Diagnostic Colonoscopy       [x] Screening Colonoscopy  [] EGD      [] ERCP      [] EUS       [] Other    [x] Previous office notes/History and Physical reviewed from the patients chart. Please see EMR for further details of HPI. I have examined the patient's status immediately prior to the procedure and:      Indications/HPI:       [x] Screening              [] History of Polyps      []Fhx of colon CA/polyps []+Cologard/DNA/Stool Testing      Anesthesia:   [x] MAC [] Moderate Sedation   [] General   [] None     ROS: 12 pt review of systems was negative unless stated above    Medications:   Prior to Admission medications    Medication Sig Start Date End Date Taking? Authorizing Provider   Tirzepatide (MOUNJARO) 7.5 MG/0.5ML SOPN SC injection Inject 0.5 mLs into the skin once a week 24   Katarzyna Perez APRN   pregabalin (LYRICA) 100 MG capsule Take 1 capsule by mouth in the morning, at noon, and at bedtime for 180 days. 1 morning and 2 tablets at night. Max Daily Amount: 300 mg 24  Katarzyna Perez APRN   modafinil (PROVIGIL) 200 MG tablet Take 1 tablet by mouth daily for 180 days. Max Daily Amount: 200 mg 24  Katarzyna Perez APRN   vitamin D (ERGOCALCIFEROL) 1.25 MG (84742 UT) CAPS capsule Take 1 capsule by mouth once a week 24   Katarzyna Perez APRN   tiZANidine (ZANAFLEX) 4 MG tablet TAKE 1 TABLET BY MOUTH EVERY 8 HOURS AS NEEDED FOR MUSCLE SPASM 24   Katarzyna Perez APRN   losartan (COZAAR) 25 MG tablet TAKE 1 TABLET BY MOUTH IN THE MORNING 23   Katarzyna Perez APRN   naproxen (NAPROSYN) 500 MG tablet Take 1 tablet by mouth 2 times daily (with meals) 11/3/23   Katarzyna Perez APRN   linaclotide

## 2024-06-11 NOTE — DISCHARGE INSTRUCTIONS
Recommendations:  1. Repeat colonoscopy: max of 5 yrs for screening    Colonoscopy: What to Expect at Home  Your Recovery    After the test, you may be bloated or have gas pains. You may need to pass gas. If a biopsy was done or a polyp was removed, you may have streaks of blood in your stool (feces) for a few days. Problems such as heavy rectal bleeding may not occur until several weeks after the test. This isn't common. But it can happen after polyps are removed.  This care sheet gives you a general idea about how long it will take for you to recover. But each person recovers at a different pace. Follow the steps below to get better as quickly as possible.    How can you care for yourself at home?  Activity    Rest when you feel tired.     You can do your normal activities when it feels okay to do so.   Diet    You may resume your regular diet.     Drink plenty of fluids. This helps to replace the fluids that were lost during the colon prep.     Do not drink alcohol.   Medicines    Your doctor will tell you if and when you can restart your medicines. You will also be given instructions about taking any new medicines.     If you stopped taking aspirin or some other blood thinner, your doctor will tell you when to start taking it again.     If polyps were removed or a biopsy was done during the test, your doctor may tell you not to take aspirin or other anti-inflammatory medicines for a few days. These include ibuprofen (Advil, Motrin) and naproxen (Aleve).   Other instructions    For your safety, do not drive or operate machinery for 24 hours.     Do not sign legal documents or make major decisions until the medicine wears off and you can think clearly. The anesthesia can make it hard for you to fully understand what you are agreeing to, and cause impaired judgement.     When should you call for help?   Call 911 anytime you think you may need emergency care. For example, call if:    You passed out (lost

## 2024-06-11 NOTE — ANESTHESIA PRE PROCEDURE
patient has tried but nothing has worked      Vital Signs (Current):   Vitals:    06/11/24 0751   BP: 124/69   Pulse: 60   Resp: 18   Temp: 98.4 °F (36.9 °C)   TempSrc: Temporal   SpO2: 98%   Weight: 112.9 kg (249 lb)   Height: 1.651 m (5' 5\")                                              BP Readings from Last 3 Encounters:   06/11/24 124/69   05/09/24 130/80   04/02/24 132/82       NPO Status: Time of last liquid consumption: 0240                        Time of last solid consumption: 1800                        Date of last liquid consumption: 06/11/24                        Date of last solid food consumption: 06/09/24    BMI:   Wt Readings from Last 3 Encounters:   06/11/24 112.9 kg (249 lb)   05/09/24 112.9 kg (249 lb)   04/02/24 116.8 kg (257 lb 8 oz)     Body mass index is 41.44 kg/m².    CBC:   Lab Results   Component Value Date/Time    WBC 9.7 05/09/2024 09:12 AM    RBC 5.05 05/09/2024 09:12 AM    HGB 14.6 05/09/2024 09:12 AM    HCT 46.4 05/09/2024 09:12 AM    MCV 91.9 05/09/2024 09:12 AM    RDW 13.5 05/09/2024 09:12 AM     05/09/2024 09:12 AM       CMP:   Lab Results   Component Value Date/Time     04/02/2024 08:14 AM    K 4.9 04/02/2024 08:14 AM    K 3.7 05/27/2023 03:53 AM     04/02/2024 08:14 AM    CO2 25 04/02/2024 08:14 AM    BUN 11 04/02/2024 08:14 AM    CREATININE 0.7 04/02/2024 08:14 AM    GFRAA >59 10/05/2022 06:24 PM    LABGLOM >90 04/02/2024 08:14 AM    GLUCOSE 101 04/02/2024 08:14 AM    CALCIUM 9.7 04/02/2024 08:14 AM    BILITOT 0.3 04/02/2024 08:14 AM    ALKPHOS 90 04/02/2024 08:14 AM    AST 54 04/02/2024 08:14 AM    ALT 87 04/02/2024 08:14 AM       POC Tests: No results for input(s): \"POCGLU\", \"POCNA\", \"POCK\", \"POCCL\", \"POCBUN\", \"POCHEMO\", \"POCHCT\" in the last 72 hours.    Coags:   Lab Results   Component Value Date/Time    PROTIME 12.8 05/26/2023 04:22 PM    INR 1.00 05/26/2023 04:22 PM    APTT 27.9 05/26/2023 04:22 PM       HCG (If Applicable): No results found for:

## 2024-06-26 NOTE — PROGRESS NOTES
Subjective:     Patient ID: Shirlene Tian is a 44 y.o. female  PCP: Katarzyna Perez APRN  Referring Provider: No ref. provider found    HPI  Patient presents to the office today with the following complaints: Follow-up (6-8 weeks)      Pt seen today for follow up on elevated liver enzymes.  Workup negative for AIH, PBC, Angel's, Acute Hepatitis.  Top differential - fatty liver.  She has added Vitamin E daily.  Currently taking Mounjaro for DM/weight loss.      Colonoscopy report discussed with pt          FIB 4 - 0.86 (4/2/2024)    Last EGD 7/5/2017 - w/dilation over wire, 51 [place]e-distal esophageal narrowing-Codie (-)   Last Colonoscopy 6/11/2024 - normal, no polyps, 5 year recall   Family hx colon cancer - Paternal Grandfather    WBC (K/uL)   Date Value   05/09/2024 9.7     Hemoglobin (g/dL)   Date Value   05/09/2024 14.6     Hematocrit (%)   Date Value   05/09/2024 46.4     Platelets (K/uL)   Date Value   05/09/2024 314     Lab Results   Component Value Date     04/02/2024    K 4.9 04/02/2024     04/02/2024    CO2 25 04/02/2024    BUN 11 04/02/2024    CREATININE 0.7 04/02/2024    GLUCOSE 101 04/02/2024    CALCIUM 9.7 04/02/2024    BILITOT 0.3 04/02/2024    ALKPHOS 90 04/02/2024    AST 54 (H) 04/02/2024    ALT 87 (H) 04/02/2024    LABGLOM >90 04/02/2024    GFRAA >59 10/05/2022       Assessment:     1. NAFLD (nonalcoholic fatty liver disease)           Plan:   - Check CMP and CBC yearly  - US liver yearly   - Follow up yearly or sooner if needed  - Call with any questions or concerns       Orders  No orders of the defined types were placed in this encounter.    Medications  No orders of the defined types were placed in this encounter.        Patient History:     Past Medical History:   Diagnosis Date    Chronic back pain     gets injections with [facility].     CPAP (continuous positive airway pressure) dependence     13cm    Fibromyalgia     GERD (gastroesophageal reflux disease)

## 2024-06-27 ENCOUNTER — OFFICE VISIT (OUTPATIENT)
Dept: GASTROENTEROLOGY | Age: 44
End: 2024-06-27
Payer: MEDICAID

## 2024-06-27 ENCOUNTER — PATIENT MESSAGE (OUTPATIENT)
Dept: FAMILY MEDICINE CLINIC | Age: 44
End: 2024-06-27

## 2024-06-27 VITALS
HEIGHT: 65 IN | DIASTOLIC BLOOD PRESSURE: 80 MMHG | BODY MASS INDEX: 42.49 KG/M2 | SYSTOLIC BLOOD PRESSURE: 132 MMHG | WEIGHT: 255 LBS | OXYGEN SATURATION: 97 % | HEART RATE: 64 BPM

## 2024-06-27 DIAGNOSIS — E11.9 CONTROLLED TYPE 2 DIABETES MELLITUS WITHOUT COMPLICATION, WITHOUT LONG-TERM CURRENT USE OF INSULIN (HCC): ICD-10-CM

## 2024-06-27 DIAGNOSIS — E66.01 MORBID OBESITY WITH BMI OF 40.0-44.9, ADULT (HCC): ICD-10-CM

## 2024-06-27 DIAGNOSIS — B00.1 COLD SORE: Primary | ICD-10-CM

## 2024-06-27 DIAGNOSIS — K76.0 NAFLD (NONALCOHOLIC FATTY LIVER DISEASE): Primary | ICD-10-CM

## 2024-06-27 PROCEDURE — 3075F SYST BP GE 130 - 139MM HG: CPT | Performed by: NURSE PRACTITIONER

## 2024-06-27 PROCEDURE — 99213 OFFICE O/P EST LOW 20 MIN: CPT | Performed by: NURSE PRACTITIONER

## 2024-06-27 PROCEDURE — 3079F DIAST BP 80-89 MM HG: CPT | Performed by: NURSE PRACTITIONER

## 2024-06-27 RX ORDER — VALACYCLOVIR HYDROCHLORIDE 1 G/1
2000 TABLET, FILM COATED ORAL 2 TIMES DAILY
Qty: 8 TABLET | Refills: 0 | Status: SHIPPED | OUTPATIENT
Start: 2024-06-27 | End: 2024-06-29

## 2024-06-27 ASSESSMENT — ENCOUNTER SYMPTOMS
NAUSEA: 0
COUGH: 0
BLOOD IN STOOL: 0
VOMITING: 0
CONSTIPATION: 0
ABDOMINAL PAIN: 0
SHORTNESS OF BREATH: 0
ABDOMINAL DISTENTION: 0
CHOKING: 0
DIARRHEA: 0
RECTAL PAIN: 0
TROUBLE SWALLOWING: 0
ANAL BLEEDING: 0

## 2024-06-27 NOTE — TELEPHONE ENCOUNTER
Received fax from pharmacy requesting refill on pts medication(s). Pt was last seen in office on 4/2/2024  and has a follow up scheduled for 7/2/2024. Will send request to  Carmen Perez  for authorization.     Requested Prescriptions     Pending Prescriptions Disp Refills    metFORMIN (GLUCOPHAGE) 1000 MG tablet [Pharmacy Med Name: metFORMIN HCl 1000 MG Oral Tablet] 180 tablet 3     Sig: TAKE 1 TABLET BY MOUTH TWICE DAILY WITH MEALS

## 2024-06-27 NOTE — PATIENT INSTRUCTIONS
Recommendations for Fatty Liver disease    Here are a few modifications you can make to your diet and lifestyle:  Consume fiber rich foods as much as possible.   Eat until you are no longer hungry but not until you are full.   Increase the frequency of your consumption of white meats.   Eat fresh fruits.   Eat green vegetables daily in addition to other vegetables.   Regularly eat foods like brown rice because they have complex carbohydrates which are good for you.     Avoiding some foods and beverages can help reduce the progression of fatty liver disease. Here are some of the things you should avoid:  alcoholic beverages   soda, fruit juice that is not 100% fruit juice and all other high sugar beverages   dark meats   fried foods   simple carb rich foods such as candy, and sweets   saturated fats should be avoided whenever possible     A fatty liver diet plan needs to be strict in order to reduce the possibility of further damage to the liver and other organs. The sacrifices as far as food and beverages are concerned are minimal when you consider the risks if these sacrifices are not made. Recommendation for consultation with dietician may be made and is a very important part of your care.     In addition to eating right, exercising regularly and gradual weight reduction will increase your overall health as well as improve your liver’s health.     Regular monitoring of liver function tests is recommended. This should be done at least annually. If elevated, at least every 6 months. Ultrasound is recommended at least every 2 years. These may be ordered and monitored by your family doctor or through regular visits to our office.     Dietary supplements recommended if no history of diabetes:  Vitamin E 800 IU daily

## 2024-07-02 ENCOUNTER — OFFICE VISIT (OUTPATIENT)
Dept: FAMILY MEDICINE CLINIC | Age: 44
End: 2024-07-02
Payer: MEDICAID

## 2024-07-02 ENCOUNTER — TELEPHONE (OUTPATIENT)
Dept: FAMILY MEDICINE CLINIC | Age: 44
End: 2024-07-02

## 2024-07-02 VITALS
OXYGEN SATURATION: 98 % | TEMPERATURE: 97.8 F | SYSTOLIC BLOOD PRESSURE: 120 MMHG | BODY MASS INDEX: 41.86 KG/M2 | DIASTOLIC BLOOD PRESSURE: 86 MMHG | WEIGHT: 251.25 LBS | HEART RATE: 86 BPM | HEIGHT: 65 IN

## 2024-07-02 DIAGNOSIS — E03.4 HYPOTHYROIDISM DUE TO ACQUIRED ATROPHY OF THYROID: ICD-10-CM

## 2024-07-02 DIAGNOSIS — E78.2 MIXED HYPERLIPIDEMIA: ICD-10-CM

## 2024-07-02 DIAGNOSIS — E11.9 CONTROLLED TYPE 2 DIABETES MELLITUS WITHOUT COMPLICATION, WITHOUT LONG-TERM CURRENT USE OF INSULIN (HCC): ICD-10-CM

## 2024-07-02 DIAGNOSIS — E11.9 CONTROLLED TYPE 2 DIABETES MELLITUS WITHOUT COMPLICATION, WITHOUT LONG-TERM CURRENT USE OF INSULIN (HCC): Primary | ICD-10-CM

## 2024-07-02 DIAGNOSIS — Z99.89 CPAP (CONTINUOUS POSITIVE AIRWAY PRESSURE) DEPENDENCE: Primary | ICD-10-CM

## 2024-07-02 DIAGNOSIS — G93.31 POSTVIRAL FATIGUE SYNDROME: ICD-10-CM

## 2024-07-02 DIAGNOSIS — M79.7 FIBROMYALGIA: ICD-10-CM

## 2024-07-02 LAB
25(OH)D3 SERPL-MCNC: 37.9 NG/ML
ALBUMIN SERPL-MCNC: 4.3 G/DL (ref 3.5–5.2)
ALP SERPL-CCNC: 101 U/L (ref 35–104)
ALT SERPL-CCNC: 43 U/L (ref 5–33)
ANION GAP SERPL CALCULATED.3IONS-SCNC: 12 MMOL/L (ref 7–19)
AST SERPL-CCNC: 35 U/L (ref 5–32)
BASOPHILS # BLD: 0.1 K/UL (ref 0–0.2)
BASOPHILS NFR BLD: 1 % (ref 0–1)
BILIRUB SERPL-MCNC: 0.3 MG/DL (ref 0.2–1.2)
BUN SERPL-MCNC: 8 MG/DL (ref 6–20)
CALCIUM SERPL-MCNC: 9.7 MG/DL (ref 8.6–10)
CHLORIDE SERPL-SCNC: 102 MMOL/L (ref 98–111)
CO2 SERPL-SCNC: 27 MMOL/L (ref 22–29)
CREAT SERPL-MCNC: 0.7 MG/DL (ref 0.5–0.9)
CREAT UR-MCNC: 19.2 MG/DL (ref 28–217)
EOSINOPHIL # BLD: 0.2 K/UL (ref 0–0.6)
EOSINOPHIL NFR BLD: 1.4 % (ref 0–5)
ERYTHROCYTE [DISTWIDTH] IN BLOOD BY AUTOMATED COUNT: 14.2 % (ref 11.5–14.5)
GLUCOSE SERPL-MCNC: 117 MG/DL (ref 74–109)
HBA1C MFR BLD: 6.3 % (ref 4–6)
HCT VFR BLD AUTO: 46 % (ref 37–47)
HGB BLD-MCNC: 14.2 G/DL (ref 12–16)
IMM GRANULOCYTES # BLD: 0 K/UL
LYMPHOCYTES # BLD: 3.3 K/UL (ref 1.1–4.5)
LYMPHOCYTES NFR BLD: 30.4 % (ref 20–40)
MCH RBC QN AUTO: 29.3 PG (ref 27–31)
MCHC RBC AUTO-ENTMCNC: 30.9 G/DL (ref 33–37)
MCV RBC AUTO: 94.8 FL (ref 81–99)
MICROALBUMIN UR-MCNC: <1.2 MG/DL (ref 0–19)
MICROALBUMIN/CREAT UR-RTO: ABNORMAL MG/G
MONOCYTES # BLD: 0.6 K/UL (ref 0–0.9)
MONOCYTES NFR BLD: 5.5 % (ref 0–10)
NEUTROPHILS # BLD: 6.7 K/UL (ref 1.5–7.5)
NEUTS SEG NFR BLD: 61.4 % (ref 50–65)
PLATELET # BLD AUTO: 364 K/UL (ref 130–400)
PMV BLD AUTO: 11.3 FL (ref 9.4–12.3)
POTASSIUM SERPL-SCNC: 4.1 MMOL/L (ref 3.5–5)
PROT SERPL-MCNC: 7.9 G/DL (ref 6.6–8.7)
RBC # BLD AUTO: 4.85 M/UL (ref 4.2–5.4)
SODIUM SERPL-SCNC: 141 MMOL/L (ref 136–145)
T4 FREE SERPL-MCNC: 1 NG/DL (ref 0.93–1.7)
TSH SERPL DL<=0.005 MIU/L-ACNC: 2.86 UIU/ML (ref 0.27–4.2)
WBC # BLD AUTO: 10.8 K/UL (ref 4.8–10.8)

## 2024-07-02 PROCEDURE — 99214 OFFICE O/P EST MOD 30 MIN: CPT | Performed by: NURSE PRACTITIONER

## 2024-07-02 PROCEDURE — 3044F HG A1C LEVEL LT 7.0%: CPT | Performed by: NURSE PRACTITIONER

## 2024-07-02 PROCEDURE — 3079F DIAST BP 80-89 MM HG: CPT | Performed by: NURSE PRACTITIONER

## 2024-07-02 PROCEDURE — 3074F SYST BP LT 130 MM HG: CPT | Performed by: NURSE PRACTITIONER

## 2024-07-02 RX ORDER — MODAFINIL 200 MG/1
200 TABLET ORAL DAILY
Qty: 30 TABLET | Refills: 5 | Status: SHIPPED | OUTPATIENT
Start: 2024-07-02 | End: 2024-12-29

## 2024-07-02 RX ORDER — PREGABALIN 100 MG/1
100 CAPSULE ORAL 3 TIMES DAILY
Qty: 90 CAPSULE | Refills: 5 | Status: SHIPPED | OUTPATIENT
Start: 2024-07-02 | End: 2024-12-29

## 2024-07-02 RX ORDER — ATORVASTATIN CALCIUM 40 MG/1
40 TABLET, FILM COATED ORAL NIGHTLY
Qty: 30 TABLET | Refills: 3 | Status: SHIPPED | OUTPATIENT
Start: 2024-07-02

## 2024-07-02 ASSESSMENT — ENCOUNTER SYMPTOMS
RECTAL PAIN: 0
COUGH: 0
NAUSEA: 0
SHORTNESS OF BREATH: 0
DIARRHEA: 0
WHEEZING: 0
VOMITING: 0
ABDOMINAL PAIN: 0

## 2024-07-02 NOTE — TELEPHONE ENCOUNTER
----- Message from ERIK Gamble sent at 7/2/2024 12:54 PM CDT -----  Urine good no abnormal protein noted  A1c 6.4 great control cont present regimen recheck in 3 months  Vitamin d normal  Thyroid wnl

## 2024-07-02 NOTE — TELEPHONE ENCOUNTER
----- Message from ERIK Gamble sent at 7/2/2024 11:42 AM CDT -----  CBC normal no anemia or concerns  Cmp electrolytes wnl   Glucose 117 cont tight control  Kidneys wnl  Liver function improving cont low fat diet and weight loss  Recheck in 3 months

## 2024-07-02 NOTE — PROGRESS NOTES
Shirlene Tian (:  1980) is a 44 y.o. female,Established patient, here for evaluation of the following chief complaint(s):  3 Month Follow-Up (For diabetes)      ASSESSMENT/PLAN:  No diagnosis found.    No follow-ups on file.    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  Has CPAP and wearing complaint  Feels tired all the time   Has to pull over and take a nap  She feels fatigued  On provigil 200mg daily last filled  #30 5 refills  She notes it seems to be helping she feels well  Get some stuff done      Diabetes Mellitus Type 2        Diet compliance:  compliant most of the time  Nutrition Consultation Needed:  no  Medication:              Metformin 1000mg twice a day  Mounjaro 7.5mg weekly  Farxiga 5mg daily         Medication compliance:  stable  Weight trend:  stable   Current exercise: no  Checkin times daily  Home blood sugar records: not checking  Low BG:  no  Eye exam current (within one year):  will need to go to [facility]  Checking Feet regularly:  yes - no provlems  ACE/ARB:  yes - cozaar  Aspirin: Yes  Moderate intensity statin: lipitor     Known diabetic complications: none  Barriers to success: none     Tobacco history: She  reports that she quit smoking about 15 months ago. Her smoking use included cigarettes. She started smoking about 16 years ago. She has a 15.0 pack-year smoking history. She has never used smokeless tobacco.    Lab Results   Component Value Date    LABA1C 6.1 (H) 2024    GLUCOSE 101 2024     Lab Results   Component Value Date    CREATININE 0.7 2024           Hypothyroidism:  Medication:              Snythroid 25 mcg  Medication compliance:  compliant most of the time  Patient is  taking her medication consistently on an empty stomach.  Symptoms: none.]  Barrier to success:   Laboratory:    No components found for:

## 2024-07-03 NOTE — TELEPHONE ENCOUNTER
Called pt with results, she is aware and voiced understanding. She is asking if provider wants her to continue taking Vitamin D? If so, she will need a refill. Will send to provider for her authorization/recommendation.

## 2024-07-05 NOTE — TELEPHONE ENCOUNTER
Vitamin D level had improved.  Recommend once daily multivitamin that does have vitamin D in it, but does not need once weekly vitamin D replacement at this time.

## 2024-07-22 RX ORDER — TIZANIDINE 4 MG/1
TABLET ORAL
Qty: 90 TABLET | Refills: 3 | Status: SHIPPED | OUTPATIENT
Start: 2024-07-22

## 2024-07-22 NOTE — TELEPHONE ENCOUNTER
Received fax from pharmacy requesting refill on pts medication(s). Pt was last seen in office on 7/2/2024  and has a follow up scheduled for 10/3/2024. Will send request to  Carmen Perez  for authorization.     Requested Prescriptions     Pending Prescriptions Disp Refills    tiZANidine (ZANAFLEX) 4 MG tablet [Pharmacy Med Name: tiZANidine HCl 4 MG Oral Tablet] 90 tablet 3     Sig: TAKE 1 TABLET BY MOUTH EVERY 8 HOURS AS NEEDED FOR MUSCLE SPASM

## 2024-09-23 DIAGNOSIS — M79.7 FIBROMYALGIA: ICD-10-CM

## 2024-09-23 DIAGNOSIS — F41.9 ANXIETY: ICD-10-CM

## 2024-09-24 RX ORDER — DULOXETIN HYDROCHLORIDE 60 MG/1
60 CAPSULE, DELAYED RELEASE ORAL 2 TIMES DAILY
Qty: 180 CAPSULE | Refills: 3 | Status: SHIPPED | OUTPATIENT
Start: 2024-09-24

## 2024-10-08 DIAGNOSIS — E03.9 ACQUIRED HYPOTHYROIDISM: ICD-10-CM

## 2024-10-08 DIAGNOSIS — R60.0 PERIPHERAL EDEMA: ICD-10-CM

## 2024-10-08 RX ORDER — POTASSIUM CHLORIDE 750 MG/1
TABLET, EXTENDED RELEASE ORAL DAILY
Qty: 30 TABLET | Refills: 11 | Status: SHIPPED | OUTPATIENT
Start: 2024-10-08

## 2024-10-08 RX ORDER — LEVOTHYROXINE SODIUM 25 MCG
25 TABLET ORAL DAILY
Qty: 30 TABLET | Refills: 11 | Status: SHIPPED | OUTPATIENT
Start: 2024-10-08

## 2024-10-08 NOTE — TELEPHONE ENCOUNTER
Received fax from pharmacy requesting refill on pts medication(s). Pt was last seen in office on 7/2/2024  and has a follow up scheduled for 10/15/2024. Will send request to  Carmen Perez  for authorization.     Requested Prescriptions     Pending Prescriptions Disp Refills    potassium chloride (KLOR-CON M) 10 MEQ extended release tablet [Pharmacy Med Name: Potassium Chloride Sarah ER 10 MEQ Oral Tablet Extended Release] 30 tablet 11     Sig: Take 1 tablet by mouth once daily

## 2024-10-08 NOTE — TELEPHONE ENCOUNTER
Received fax from pharmacy requesting refill on pts medication(s). Pt was last seen in office on 7/2/2024  and has a follow up scheduled for 10/15/2024. Will send request to  Carmen Perez  for authorization.     Requested Prescriptions     Pending Prescriptions Disp Refills    SYNTHROID 25 MCG tablet [Pharmacy Med Name: Synthroid 25 MCG Oral Tablet] 30 tablet 11     Sig: Take 1 tablet by mouth once daily

## 2024-10-22 ENCOUNTER — OFFICE VISIT (OUTPATIENT)
Dept: FAMILY MEDICINE CLINIC | Age: 44
End: 2024-10-22
Payer: MEDICAID

## 2024-10-22 VITALS
WEIGHT: 217 LBS | DIASTOLIC BLOOD PRESSURE: 70 MMHG | SYSTOLIC BLOOD PRESSURE: 108 MMHG | OXYGEN SATURATION: 100 % | BODY MASS INDEX: 36.11 KG/M2 | TEMPERATURE: 98.9 F | HEART RATE: 97 BPM

## 2024-10-22 DIAGNOSIS — E78.2 MIXED HYPERLIPIDEMIA: ICD-10-CM

## 2024-10-22 DIAGNOSIS — G93.31 POSTVIRAL FATIGUE SYNDROME: ICD-10-CM

## 2024-10-22 DIAGNOSIS — M79.7 FIBROMYALGIA: ICD-10-CM

## 2024-10-22 DIAGNOSIS — R60.0 PERIPHERAL EDEMA: ICD-10-CM

## 2024-10-22 DIAGNOSIS — Z99.89 CPAP (CONTINUOUS POSITIVE AIRWAY PRESSURE) DEPENDENCE: Primary | ICD-10-CM

## 2024-10-22 DIAGNOSIS — E11.9 CONTROLLED TYPE 2 DIABETES MELLITUS WITHOUT COMPLICATION, WITHOUT LONG-TERM CURRENT USE OF INSULIN (HCC): ICD-10-CM

## 2024-10-22 DIAGNOSIS — E03.4 HYPOTHYROIDISM DUE TO ACQUIRED ATROPHY OF THYROID: ICD-10-CM

## 2024-10-22 PROCEDURE — 3074F SYST BP LT 130 MM HG: CPT | Performed by: NURSE PRACTITIONER

## 2024-10-22 PROCEDURE — 3044F HG A1C LEVEL LT 7.0%: CPT | Performed by: NURSE PRACTITIONER

## 2024-10-22 PROCEDURE — 3078F DIAST BP <80 MM HG: CPT | Performed by: NURSE PRACTITIONER

## 2024-10-22 PROCEDURE — 99214 OFFICE O/P EST MOD 30 MIN: CPT | Performed by: NURSE PRACTITIONER

## 2024-10-22 RX ORDER — PREGABALIN 100 MG/1
100 CAPSULE ORAL 3 TIMES DAILY
Qty: 90 CAPSULE | Refills: 5 | Status: SHIPPED | OUTPATIENT
Start: 2024-10-22 | End: 2025-04-20

## 2024-10-22 RX ORDER — MODAFINIL 200 MG/1
200 TABLET ORAL DAILY
Qty: 30 TABLET | Refills: 5 | Status: SHIPPED | OUTPATIENT
Start: 2024-10-22 | End: 2025-04-20

## 2024-10-22 RX ORDER — BUMETANIDE 1 MG/1
1 TABLET ORAL DAILY
Qty: 30 TABLET | Refills: 11 | Status: SHIPPED | OUTPATIENT
Start: 2024-10-22

## 2024-10-22 ASSESSMENT — ENCOUNTER SYMPTOMS
ABDOMINAL PAIN: 0
RECTAL PAIN: 0
VOMITING: 0
COUGH: 0
WHEEZING: 0
DIARRHEA: 0
NAUSEA: 0
SHORTNESS OF BREATH: 0

## 2024-10-22 NOTE — PROGRESS NOTES
Component Value Date    CHOL 224 (H) 10/03/2023    TRIG 126 10/03/2023    HDL 50 (L) 10/03/2023      Lab Results   Component Value Date    ALT 43 (H) 07/02/2024    AST 35 (H) 07/02/2024                Vitamin d deficiency  Last check 17/2024 at goal     Fibro issues  She notes hurting more recently but trying to maintain on current dose  Cymbalta 60mg twice a day  Lyrica 100mg 1 am 2 pm  Reports that is helpful   Last filled 7/2  *90  5 refills       Controlled Substance Monitoring:    Acute and Chronic Pain Monitoring:   RX Monitoring Periodic Controlled Substance Monitoring   10/22/2024   9:18 AM Possible medication side effects, risk of tolerance/dependence & alternative treatments discussed.;No signs of potential drug abuse or diversion identified.;Assessed functional status (ability to engage in work or other purposeful activities, the pain intensity and its interference with activities of daily living, quality of family life and social activities, and the physical activity);Obtaining appropriate analgesic effect of treatment.       [facility] [id number] was reviewed today per office protocol. Report shows No discrepancies.  Fill pattern is consistent from single provider(s) at single pharmacy(s).    Review of Systems   Constitutional:  Negative for activity change, appetite change (resolved), fatigue and fever.   HENT:  Negative for congestion.    Respiratory:  Negative for cough, shortness of breath and wheezing.    Cardiovascular:  Negative for chest pain, palpitations and leg swelling (much improved).   Gastrointestinal:  Negative for abdominal pain, diarrhea, nausea, rectal pain and vomiting.   Endocrine: Negative for heat intolerance.        Food cravings     Genitourinary:  Negative for difficulty urinating, menstrual problem and pelvic pain.   Musculoskeletal:  Positive for myalgias (fibro varies). Negative for arthralgias.   Skin:  Negative for wound.   Psychiatric/Behavioral:  Negative for behavioral

## 2024-10-22 NOTE — ASSESSMENT & PLAN NOTE
Complaint with use doing well  Need new machine and supplies    Orders:    modafinil (PROVIGIL) 200 MG tablet; Take 1 tablet by mouth daily for 180 days. Max Daily Amount: 200 mg    [facility]    DME Order for CPAP as OP

## 2024-10-22 NOTE — ASSESSMENT & PLAN NOTE
Orders:    pregabalin (LYRICA) 100 MG capsule; Take 1 capsule by mouth in the morning, at noon, and at bedtime for 180 days. 1 morning and 2 tablets at night. Max Daily Amount: 300 mg

## 2024-10-30 DIAGNOSIS — E11.9 CONTROLLED TYPE 2 DIABETES MELLITUS WITHOUT COMPLICATION, WITHOUT LONG-TERM CURRENT USE OF INSULIN (HCC): ICD-10-CM

## 2024-10-30 DIAGNOSIS — E66.01 MORBID OBESITY WITH BMI OF 40.0-44.9, ADULT: ICD-10-CM

## 2024-10-30 RX ORDER — DAPAGLIFLOZIN 5 MG/1
5 TABLET, FILM COATED ORAL EVERY MORNING
Qty: 90 TABLET | Refills: 3 | Status: SHIPPED | OUTPATIENT
Start: 2024-10-30

## 2024-10-30 NOTE — TELEPHONE ENCOUNTER
Received fax from pharmacy requesting refill on pts medication(s). Pt was last seen in office on 10/22/2024  and has a follow up scheduled for 12/30/2024. Will send request to  Carmen Perez  for authorization.     Requested Prescriptions     Pending Prescriptions Disp Refills    FARXIGA 5 MG tablet [Pharmacy Med Name: Farxiga 5 MG Oral Tablet] 90 tablet 3     Sig: TAKE 1 TABLET BY MOUTH ONCE DAILY IN THE MORNING

## 2024-12-23 DIAGNOSIS — R03.0 ELEVATED BP WITHOUT DIAGNOSIS OF HYPERTENSION: ICD-10-CM

## 2024-12-23 DIAGNOSIS — E11.42 TYPE 2 DIABETES MELLITUS WITH DIABETIC POLYNEUROPATHY, WITHOUT LONG-TERM CURRENT USE OF INSULIN (HCC): ICD-10-CM

## 2024-12-23 RX ORDER — LOSARTAN POTASSIUM 25 MG/1
25 TABLET ORAL DAILY
Qty: 90 TABLET | Refills: 0 | Status: SHIPPED | OUTPATIENT
Start: 2024-12-23 | End: 2024-12-27

## 2024-12-23 NOTE — TELEPHONE ENCOUNTER
Received fax from pharmacy requesting refill on pts medication(s). Pt was last seen in office on 10/22/2024  and has a follow up scheduled for 12/30/2024. Will send request to ERIK Mckinney for authorization.     Requested Prescriptions     Pending Prescriptions Disp Refills    losartan (COZAAR) 25 MG tablet [Pharmacy Med Name: Losartan Potassium 25 MG Oral Tablet] 90 tablet 0     Sig: TAKE 1 TABLET BY MOUTH IN THE MORNING

## 2024-12-26 DIAGNOSIS — R03.0 ELEVATED BP WITHOUT DIAGNOSIS OF HYPERTENSION: ICD-10-CM

## 2024-12-26 DIAGNOSIS — E11.42 TYPE 2 DIABETES MELLITUS WITH DIABETIC POLYNEUROPATHY, WITHOUT LONG-TERM CURRENT USE OF INSULIN (HCC): ICD-10-CM

## 2024-12-26 NOTE — TELEPHONE ENCOUNTER
Received fax from pharmacy requesting refill on pts medication(s). Pt was last seen in office on 10/22/2024 and has a follow up scheduled for 12/30/2024. Will send request to ERIK Mckinney for authorization.     Requested Prescriptions     Pending Prescriptions Disp Refills    losartan (COZAAR) 25 MG tablet [Pharmacy Med Name: Losartan Potassium 25 MG Oral Tablet] 90 tablet 3     Sig: TAKE 1 TABLET BY MOUTH IN THE MORNING

## 2024-12-27 RX ORDER — LOSARTAN POTASSIUM 25 MG/1
25 TABLET ORAL DAILY
Qty: 90 TABLET | Refills: 3 | Status: SHIPPED | OUTPATIENT
Start: 2024-12-27

## 2024-12-27 NOTE — TELEPHONE ENCOUNTER
Sent to pharmacy per provider    Requested Prescriptions     Signed Prescriptions Disp Refills    losartan (COZAAR) 25 MG tablet 90 tablet 3     Sig: TAKE 1 TABLET BY MOUTH IN THE MORNING     Authorizing Provider: WILLIAM SMITH     Ordering User: LYDIA RUSS

## 2024-12-30 ENCOUNTER — TELEPHONE (OUTPATIENT)
Dept: FAMILY MEDICINE CLINIC | Age: 44
End: 2024-12-30

## 2024-12-30 ENCOUNTER — OFFICE VISIT (OUTPATIENT)
Dept: FAMILY MEDICINE CLINIC | Age: 44
End: 2024-12-30
Payer: MEDICAID

## 2024-12-30 VITALS
TEMPERATURE: 98.9 F | WEIGHT: 207 LBS | BODY MASS INDEX: 34.45 KG/M2 | OXYGEN SATURATION: 98 % | HEART RATE: 83 BPM | SYSTOLIC BLOOD PRESSURE: 102 MMHG | DIASTOLIC BLOOD PRESSURE: 68 MMHG

## 2024-12-30 DIAGNOSIS — Z00.00 ENCOUNTER FOR WELL ADULT EXAM WITHOUT ABNORMAL FINDINGS: Primary | ICD-10-CM

## 2024-12-30 DIAGNOSIS — R03.0 ELEVATED BP WITHOUT DIAGNOSIS OF HYPERTENSION: ICD-10-CM

## 2024-12-30 DIAGNOSIS — K59.03 DRUG-INDUCED CONSTIPATION: ICD-10-CM

## 2024-12-30 DIAGNOSIS — E55.9 VITAMIN D DEFICIENCY: Primary | ICD-10-CM

## 2024-12-30 DIAGNOSIS — N23 KIDNEY PAIN: ICD-10-CM

## 2024-12-30 DIAGNOSIS — E66.09 CLASS 1 OBESITY DUE TO EXCESS CALORIES WITHOUT SERIOUS COMORBIDITY WITH BODY MASS INDEX (BMI) OF 34.0 TO 34.9 IN ADULT: ICD-10-CM

## 2024-12-30 DIAGNOSIS — E78.2 MIXED HYPERLIPIDEMIA: ICD-10-CM

## 2024-12-30 DIAGNOSIS — Z12.31 ENCOUNTER FOR SCREENING MAMMOGRAM FOR MALIGNANT NEOPLASM OF BREAST: ICD-10-CM

## 2024-12-30 DIAGNOSIS — I95.1 ORTHOSTATIC HYPOTENSION: ICD-10-CM

## 2024-12-30 DIAGNOSIS — Z00.00 ENCOUNTER FOR WELL ADULT EXAM WITHOUT ABNORMAL FINDINGS: ICD-10-CM

## 2024-12-30 DIAGNOSIS — E66.811 CLASS 1 OBESITY DUE TO EXCESS CALORIES WITHOUT SERIOUS COMORBIDITY WITH BODY MASS INDEX (BMI) OF 34.0 TO 34.9 IN ADULT: ICD-10-CM

## 2024-12-30 DIAGNOSIS — G93.31 POSTVIRAL FATIGUE SYNDROME: ICD-10-CM

## 2024-12-30 DIAGNOSIS — E11.42 TYPE 2 DIABETES MELLITUS WITH DIABETIC POLYNEUROPATHY, WITHOUT LONG-TERM CURRENT USE OF INSULIN (HCC): ICD-10-CM

## 2024-12-30 DIAGNOSIS — M79.7 FIBROMYALGIA: ICD-10-CM

## 2024-12-30 DIAGNOSIS — K58.1 IRRITABLE BOWEL SYNDROME WITH CONSTIPATION: ICD-10-CM

## 2024-12-30 DIAGNOSIS — E11.9 CONTROLLED TYPE 2 DIABETES MELLITUS WITHOUT COMPLICATION, WITHOUT LONG-TERM CURRENT USE OF INSULIN (HCC): ICD-10-CM

## 2024-12-30 DIAGNOSIS — Z99.89 CPAP (CONTINUOUS POSITIVE AIRWAY PRESSURE) DEPENDENCE: ICD-10-CM

## 2024-12-30 DIAGNOSIS — R60.0 PERIPHERAL EDEMA: ICD-10-CM

## 2024-12-30 DIAGNOSIS — E55.9 VITAMIN D DEFICIENCY: ICD-10-CM

## 2024-12-30 LAB
25(OH)D3 SERPL-MCNC: 29.1 NG/ML
ALBUMIN SERPL-MCNC: 4.2 G/DL (ref 3.5–5.2)
ALBUMIN SERPL-MCNC: 4.3 G/DL (ref 3.5–5.2)
ALP SERPL-CCNC: 81 U/L (ref 35–104)
ALP SERPL-CCNC: 84 U/L (ref 35–104)
ALT SERPL-CCNC: 20 U/L (ref 5–33)
ALT SERPL-CCNC: 22 U/L (ref 5–33)
ANION GAP SERPL CALCULATED.3IONS-SCNC: 15 MMOL/L (ref 7–19)
ANION GAP SERPL CALCULATED.3IONS-SCNC: 22 MMOL/L (ref 7–19)
AST SERPL-CCNC: 17 U/L (ref 5–32)
AST SERPL-CCNC: 20 U/L (ref 5–32)
BACTERIA URNS QL MICRO: ABNORMAL /HPF
BASOPHILS # BLD: 0.1 K/UL (ref 0–0.2)
BASOPHILS NFR BLD: 0.8 % (ref 0–1)
BILIRUB SERPL-MCNC: 0.4 MG/DL (ref 0.2–1.2)
BILIRUB SERPL-MCNC: 0.4 MG/DL (ref 0.2–1.2)
BILIRUB UR QL STRIP: NEGATIVE
BUN SERPL-MCNC: 15 MG/DL (ref 6–20)
BUN SERPL-MCNC: 15 MG/DL (ref 6–20)
CALCIUM SERPL-MCNC: 9.7 MG/DL (ref 8.6–10)
CALCIUM SERPL-MCNC: 9.7 MG/DL (ref 8.6–10)
CHLORIDE SERPL-SCNC: 100 MMOL/L (ref 98–111)
CHLORIDE SERPL-SCNC: 97 MMOL/L (ref 98–111)
CHOLEST SERPL-MCNC: 145 MG/DL (ref 0–199)
CLARITY UR: ABNORMAL
CO2 SERPL-SCNC: 21 MMOL/L (ref 22–29)
CO2 SERPL-SCNC: 24 MMOL/L (ref 22–29)
COLOR UR: YELLOW
CREAT SERPL-MCNC: 0.7 MG/DL (ref 0.5–0.9)
CREAT SERPL-MCNC: 0.7 MG/DL (ref 0.5–0.9)
CREAT UR-MCNC: 172.4 MG/DL (ref 28–217)
CRYSTALS URNS MICRO: ABNORMAL /HPF
EOSINOPHIL # BLD: 0.1 K/UL (ref 0–0.6)
EOSINOPHIL NFR BLD: 0.7 % (ref 0–5)
EPI CELLS #/AREA URNS AUTO: 1 /HPF (ref 0–5)
ERYTHROCYTE [DISTWIDTH] IN BLOOD BY AUTOMATED COUNT: 13.4 % (ref 11.5–14.5)
GLUCOSE SERPL-MCNC: 83 MG/DL (ref 70–99)
GLUCOSE SERPL-MCNC: 90 MG/DL (ref 70–99)
GLUCOSE UR STRIP.AUTO-MCNC: NEGATIVE MG/DL
HBA1C MFR BLD: 5.5 % (ref 4–5.6)
HCT VFR BLD AUTO: 46.2 % (ref 37–47)
HDLC SERPL-MCNC: 39 MG/DL (ref 40–60)
HGB BLD-MCNC: 14.4 G/DL (ref 12–16)
HGB UR STRIP.AUTO-MCNC: NEGATIVE MG/L
HYALINE CASTS #/AREA URNS AUTO: 2 /HPF (ref 0–8)
IMM GRANULOCYTES # BLD: 0 K/UL
KETONES UR STRIP.AUTO-MCNC: ABNORMAL MG/DL
LDLC SERPL CALC-MCNC: 75 MG/DL
LEUKOCYTE ESTERASE UR QL STRIP.AUTO: ABNORMAL
LYMPHOCYTES # BLD: 3 K/UL (ref 1.1–4.5)
LYMPHOCYTES NFR BLD: 28.6 % (ref 20–40)
MCH RBC QN AUTO: 28.9 PG (ref 27–31)
MCHC RBC AUTO-ENTMCNC: 31.2 G/DL (ref 33–37)
MCV RBC AUTO: 92.8 FL (ref 81–99)
MICROALBUMIN UR-MCNC: <1.2 MG/DL (ref 0–1.99)
MICROALBUMIN/CREAT UR-RTO: NORMAL MG/G
MONOCYTES # BLD: 0.5 K/UL (ref 0–0.9)
MONOCYTES NFR BLD: 4.5 % (ref 0–10)
NEUTROPHILS # BLD: 6.8 K/UL (ref 1.5–7.5)
NEUTS SEG NFR BLD: 65.1 % (ref 50–65)
NITRITE UR QL STRIP.AUTO: POSITIVE
PH UR STRIP.AUTO: 5.5 [PH] (ref 5–8)
PLATELET # BLD AUTO: 351 K/UL (ref 130–400)
PMV BLD AUTO: 11.2 FL (ref 9.4–12.3)
POTASSIUM SERPL-SCNC: 4 MMOL/L (ref 3.5–5)
POTASSIUM SERPL-SCNC: 4.1 MMOL/L (ref 3.5–5)
PROT SERPL-MCNC: 7.2 G/DL (ref 6.4–8.3)
PROT SERPL-MCNC: 7.8 G/DL (ref 6.4–8.3)
PROT UR STRIP.AUTO-MCNC: NEGATIVE MG/DL
RBC # BLD AUTO: 4.98 M/UL (ref 4.2–5.4)
RBC #/AREA URNS AUTO: 87 /HPF (ref 0–4)
SODIUM SERPL-SCNC: 139 MMOL/L (ref 136–145)
SODIUM SERPL-SCNC: 140 MMOL/L (ref 136–145)
SP GR UR STRIP.AUTO: 1.02 (ref 1–1.03)
T4 FREE SERPL-MCNC: 1.15 NG/DL (ref 0.93–1.7)
TRIGL SERPL-MCNC: 157 MG/DL (ref 0–149)
TSH SERPL DL<=0.005 MIU/L-ACNC: 3.15 UIU/ML (ref 0.27–4.2)
UROBILINOGEN UR STRIP.AUTO-MCNC: 1 E.U./DL
WBC # BLD AUTO: 10.4 K/UL (ref 4.8–10.8)
WBC #/AREA URNS AUTO: 4 /HPF (ref 0–5)

## 2024-12-30 PROCEDURE — 3074F SYST BP LT 130 MM HG: CPT | Performed by: NURSE PRACTITIONER

## 2024-12-30 PROCEDURE — 99214 OFFICE O/P EST MOD 30 MIN: CPT | Performed by: NURSE PRACTITIONER

## 2024-12-30 PROCEDURE — 3078F DIAST BP <80 MM HG: CPT | Performed by: NURSE PRACTITIONER

## 2024-12-30 PROCEDURE — 99396 PREV VISIT EST AGE 40-64: CPT | Performed by: NURSE PRACTITIONER

## 2024-12-30 RX ORDER — PREGABALIN 100 MG/1
100 CAPSULE ORAL 3 TIMES DAILY
Qty: 90 CAPSULE | Refills: 5 | Status: CANCELLED | OUTPATIENT
Start: 2024-12-30 | End: 2025-06-28

## 2024-12-30 RX ORDER — NAPROXEN 500 MG/1
500 TABLET ORAL 2 TIMES DAILY WITH MEALS
Qty: 60 TABLET | Refills: 11 | Status: SHIPPED | OUTPATIENT
Start: 2024-12-30

## 2024-12-30 RX ORDER — CIPROFLOXACIN 500 MG/1
500 TABLET, FILM COATED ORAL 2 TIMES DAILY
Qty: 20 TABLET | Refills: 0 | Status: SHIPPED | OUTPATIENT
Start: 2024-12-30 | End: 2025-01-09

## 2024-12-30 RX ORDER — MODAFINIL 200 MG/1
200 TABLET ORAL DAILY
Qty: 30 TABLET | Refills: 5 | Status: SHIPPED | OUTPATIENT
Start: 2024-12-30 | End: 2025-06-28

## 2024-12-30 RX ORDER — LINACLOTIDE 290 UG/1
290 CAPSULE, GELATIN COATED ORAL
Qty: 30 CAPSULE | Refills: 11 | Status: SHIPPED | OUTPATIENT
Start: 2024-12-30

## 2024-12-30 RX ORDER — PREGABALIN 100 MG/1
100 CAPSULE ORAL 3 TIMES DAILY
Qty: 90 CAPSULE | Refills: 5 | Status: SHIPPED | OUTPATIENT
Start: 2024-12-30 | End: 2025-06-28

## 2024-12-30 RX ORDER — ATORVASTATIN CALCIUM 40 MG/1
40 TABLET, FILM COATED ORAL NIGHTLY
Qty: 90 TABLET | Refills: 3 | Status: SHIPPED | OUTPATIENT
Start: 2024-12-30

## 2024-12-30 RX ORDER — NITROGLYCERIN 0.4 MG/1
0.4 TABLET SUBLINGUAL EVERY 5 MIN PRN
Qty: 25 TABLET | Refills: 3 | Status: SHIPPED | OUTPATIENT
Start: 2024-12-30

## 2024-12-30 RX ORDER — BUMETANIDE 1 MG/1
0.5 TABLET ORAL DAILY
Qty: 45 TABLET | Refills: 3 | Status: SHIPPED | OUTPATIENT
Start: 2024-12-30

## 2024-12-30 RX ORDER — ERGOCALCIFEROL 1.25 MG/1
50000 CAPSULE, LIQUID FILLED ORAL WEEKLY
Qty: 4 CAPSULE | Refills: 1 | Status: SHIPPED | OUTPATIENT
Start: 2024-12-30

## 2024-12-30 RX ORDER — LOSARTAN POTASSIUM 25 MG/1
12.5 TABLET ORAL DAILY
Qty: 45 TABLET | Refills: 3 | Status: SHIPPED | OUTPATIENT
Start: 2024-12-30

## 2024-12-30 RX ORDER — TIRZEPATIDE 7.5 MG/.5ML
7.5 INJECTION, SOLUTION SUBCUTANEOUS WEEKLY
Qty: 2 ML | Refills: 11 | Status: SHIPPED | OUTPATIENT
Start: 2024-12-30

## 2024-12-30 ASSESSMENT — ENCOUNTER SYMPTOMS
VOMITING: 0
SHORTNESS OF BREATH: 0
RECTAL PAIN: 0
COUGH: 0
WHEEZING: 0
DIARRHEA: 0
NAUSEA: 0
ABDOMINAL PAIN: 0

## 2024-12-30 NOTE — TELEPHONE ENCOUNTER
----- Message from ERIK Ingram sent at 12/30/2024  1:52 PM CST -----  Cmp electrolytes liver and kidneys wnl  A1c 5.5 great job recheck in 6 months  Continue present regimen

## 2024-12-30 NOTE — TELEPHONE ENCOUNTER
----- Message from ERIK Ingram sent at 12/30/2024 12:35 PM CST -----  Your vitamin d level is low  Suggest start vitamin d 20798 units 1 po weekly for 8 weeks #4 1 refill  Then recheck level

## 2024-12-30 NOTE — TELEPHONE ENCOUNTER
----- Message from ERIK Ingram sent at 12/30/2024  2:53 PM CST -----  Urine noted bacteria and infection as suspected  Take all doses of cipro if not improving Friday let us know

## 2024-12-30 NOTE — PROGRESS NOTES
naproxen (NAPROSYN) 500 MG tablet Take 1 tablet by mouth 2 times daily (with meals) Yes Katarzyna Perez APRN   linaclotide (LINZESS) 290 MCG CAPS capsule Take 1 capsule by mouth every morning (before breakfast) Yes Katarzyna Perez APRN   nitroGLYCERIN (NITROSTAT) 0.4 MG SL tablet Place 1 tablet under the tongue every 5 minutes as needed for Chest pain up to max of 3 total doses. If no relief after 1 dose, call 911. Yes Katarzyna Perez APRN   Tirzepatide (MOUNJARO) 7.5 MG/0.5ML SOAJ Inject 7.5 mg into the skin once a week Yes Katarzyna Perez APRN   ciprofloxacin (CIPRO) 500 MG tablet Take 1 tablet by mouth 2 times daily for 10 days Yes Katarzyna Perez APRN   losartan (COZAAR) 25 MG tablet Take 0.5 tablets by mouth daily Yes Katarzyna Perez APRN   bumetanide (BUMEX) 1 MG tablet Take 0.5 tablets by mouth daily Yes Katarzyna Perez APRN   pregabalin (LYRICA) 100 MG capsule Take 1 capsule by mouth in the morning, at noon, and at bedtime for 180 days. 1 morning and 2 tablets at night. Max Daily Amount: 300 mg Yes Katarzyna Perez APRN   FARXIGA 5 MG tablet TAKE 1 TABLET BY MOUTH ONCE DAILY IN THE MORNING Yes Katarzyna Perez APRN   potassium chloride (KLOR-CON M) 10 MEQ extended release tablet Take 1 tablet by mouth once daily Yes Katarzyna Perez APRN   SYNTHROID 25 MCG tablet Take 1 tablet by mouth once daily Yes Katarzyna Perez APRN   DULoxetine (CYMBALTA) 60 MG extended release capsule Take 1 capsule by mouth twice daily Yes Katarzyna Perez APRN   ondansetron (ZOFRAN-ODT) 4 MG disintegrating tablet Take 1 tablet by mouth 3 times daily as needed for Nausea or Vomiting Yes ARASELI Bautista DO     Past Medical History:   Diagnosis Date    Chronic back pain     gets injections with [facility].     CPAP (continuous positive airway pressure) dependence     13cm    Fibromyalgia     GERD (gastroesophageal reflux disease)     Hyperlipidemia     Hypertension      (3) no apparent problem

## 2024-12-30 NOTE — TELEPHONE ENCOUNTER
----- Message from ERIK Ingram sent at 12/30/2024  3:11 PM CST -----  Urine good no abnormal protein noted

## 2024-12-30 NOTE — TELEPHONE ENCOUNTER
Patient is aware of results. She was asking about the vit d I let her know I had sent it to you to sign off on but I wasn't sure if he had seen it. She also asked about a recommended dose regarding the calcium.

## 2024-12-30 NOTE — PATIENT INSTRUCTIONS
hands, brush your teeth twice a day, and wear a seat belt in the car.   Where can you learn more?  Go to https://www.[employer].net/patientEd and enter P072 to learn more about \"Well Visit, Ages 18 to 65: Care Instructions.\"  Current as of: August 6, 2023  Content Version: 14.2  © 2024 [employer].   Care instructions adapted under license by [employer]. If you have questions about a medical condition or this instruction, always ask your healthcare professional. [employer] disclaims any warranty or liability for your use of this information.

## 2024-12-30 NOTE — TELEPHONE ENCOUNTER
----- Message from ERIK Ingram sent at 12/30/2024 11:52 AM CST -----  CBC normal no anemia or concerns

## 2024-12-31 ENCOUNTER — TELEPHONE (OUTPATIENT)
Dept: FAMILY MEDICINE CLINIC | Age: 44
End: 2024-12-31

## 2024-12-31 NOTE — TELEPHONE ENCOUNTER
----- Message from ERIK Ingram sent at 12/30/2024  7:29 PM CST -----  Cmp electrolytes liver and kidneys wnl   Lipids at goal

## 2025-01-02 ENCOUNTER — TELEPHONE (OUTPATIENT)
Dept: FAMILY MEDICINE CLINIC | Age: 45
End: 2025-01-02

## 2025-01-02 DIAGNOSIS — E11.9 CONTROLLED TYPE 2 DIABETES MELLITUS WITHOUT COMPLICATION, WITHOUT LONG-TERM CURRENT USE OF INSULIN (HCC): ICD-10-CM

## 2025-01-02 DIAGNOSIS — M79.7 FIBROMYALGIA: ICD-10-CM

## 2025-01-02 RX ORDER — PREGABALIN 100 MG/1
100 CAPSULE ORAL 3 TIMES DAILY
Qty: 90 CAPSULE | Refills: 5 | Status: SHIPPED | OUTPATIENT
Start: 2025-01-02 | End: 2025-07-01

## 2025-01-02 NOTE — TELEPHONE ENCOUNTER
Patient made aware that Mammo is scheduled for March 31st @ 8 am at [facility] [place] in [place]. Patient asked to refrain from wearing any lotion,powders, or deodorants the day of exam. If needing to reschedule she can call [phone] to do so. Patient states she will call to reschedule this.

## 2025-02-20 NOTE — TELEPHONE ENCOUNTER
1) Your evaluation today did not reveal an emergency condition or diagnosis or exact cause to explain your symptoms reported prior to presenting for care.    2) We have reviewed importance of follow-up care including care coordination with your care team to help secure follow-up with gastroenterology as reported.  Be sure to follow-up with your primary care provider for medication management needs.   Care Transitions Initial Follow Up Call    Outreach made within 2 business days of discharge: Yes    Patient: Alycia Ledbetter Patient : 1980   MRN: [id number]  Reason for Admission: There are no discharge diagnoses documented for the most recent discharge. Discharge Date: 23       Spoke with: Patient    Discharge department/facility: [facility]    TCM Interactive Patient Contact:  Was patient able to fill all prescriptions: Yes  Was patient instructed to bring all medications to the follow-up visit: Yes  Is patient taking all medications as directed in the discharge summary?  Yes  Does patient understand their discharge instructions: Yes  Does patient have questions or concerns that need addressed prior to 7-14 day follow up office visit: no    Scheduled appointment with PCP within 7-14 days    Follow Up  Future Appointments   Date Time Provider Eliz Moreno   2023  8:15 AM Stephanie [address] [place] [address]

## 2025-03-31 ENCOUNTER — HOSPITAL ENCOUNTER (OUTPATIENT)
Dept: WOMENS IMAGING | Age: 45
Discharge: HOME | End: 2025-03-31
Payer: MEDICAID

## 2025-03-31 ENCOUNTER — RESULTS FOLLOW-UP (OUTPATIENT)
Age: 45
End: 2025-03-31

## 2025-03-31 DIAGNOSIS — R92.8 ABNORMAL MAMMOGRAM OF BOTH BREASTS: Primary | ICD-10-CM

## 2025-03-31 DIAGNOSIS — Z12.31 ENCOUNTER FOR SCREENING MAMMOGRAM FOR MALIGNANT NEOPLASM OF BREAST: ICD-10-CM

## 2025-03-31 PROCEDURE — 77067 SCR MAMMO BI INCL CAD: CPT

## 2025-04-03 RX ORDER — NITROGLYCERIN 0.4 MG/1
0.4 TABLET SUBLINGUAL EVERY 5 MIN PRN
Qty: 25 TABLET | Refills: 3 | Status: SHIPPED | OUTPATIENT
Start: 2025-04-03

## 2025-04-03 NOTE — TELEPHONE ENCOUNTER
Received fax from pharmacy requesting refill on pts medication(s). Pt was last seen in office on Visit date not found  and has a follow up scheduled for 6/30/2025. Will send request to  Carmen Perez  for authorization.     Requested Prescriptions     Pending Prescriptions Disp Refills    nitroGLYCERIN (NITROSTAT) 0.4 MG SL tablet 25 tablet 3     Sig: Place 1 tablet under the tongue every 5 minutes as needed for Chest pain up to max of 3 total doses. If no relief after 1 dose, call 911.

## 2025-04-04 ENCOUNTER — CLINICAL DOCUMENTATION (OUTPATIENT)
Dept: WOMENS IMAGING | Age: 45
End: 2025-04-04

## 2025-04-04 DIAGNOSIS — Z80.0 FAMILY HISTORY OF COLON CANCER: ICD-10-CM

## 2025-04-04 DIAGNOSIS — Z80.0 FAMILY HISTORY OF ESOPHAGEAL CANCER: ICD-10-CM

## 2025-04-08 ENCOUNTER — PATIENT MESSAGE (OUTPATIENT)
Age: 45
End: 2025-04-08

## 2025-04-16 LAB
Lab: NEGATIVE
Lab: NORMAL

## 2025-04-17 ENCOUNTER — HOSPITAL ENCOUNTER (OUTPATIENT)
Dept: WOMENS IMAGING | Age: 45
Discharge: HOME | End: 2025-04-17
Payer: MEDICAID

## 2025-04-17 ENCOUNTER — RESULTS FOLLOW-UP (OUTPATIENT)
Age: 45
End: 2025-04-17

## 2025-04-17 DIAGNOSIS — R92.8 ABNORMAL MAMMOGRAM OF BOTH BREASTS: ICD-10-CM

## 2025-04-17 PROCEDURE — 76642 ULTRASOUND BREAST LIMITED: CPT

## 2025-05-08 ENCOUNTER — OFFICE VISIT (OUTPATIENT)
Age: 45
End: 2025-05-08
Payer: MEDICAID

## 2025-05-08 VITALS
HEIGHT: 65 IN | DIASTOLIC BLOOD PRESSURE: 78 MMHG | SYSTOLIC BLOOD PRESSURE: 108 MMHG | BODY MASS INDEX: 31.99 KG/M2 | HEART RATE: 81 BPM | WEIGHT: 192 LBS | OXYGEN SATURATION: 98 % | TEMPERATURE: 99.3 F

## 2025-05-08 DIAGNOSIS — E78.2 MIXED HYPERLIPIDEMIA: ICD-10-CM

## 2025-05-08 DIAGNOSIS — L72.0 EPIDERMOID CYST OF SKIN: ICD-10-CM

## 2025-05-08 DIAGNOSIS — R30.0 DYSURIA: ICD-10-CM

## 2025-05-08 DIAGNOSIS — R23.8 OTHER SKIN CHANGES: ICD-10-CM

## 2025-05-08 DIAGNOSIS — N10 ACUTE PYELONEPHRITIS: ICD-10-CM

## 2025-05-08 DIAGNOSIS — F41.1 GAD (GENERALIZED ANXIETY DISORDER): Primary | ICD-10-CM

## 2025-05-08 LAB
APPEARANCE FLUID: CLEAR
BILIRUBIN, POC: ABNORMAL
BLOOD URINE, POC: ABNORMAL
CLARITY, POC: CLEAR
COLOR, POC: YELLOW
GLUCOSE URINE, POC: ABNORMAL MG/DL
KETONES, POC: ABNORMAL MG/DL
LEUKOCYTE EST, POC: ABNORMAL
NITRITE, POC: NEGATIVE
PH, POC: 6
PROTEIN, POC: ABNORMAL MG/DL
SPECIFIC GRAVITY, POC: 1.02
UROBILINOGEN, POC: 0.2 MG/DL

## 2025-05-08 PROCEDURE — 3074F SYST BP LT 130 MM HG: CPT | Performed by: NURSE PRACTITIONER

## 2025-05-08 PROCEDURE — 81002 URINALYSIS NONAUTO W/O SCOPE: CPT | Performed by: NURSE PRACTITIONER

## 2025-05-08 PROCEDURE — 3078F DIAST BP <80 MM HG: CPT | Performed by: NURSE PRACTITIONER

## 2025-05-08 PROCEDURE — 99214 OFFICE O/P EST MOD 30 MIN: CPT | Performed by: NURSE PRACTITIONER

## 2025-05-08 RX ORDER — PHENAZOPYRIDINE HYDROCHLORIDE 200 MG/1
200 TABLET, FILM COATED ORAL 3 TIMES DAILY PRN
Qty: 9 TABLET | Refills: 0 | Status: SHIPPED | OUTPATIENT
Start: 2025-05-08 | End: 2025-05-11

## 2025-05-08 RX ORDER — CIPROFLOXACIN 500 MG/1
500 TABLET, FILM COATED ORAL 2 TIMES DAILY
Qty: 20 TABLET | Refills: 0 | Status: SHIPPED | OUTPATIENT
Start: 2025-05-08 | End: 2025-05-18

## 2025-05-08 RX ORDER — BUSPIRONE HYDROCHLORIDE 5 MG/1
5 TABLET ORAL 3 TIMES DAILY PRN
Qty: 90 TABLET | Refills: 0 | Status: SHIPPED | OUTPATIENT
Start: 2025-05-08 | End: 2025-06-07

## 2025-05-08 SDOH — ECONOMIC STABILITY: FOOD INSECURITY: WITHIN THE PAST 12 MONTHS, THE FOOD YOU BOUGHT JUST DIDN'T LAST AND YOU DIDN'T HAVE MONEY TO GET MORE.: NEVER TRUE

## 2025-05-08 SDOH — ECONOMIC STABILITY: FOOD INSECURITY: WITHIN THE PAST 12 MONTHS, YOU WORRIED THAT YOUR FOOD WOULD RUN OUT BEFORE YOU GOT MONEY TO BUY MORE.: NEVER TRUE

## 2025-05-08 ASSESSMENT — PATIENT HEALTH QUESTIONNAIRE - PHQ9
8. MOVING OR SPEAKING SO SLOWLY THAT OTHER PEOPLE COULD HAVE NOTICED. OR THE OPPOSITE, BEING SO FIGETY OR RESTLESS THAT YOU HAVE BEEN MOVING AROUND A LOT MORE THAN USUAL: NOT AT ALL
10. IF YOU CHECKED OFF ANY PROBLEMS, HOW DIFFICULT HAVE THESE PROBLEMS MADE IT FOR YOU TO DO YOUR WORK, TAKE CARE OF THINGS AT HOME, OR GET ALONG WITH OTHER PEOPLE: NOT DIFFICULT AT ALL
SUM OF ALL RESPONSES TO PHQ QUESTIONS 1-9: 0
7. TROUBLE CONCENTRATING ON THINGS, SUCH AS READING THE NEWSPAPER OR WATCHING TELEVISION: NOT AT ALL
6. FEELING BAD ABOUT YOURSELF - OR THAT YOU ARE A FAILURE OR HAVE LET YOURSELF OR YOUR FAMILY DOWN: NOT AT ALL
9. THOUGHTS THAT YOU WOULD BE BETTER OFF DEAD, OR OF HURTING YOURSELF: NOT AT ALL
SUM OF ALL RESPONSES TO PHQ QUESTIONS 1-9: 0
2. FEELING DOWN, DEPRESSED OR HOPELESS: NOT AT ALL
SUM OF ALL RESPONSES TO PHQ QUESTIONS 1-9: 0
1. LITTLE INTEREST OR PLEASURE IN DOING THINGS: NOT AT ALL
3. TROUBLE FALLING OR STAYING ASLEEP: NOT AT ALL
5. POOR APPETITE OR OVEREATING: NOT AT ALL
4. FEELING TIRED OR HAVING LITTLE ENERGY: NOT AT ALL
SUM OF ALL RESPONSES TO PHQ QUESTIONS 1-9: 0

## 2025-05-08 ASSESSMENT — ENCOUNTER SYMPTOMS
ABDOMINAL PAIN: 0
SHORTNESS OF BREATH: 0
NAUSEA: 0
WHEEZING: 0
COUGH: 0
RECTAL PAIN: 0
VOMITING: 0

## 2025-05-08 NOTE — PROGRESS NOTES
Shirlene Tian (:  1980) is a 45 y.o. female, Established patient, here for evaluation of the following chief complaint(s):  Urinary Tract Infection (Patient is here today for symptoms of UTI. Patient states she started running fever Tuesday night and last night. She states that her left flank hurts and in her bladder area she is hurting. ), Cyst (Patient has a cyst on her breast that caused issues with her mammograms. She also has a spot on her left leg and right arm. ), Skin Problem (She also has several moles on her back that she would like checked with dermatology. ), and Anxiety (Patient also states that her anxiety medication may need to be changed around. Her anxiety has been high lately,  also confirms this. )         Assessment & Plan  1. Urinary Tract Infection (UTI): Acute.  - Reports fever since 2025, left flank pain, and bladder pressure.  - Urine culture today showed a trace of leukocytes but no blood.  - Ciprofloxacin and Pyridium prescribed; advised to complete the full course of antibiotics and maintain adequate hydration.  - Pyridium will help alleviate the pain.    2. Epidermoid cyst.  - 1.3 cm epidermoid cyst on the left breastbone that recently popped but did not drain completely.  - Referral to Dr. Megan Perez, dermatologist, for further evaluation and potential removal of the cyst.  - Discussed the impact of the cyst on mammogram results and the need for removal before the next mammogram.    3. Anxiety: Chronic.  - Anxiety has worsened, especially in social situations.  - Currently on Cymbalta 60 mg twice daily; sleep disturbances noted.  - BuSpar 5 mg prescribed, to be taken up to three times daily as needed.  - Advised to monitor response to the medication and adjust the dosage as necessary.    4. Insomnia.  - Experiences insomnia on some nights, exacerbating anxiety.  - Advised to aim for at least 6 hours of sleep per night.  - Discussed non-controlled sleep aids

## 2025-05-21 ENCOUNTER — APPOINTMENT (OUTPATIENT)
Dept: GENERAL RADIOLOGY | Age: 45
End: 2025-05-21
Payer: MEDICAID

## 2025-05-21 ENCOUNTER — HOSPITAL ENCOUNTER (EMERGENCY)
Age: 45
Discharge: HOME | End: 2025-05-22
Payer: MEDICAID

## 2025-05-21 DIAGNOSIS — R07.89 ATYPICAL CHEST PAIN: Primary | ICD-10-CM

## 2025-05-21 DIAGNOSIS — G44.209 TENSION HEADACHE: ICD-10-CM

## 2025-05-21 LAB
ALBUMIN SERPL-MCNC: 4.1 G/DL (ref 3.5–5.2)
ALP SERPL-CCNC: 108 U/L (ref 35–104)
ALT SERPL-CCNC: 14 U/L (ref 10–35)
ANION GAP SERPL CALCULATED.3IONS-SCNC: 13 MMOL/L (ref 8–16)
AST SERPL-CCNC: 19 U/L (ref 10–35)
BASOPHILS # BLD: 0.1 K/UL (ref 0–0.2)
BASOPHILS NFR BLD: 1 % (ref 0–1)
BILIRUB SERPL-MCNC: 0.5 MG/DL (ref 0.2–1.2)
BILIRUB UR QL STRIP: NEGATIVE
BNP BLD-MCNC: <36 PG/ML (ref 0–124)
BUN SERPL-MCNC: 21 MG/DL (ref 6–20)
CALCIUM SERPL-MCNC: 9.6 MG/DL (ref 8.6–10)
CHLORIDE SERPL-SCNC: 101 MMOL/L (ref 98–107)
CLARITY UR: CLEAR
CO2 SERPL-SCNC: 25 MMOL/L (ref 22–29)
COLOR UR: YELLOW
CREAT SERPL-MCNC: 0.7 MG/DL (ref 0.5–0.9)
EOSINOPHIL # BLD: 0.2 K/UL (ref 0–0.6)
EOSINOPHIL NFR BLD: 1.3 % (ref 0–5)
ERYTHROCYTE [DISTWIDTH] IN BLOOD BY AUTOMATED COUNT: 13.1 % (ref 11.5–14.5)
GLUCOSE SERPL-MCNC: 81 MG/DL (ref 70–99)
GLUCOSE UR STRIP.AUTO-MCNC: NEGATIVE MG/DL
HCT VFR BLD AUTO: 43.5 % (ref 37–47)
HGB BLD-MCNC: 14.1 G/DL (ref 12–16)
HGB UR STRIP.AUTO-MCNC: NEGATIVE MG/L
IMM GRANULOCYTES # BLD: 0.1 K/UL
KETONES UR STRIP.AUTO-MCNC: ABNORMAL MG/DL
LEUKOCYTE ESTERASE UR QL STRIP.AUTO: NEGATIVE
LYMPHOCYTES # BLD: 4.2 K/UL (ref 1.1–4.5)
LYMPHOCYTES NFR BLD: 33.4 % (ref 20–40)
MCH RBC QN AUTO: 28.6 PG (ref 27–31)
MCHC RBC AUTO-ENTMCNC: 32.4 G/DL (ref 33–37)
MCV RBC AUTO: 88.2 FL (ref 81–99)
MONOCYTES # BLD: 0.9 K/UL (ref 0–0.9)
MONOCYTES NFR BLD: 6.8 % (ref 0–10)
NEUTROPHILS # BLD: 7.2 K/UL (ref 1.5–7.5)
NEUTS SEG NFR BLD: 57.1 % (ref 50–65)
NITRITE UR QL STRIP.AUTO: NEGATIVE
PH UR STRIP.AUTO: 6.5 [PH] (ref 5–8)
PLATELET # BLD AUTO: 357 K/UL (ref 130–400)
PMV BLD AUTO: 10.5 FL (ref 9.4–12.3)
POTASSIUM SERPL-SCNC: 3.7 MMOL/L (ref 3.5–5)
PROT SERPL-MCNC: 8 G/DL (ref 6.4–8.3)
PROT UR STRIP.AUTO-MCNC: NEGATIVE MG/DL
RBC # BLD AUTO: 4.93 M/UL (ref 4.2–5.4)
SODIUM SERPL-SCNC: 139 MMOL/L (ref 136–145)
SP GR UR STRIP.AUTO: 1.02 (ref 1–1.03)
TROPONIN, HIGH SENSITIVITY: 18 NG/L (ref 0–14)
TROPONIN, HIGH SENSITIVITY: <6 NG/L (ref 0–14)
UROBILINOGEN UR STRIP.AUTO-MCNC: 1 E.U./DL
WBC # BLD AUTO: 12.7 K/UL (ref 4.8–10.8)

## 2025-05-21 PROCEDURE — 71045 X-RAY EXAM CHEST 1 VIEW: CPT

## 2025-05-21 PROCEDURE — 6360000002 HC RX W HCPCS: Performed by: PHYSICIAN ASSISTANT

## 2025-05-21 PROCEDURE — 36415 COLL VENOUS BLD VENIPUNCTURE: CPT

## 2025-05-21 PROCEDURE — 81003 URINALYSIS AUTO W/O SCOPE: CPT

## 2025-05-21 PROCEDURE — 85025 COMPLETE CBC W/AUTO DIFF WBC: CPT

## 2025-05-21 PROCEDURE — 80053 COMPREHEN METABOLIC PANEL: CPT

## 2025-05-21 PROCEDURE — 99285 EMERGENCY DEPT VISIT HI MDM: CPT

## 2025-05-21 PROCEDURE — 6370000000 HC RX 637 (ALT 250 FOR IP): Performed by: PHYSICIAN ASSISTANT

## 2025-05-21 PROCEDURE — 83880 ASSAY OF NATRIURETIC PEPTIDE: CPT

## 2025-05-21 PROCEDURE — 96375 TX/PRO/DX INJ NEW DRUG ADDON: CPT

## 2025-05-21 PROCEDURE — 84484 ASSAY OF TROPONIN QUANT: CPT

## 2025-05-21 PROCEDURE — 96374 THER/PROPH/DIAG INJ IV PUSH: CPT

## 2025-05-21 RX ORDER — PROCHLORPERAZINE EDISYLATE 5 MG/ML
10 INJECTION INTRAMUSCULAR; INTRAVENOUS ONCE
Status: COMPLETED | OUTPATIENT
Start: 2025-05-21 | End: 2025-05-21

## 2025-05-21 RX ORDER — NITROGLYCERIN 0.4 MG/1
0.4 TABLET SUBLINGUAL EVERY 5 MIN PRN
Status: DISCONTINUED | OUTPATIENT
Start: 2025-05-21 | End: 2025-05-22 | Stop reason: HOSPADM

## 2025-05-21 RX ORDER — KETOROLAC TROMETHAMINE 30 MG/ML
30 INJECTION, SOLUTION INTRAMUSCULAR; INTRAVENOUS ONCE
Status: COMPLETED | OUTPATIENT
Start: 2025-05-21 | End: 2025-05-21

## 2025-05-21 RX ADMIN — PROCHLORPERAZINE EDISYLATE 10 MG: 5 INJECTION INTRAMUSCULAR; INTRAVENOUS at 22:35

## 2025-05-21 RX ADMIN — KETOROLAC TROMETHAMINE 30 MG: 30 INJECTION, SOLUTION INTRAMUSCULAR at 23:19

## 2025-05-21 RX ADMIN — NITROGLYCERIN 0.4 MG: 0.4 TABLET, ORALLY DISINTEGRATING SUBLINGUAL at 22:35

## 2025-05-21 ASSESSMENT — ENCOUNTER SYMPTOMS
PHOTOPHOBIA: 0
ABDOMINAL PAIN: 0
NAUSEA: 0
RHINORRHEA: 0
BACK PAIN: 0
SHORTNESS OF BREATH: 0
SORE THROAT: 0
EYE PAIN: 0
ABDOMINAL DISTENTION: 0
COLOR CHANGE: 0
EYE DISCHARGE: 0
APNEA: 0
COUGH: 0

## 2025-05-21 ASSESSMENT — PAIN DESCRIPTION - DESCRIPTORS: DESCRIPTORS: ACHING

## 2025-05-21 ASSESSMENT — PAIN SCALES - GENERAL
PAINLEVEL_OUTOF10: 7
PAINLEVEL_OUTOF10: 0

## 2025-05-21 ASSESSMENT — PAIN DESCRIPTION - LOCATION: LOCATION: HEAD

## 2025-05-21 ASSESSMENT — HEART SCORE: ECG: NORMAL

## 2025-05-22 VITALS
SYSTOLIC BLOOD PRESSURE: 106 MMHG | WEIGHT: 192 LBS | HEART RATE: 58 BPM | DIASTOLIC BLOOD PRESSURE: 77 MMHG | TEMPERATURE: 97.5 F | OXYGEN SATURATION: 98 % | RESPIRATION RATE: 17 BRPM | HEIGHT: 66 IN | BODY MASS INDEX: 30.86 KG/M2

## 2025-05-22 NOTE — ED PROVIDER NOTES
[facility] EMERGENCY DEPARTMENT  eMERGENCYdEPARTMENT eNCOUnter      Pt Name: Shirlene Tian  MRN: [id number]  Birthdate 1980  Date of evaluation: 5/21/2025  Provider:MEHRAN Turner    CHIEF COMPLAINT       Chief Complaint   Patient presents with    Chest Pain     Pressure in the middle of her chest. Patient states it has went away some but still has some stabbing pain in the middle of her chest.         HISTORY OF PRESENT ILLNESS  (Location/Symptom, Timing/Onset, Context/Setting, Quality, Duration, Modifying Factors, Severity.)   Shirlene Tian is a 45 y.o. female who presents to the emergency department with complaints of midsternal pain with no radiation no pleurisy happened while she was attempting to treat her headache she took a nitro that she states did bring her pain down to almost 0 but it is \"starting to come back\" this was 2 hours ago.  She denies any headache she has a history of prior hypertension she is a diabetic with a strong family history denies any fever chills cough or referred pain.    HPI    Nursing Notes were reviewed and I agree.    REVIEW OF SYSTEMS    (2-9 systems for level 4, 10 or more for level 5)     Review of Systems   Constitutional:  Negative for activity change, appetite change, chills and fever.   HENT:  Negative for congestion, postnasal drip, rhinorrhea and sore throat.    Eyes:  Negative for photophobia, pain, discharge and visual disturbance.   Respiratory:  Negative for apnea, cough and shortness of breath.    Cardiovascular:  Positive for chest pain. Negative for leg swelling.   Gastrointestinal:  Negative for abdominal distention, abdominal pain and nausea.   Genitourinary:  Negative for vaginal bleeding.   Musculoskeletal:  Negative for arthralgias, back pain, joint swelling, neck pain and neck stiffness.   Skin:  Negative for color change and rash.   Neurological:  Negative for dizziness, syncope, facial asymmetry and headaches.   Hematological:  Negative for adenopathy.  Pulmonary effort is normal. No respiratory distress.      Breath sounds: Normal breath sounds. No stridor. No wheezing.   Abdominal:      General: Bowel sounds are normal. There is no distension.      Palpations: Abdomen is soft.      Tenderness: There is no abdominal tenderness.   Musculoskeletal:         General: Normal range of motion.      Cervical back: Normal range of motion and neck supple.   Skin:     General: Skin is warm and dry.      Capillary Refill: Capillary refill takes less than 2 seconds.      Findings: No rash.   Neurological:      Mental Status: She is alert and oriented to person, place, and time.      Cranial Nerves: No cranial nerve deficit.      Sensory: No sensory deficit.      Coordination: Coordination normal.   Psychiatric:         Behavior: Behavior normal.         Thought Content: Thought content normal.           DIAGNOSTIC RESULTS     RADIOLOGY:   Non-plain film images such as CT, Ultrasound and MRI are read by the radiologist. Plain radiographic images are visualized and preliminarilyinterpreted by No att. providers found with the below findings:        Interpretation per the Radiologist below, if available at the time of this note:    XR CHEST PORTABLE   Final Result       1. No acute findings.                       ______________________________________    Electronically signed by: OLEGARIO SALGADO M.D.   Date:     05/21/2025   Time:    22:17           LABS:  Labs Reviewed   TROPONIN - Abnormal; Notable for the following components:       Result Value    Troponin, High Sensitivity 18 (*)     All other components within normal limits   CBC WITH AUTO DIFFERENTIAL - Abnormal; Notable for the following components:    WBC 12.7 (*)     MCHC 32.4 (*)     All other components within normal limits   COMPREHENSIVE METABOLIC PANEL W/ REFLEX TO MG FOR LOW K - Abnormal; Notable for the following components:    BUN 21 (*)     Alkaline Phosphatase 108 (*)     All other components within normal limits

## 2025-05-22 NOTE — ED TRIAGE NOTES
Chest pain that started around 2 hours ago. Patient states its a stabbing/ pressure across the center of her chest.

## 2025-05-27 ENCOUNTER — HOSPITAL ENCOUNTER (INPATIENT)
Age: 45
LOS: 1 days | Discharge: HOME | DRG: 287 | End: 2025-05-28
Attending: STUDENT IN AN ORGANIZED HEALTH CARE EDUCATION/TRAINING PROGRAM | Admitting: STUDENT IN AN ORGANIZED HEALTH CARE EDUCATION/TRAINING PROGRAM
Payer: MEDICAID

## 2025-05-27 ENCOUNTER — APPOINTMENT (OUTPATIENT)
Dept: GENERAL RADIOLOGY | Age: 45
DRG: 287 | End: 2025-05-27
Payer: MEDICAID

## 2025-05-27 DIAGNOSIS — R07.9 CHEST PAIN, UNSPECIFIED TYPE: ICD-10-CM

## 2025-05-27 DIAGNOSIS — I20.0 UNSTABLE ANGINA (HCC): Primary | ICD-10-CM

## 2025-05-27 LAB
ALBUMIN SERPL-MCNC: 4.7 G/DL (ref 3.5–5.2)
ALP SERPL-CCNC: 120 U/L (ref 35–104)
ALT SERPL-CCNC: 12 U/L (ref 10–35)
ANION GAP SERPL CALCULATED.3IONS-SCNC: 19 MMOL/L (ref 8–16)
ANTI-XA UNFRAC HEPARIN: <0.1 IU/ML
AST SERPL-CCNC: 22 U/L (ref 10–35)
BASOPHILS # BLD: 0.1 K/UL (ref 0–0.2)
BASOPHILS NFR BLD: 0.8 % (ref 0–1)
BILIRUB SERPL-MCNC: 0.4 MG/DL (ref 0.2–1.2)
BUN SERPL-MCNC: 10 MG/DL (ref 6–20)
CALCIUM SERPL-MCNC: 10.9 MG/DL (ref 8.6–10)
CHLORIDE SERPL-SCNC: 97 MMOL/L (ref 98–107)
CO2 SERPL-SCNC: 22 MMOL/L (ref 22–29)
CREAT SERPL-MCNC: 0.8 MG/DL (ref 0.5–0.9)
EOSINOPHIL # BLD: 0.1 K/UL (ref 0–0.6)
EOSINOPHIL NFR BLD: 0.7 % (ref 0–5)
ERYTHROCYTE [DISTWIDTH] IN BLOOD BY AUTOMATED COUNT: 12.8 % (ref 11.5–14.5)
GLUCOSE SERPL-MCNC: 81 MG/DL (ref 70–99)
HCT VFR BLD AUTO: 46.7 % (ref 37–47)
HGB BLD-MCNC: 15.4 G/DL (ref 12–16)
IMM GRANULOCYTES # BLD: 0 K/UL
LYMPHOCYTES # BLD: 4.2 K/UL (ref 1.1–4.5)
LYMPHOCYTES NFR BLD: 34.7 % (ref 20–40)
MCH RBC QN AUTO: 28.4 PG (ref 27–31)
MCHC RBC AUTO-ENTMCNC: 33 G/DL (ref 33–37)
MCV RBC AUTO: 86 FL (ref 81–99)
MONOCYTES # BLD: 0.6 K/UL (ref 0–0.9)
MONOCYTES NFR BLD: 5.1 % (ref 0–10)
NEUTROPHILS # BLD: 7 K/UL (ref 1.5–7.5)
NEUTS SEG NFR BLD: 58.4 % (ref 50–65)
PLATELET # BLD AUTO: 389 K/UL (ref 130–400)
PMV BLD AUTO: 10.6 FL (ref 9.4–12.3)
POTASSIUM SERPL-SCNC: 3.5 MMOL/L (ref 3.5–5.1)
PROT SERPL-MCNC: 9.2 G/DL (ref 6.4–8.3)
RBC # BLD AUTO: 5.43 M/UL (ref 4.2–5.4)
SODIUM SERPL-SCNC: 138 MMOL/L (ref 136–145)
TROPONIN, HIGH SENSITIVITY: <6 NG/L (ref 0–14)
TROPONIN, HIGH SENSITIVITY: <6 NG/L (ref 0–14)
WBC # BLD AUTO: 12 K/UL (ref 4.8–10.8)

## 2025-05-27 PROCEDURE — 6360000002 HC RX W HCPCS: Performed by: STUDENT IN AN ORGANIZED HEALTH CARE EDUCATION/TRAINING PROGRAM

## 2025-05-27 PROCEDURE — 99285 EMERGENCY DEPT VISIT HI MDM: CPT

## 2025-05-27 PROCEDURE — G0378 HOSPITAL OBSERVATION PER HR: HCPCS

## 2025-05-27 PROCEDURE — 84484 ASSAY OF TROPONIN QUANT: CPT

## 2025-05-27 PROCEDURE — 1200000000 HC SEMI PRIVATE

## 2025-05-27 PROCEDURE — 2580000003 HC RX 258: Performed by: STUDENT IN AN ORGANIZED HEALTH CARE EDUCATION/TRAINING PROGRAM

## 2025-05-27 PROCEDURE — 96368 THER/DIAG CONCURRENT INF: CPT

## 2025-05-27 PROCEDURE — 96366 THER/PROPH/DIAG IV INF ADDON: CPT

## 2025-05-27 PROCEDURE — 36415 COLL VENOUS BLD VENIPUNCTURE: CPT

## 2025-05-27 PROCEDURE — 85520 HEPARIN ASSAY: CPT

## 2025-05-27 PROCEDURE — 85025 COMPLETE CBC W/AUTO DIFF WBC: CPT

## 2025-05-27 PROCEDURE — 6370000000 HC RX 637 (ALT 250 FOR IP): Performed by: STUDENT IN AN ORGANIZED HEALTH CARE EDUCATION/TRAINING PROGRAM

## 2025-05-27 PROCEDURE — 71045 X-RAY EXAM CHEST 1 VIEW: CPT

## 2025-05-27 PROCEDURE — 96365 THER/PROPH/DIAG IV INF INIT: CPT

## 2025-05-27 PROCEDURE — 93005 ELECTROCARDIOGRAM TRACING: CPT | Performed by: STUDENT IN AN ORGANIZED HEALTH CARE EDUCATION/TRAINING PROGRAM

## 2025-05-27 PROCEDURE — 80053 COMPREHEN METABOLIC PANEL: CPT

## 2025-05-27 PROCEDURE — 2500000003 HC RX 250 WO HCPCS: Performed by: STUDENT IN AN ORGANIZED HEALTH CARE EDUCATION/TRAINING PROGRAM

## 2025-05-27 RX ORDER — HEPARIN SODIUM 10000 [USP'U]/100ML
5-30 INJECTION, SOLUTION INTRAVENOUS CONTINUOUS
Status: DISCONTINUED | OUTPATIENT
Start: 2025-05-27 | End: 2025-05-28

## 2025-05-27 RX ORDER — ACETAMINOPHEN 325 MG/1
650 TABLET ORAL EVERY 6 HOURS PRN
Status: DISCONTINUED | OUTPATIENT
Start: 2025-05-27 | End: 2025-05-28 | Stop reason: HOSPADM

## 2025-05-27 RX ORDER — ATORVASTATIN CALCIUM 80 MG/1
80 TABLET, FILM COATED ORAL NIGHTLY
Status: DISCONTINUED | OUTPATIENT
Start: 2025-05-27 | End: 2025-05-28 | Stop reason: HOSPADM

## 2025-05-27 RX ORDER — BUSPIRONE HYDROCHLORIDE 5 MG/1
5 TABLET ORAL 3 TIMES DAILY PRN
Status: DISCONTINUED | OUTPATIENT
Start: 2025-05-27 | End: 2025-05-28 | Stop reason: HOSPADM

## 2025-05-27 RX ORDER — SODIUM CHLORIDE 0.9 % (FLUSH) 0.9 %
5-40 SYRINGE (ML) INJECTION PRN
Status: DISCONTINUED | OUTPATIENT
Start: 2025-05-27 | End: 2025-05-28 | Stop reason: HOSPADM

## 2025-05-27 RX ORDER — LEVOTHYROXINE SODIUM 25 UG/1
25 TABLET ORAL DAILY
Status: DISCONTINUED | OUTPATIENT
Start: 2025-05-28 | End: 2025-05-28 | Stop reason: HOSPADM

## 2025-05-27 RX ORDER — ONDANSETRON 4 MG/1
4 TABLET, ORALLY DISINTEGRATING ORAL EVERY 8 HOURS PRN
Status: DISCONTINUED | OUTPATIENT
Start: 2025-05-27 | End: 2025-05-28 | Stop reason: HOSPADM

## 2025-05-27 RX ORDER — ASPIRIN 81 MG/1
81 TABLET, CHEWABLE ORAL DAILY
Status: DISCONTINUED | OUTPATIENT
Start: 2025-05-28 | End: 2025-05-28 | Stop reason: HOSPADM

## 2025-05-27 RX ORDER — POLYETHYLENE GLYCOL 3350 17 G/17G
17 POWDER, FOR SOLUTION ORAL DAILY PRN
Status: DISCONTINUED | OUTPATIENT
Start: 2025-05-27 | End: 2025-05-28 | Stop reason: HOSPADM

## 2025-05-27 RX ORDER — 0.9 % SODIUM CHLORIDE 0.9 %
1000 INTRAVENOUS SOLUTION INTRAVENOUS ONCE
Status: COMPLETED | OUTPATIENT
Start: 2025-05-27 | End: 2025-05-27

## 2025-05-27 RX ORDER — PREGABALIN 50 MG/1
100 CAPSULE ORAL ONCE
Status: COMPLETED | OUTPATIENT
Start: 2025-05-28 | End: 2025-05-27

## 2025-05-27 RX ORDER — ASPIRIN 325 MG
325 TABLET ORAL ONCE
Status: COMPLETED | OUTPATIENT
Start: 2025-05-27 | End: 2025-05-27

## 2025-05-27 RX ORDER — PREGABALIN 50 MG/1
200 CAPSULE ORAL NIGHTLY
Status: DISCONTINUED | OUTPATIENT
Start: 2025-05-28 | End: 2025-05-27

## 2025-05-27 RX ORDER — HEPARIN SODIUM 1000 [USP'U]/ML
2000 INJECTION, SOLUTION INTRAVENOUS; SUBCUTANEOUS PRN
Status: DISCONTINUED | OUTPATIENT
Start: 2025-05-27 | End: 2025-05-28

## 2025-05-27 RX ORDER — POTASSIUM CHLORIDE 1500 MG/1
40 TABLET, EXTENDED RELEASE ORAL PRN
Status: DISCONTINUED | OUTPATIENT
Start: 2025-05-27 | End: 2025-05-28 | Stop reason: HOSPADM

## 2025-05-27 RX ORDER — ONDANSETRON 2 MG/ML
4 INJECTION INTRAMUSCULAR; INTRAVENOUS EVERY 6 HOURS PRN
Status: DISCONTINUED | OUTPATIENT
Start: 2025-05-27 | End: 2025-05-28 | Stop reason: HOSPADM

## 2025-05-27 RX ORDER — SODIUM CHLORIDE 9 MG/ML
INJECTION, SOLUTION INTRAVENOUS PRN
Status: DISCONTINUED | OUTPATIENT
Start: 2025-05-27 | End: 2025-05-28 | Stop reason: HOSPADM

## 2025-05-27 RX ORDER — PREGABALIN 50 MG/1
100 CAPSULE ORAL 3 TIMES DAILY
Status: DISCONTINUED | OUTPATIENT
Start: 2025-05-27 | End: 2025-05-27 | Stop reason: DRUGHIGH

## 2025-05-27 RX ORDER — PREGABALIN 50 MG/1
100 CAPSULE ORAL EVERY MORNING
Status: DISCONTINUED | OUTPATIENT
Start: 2025-05-28 | End: 2025-05-28 | Stop reason: HOSPADM

## 2025-05-27 RX ORDER — SODIUM CHLORIDE 0.9 % (FLUSH) 0.9 %
5-40 SYRINGE (ML) INJECTION EVERY 12 HOURS SCHEDULED
Status: DISCONTINUED | OUTPATIENT
Start: 2025-05-27 | End: 2025-05-28 | Stop reason: HOSPADM

## 2025-05-27 RX ORDER — PREGABALIN 50 MG/1
200 CAPSULE ORAL NIGHTLY
Status: DISCONTINUED | OUTPATIENT
Start: 2025-05-28 | End: 2025-05-28 | Stop reason: HOSPADM

## 2025-05-27 RX ORDER — DULOXETIN HYDROCHLORIDE 60 MG/1
60 CAPSULE, DELAYED RELEASE ORAL 2 TIMES DAILY
Status: DISCONTINUED | OUTPATIENT
Start: 2025-05-27 | End: 2025-05-28 | Stop reason: HOSPADM

## 2025-05-27 RX ORDER — MAGNESIUM SULFATE IN WATER 40 MG/ML
2000 INJECTION, SOLUTION INTRAVENOUS PRN
Status: DISCONTINUED | OUTPATIENT
Start: 2025-05-27 | End: 2025-05-28 | Stop reason: HOSPADM

## 2025-05-27 RX ORDER — HEPARIN SODIUM 1000 [USP'U]/ML
4000 INJECTION, SOLUTION INTRAVENOUS; SUBCUTANEOUS ONCE
Status: COMPLETED | OUTPATIENT
Start: 2025-05-27 | End: 2025-05-27

## 2025-05-27 RX ORDER — NITROGLYCERIN 0.4 MG/1
0.4 TABLET SUBLINGUAL EVERY 5 MIN PRN
Status: COMPLETED | OUTPATIENT
Start: 2025-05-27 | End: 2025-05-27

## 2025-05-27 RX ORDER — NITROGLYCERIN 20 MG/100ML
5 INJECTION INTRAVENOUS CONTINUOUS
Status: DISCONTINUED | OUTPATIENT
Start: 2025-05-27 | End: 2025-05-28

## 2025-05-27 RX ORDER — HEPARIN SODIUM 1000 [USP'U]/ML
4000 INJECTION, SOLUTION INTRAVENOUS; SUBCUTANEOUS PRN
Status: DISCONTINUED | OUTPATIENT
Start: 2025-05-27 | End: 2025-05-28

## 2025-05-27 RX ORDER — POTASSIUM CHLORIDE 7.45 MG/ML
10 INJECTION INTRAVENOUS PRN
Status: DISCONTINUED | OUTPATIENT
Start: 2025-05-27 | End: 2025-05-28 | Stop reason: HOSPADM

## 2025-05-27 RX ADMIN — HEPARIN SODIUM 4000 UNITS: 1000 INJECTION INTRAVENOUS; SUBCUTANEOUS at 22:15

## 2025-05-27 RX ADMIN — ATORVASTATIN CALCIUM 80 MG: 80 TABLET, FILM COATED ORAL at 23:44

## 2025-05-27 RX ADMIN — SODIUM CHLORIDE, PRESERVATIVE FREE 10 ML: 5 INJECTION INTRAVENOUS at 23:45

## 2025-05-27 RX ADMIN — HEPARIN SODIUM 12 UNITS/KG/HR: 10000 INJECTION, SOLUTION INTRAVENOUS at 22:16

## 2025-05-27 RX ADMIN — DULOXETINE 60 MG: 60 CAPSULE, DELAYED RELEASE ORAL at 23:44

## 2025-05-27 RX ADMIN — PREGABALIN 100 MG: 50 CAPSULE ORAL at 23:59

## 2025-05-27 RX ADMIN — PREGABALIN 100 MG: 50 CAPSULE ORAL at 23:44

## 2025-05-27 RX ADMIN — NITROGLYCERIN 0.4 MG: 0.4 TABLET SUBLINGUAL at 20:29

## 2025-05-27 RX ADMIN — ASPIRIN 325 MG: 325 TABLET ORAL at 20:23

## 2025-05-27 RX ADMIN — NITROGLYCERIN 0.4 MG: 0.4 TABLET SUBLINGUAL at 20:24

## 2025-05-27 RX ADMIN — NITROGLYCERIN 2.5 MCG/MIN: 20 INJECTION INTRAVENOUS at 22:18

## 2025-05-27 RX ADMIN — NITROGLYCERIN 0.4 MG: 0.4 TABLET SUBLINGUAL at 20:34

## 2025-05-27 RX ADMIN — SODIUM CHLORIDE 1000 ML: 9 INJECTION, SOLUTION INTRAVENOUS at 22:18

## 2025-05-27 SDOH — ECONOMIC STABILITY: INCOME INSECURITY: IN THE PAST 12 MONTHS, HAS THE ELECTRIC, GAS, OIL, OR WATER COMPANY THREATENED TO SHUT OFF SERVICE IN YOUR HOME?: NO

## 2025-05-27 SDOH — ECONOMIC STABILITY: FOOD INSECURITY: WITHIN THE PAST 12 MONTHS, YOU WORRIED THAT YOUR FOOD WOULD RUN OUT BEFORE YOU GOT MONEY TO BUY MORE.: NEVER TRUE

## 2025-05-27 SDOH — ECONOMIC STABILITY: INCOME INSECURITY: HOW HARD IS IT FOR YOU TO PAY FOR THE VERY BASICS LIKE FOOD, HOUSING, MEDICAL CARE, AND HEATING?: NOT HARD AT ALL

## 2025-05-27 ASSESSMENT — PAIN DESCRIPTION - LOCATION
LOCATION: CHEST

## 2025-05-27 ASSESSMENT — PATIENT HEALTH QUESTIONNAIRE - PHQ9
SUM OF ALL RESPONSES TO PHQ QUESTIONS 1-9: 2
1. LITTLE INTEREST OR PLEASURE IN DOING THINGS: NOT AT ALL
10. IF YOU CHECKED OFF ANY PROBLEMS, HOW DIFFICULT HAVE THESE PROBLEMS MADE IT FOR YOU TO DO YOUR WORK, TAKE CARE OF THINGS AT HOME, OR GET ALONG WITH OTHER PEOPLE: NOT DIFFICULT AT ALL
7. TROUBLE CONCENTRATING ON THINGS, SUCH AS READING THE NEWSPAPER OR WATCHING TELEVISION: NOT AT ALL
SUM OF ALL RESPONSES TO PHQ QUESTIONS 1-9: 2
6. FEELING BAD ABOUT YOURSELF - OR THAT YOU ARE A FAILURE OR HAVE LET YOURSELF OR YOUR FAMILY DOWN: NOT AT ALL
2. FEELING DOWN, DEPRESSED OR HOPELESS: NOT AT ALL
5. POOR APPETITE OR OVEREATING: NOT AT ALL
9. THOUGHTS THAT YOU WOULD BE BETTER OFF DEAD, OR OF HURTING YOURSELF: NOT AT ALL
SUM OF ALL RESPONSES TO PHQ QUESTIONS 1-9: 2
SUM OF ALL RESPONSES TO PHQ QUESTIONS 1-9: 2
3. TROUBLE FALLING OR STAYING ASLEEP: SEVERAL DAYS
4. FEELING TIRED OR HAVING LITTLE ENERGY: SEVERAL DAYS
8. MOVING OR SPEAKING SO SLOWLY THAT OTHER PEOPLE COULD HAVE NOTICED. OR THE OPPOSITE, BEING SO FIGETY OR RESTLESS THAT YOU HAVE BEEN MOVING AROUND A LOT MORE THAN USUAL: NOT AT ALL

## 2025-05-27 ASSESSMENT — PAIN DESCRIPTION - DESCRIPTORS
DESCRIPTORS: HEAVINESS

## 2025-05-27 ASSESSMENT — PAIN SCALES - GENERAL
PAINLEVEL_OUTOF10: 4
PAINLEVEL_OUTOF10: 2
PAINLEVEL_OUTOF10: 7

## 2025-05-28 ENCOUNTER — APPOINTMENT (OUTPATIENT)
Age: 45
DRG: 287 | End: 2025-05-28
Payer: MEDICAID

## 2025-05-28 VITALS
DIASTOLIC BLOOD PRESSURE: 65 MMHG | WEIGHT: 192 LBS | BODY MASS INDEX: 31.99 KG/M2 | RESPIRATION RATE: 18 BRPM | HEART RATE: 64 BPM | HEIGHT: 65 IN | OXYGEN SATURATION: 99 % | TEMPERATURE: 98.2 F | SYSTOLIC BLOOD PRESSURE: 100 MMHG

## 2025-05-28 LAB
ANION GAP SERPL CALCULATED.3IONS-SCNC: 13 MMOL/L (ref 8–16)
ANTI-XA UNFRAC HEPARIN: 0.29 IU/ML
BUN SERPL-MCNC: 9 MG/DL (ref 6–20)
CALCIUM SERPL-MCNC: 9.7 MG/DL (ref 8.6–10)
CHLORIDE SERPL-SCNC: 103 MMOL/L (ref 98–107)
CHOLEST SERPL-MCNC: 162 MG/DL (ref 0–199)
CO2 SERPL-SCNC: 27 MMOL/L (ref 22–29)
CREAT SERPL-MCNC: 0.7 MG/DL (ref 0.5–0.9)
ECHO AO ASC DIAM: 2.7 CM
ECHO AO ASCENDING AORTA INDEX: 1.39 CM/M2
ECHO AO ROOT DIAM: 1.8 CM
ECHO AO ROOT INDEX: 0.93 CM/M2
ECHO AO SINUS VALSALVA DIAM: 2.5 CM
ECHO AO SINUS VALSALVA INDEX: 1.29 CM/M2
ECHO AO ST JNCT DIAM: 2 CM
ECHO AV AREA PEAK VELOCITY: 2.7 CM2
ECHO AV AREA VTI: 2.8 CM2
ECHO AV AREA/BSA PEAK VELOCITY: 1.4 CM2/M2
ECHO AV AREA/BSA VTI: 1.4 CM2/M2
ECHO AV MEAN GRADIENT: 4 MMHG
ECHO AV MEAN VELOCITY: 0.9 M/S
ECHO AV PEAK GRADIENT: 7 MMHG
ECHO AV PEAK VELOCITY: 1.3 M/S
ECHO AV VELOCITY RATIO: 0.92
ECHO AV VTI: 26.5 CM
ECHO BSA: 2 M2
ECHO BSA: 2 M2
ECHO EST RA PRESSURE: 3 MMHG
ECHO IVC PROX: 1.7 CM
ECHO LA AREA 2C: 7.7 CM2
ECHO LA AREA 4C: 10.5 CM2
ECHO LA DIAMETER INDEX: 1.75 CM/M2
ECHO LA DIAMETER: 3.4 CM
ECHO LA MAJOR AXIS: 3.9 CM
ECHO LA MINOR AXIS: 3.3 CM
ECHO LA TO AORTIC ROOT RATIO: 1.89
ECHO LA VOL BP: 19 ML (ref 22–52)
ECHO LA VOL MOD A2C: 13 ML (ref 22–52)
ECHO LA VOL MOD A4C: 23 ML (ref 22–52)
ECHO LA VOL/BSA BIPLANE: 10 ML/M2 (ref 16–34)
ECHO LA VOLUME INDEX MOD A2C: 7 ML/M2 (ref 16–34)
ECHO LA VOLUME INDEX MOD A4C: 12 ML/M2 (ref 16–34)
ECHO LV E' LATERAL VELOCITY: 10.2 CM/S
ECHO LV E' SEPTAL VELOCITY: 9.46 CM/S
ECHO LV EDV A2C: 84 ML
ECHO LV EDV A4C: 91 ML
ECHO LV EDV INDEX A4C: 47 ML/M2
ECHO LV EDV NDEX A2C: 43 ML/M2
ECHO LV EF PHYSICIAN: 55 %
ECHO LV EJECTION FRACTION A2C: 62 %
ECHO LV EJECTION FRACTION A4C: 55 %
ECHO LV EJECTION FRACTION BIPLANE: 58 % (ref 55–100)
ECHO LV ESV A2C: 32 ML
ECHO LV ESV A4C: 41 ML
ECHO LV ESV INDEX A2C: 16 ML/M2
ECHO LV ESV INDEX A4C: 21 ML/M2
ECHO LV FRACTIONAL SHORTENING: 27 % (ref 28–44)
ECHO LV GLOBAL LONGITUDINAL STRAIN (GLS): -17.9 %
ECHO LV GLOBAL LONGITUDINAL STRAIN (GLS): -18.6 %
ECHO LV GLOBAL LONGITUDINAL STRAIN (GLS): -19.4 %
ECHO LV GLOBAL LONGITUDINAL STRAIN (GLS): -21.8 %
ECHO LV INTERNAL DIMENSION DIASTOLE INDEX: 2.63 CM/M2
ECHO LV INTERNAL DIMENSION DIASTOLIC: 5.1 CM (ref 3.9–5.3)
ECHO LV INTERNAL DIMENSION SYSTOLIC INDEX: 1.91 CM/M2
ECHO LV INTERNAL DIMENSION SYSTOLIC: 3.7 CM
ECHO LV IVSD: 1 CM (ref 0.6–0.9)
ECHO LV MASS 2D: 188 G (ref 67–162)
ECHO LV MASS INDEX 2D: 96.9 G/M2 (ref 43–95)
ECHO LV POSTERIOR WALL DIASTOLIC: 1 CM (ref 0.6–0.9)
ECHO LV RELATIVE WALL THICKNESS RATIO: 0.39
ECHO LVOT AREA: 3.1 CM2
ECHO LVOT AV VTI INDEX: 0.88
ECHO LVOT DIAM: 2 CM
ECHO LVOT MEAN GRADIENT: 3 MMHG
ECHO LVOT PEAK GRADIENT: 5 MMHG
ECHO LVOT PEAK VELOCITY: 1.2 M/S
ECHO LVOT STROKE VOLUME INDEX: 37.9 ML/M2
ECHO LVOT SV: 73.5 ML
ECHO LVOT VTI: 23.4 CM
ECHO MV A VELOCITY: 0.61 M/S
ECHO MV AREA VTI: 2.2 CM2
ECHO MV E DECELERATION TIME (DT): 227 MS
ECHO MV E VELOCITY: 0.89 M/S
ECHO MV E/A RATIO: 1.46
ECHO MV E/E' LATERAL: 8.73
ECHO MV E/E' RATIO (AVERAGED): 9.07
ECHO MV E/E' SEPTAL: 9.41
ECHO MV LVOT VTI INDEX: 1.41
ECHO MV MAX VELOCITY: 0.9 M/S
ECHO MV MEAN GRADIENT: 1 MMHG
ECHO MV MEAN VELOCITY: 0.5 M/S
ECHO MV PEAK GRADIENT: 3 MMHG
ECHO MV VTI: 33 CM
ECHO RA AREA 4C: 6.5 CM2
ECHO RA END SYSTOLIC VOLUME APICAL 4 CHAMBER INDEX BSA: 5 ML/M2
ECHO RA VOLUME: 10 ML
ECHO RV BASAL DIMENSION: 3.2 CM
ECHO RV INTERNAL DIMENSION: 2 CM
ECHO RV LONGITUDINAL DIMENSION: 8 CM
ECHO RV MID DIMENSION: 2.5 CM
ECHO RV TAPSE: 2.5 CM (ref 1.7–?)
EKG P AXIS: 0 DEGREES
EKG P AXIS: 61 DEGREES
EKG P-R INTERVAL: 136 MS
EKG P-R INTERVAL: 160 MS
EKG Q-T INTERVAL: 392 MS
EKG Q-T INTERVAL: 424 MS
EKG QRS DURATION: 92 MS
EKG QRS DURATION: 98 MS
EKG QTC CALCULATION (BAZETT): 406 MS
EKG QTC CALCULATION (BAZETT): 419 MS
EKG T AXIS: 67 DEGREES
EKG T AXIS: 76 DEGREES
ERYTHROCYTE [DISTWIDTH] IN BLOOD BY AUTOMATED COUNT: 12.9 % (ref 11.5–14.5)
GLUCOSE SERPL-MCNC: 104 MG/DL (ref 70–99)
HCT VFR BLD AUTO: 39.9 % (ref 37–47)
HDLC SERPL-MCNC: 35 MG/DL (ref 40–60)
HGB BLD-MCNC: 12.7 G/DL (ref 12–16)
LDLC SERPL CALC-MCNC: 103 MG/DL
MCH RBC QN AUTO: 28.7 PG (ref 27–31)
MCHC RBC AUTO-ENTMCNC: 31.8 G/DL (ref 33–37)
MCV RBC AUTO: 90.1 FL (ref 81–99)
PLATELET # BLD AUTO: 321 K/UL (ref 130–400)
PMV BLD AUTO: 11.1 FL (ref 9.4–12.3)
POTASSIUM SERPL-SCNC: 3.6 MMOL/L (ref 3.5–5)
RBC # BLD AUTO: 4.43 M/UL (ref 4.2–5.4)
SODIUM SERPL-SCNC: 143 MMOL/L (ref 136–145)
TRIGL SERPL-MCNC: 121 MG/DL (ref 0–149)
TROPONIN, HIGH SENSITIVITY: <6 NG/L (ref 0–14)
WBC # BLD AUTO: 10.1 K/UL (ref 4.8–10.8)

## 2025-05-28 PROCEDURE — 2709999900 HC NON-CHARGEABLE SUPPLY: Performed by: INTERNAL MEDICINE

## 2025-05-28 PROCEDURE — 94760 N-INVAS EAR/PLS OXIMETRY 1: CPT

## 2025-05-28 PROCEDURE — 84484 ASSAY OF TROPONIN QUANT: CPT

## 2025-05-28 PROCEDURE — G0378 HOSPITAL OBSERVATION PER HR: HCPCS

## 2025-05-28 PROCEDURE — 93356 MYOCRD STRAIN IMG SPCKL TRCK: CPT | Performed by: INTERNAL MEDICINE

## 2025-05-28 PROCEDURE — B2111ZZ FLUOROSCOPY OF MULTIPLE CORONARY ARTERIES USING LOW OSMOLAR CONTRAST: ICD-10-PCS | Performed by: INTERNAL MEDICINE

## 2025-05-28 PROCEDURE — 93306 TTE W/DOPPLER COMPLETE: CPT | Performed by: INTERNAL MEDICINE

## 2025-05-28 PROCEDURE — 94660 CPAP INITIATION&MGMT: CPT

## 2025-05-28 PROCEDURE — C1894 INTRO/SHEATH, NON-LASER: HCPCS | Performed by: INTERNAL MEDICINE

## 2025-05-28 PROCEDURE — 99152 MOD SED SAME PHYS/QHP 5/>YRS: CPT | Performed by: INTERNAL MEDICINE

## 2025-05-28 PROCEDURE — 99222 1ST HOSP IP/OBS MODERATE 55: CPT | Performed by: INTERNAL MEDICINE

## 2025-05-28 PROCEDURE — 85027 COMPLETE CBC AUTOMATED: CPT

## 2025-05-28 PROCEDURE — 93458 L HRT ARTERY/VENTRICLE ANGIO: CPT | Performed by: INTERNAL MEDICINE

## 2025-05-28 PROCEDURE — 36415 COLL VENOUS BLD VENIPUNCTURE: CPT

## 2025-05-28 PROCEDURE — 4A023N7 MEASUREMENT OF CARDIAC SAMPLING AND PRESSURE, LEFT HEART, PERCUTANEOUS APPROACH: ICD-10-PCS | Performed by: INTERNAL MEDICINE

## 2025-05-28 PROCEDURE — 6360000002 HC RX W HCPCS: Performed by: INTERNAL MEDICINE

## 2025-05-28 PROCEDURE — 6360000004 HC RX CONTRAST MEDICATION: Performed by: INTERNAL MEDICINE

## 2025-05-28 PROCEDURE — 93010 ELECTROCARDIOGRAM REPORT: CPT | Performed by: INTERNAL MEDICINE

## 2025-05-28 PROCEDURE — 6360000002 HC RX W HCPCS: Performed by: STUDENT IN AN ORGANIZED HEALTH CARE EDUCATION/TRAINING PROGRAM

## 2025-05-28 PROCEDURE — 2500000003 HC RX 250 WO HCPCS: Performed by: INTERNAL MEDICINE

## 2025-05-28 PROCEDURE — 2580000003 HC RX 258

## 2025-05-28 PROCEDURE — 93356 MYOCRD STRAIN IMG SPCKL TRCK: CPT

## 2025-05-28 PROCEDURE — B2151ZZ FLUOROSCOPY OF LEFT HEART USING LOW OSMOLAR CONTRAST: ICD-10-PCS | Performed by: INTERNAL MEDICINE

## 2025-05-28 PROCEDURE — 85520 HEPARIN ASSAY: CPT

## 2025-05-28 PROCEDURE — 80061 LIPID PANEL: CPT

## 2025-05-28 PROCEDURE — 96366 THER/PROPH/DIAG IV INF ADDON: CPT

## 2025-05-28 PROCEDURE — 80048 BASIC METABOLIC PNL TOTAL CA: CPT

## 2025-05-28 PROCEDURE — 6370000000 HC RX 637 (ALT 250 FOR IP): Performed by: STUDENT IN AN ORGANIZED HEALTH CARE EDUCATION/TRAINING PROGRAM

## 2025-05-28 PROCEDURE — C1769 GUIDE WIRE: HCPCS | Performed by: INTERNAL MEDICINE

## 2025-05-28 RX ORDER — HEPARIN SODIUM 1000 [USP'U]/ML
INJECTION, SOLUTION INTRAVENOUS; SUBCUTANEOUS PRN
Status: DISCONTINUED | OUTPATIENT
Start: 2025-05-28 | End: 2025-05-28 | Stop reason: HOSPADM

## 2025-05-28 RX ORDER — FENTANYL CITRATE 50 UG/ML
INJECTION, SOLUTION INTRAMUSCULAR; INTRAVENOUS PRN
Status: DISCONTINUED | OUTPATIENT
Start: 2025-05-28 | End: 2025-05-28 | Stop reason: HOSPADM

## 2025-05-28 RX ORDER — MIDAZOLAM HYDROCHLORIDE 1 MG/ML
INJECTION, SOLUTION INTRAMUSCULAR; INTRAVENOUS PRN
Status: DISCONTINUED | OUTPATIENT
Start: 2025-05-28 | End: 2025-05-28 | Stop reason: HOSPADM

## 2025-05-28 RX ORDER — VERAPAMIL HYDROCHLORIDE 2.5 MG/ML
INJECTION INTRAVENOUS PRN
Status: DISCONTINUED | OUTPATIENT
Start: 2025-05-28 | End: 2025-05-28 | Stop reason: HOSPADM

## 2025-05-28 RX ORDER — NITROGLYCERIN 20 MG/100ML
INJECTION INTRAVENOUS PRN
Status: DISCONTINUED | OUTPATIENT
Start: 2025-05-28 | End: 2025-05-28 | Stop reason: HOSPADM

## 2025-05-28 RX ORDER — IODIXANOL 320 MG/ML
INJECTION, SOLUTION INTRAVASCULAR PRN
Status: DISCONTINUED | OUTPATIENT
Start: 2025-05-28 | End: 2025-05-28 | Stop reason: HOSPADM

## 2025-05-28 RX ORDER — ASPIRIN 81 MG/1
81 TABLET, CHEWABLE ORAL DAILY
Qty: 30 TABLET | Refills: 3 | Status: SHIPPED | OUTPATIENT
Start: 2025-05-29

## 2025-05-28 RX ORDER — SODIUM CHLORIDE 9 MG/ML
INJECTION, SOLUTION INTRAVENOUS CONTINUOUS
Status: DISCONTINUED | OUTPATIENT
Start: 2025-05-28 | End: 2025-05-28 | Stop reason: HOSPADM

## 2025-05-28 RX ADMIN — ASPIRIN 81 MG: 81 TABLET, CHEWABLE ORAL at 11:05

## 2025-05-28 RX ADMIN — ACETAMINOPHEN 650 MG: 325 TABLET ORAL at 11:07

## 2025-05-28 RX ADMIN — SODIUM CHLORIDE: 0.9 INJECTION, SOLUTION INTRAVENOUS at 11:05

## 2025-05-28 RX ADMIN — DULOXETINE 60 MG: 60 CAPSULE, DELAYED RELEASE ORAL at 09:19

## 2025-05-28 RX ADMIN — PREGABALIN 100 MG: 50 CAPSULE ORAL at 09:19

## 2025-05-28 RX ADMIN — HEPARIN SODIUM 2000 UNITS: 1000 INJECTION INTRAVENOUS; SUBCUTANEOUS at 04:37

## 2025-05-28 RX ADMIN — HEPARIN SODIUM 14 UNITS/KG/HR: 10000 INJECTION, SOLUTION INTRAVENOUS at 04:38

## 2025-05-28 RX ADMIN — LEVOTHYROXINE SODIUM 25 MCG: 25 TABLET ORAL at 09:19

## 2025-05-28 RX ADMIN — SULFUR HEXAFLUORIDE 2 ML: 60.7; .19; .19 INJECTION, POWDER, LYOPHILIZED, FOR SUSPENSION INTRAVENOUS; INTRAVESICAL at 09:57

## 2025-05-28 RX ADMIN — SODIUM CHLORIDE: 0.9 INJECTION, SOLUTION INTRAVENOUS at 08:52

## 2025-05-28 ASSESSMENT — ENCOUNTER SYMPTOMS
EYES NEGATIVE: 1
SHORTNESS OF BREATH: 0
DIARRHEA: 0
NAUSEA: 0
GASTROINTESTINAL NEGATIVE: 1
ABDOMINAL PAIN: 0
VOMITING: 0
RESPIRATORY NEGATIVE: 1

## 2025-05-28 ASSESSMENT — PAIN SCALES - GENERAL: PAINLEVEL_OUTOF10: 7

## 2025-05-28 ASSESSMENT — PAIN DESCRIPTION - LOCATION: LOCATION: HEAD

## 2025-05-28 NOTE — PROGRESS NOTES
Shirlene Tian arrived to room # 410-2.   Presented with: unstable angina  Mental Status: Patient is oriented, alert, coherent, logical, thought processes intact, and able to concentrate and follow conversation.   Vitals:    05/27/25 2324   BP: 98/63   Pulse: 52   Resp: 18   Temp:    SpO2: 99%     Patient safety contract and falls prevention contract reviewed with patient Yes.  Oriented Patient and Family to room.  Call light within reach. Yes.  Needs, issues or concerns expressed at this time: no.      Electronically signed by Madeleine Siddiqui RN on 5/28/2025 at 12:05 AM

## 2025-05-28 NOTE — DISCHARGE INSTRUCTIONS

## 2025-05-28 NOTE — CONSULTS
[facility]  Cardiology Consult      Requesting MD:  Shashank Garcia MD   Admit Status:         History obtained from:   [] Patient  [] Other (specify):     Patient:  Shirlene Tian  [id number]     Chief Complaint:   Chief Complaint   Patient presents with    Chest Pain     CP x approx 1 hour, pressure all day- now SOA; took 1 nitro PTA with relief but pain returned per pt         HPI: Ms. Tian is a 45 y.o. female with a history of diabetes reflux disease hypertension hyperlipidemia in her usual state of health until last Thursday had an episode of pressure in her chest more on the right side of the sternum went into her right shoulder and arm lasted 2-1/2 hours went to the emergency department evaluated and released had no difficulty until yesterday had recurrence of chest pain pressure again around 11 AM today took one of her husbands nitroglycerin tablets which seem to help she tried an acids which did not help.  Came to the hospital now admitted all troponins are negative.  I see her  as a patient as well.  She does noticed more shortness of breath lately.  No previous invasive assessment.  BNP level less than 36.  Review of Systems:  Review of Systems   Constitutional: Negative.  Negative for chills, fever and unexpected weight change.   HENT: Negative.     Eyes: Negative.    Respiratory: Negative.  Negative for shortness of breath.    Cardiovascular: Negative.  Negative for chest pain.   Gastrointestinal: Negative.  Negative for diarrhea, nausea and vomiting.   Endocrine: Negative.    Genitourinary: Negative.    Musculoskeletal: Negative.    Skin: Negative.    Neurological: Negative.    All other systems reviewed and are negative.      Cardiac Specific Data:  Specialty Problems          Cardiology Problems    Essential hypertension        Mixed hyperlipidemia        Chest pain        * (Principal) Unstable angina (HCC)           Past Medical History:  Past Medical History:

## 2025-05-28 NOTE — ED NOTES
ED TO INPATIENT SBAR HANDOFF    Patient Name: Shirlene Tian   : 1980  45 y.o.   Family/Caregiver Present: Yes  Code Status Order: Full Code    C-SSRS: Risk of Suicide: No Risk  Sitter No  Restraints:         Situation  Chief Complaint:   Chief Complaint   Patient presents with    Chest Pain     CP x approx 1 hour, pressure all day- now SOA; took 1 nitro PTA with relief but pain returned per pt     Patient Diagnosis: Unstable angina (HCC) [I20.0]     Brief Description of Patient's Condition: Shirlene Tian is a 45 y.o. female who presents to the emergency department with chest pain. Was here a few days ago same complaint. P/w chest pressure x1h.  Had a dynamic Tn was DC home a few days ago. Nitro helped with the pain but it did not resolve   Mental Status: oriented, alert, coherent, logical, thought processes intact, and able to concentrate and follow conversation  Arrived from: home    Imaging:   XR CHEST PORTABLE   Final Result       Left lower lobe opacity.  Correlate for pleural effusion with atelectasis, pneumonia or projection from overlying soft tissue structures.       Additional findings as described.               ______________________________________    Electronically signed by: ABRAN RAMÍREZ D.O.   Date:     2025   Time:    21:54         COVID-19 Results:   Internal Administration   First Dose COVID-19, [facility], Primary or Immunocompromised, (age 12y+), IM, 100 mcg/0.5mL  2021   Second Dose           Last COVID Lab No results found for: \"SARS-COV-2\"        Abnormal labs:   Abnormal Labs Reviewed   CBC WITH AUTO DIFFERENTIAL - Abnormal; Notable for the following components:       Result Value    WBC 12.0 (*)     RBC 5.43 (*)     All other components within normal limits   COMPREHENSIVE METABOLIC PANEL - Abnormal; Notable for the following components:    Chloride 97 (*)     Anion Gap 19 (*)     Calcium 10.9 (*)     Total Protein 9.2 (*)     Alkaline Phosphatase 120 (*)

## 2025-05-28 NOTE — ED NOTES
Pt reports pain relief with nitro, after first nitro pain down to 4 from 7, second nitro given, will continue to monitor

## 2025-05-28 NOTE — PROGRESS NOTES
4 Eyes Skin Assessment     NAME:  Shirlene Tian  YOB: 1980  MEDICAL RECORD NUMBER:  [id number]    The patient is being assessed for  Admission    I agree that at least one RN has performed a thorough Head to Toe Skin Assessment on the patient. ALL assessment sites listed below have been assessed.      Areas assessed by both nurses:    Head, Face, Ears, Shoulders, Back, Chest, Arms, Elbows, Hands, Sacrum. Buttock, Coccyx, Ischium, Legs. Feet and Heels, and Under Medical Devices         Does the Patient have a Wound? No noted wound(s)       Antonio Prevention initiated by RN: No  Wound Care Orders initiated by RN: No    Pressure Injury (Stage 3,4, Unstageable, DTI, NWPT, and Complex wounds) if present, place Wound referral order by RN under : No    New Ostomies, if present place, Ostomy referral order under : No     Nurse 1 eSignature: Electronically signed by Madeleine Siddiqui RN on 5/28/25 at 12:05 AM CDT    **SHARE this note so that the co-signing nurse can place an eSignature**    Nurse 2 eSignature: Electronically signed by Latha Maria RN on 5/28/25 at 6:03 AM CDT

## 2025-05-28 NOTE — DISCHARGE SUMMARY
[address] [place] [address]  DEPARTMENT OF HOSPITALIST MEDICINE    DISCHARGE SUMMARY:        Shirlene Tian  :  1980  MRN:  [id number]    Admit date:  2025  Discharge date:  2025      Admitting Physician:  Mike Alcantar MD    Advance Directive: Full Code    Consults Made:   IP CONSULT TO CARDIOLOGY  IP CONSULT TO CARDIOLOGY      Primary Care Physician:  Katarzyna Perze, APRN    Discharge Diagnoses:  Principal Problem:    Unstable angina (HCC)  Resolved Problems:    * No resolved hospital problems. *        Pertinent Labs:  CBC with DIFF:  Recent Labs     25   WBC 12.0* 10.1   RBC 5.43* 4.43   HGB 15.4 12.7   HCT 46.7 39.9   MCV 86.0 90.1   MCH 28.4 28.7   MCHC 33.0 31.8*   RDW 12.8 12.9    321   MPV 10.6 11.1   NEUTOPHILPCT 58.4  --    LYMPHOPCT 34.7  --    MONOPCT 5.1  --    BASOPCT 0.8  --    NEUTROABS 7.0  --    LYMPHSABS 4.2  --    MONOSABS 0.60  --    EOSABS 0.10  --    BASOSABS 0.10  --        CMP/BMP:  Recent Labs     25    143   K 3.5 3.6   CL 97* 103   CO2 22 27   ANIONGAP 19* 13   GLUCOSE 81 104*   BUN 10 9   CREATININE 0.8 0.7   LABGLOM >90 >90   CALCIUM 10.9* 9.7   BILITOT 0.4  --    ALKPHOS 120*  --    ALT 12  --    AST 22  --          CRP:  No results for input(s): \"CRP\" in the last 72 hours.  Sed Rate:  No results for input(s): \"SEDRATE\" in the last 72 hours.      HgBA1c:  No components found for: \"HGBA1C\"  FLP:    Lab Results   Component Value Date/Time    TRIG 121 2025 03:25 AM    HDL 35 2025 03:25 AM     TSH:    Lab Results   Component Value Date/Time    TSH 3.150 2024 07:37 AM     Troponin T: No results for input(s): \"TROPONINI\" in the last 72 hours.  Pro-BNP: No results for input(s): \"BNP\" in the last 72 hours.  INR: No results for input(s): \"INR\" in the last 72 hours.  ABGs: No results found for: \"PHART\", \"PO2ART\", \"XLY6DTV\"  UA:No results for input(s): \"NITRITE\", \"COLORU\",

## 2025-05-28 NOTE — ED PROVIDER NOTES
without long-term current use of insulin ([facility]); Class 1 obesity due to excess calories without serious comorbidity with body mass index (BMI) of 34.0 to 34.9 in adult      naproxen (NAPROSYN) 500 MG tablet Take 1 tablet by mouth 2 times daily (with meals)  Qty: 60 tablet, Refills: 11      linaclotide (LINZESS) 290 MCG CAPS capsule Take 1 capsule by mouth every morning (before breakfast)  Qty: 30 capsule, Refills: 11    Associated Diagnoses: Drug-induced constipation; Irritable bowel syndrome with constipation      Tirzepatide (MOUNJARO) 7.5 MG/0.5ML SOAJ Inject 7.5 mg into the skin once a week  Qty: 2 mL, Refills: 11    Associated Diagnoses: Controlled type 2 diabetes mellitus without complication, without long-term current use of insulin ([facility])      losartan (COZAAR) 25 MG tablet Take 0.5 tablets by mouth daily  Qty: 45 tablet, Refills: 3    Associated Diagnoses: Elevated BP without diagnosis of hypertension; Type 2 diabetes mellitus with diabetic polyneuropathy, without long-term current use of insulin ([facility]); Orthostatic hypotension      bumetanide (BUMEX) 1 MG tablet Take 0.5 tablets by mouth daily  Qty: 45 tablet, Refills: 3    Associated Diagnoses: Peripheral edema; Orthostatic hypotension      !! pregabalin (LYRICA) 100 MG capsule Take 1 capsule by mouth in the morning, at noon, and at bedtime for 180 days. 1 morning and 2 tablets at night. Max Daily Amount: 300 mg  Qty: 90 capsule, Refills: 5    Associated Diagnoses: Controlled type 2 diabetes mellitus without complication, without long-term current use of insulin ([facility]); Fibromyalgia      vitamin D (ERGOCALCIFEROL) 1.25 MG (94328 UT) CAPS capsule Take 1 capsule by mouth once a week  Qty: 4 capsule, Refills: 1    Associated Diagnoses: Vitamin D deficiency      FARXIGA 5 MG tablet TAKE 1 TABLET BY MOUTH ONCE DAILY IN THE MORNING  Qty: 90 tablet, Refills: 3    Associated Diagnoses: Controlled type 2 diabetes mellitus without complication, without long-term

## 2025-05-28 NOTE — H&P
Hospitalist: History and Physical    Date: 2025 Time: 10:08 PM    Name: Shirlene Tian : 1980 MRN: [id number]    Code Status: Full Code No additional code details    PCP: Katarzyna Perez APRN    Patient's Chief Complaint Is: Chest pain  HPI: Patient is a 45 y.o. female with past medical history as documented below . Patient presented to [facility] ED due to chest pain. She said that chest happened at home after 4 hours of grocery shopping today. She described chest pain as pressure  like pain located in right center of chest and radiating to neck and jaw. She said that chest pain was associated with shortness of breath. She was seen several days ago for similar complaint and sent home.She was given Nitroglycerin, chest pain resolved but came back. Patient denies fever, chills, visual problem, dysphagia, cough, bowel or bladder incontinence, abdominal pain, diarrhea or dysuria.   Blood work ordered in ED significant for leukocytosis and elevated Alkaline phosphatase. Troponin WNL.   EKG did not shoe ST-T wave segment elevation    Past Medical History:   Diagnosis Date    Chronic back pain     gets injections with [facility].     CPAP (continuous positive airway pressure) dependence     13cm    Fibromyalgia     GERD (gastroesophageal reflux disease)     Hyperlipidemia     Hypertension     Obesity     Obstructive sleep apnea     AHI: 18.1 per [facility], 2017    Restless leg syndrome     Skin cancer     left breast     Type 2 diabetes mellitus without complication (HCC)     Vitamin D deficiency      Past Surgical History:   Procedure Laterality Date    ABDOMINAL EXPLORATION SURGERY      APPENDECTOMY       SECTION      x1    CHOLECYSTECTOMY      COLONOSCOPY N/A 2024    Dr KJ Perez-Normal, 5 yr recall    HYSTERECTOMY, TOTAL ABDOMINAL (CERVIX REMOVED)      HYSTERECTOMY, VAGINAL      NECK SURGERY      2 level neck fusion    OVARIAN CYST REMOVAL      NM EGD TRANSORAL BIOPSY

## 2025-05-29 ENCOUNTER — TELEPHONE (OUTPATIENT)
Age: 45
End: 2025-05-29

## 2025-05-29 NOTE — TELEPHONE ENCOUNTER
Care Transitions Initial Follow Up Call    Outreach made within 2 business days of discharge: Yes    Patient: Shirlene Tian Patient : 1980   MRN: [id number]  Reason for Admission: chest pain  Discharge Date: 25       Spoke with: Shirlene    Discharge department/facility: [facility] Interactive Patient Contact:  Was patient able to fill all prescriptions: Yes  Was patient instructed to bring all medications to the follow-up visit: Yes  Is patient taking all medications as directed in the discharge summary? Yes  Does patient understand their discharge instructions: Yes  Does patient have questions or concerns that need addressed prior to 7-14 day follow up office visit: no    Additional needs identified to be addressed with provider  No needs identified Working to get in with gastro. Cardio said It was gastro caused.       Scheduled appointment with PCP within 7-14 days    Follow Up  Future Appointments   Date Time Provider Department Center   2025  9:20 AM Katarzyna Perez APRN [facility]   2025  8:00 AM Katarzyna Perez APRN [facility]       Samuel Dash MA

## 2025-06-01 LAB
EKG P AXIS: 45 DEGREES
EKG P-R INTERVAL: 152 MS
EKG Q-T INTERVAL: 326 MS
EKG QRS DURATION: 74 MS
EKG QTC CALCULATION (BAZETT): 394 MS
EKG T AXIS: 64 DEGREES

## 2025-06-04 ENCOUNTER — OFFICE VISIT (OUTPATIENT)
Dept: GASTROENTEROLOGY | Age: 45
End: 2025-06-04
Payer: MEDICAID

## 2025-06-04 VITALS
DIASTOLIC BLOOD PRESSURE: 74 MMHG | HEIGHT: 65 IN | BODY MASS INDEX: 31.32 KG/M2 | SYSTOLIC BLOOD PRESSURE: 122 MMHG | HEART RATE: 89 BPM | WEIGHT: 188 LBS | OXYGEN SATURATION: 99 %

## 2025-06-04 DIAGNOSIS — R07.89 NON-CARDIAC CHEST PAIN: Primary | ICD-10-CM

## 2025-06-04 DIAGNOSIS — R13.19 ESOPHAGEAL DYSPHAGIA: ICD-10-CM

## 2025-06-04 PROCEDURE — 3074F SYST BP LT 130 MM HG: CPT | Performed by: NURSE PRACTITIONER

## 2025-06-04 PROCEDURE — 3078F DIAST BP <80 MM HG: CPT | Performed by: NURSE PRACTITIONER

## 2025-06-04 PROCEDURE — 99214 OFFICE O/P EST MOD 30 MIN: CPT | Performed by: NURSE PRACTITIONER

## 2025-06-04 RX ORDER — PANTOPRAZOLE SODIUM 40 MG/1
40 TABLET, DELAYED RELEASE ORAL
Qty: 30 TABLET | Refills: 3 | Status: SHIPPED | OUTPATIENT
Start: 2025-06-04

## 2025-06-04 ASSESSMENT — ENCOUNTER SYMPTOMS
COUGH: 0
ANAL BLEEDING: 0
ABDOMINAL PAIN: 0
TROUBLE SWALLOWING: 1
VOMITING: 0
CONSTIPATION: 0
DIARRHEA: 0
ABDOMINAL DISTENTION: 0
BLOOD IN STOOL: 0
CHOKING: 0
NAUSEA: 0
SHORTNESS OF BREATH: 0
RECTAL PAIN: 0

## 2025-06-04 NOTE — PROGRESS NOTES
Subjective:     Patient ID: Shirlene Tian is a 45 y.o. female  PCP: Katarzyna Perez APRN  Referring Provider: No ref. provider found    HPI  Patient presents to the office today with the following complaints: Other (FEELING LIKE SOMETHING IS STUCK IN THROAT; CHEST PRESSURE)      History of Present Illness  The patient presents for evaluation of acid reflux, gas, and difficulty swallowing.    She has been experiencing persistent gagging sensations, which she suspects may be related to acid reflux. This sensation is constant, although its intensity varies. On some days, it is so severe that it impedes her ability to eat or drink. She has lost approximately 80 pounds and currently weighs 188 pounds. She has not been prescribed any medication for acid reflux but manages it with over-the-counter remedies such as Rolaids, Tums, and [place]. Her symptoms have worsened over the past 2 months. She has previously undergone esophageal dilation more than a decade ago and has not taken Protonix or pantoprazole in the past.    She also reports difficulty swallowing, particularly with meat, which often gets lodged in her throat and necessitates slow drinking to dislodge. She has experienced regurgitation of food. She is concerned about the possibility of esophageal cancer, given her family history.    She reports frequent gas and bloating, which she attributes to her Mounjaro medication. She is considering the use of Gas-X but is hesitant to take it daily.    She had two trips to the ER last week and the week before. During the first visit, her enzyme levels were at 18 but decreased to around 6 within about an hour, leading to her discharge. Within almost a week, she returned to the ER after taking nitro, which did not alleviate her symptoms. Although her enzymes were not elevated, she experienced pressure in her chest and sharp pain on the right side, which traveled up her neck and down into her shoulder. Dr. Oscar

## 2025-06-05 ENCOUNTER — TELEPHONE (OUTPATIENT)
Dept: GASTROENTEROLOGY | Age: 45
End: 2025-06-05

## 2025-06-05 NOTE — TELEPHONE ENCOUNTER
called patient to confirm  procedure scheduled for 6/10/25@800  with Dr. Perez at [facility] outpatient

## 2025-06-09 ENCOUNTER — OFFICE VISIT (OUTPATIENT)
Age: 45
End: 2025-06-09
Payer: MEDICAID

## 2025-06-09 VITALS
BODY MASS INDEX: 30.91 KG/M2 | HEART RATE: 97 BPM | TEMPERATURE: 98.3 F | WEIGHT: 185.5 LBS | HEIGHT: 65 IN | DIASTOLIC BLOOD PRESSURE: 82 MMHG | OXYGEN SATURATION: 91 % | SYSTOLIC BLOOD PRESSURE: 118 MMHG

## 2025-06-09 DIAGNOSIS — E78.2 MIXED HYPERLIPIDEMIA: ICD-10-CM

## 2025-06-09 DIAGNOSIS — R60.0 PERIPHERAL EDEMA: ICD-10-CM

## 2025-06-09 DIAGNOSIS — I95.1 ORTHOSTATIC HYPOTENSION: ICD-10-CM

## 2025-06-09 DIAGNOSIS — K21.9 GASTROESOPHAGEAL REFLUX DISEASE WITHOUT ESOPHAGITIS: ICD-10-CM

## 2025-06-09 DIAGNOSIS — E11.9 CONTROLLED TYPE 2 DIABETES MELLITUS WITHOUT COMPLICATION, WITHOUT LONG-TERM CURRENT USE OF INSULIN (HCC): Primary | ICD-10-CM

## 2025-06-09 PROCEDURE — 3074F SYST BP LT 130 MM HG: CPT | Performed by: NURSE PRACTITIONER

## 2025-06-09 PROCEDURE — 99214 OFFICE O/P EST MOD 30 MIN: CPT | Performed by: NURSE PRACTITIONER

## 2025-06-09 PROCEDURE — 3079F DIAST BP 80-89 MM HG: CPT | Performed by: NURSE PRACTITIONER

## 2025-06-09 RX ORDER — ASPIRIN 81 MG/1
81 TABLET ORAL DAILY
Qty: 30 TABLET | Refills: 11 | Status: SHIPPED | OUTPATIENT
Start: 2025-06-09

## 2025-06-09 RX ORDER — LOSARTAN POTASSIUM 25 MG/1
12.5 TABLET ORAL DAILY
Qty: 45 TABLET | Refills: 3 | Status: SHIPPED | OUTPATIENT
Start: 2025-06-09

## 2025-06-09 RX ORDER — ATORVASTATIN CALCIUM 40 MG/1
40 TABLET, FILM COATED ORAL NIGHTLY
Qty: 90 TABLET | Refills: 3 | Status: SHIPPED | OUTPATIENT
Start: 2025-06-09

## 2025-06-09 RX ORDER — BUMETANIDE 1 MG/1
0.5 TABLET ORAL DAILY
Qty: 45 TABLET | Refills: 3 | Status: SHIPPED | OUTPATIENT
Start: 2025-06-09

## 2025-06-09 ASSESSMENT — ENCOUNTER SYMPTOMS
SHORTNESS OF BREATH: 0
NAUSEA: 0
VOMITING: 0
DIARRHEA: 0
RECTAL PAIN: 0
ABDOMINAL PAIN: 0
COUGH: 0
WHEEZING: 0

## 2025-06-09 NOTE — PROGRESS NOTES
Shirlene Tian (:  1980) is a 45 y.o. female, Established patient, here for evaluation of the following chief complaint(s):  Follow-Up from Hospital (Patient is here today for hospital follow up after being discharged from [facility] on  for unstable angina. She was admitted through the ER on . They started her on low dose aspirin but she hasn't taken it yet due to endoscopy scheduled for tomorrow. )         Assessment & Plan  1. Chest pain: Stable. Left heart catheterization on 2023 showed no blockages or signs of cardiac problems. Differential diagnosis includes esophageal spasms and pain, possibly related to silent reflux.  - Protonix daily before food.  - EGD scheduled for tomorrow.  - Start enteric-coated baby aspirin daily.    2. Dysphagia: Reports difficulty swallowing and occasional gagging.  - Protonix daily before food.  - EGD scheduled for tomorrow.  - Possible esophageal stretching depending on EGD findings.    3. Orthostatic hypotension: Likely due to dehydration.  - Increase fluid intake.  - Use Bumex only as needed.  - Wear compression socks.  - Monitor fluid intake and symptoms to adjust Bumex dosage.    4. Diabetes mellitus: Chronic.  - Continue Farxiga.  - Start enteric-coated baby aspirin daily.  - Resume losartan half a pill daily.  - Monitor kidney function through urine protein levels.    5. Hyperlipidemia: Chronic.  - Resume Lipitor nightly.  - Monitor cholesterol levels and adherence to medication.    Follow-up  - Regular appointment scheduled for 2023.    Results  Imaging   - Left heart catheterization: 2025, No blockages or signs of any problems  No results found for this visit on 25.    ICD-10-CM    1. Controlled type 2 diabetes mellitus without complication, without long-term current use of insulin (HCC)  E11.9 aspirin 81 MG EC tablet     atorvastatin (LIPITOR) 40 MG tablet     losartan (COZAAR) 25 MG tablet     CBC with Auto Differential

## 2025-06-10 ENCOUNTER — ANESTHESIA EVENT (OUTPATIENT)
Dept: ENDOSCOPY | Age: 45
End: 2025-06-10
Payer: MEDICAID

## 2025-06-10 ENCOUNTER — ANESTHESIA (OUTPATIENT)
Dept: ENDOSCOPY | Age: 45
End: 2025-06-10
Payer: MEDICAID

## 2025-06-10 ENCOUNTER — ANCILLARY PROCEDURE (OUTPATIENT)
Dept: ENDOSCOPY | Age: 45
End: 2025-06-10
Attending: INTERNAL MEDICINE
Payer: MEDICAID

## 2025-06-10 ENCOUNTER — HOSPITAL ENCOUNTER (OUTPATIENT)
Age: 45
Setting detail: OUTPATIENT SURGERY
Discharge: HOME | End: 2025-06-10
Attending: INTERNAL MEDICINE | Admitting: INTERNAL MEDICINE
Payer: MEDICAID

## 2025-06-10 VITALS
HEIGHT: 65 IN | OXYGEN SATURATION: 95 % | BODY MASS INDEX: 30.82 KG/M2 | SYSTOLIC BLOOD PRESSURE: 108 MMHG | DIASTOLIC BLOOD PRESSURE: 71 MMHG | HEART RATE: 61 BPM | WEIGHT: 185 LBS | TEMPERATURE: 98 F | RESPIRATION RATE: 18 BRPM

## 2025-06-10 DIAGNOSIS — R07.89 NON-CARDIAC CHEST PAIN: ICD-10-CM

## 2025-06-10 DIAGNOSIS — R13.10 DYSPHAGIA, UNSPECIFIED TYPE: ICD-10-CM

## 2025-06-10 LAB
GLUCOSE BLD-MCNC: 94 MG/DL (ref 70–99)
PERFORMED ON: NORMAL

## 2025-06-10 PROCEDURE — 88342 IMHCHEM/IMCYTCHM 1ST ANTB: CPT

## 2025-06-10 PROCEDURE — 3609012700 HC EGD DILATION SAVORY: Performed by: INTERNAL MEDICINE

## 2025-06-10 PROCEDURE — 88305 TISSUE EXAM BY PATHOLOGIST: CPT

## 2025-06-10 PROCEDURE — 3700000000 HC ANESTHESIA ATTENDED CARE: Performed by: INTERNAL MEDICINE

## 2025-06-10 PROCEDURE — 2709999900 HC NON-CHARGEABLE SUPPLY: Performed by: INTERNAL MEDICINE

## 2025-06-10 PROCEDURE — 3609012400 HC EGD TRANSORAL BIOPSY SINGLE/MULTIPLE: Performed by: INTERNAL MEDICINE

## 2025-06-10 PROCEDURE — 82962 GLUCOSE BLOOD TEST: CPT

## 2025-06-10 PROCEDURE — 7100000010 HC PHASE II RECOVERY - FIRST 15 MIN: Performed by: INTERNAL MEDICINE

## 2025-06-10 PROCEDURE — 7100000011 HC PHASE II RECOVERY - ADDTL 15 MIN: Performed by: INTERNAL MEDICINE

## 2025-06-10 RX ORDER — SODIUM CHLORIDE, SODIUM LACTATE, POTASSIUM CHLORIDE, CALCIUM CHLORIDE 600; 310; 30; 20 MG/100ML; MG/100ML; MG/100ML; MG/100ML
INJECTION, SOLUTION INTRAVENOUS CONTINUOUS
Status: DISCONTINUED | OUTPATIENT
Start: 2025-06-10 | End: 2025-06-10 | Stop reason: HOSPADM

## 2025-06-10 RX ORDER — PANTOPRAZOLE SODIUM 40 MG/1
40 TABLET, DELAYED RELEASE ORAL
Qty: 60 TABLET | Refills: 3 | Status: SHIPPED | OUTPATIENT
Start: 2025-06-10

## 2025-06-10 ASSESSMENT — PAIN - FUNCTIONAL ASSESSMENT: PAIN_FUNCTIONAL_ASSESSMENT: 0-10

## 2025-06-10 NOTE — ANESTHESIA PRE PROCEDURE
found for: \"HIV\", \"HEPCAB\"    COVID-19 Screening (If Applicable): No results found for: \"COVID19\"        Anesthesia Evaluation  Patient summary reviewed   no history of anesthetic complications:   Airway: Mallampati: III  TM distance: <3 FB   Neck ROM: full  Mouth opening: < 3 FB   Dental: normal exam         Pulmonary:normal exam    (+)     sleep apnea: on CPAP,                                 ROS comment: FORMER TOBACCO USER- QUIT 3/2023   Cardiovascular:  Exercise tolerance: no interval change  (+) hypertension:, hyperlipidemia         Beta Blocker:  Not on Beta Blocker      ROS comment:     Conclusions      Summary   Negative EKG criteria for ischemia, for the workload achieved. Fair   exercise tolerance (METS 7.0, exercise time 4:43 mins). No chest pain.   Normal BP response to stress. Low risk stress test.      Signature      ----------------------------------------------------------------   Electronically signed by Sea AndersonInterpreting physician)   on 08/02/2023 02:22 PM   ----------------------------------------------------------------       Neuro/Psych:   (+) depression/anxiety              ROS comment: FIBROMYALGIA  RLS  CHRONIC BACK PAIN GI/Hepatic/Renal:   (+) GERD:, bowel prep, morbid obesity          Endo/Other:    (+) Diabetes, hypothyroidism::..                 Abdominal:   (+) obese          Vascular: negative vascular ROS.         Other Findings:             Anesthesia Plan      general and TIVA     ASA 3       Induction: intravenous.      Anesthetic plan and risks discussed with patient.                        Bright Gresham, APRN - CRNA   6/10/2025

## 2025-06-10 NOTE — H&P
Patient Name: Shirlene Tian  : 1980  MRN: [id number]  DATE: 06/10/25    Allergies:   Allergies   Allergen Reactions    Hydrocodone-Acetaminophen Nausea Only    Adhesive Tape Rash        ENDOSCOPY  History and Physical    Procedure:    [] Diagnostic Colonoscopy       [] Screening Colonoscopy  [x] EGD      [] ERCP      [] EUS       [] Other    [x] Previous office notes/History and Physical reviewed from the patients chart. Please see EMR for further details of HPI. I have examined the patient's status immediately prior to the procedure and:      Indications/HPI:    []Abdominal Pain   []Barretts  []Screening/Surveillance   []History of Polyps  [x]Dysphagia            [] +Cologard/DNA testing  []Abnormal Imaging              []EOE Hx              [] Family Hx of CRC/Polyps  []Anemia                            []Food Impaction       []Recent Poor Prep  []GI Bleed             []Lymphadenopathy  []History of Polyps  []Change in bowel habits []Heartburn/Reflux  []Cancer- GI/Lung  []Chest Pain - Non Cardiac []Heme (+) Stool []Ulcers  []Constipation  []Hemoptysis  []Incontinence    []Diarrhea  []Hypoxemia  []Rectal Bleed (BRBPR)  []Nausea/Vomiting   [] Varices  []Crohns/Colitis  []Pancreatic Cyst   [] Cirrhosis   []Pancreatitis    []Abnormal MRCP  []Elevated LFT [] Stent Removal, Previous ERCP  []Other:     Anesthesia:   [x] MAC [] Moderate Sedation   [] General   [] None     ROS: 12 pt Review of Symptoms was negative unless mentioned above    Medications:   Prior to Admission medications    Medication Sig Start Date End Date Taking? Authorizing Provider   atorvastatin (LIPITOR) 40 MG tablet Take 1 tablet by mouth at bedtime 25  Yes Katarzyna Perez APRN   losartan (COZAAR) 25 MG tablet Take 0.5 tablets by mouth daily 25  Yes Katarzyna Perez APRN   bumetanide (BUMEX) 1 MG tablet Take 0.5 tablets by mouth daily 25  Yes Katarzyna Perez APRN   pantoprazole (PROTONIX) 40 MG tablet Take 1

## 2025-06-10 NOTE — ANESTHESIA POSTPROCEDURE EVALUATION
Department of Anesthesiology  Postprocedure Note    Patient: Shirlene Tian  MRN: [id number]  YOB: 1980  Date of evaluation: 6/10/2025    Procedure Summary       Date: 06/10/25 Room / Location: [place] [address] / [facility]    Anesthesia Start: 0925 Anesthesia Stop: 0934    Procedures:       ESOPHAGOGASTRODUODENOSCOPY BIOPSY      ESOPHAGOGASTRODUODENOSCOPY DILATION SAVORY Diagnosis:       Non-cardiac chest pain      Dysphagia, unspecified type      (Non-cardiac chest pain [R07.89])      (Dysphagia, unspecified type [R13.10])    Surgeons: Dat Perez MD Responsible Provider: Bright Gresham APRN - CRNA    Anesthesia Type: general, TIVA ASA Status: 3            Anesthesia Type: No value filed.    King Phase I: King Score: 10    King Phase II:      Anesthesia Post Evaluation    Patient location during evaluation: bedside  Patient participation: complete - patient participated  Level of consciousness: sleepy but conscious  Pain score: 0  Airway patency: patent  Nausea & Vomiting: no nausea and no vomiting  Cardiovascular status: hemodynamically stable  Respiratory status: acceptable  Hydration status: euvolemic  Pain management: adequate    No notable events documented.

## 2025-06-10 NOTE — DISCHARGE INSTRUCTIONS
RECOMMENDATIONS:    1.  Await path results  2.  I would recommend taking a PPI twice daily x 8-12 weeks.  3.  Avoid NSAIDs   4.  Repeat EGD with dilation as needed for trouble swallowing     Upper GI Endoscopy: What to Expect at Home  Your Recovery  You had an upper GI endoscopy. Your doctor used a thin, lighted tube that bends to look at the inside of your esophagus, your stomach, and the first part of the small intestine, called the duodenum.    How can you care for yourself at home?  Activity   Rest as much as you need to after you go home.  You should be able to go back to your usual activities the day after the test.  Due to anesthesia, no driving or operating equipment for 24 hours.  Diet   Follow your doctor's directions for eating after the test.  Drink plenty of fluids (unless your doctor has told you not to).  Medications   If you have a sore throat the day after the test, use an over-the-counter spray to numb your throat.  When should you call for help?   Call 911 anytime you think you may need emergency care. For example, call if:    You passed out (lost consciousness).     You have trouble breathing.     You pass maroon or bloody stools.   Call your doctor now or seek immediate medical care if:    You have pain that does not get better after your take pain medicine.     You have new or worse belly pain.     You have blood in your stools.     You are sick to your stomach and cannot keep fluids down.     You have a fever.     You cannot pass stools or gas.   Watch closely for changes in your health, and be sure to contact your doctor if:    Your throat still hurts after a day or two.     You do not get better as expected.

## 2025-06-10 NOTE — OP NOTE
Endoscopic Procedure Note    Patient: Shirlene Tian : 1980  Med Rec#: [id number] Acc#: [id number]     Primary Care Provider Katarzyna Perez APRN  Referring Provider: CLINT VALENCIA    Endoscopist: Dat Perez MD    Date of Procedure:  6/10/2025    Procedure:   1. EGD with biopsy  2. EGD with wire guided dilation to 54F     Indications:   1. Dysphagia   2. Dyspepsia     Anesthesia:  Sedation was administered by anesthesia who monitored the patient during the procedure.    Estimated Blood Loss: minimal    Procedure:   After reviewing the patient's chart and obtaining informed consent, the patient was placed in the left lateral decubitus position.  A forward-viewing Olympus endoscope was lubricated and inserted through the mouth into the oropharynx. Under direct visualization, the upper esophagus was intubated. The scope was advanced to the level of the third portion of duodenum. Scope was slowly withdrawn with careful inspection of the mucosal surfaces. The scope was retroflexed for inspection of the gastric fundus and incisura. Findings and maneuvers are listed in impression below. The patient tolerated the procedure well. The scope was removed. There were no immediate complications.    Findings:   Esophagus: abnormal: in the distal esophagus there is a benign appearing stricture noted. This was dilated due to symptoms.  A guidewire was placed through the endoscope and the endoscope was removed.  A 54 [place] savory dilator was advanced down the length of the esophagus used a fixed-point wire technique. The dilator and guidewire were removed.  The patient tolerated the procedure well.      There is no hiatal hernia present.      Stomach:  abnormal: mild mucosal changes suggestive of gastritis noted -  Gastric biopsies were taken from the antrum and body to rule out Helicobacter pylori infection.      There is a moderate amount of retained food in the stomach.     Duodenum: normal      IMPRESSION:  1.

## 2025-06-12 ENCOUNTER — RESULTS FOLLOW-UP (OUTPATIENT)
Dept: GASTROENTEROLOGY | Age: 45
End: 2025-06-12

## 2025-06-30 ENCOUNTER — OFFICE VISIT (OUTPATIENT)
Age: 45
End: 2025-06-30
Payer: MEDICAID

## 2025-06-30 VITALS
BODY MASS INDEX: 30.99 KG/M2 | TEMPERATURE: 99.5 F | HEIGHT: 65 IN | WEIGHT: 186 LBS | SYSTOLIC BLOOD PRESSURE: 124 MMHG | OXYGEN SATURATION: 99 % | HEART RATE: 98 BPM | DIASTOLIC BLOOD PRESSURE: 72 MMHG

## 2025-06-30 DIAGNOSIS — Z79.899 MEDICATION MANAGEMENT: ICD-10-CM

## 2025-06-30 DIAGNOSIS — G43.009 MIGRAINE WITHOUT AURA AND WITHOUT STATUS MIGRAINOSUS, NOT INTRACTABLE: ICD-10-CM

## 2025-06-30 DIAGNOSIS — M79.7 FIBROMYALGIA: ICD-10-CM

## 2025-06-30 DIAGNOSIS — G44.209 TENSION HEADACHE: ICD-10-CM

## 2025-06-30 DIAGNOSIS — E11.9 CONTROLLED TYPE 2 DIABETES MELLITUS WITHOUT COMPLICATION, WITHOUT LONG-TERM CURRENT USE OF INSULIN (HCC): Primary | ICD-10-CM

## 2025-06-30 DIAGNOSIS — Z99.89 CPAP (CONTINUOUS POSITIVE AIRWAY PRESSURE) DEPENDENCE: ICD-10-CM

## 2025-06-30 DIAGNOSIS — R23.2 HOT FLASHES: ICD-10-CM

## 2025-06-30 DIAGNOSIS — G93.31 POSTVIRAL FATIGUE SYNDROME: ICD-10-CM

## 2025-06-30 LAB
ALCOHOL URINE: NORMAL
AMPHETAMINE SCREEN URINE: NORMAL
BARBITURATE SCREEN URINE: NORMAL
BENZODIAZEPINE SCREEN, URINE: NORMAL
BUPRENORPHINE URINE: NORMAL
COCAINE METABOLITE SCREEN URINE: NORMAL
FENTANYL SCREEN, URINE: NORMAL
GABAPENTIN SCREEN, URINE: NORMAL
MDMA, URINE: NORMAL
METHADONE SCREEN, URINE: NORMAL
METHAMPHETAMINE, URINE: NORMAL
OPIATE SCREEN URINE: NORMAL
OXYCODONE SCREEN URINE: NORMAL
PHENCYCLIDINE SCREEN URINE: NORMAL
PROPOXYPHENE SCREEN, URINE: NORMAL
SYNTHETIC CANNABINOIDS(K2) SCREEN, URINE: NORMAL
THC SCREEN, URINE: NORMAL
TRAMADOL SCREEN URINE: NORMAL
TRICYCLIC ANTIDEPRESSANTS, UR: NORMAL

## 2025-06-30 PROCEDURE — 3074F SYST BP LT 130 MM HG: CPT | Performed by: NURSE PRACTITIONER

## 2025-06-30 PROCEDURE — 3078F DIAST BP <80 MM HG: CPT | Performed by: NURSE PRACTITIONER

## 2025-06-30 PROCEDURE — 80305 DRUG TEST PRSMV DIR OPT OBS: CPT | Performed by: NURSE PRACTITIONER

## 2025-06-30 PROCEDURE — 99214 OFFICE O/P EST MOD 30 MIN: CPT | Performed by: NURSE PRACTITIONER

## 2025-06-30 RX ORDER — MODAFINIL 200 MG/1
200 TABLET ORAL DAILY
Qty: 30 TABLET | Refills: 5 | Status: SHIPPED | OUTPATIENT
Start: 2025-06-30 | End: 2025-12-27

## 2025-06-30 RX ORDER — FEZOLINETANT 45 MG/1
1 TABLET, FILM COATED ORAL NIGHTLY
Qty: 30 TABLET | Refills: 11 | Status: SHIPPED | OUTPATIENT
Start: 2025-06-30

## 2025-06-30 RX ORDER — PREGABALIN 100 MG/1
100 CAPSULE ORAL 3 TIMES DAILY
Qty: 90 CAPSULE | Refills: 5 | Status: SHIPPED | OUTPATIENT
Start: 2025-06-30 | End: 2025-12-27

## 2025-06-30 ASSESSMENT — ENCOUNTER SYMPTOMS
SHORTNESS OF BREATH: 0
COUGH: 0
ABDOMINAL PAIN: 0
NAUSEA: 0
VOMITING: 0
DIARRHEA: 0
RECTAL PAIN: 0
WHEEZING: 0

## 2025-06-30 NOTE — PROGRESS NOTES
06- Called [facility]  spoke with [facility]   Notified that bid Pantoprazole has been approved by insurance

## 2025-06-30 NOTE — PROGRESS NOTES
Shirlene Tian (:  1980) is a 45 y.o. female, Established patient, here for evaluation of the following chief complaint(s):  6 Month Follow-Up (Patient is here today for a 6 month follow up for diabetes and hypertension. Patient forgot to do blood work. For the last week to two weeks she has had a headache. It hurts in different areas, some being tension, while others are more of a migraine or cluster headache. She has been trying mountain dew and BC powder, redbull and BC powder and nothing seems to help them. She also felt like she was running a fever for the last couple of weeks and is sweating at night. Not comfortable to sleep, insomnia.)         Assessment & Plan  1. Diabetes Mellitus: Chronic.  - Mounjaro 7.5 mg weekly and Farxiga 5 mg daily.  - Last lab in 2024.  - Advised to get blood work done soon.    2. Thyroid Management: Stable.  - Synthroid 25 mcg daily.  - Last thyroid lab in 2024 showed normal levels.  - Advised to continue current dosage and get thyroid levels checked soon.    3. Depression: Stable.  - Cymbalta twice a day.    4. Hyperlipidemia: Stable.  - Lipitor nightly for cholesterol management.    5. Hypertension: Stable.  - Losartan half a tablet daily, which also provides renal protection.    6. Edema: Stable.  - Bumex half a tablet daily.    7. Constipation: Stable.  - Linzess as needed.    8. Neuropathy: Chronic.  - Lyrica 100 mg three times a day (one in the morning and two at bedtime).  - Requires the brand name.    9. Narcolepsy: Stable.  - Provigil (modafinil) every morning.    10. Headaches: Acute.  - Reports tension headaches, sinus headaches, and cluster headaches over the past two weeks.  - Samples of Nurtec provided, to be taken every other day to prevent headaches or as needed.  - Advised to try taking half a dose of Zanaflex for tension headaches.    11. Hot Flashes: Acute.  - ZI will be started as the safest option at 45 mcg nightly.  - Zanaflex will also be

## 2025-07-17 ENCOUNTER — RESULTS FOLLOW-UP (OUTPATIENT)
Age: 45
End: 2025-07-17

## 2025-07-17 DIAGNOSIS — E55.9 VITAMIN D DEFICIENCY: Primary | ICD-10-CM

## 2025-07-17 DIAGNOSIS — E11.9 CONTROLLED TYPE 2 DIABETES MELLITUS WITHOUT COMPLICATION, WITHOUT LONG-TERM CURRENT USE OF INSULIN (HCC): ICD-10-CM

## 2025-07-17 LAB
25(OH)D3 SERPL-MCNC: 25.9 NG/ML
ALBUMIN SERPL-MCNC: 4.1 G/DL (ref 3.5–5.2)
ALP SERPL-CCNC: 93 U/L (ref 35–104)
ALT SERPL-CCNC: 10 U/L (ref 10–35)
ANION GAP SERPL CALCULATED.3IONS-SCNC: 12 MMOL/L (ref 8–16)
AST SERPL-CCNC: 15 U/L (ref 10–35)
BASOPHILS # BLD: 0.1 K/UL (ref 0–0.2)
BASOPHILS NFR BLD: 0.8 % (ref 0–1)
BILIRUB SERPL-MCNC: 0.4 MG/DL (ref 0.2–1.2)
BUN SERPL-MCNC: 19 MG/DL (ref 6–20)
CALCIUM SERPL-MCNC: 9.7 MG/DL (ref 8.6–10)
CHLORIDE SERPL-SCNC: 103 MMOL/L (ref 98–107)
CHOLEST SERPL-MCNC: 147 MG/DL (ref 0–199)
CO2 SERPL-SCNC: 25 MMOL/L (ref 22–29)
CREAT SERPL-MCNC: 0.8 MG/DL (ref 0.5–0.9)
CREAT UR-MCNC: 289 MG/DL (ref 28–217)
EOSINOPHIL # BLD: 0.1 K/UL (ref 0–0.6)
EOSINOPHIL NFR BLD: 1.4 % (ref 0–5)
ERYTHROCYTE [DISTWIDTH] IN BLOOD BY AUTOMATED COUNT: 13.9 % (ref 11.5–14.5)
GLUCOSE SERPL-MCNC: 83 MG/DL (ref 70–99)
HBA1C MFR BLD: 5.5 % (ref 4–5.6)
HCT VFR BLD AUTO: 43.1 % (ref 37–47)
HDLC SERPL-MCNC: 43 MG/DL (ref 40–60)
HGB BLD-MCNC: 13.1 G/DL (ref 12–16)
IMM GRANULOCYTES # BLD: 0 K/UL
LDLC SERPL CALC-MCNC: 90 MG/DL
LYMPHOCYTES # BLD: 2.8 K/UL (ref 1.1–4.5)
LYMPHOCYTES NFR BLD: 27.4 % (ref 20–40)
MCH RBC QN AUTO: 27.3 PG (ref 27–31)
MCHC RBC AUTO-ENTMCNC: 30.4 G/DL (ref 33–37)
MCV RBC AUTO: 90 FL (ref 81–99)
MICROALBUMIN UR-MCNC: 1.63 MG/DL (ref 0–1.99)
MICROALBUMIN/CREAT UR-RTO: 5.6 MG/G (ref 0–29)
MONOCYTES # BLD: 0.6 K/UL (ref 0–0.9)
MONOCYTES NFR BLD: 5.4 % (ref 0–10)
NEUTROPHILS # BLD: 6.5 K/UL (ref 1.5–7.5)
NEUTS SEG NFR BLD: 64.6 % (ref 50–65)
PLATELET # BLD AUTO: 405 K/UL (ref 130–400)
PMV BLD AUTO: 10.4 FL (ref 9.4–12.3)
POTASSIUM SERPL-SCNC: 3.9 MMOL/L (ref 3.5–5.1)
PROT SERPL-MCNC: 7.7 G/DL (ref 6.4–8.3)
RBC # BLD AUTO: 4.79 M/UL (ref 4.2–5.4)
SODIUM SERPL-SCNC: 140 MMOL/L (ref 136–145)
T4 FREE SERPL-MCNC: 1.32 NG/DL (ref 0.93–1.7)
TRIGL SERPL-MCNC: 68 MG/DL (ref 0–149)
TSH SERPL DL<=0.005 MIU/L-ACNC: 3.19 UIU/ML (ref 0.27–4.2)
WBC # BLD AUTO: 10.1 K/UL (ref 4.8–10.8)

## 2025-07-17 RX ORDER — ERGOCALCIFEROL 1.25 MG/1
50000 CAPSULE, LIQUID FILLED ORAL WEEKLY
Qty: 4 CAPSULE | Refills: 1 | Status: SHIPPED | OUTPATIENT
Start: 2025-07-17

## 2025-07-18 ENCOUNTER — OFFICE VISIT (OUTPATIENT)
Age: 45
End: 2025-07-18
Payer: MEDICAID

## 2025-07-18 VITALS
TEMPERATURE: 99.2 F | BODY MASS INDEX: 30.37 KG/M2 | OXYGEN SATURATION: 97 % | DIASTOLIC BLOOD PRESSURE: 74 MMHG | WEIGHT: 182.25 LBS | HEIGHT: 65 IN | HEART RATE: 77 BPM | SYSTOLIC BLOOD PRESSURE: 110 MMHG

## 2025-07-18 DIAGNOSIS — E55.9 VITAMIN D DEFICIENCY: ICD-10-CM

## 2025-07-18 DIAGNOSIS — L02.91 ABSCESS: ICD-10-CM

## 2025-07-18 DIAGNOSIS — G43.109 MIGRAINE WITH AURA AND WITHOUT STATUS MIGRAINOSUS, NOT INTRACTABLE: Primary | ICD-10-CM

## 2025-07-18 DIAGNOSIS — L72.0 EPIDERMAL INCLUSION CYST: ICD-10-CM

## 2025-07-18 DIAGNOSIS — N95.1 HOT FLASHES DUE TO MENOPAUSE: ICD-10-CM

## 2025-07-18 PROCEDURE — 3074F SYST BP LT 130 MM HG: CPT | Performed by: NURSE PRACTITIONER

## 2025-07-18 PROCEDURE — 3078F DIAST BP <80 MM HG: CPT | Performed by: NURSE PRACTITIONER

## 2025-07-18 PROCEDURE — 99214 OFFICE O/P EST MOD 30 MIN: CPT | Performed by: NURSE PRACTITIONER

## 2025-07-18 RX ORDER — SULFAMETHOXAZOLE AND TRIMETHOPRIM 800; 160 MG/1; MG/1
1 TABLET ORAL 2 TIMES DAILY
Qty: 20 TABLET | Refills: 0 | Status: SHIPPED | OUTPATIENT
Start: 2025-07-18 | End: 2025-07-28

## 2025-07-18 RX ORDER — DOXYCYCLINE 100 MG/1
100 CAPSULE ORAL 2 TIMES DAILY
COMMUNITY
Start: 2025-07-09

## 2025-07-18 RX ORDER — DOXYCYCLINE HYCLATE 100 MG
100 TABLET ORAL 2 TIMES DAILY
Qty: 20 TABLET | Refills: 0 | Status: SHIPPED | OUTPATIENT
Start: 2025-07-18 | End: 2025-07-28

## 2025-07-18 ASSESSMENT — ENCOUNTER SYMPTOMS
SHORTNESS OF BREATH: 0
WHEEZING: 0
RECTAL PAIN: 0
DIARRHEA: 0
ABDOMINAL PAIN: 0
NAUSEA: 0
COUGH: 0
VOMITING: 0

## 2025-07-18 NOTE — PROGRESS NOTES
Shirlene Tian (:  1980) is a 45 y.o. female, Established patient, here for evaluation of the following chief complaint(s):  6 Week Follow Up (Patient is here today for 6 week follow up for night sweats and headache. Patient states that night sweats have improved and headaches are getting better. She went and saw the dermatologist, was put on abx, and referred to general surgeon. She saw him Wednesday and he lanced it. Packing fell out last night would like it looked out and instruction on aftercare. Gen Surg was @ [facility] as well. )         Assessment & Plan  1. Hot flashes: Stable.  - Veozah has been effective in managing symptoms.  - Continue Veozah daily.  - Medication sent to pharmacy.    2. Migraines: Stable.  - Nurtec has been effective in managing migraines.  - Continue Nurtec as prescribed.  - Use Zanaflex as needed for tension headaches.  - Medication sent to pharmacy.    3. Epidermal inclusion cyst abscess: Acute.  - Cyst was lanced by a surgeon on 2025.  - Completed a 10-day course of doxycycline, ending tomorrow.  - Start Bactrim and doxycycline to cover staph and strep bacteria.  - Wash the area with soap and water.  - Ensure the area continues to drain.    4. Vitamin D deficiency.  - Vitamin D levels were low.  - Continue taking vitamin D supplements.  - Discussed importance of vitamin D for bone health and prevention of osteoporosis.  - Medication sent to pharmacy.    Follow-up  - Follow-up visit scheduled in 6 weeks.    Results  Labs   - Vitamin D level: Low  No results found for this visit on 25.    ICD-10-CM    1. Migraine with aura and without status migrainosus, not intractable  G43.109 Stable continue nurtec every other day   Along with zanaflex as needed      2. Hot flashes due to menopause  N95.1 At goal on veozah continue regimen      3. Epidermal inclusion cyst  L72.0 sulfamethoxazole-trimethoprim (BACTRIM DS;SEPTRA DS) 800-160 MG per tablet     doxycycline

## (undated) DEVICE — GLV SURG BIOGEL LTX PF 7 1/2

## (undated) DEVICE — PK SPINE CERV ANT 30

## (undated) DEVICE — CATHETER DIAG 6FR L100CM LUMN ID0.056IN JR4 CRV 0 SIDE H

## (undated) DEVICE — 3.0MM PRECISION ROUND

## (undated) DEVICE — PROXIMATE RH ROTATING HEAD SKIN STAPLERS (35 WIDE) CONTAINS 35 STAINLESS STEEL STAPLES: Brand: PROXIMATE

## (undated) DEVICE — PK TURNOVER RM ADV

## (undated) DEVICE — Device

## (undated) DEVICE — GLV SURG BIOGEL LTX PF 6 1/2

## (undated) DEVICE — DECANTER: Brand: UNBRANDED

## (undated) DEVICE — APPL CHLORAPREP W/TINT 26ML ORNG

## (undated) DEVICE — ELECTRD BLD EDGE/INSUL1P 2.4X5.1MM STRL

## (undated) DEVICE — HEMOST ABS GELFOAM GELATIN SPNG SZ100

## (undated) DEVICE — GLIDESHEATH SLENDER STAINLESS STEEL KIT: Brand: GLIDESHEATH SLENDER

## (undated) DEVICE — ENDO KIT,LOURDES HOSPITAL: Brand: MEDLINE INDUSTRIES, INC.

## (undated) DEVICE — SKIN AFFIX SURG ADHESIVE 72/CS 0.55ML: Brand: MEDLINE

## (undated) DEVICE — FORCEPS BX L240CM JAW DIA2.8MM L CAP W/ NDL MIC MESH TOOTH

## (undated) DEVICE — GUIDEWIRE VASC L260CM DIA0038IN TIP L3MM PTFE J TIP FIX COR

## (undated) DEVICE — TOWEL,OR,DSP,ST,BLUE,DLX,4/PK,20PK/CS: Brand: MEDLINE

## (undated) DEVICE — GLV SURG TRIUMPH GREEN W/ALOE PF LTX 8 STRL

## (undated) DEVICE — KIT MFLD ISOLATN NACL CNTRST PRT TBNG SPIK W/ PRSS TRNSDUC

## (undated) DEVICE — ANTIBACTERIAL UNDYED BRAIDED (POLYGLACTIN 910), SYNTHETIC ABSORBABLE SUTURE: Brand: COATED VICRYL

## (undated) DEVICE — CATH IV ANGIO FEP 12G 3IN LTBLU 10PK

## (undated) DEVICE — 4-PORT MANIFOLD: Brand: NEPTUNE 2

## (undated) DEVICE — GOWN,SIRUS,NONRNF,SETINSLV,XL,20/CS: Brand: MEDLINE

## (undated) DEVICE — BAND COMPR L24CM REG CLR PLAS HEMSTAT EXT HK AND LOOP RETEN

## (undated) DEVICE — FORCEPS BX L240CM DIA2.4MM L NDL RAD JAW 4 133340

## (undated) DEVICE — NDL SPINE 18G 31/2IN PNK

## (undated) DEVICE — CVR UNIV C/ARM

## (undated) DEVICE — CATHETER COR DIAG JUDKINS L 3.5 CRV 6FR 100CM 0 SIDE H

## (undated) DEVICE — SPNG DISSCT SECTO KTTNER PK/5

## (undated) DEVICE — KIT ANGIO W/ AT P65 PREM HND CTRL FOR CNTRST DEL ANGIOTOUCH

## (undated) DEVICE — Device: Brand: NOMOLINE™ LH ADULT NASAL CO2 CANNULA WITH O2 4M

## (undated) DEVICE — SUT MNCRYL 4/0 PS2 27IN UD MCP426H

## (undated) DEVICE — 5.0MM BARREL STRAIGHT FLUTE